# Patient Record
Sex: FEMALE | Race: WHITE | NOT HISPANIC OR LATINO | Employment: OTHER | URBAN - METROPOLITAN AREA
[De-identification: names, ages, dates, MRNs, and addresses within clinical notes are randomized per-mention and may not be internally consistent; named-entity substitution may affect disease eponyms.]

---

## 2017-03-23 ENCOUNTER — APPOINTMENT (OUTPATIENT)
Dept: LAB | Age: 62
End: 2017-03-23

## 2017-03-23 ENCOUNTER — TRANSCRIBE ORDERS (OUTPATIENT)
Dept: ADMINISTRATIVE | Age: 62
End: 2017-03-23

## 2017-03-23 DIAGNOSIS — Z00.00 ROUTINE GENERAL MEDICAL EXAMINATION AT A HEALTH CARE FACILITY: ICD-10-CM

## 2017-03-23 DIAGNOSIS — Z00.00 ROUTINE GENERAL MEDICAL EXAMINATION AT A HEALTH CARE FACILITY: Primary | ICD-10-CM

## 2017-03-23 PROCEDURE — 36415 COLL VENOUS BLD VENIPUNCTURE: CPT

## 2017-03-23 PROCEDURE — 86787 VARICELLA-ZOSTER ANTIBODY: CPT

## 2017-03-23 PROCEDURE — 86762 RUBELLA ANTIBODY: CPT

## 2017-03-23 PROCEDURE — 86480 TB TEST CELL IMMUN MEASURE: CPT

## 2017-03-23 PROCEDURE — 86765 RUBEOLA ANTIBODY: CPT

## 2017-03-23 PROCEDURE — 86735 MUMPS ANTIBODY: CPT

## 2017-03-24 LAB — RUBV IGG SERPL IA-ACNC: >175 IU/ML

## 2017-03-25 LAB
ANNOTATION COMMENT IMP: NORMAL
GAMMA INTERFERON BACKGROUND BLD IA-ACNC: 0.06 IU/ML
M TB IFN-G BLD-IMP: NEGATIVE
M TB IFN-G CD4+ BCKGRND COR BLD-ACNC: 0.16 IU/ML
M TB IFN-G CD4+ T-CELLS BLD-ACNC: 0.22 IU/ML
MITOGEN IGNF BLD-ACNC: >10 IU/ML
QUANTIFERON-TB GOLD IN TUBE: NORMAL
SERVICE CMNT-IMP: NORMAL

## 2017-03-28 LAB
MEV IGG SER QL: NORMAL
MUV IGG SER QL: NORMAL
VZV IGG SER IA-ACNC: NORMAL

## 2017-04-06 ENCOUNTER — TRANSCRIBE ORDERS (OUTPATIENT)
Dept: ADMINISTRATIVE | Facility: HOSPITAL | Age: 62
End: 2017-04-06

## 2017-04-06 DIAGNOSIS — Z13.820 SCREENING FOR OSTEOPOROSIS: Primary | ICD-10-CM

## 2017-04-13 ENCOUNTER — HOSPITAL ENCOUNTER (OUTPATIENT)
Dept: RADIOLOGY | Age: 62
Discharge: HOME | End: 2017-04-13
Payer: COMMERCIAL

## 2017-04-13 DIAGNOSIS — Z13.820 SCREENING FOR OSTEOPOROSIS: ICD-10-CM

## 2017-04-13 PROCEDURE — 77080 DXA BONE DENSITY AXIAL: CPT

## 2017-05-30 ENCOUNTER — APPOINTMENT (OUTPATIENT)
Dept: LAB | Age: 62
End: 2017-05-30
Attending: PREVENTIVE MEDICINE
Payer: COMMERCIAL

## 2017-05-30 ENCOUNTER — TRANSCRIBE ORDERS (OUTPATIENT)
Dept: ADMINISTRATIVE | Age: 62
End: 2017-05-30

## 2017-05-30 DIAGNOSIS — Z02.1 PRE-EMPLOYMENT HEALTH SCREENING EXAMINATION: ICD-10-CM

## 2017-05-30 DIAGNOSIS — Z02.1 PRE-EMPLOYMENT HEALTH SCREENING EXAMINATION: Primary | ICD-10-CM

## 2017-05-30 LAB — RUBV IGG SERPL IA-ACNC: >175 IU/ML

## 2017-05-30 PROCEDURE — 86762 RUBELLA ANTIBODY: CPT

## 2017-05-30 PROCEDURE — 86735 MUMPS ANTIBODY: CPT

## 2017-05-30 PROCEDURE — 86480 TB TEST CELL IMMUN MEASURE: CPT

## 2017-05-30 PROCEDURE — 86787 VARICELLA-ZOSTER ANTIBODY: CPT

## 2017-05-30 PROCEDURE — 36415 COLL VENOUS BLD VENIPUNCTURE: CPT

## 2017-05-30 PROCEDURE — 86765 RUBEOLA ANTIBODY: CPT

## 2017-06-01 LAB
ANNOTATION COMMENT IMP: NORMAL
GAMMA INTERFERON BACKGROUND BLD IA-ACNC: 0.03 IU/ML
M TB IFN-G BLD-IMP: NEGATIVE
M TB IFN-G CD4+ BCKGRND COR BLD-ACNC: 0.01 IU/ML
M TB IFN-G CD4+ T-CELLS BLD-ACNC: 0.04 IU/ML
MEV IGG SER QL: NORMAL
MITOGEN IGNF BLD-ACNC: 6.35 IU/ML
MUV IGG SER QL: NORMAL
QUANTIFERON-TB GOLD IN TUBE: NORMAL
SERVICE CMNT-IMP: NORMAL
VZV IGG SER IA-ACNC: NORMAL

## 2017-06-13 ENCOUNTER — TRANSCRIBE ORDERS (OUTPATIENT)
Dept: ADMINISTRATIVE | Age: 62
End: 2017-06-13

## 2017-06-13 ENCOUNTER — APPOINTMENT (OUTPATIENT)
Dept: LAB | Age: 62
End: 2017-06-13
Payer: COMMERCIAL

## 2017-06-13 DIAGNOSIS — E03.9 UNSPECIFIED HYPOTHYROIDISM: ICD-10-CM

## 2017-06-13 DIAGNOSIS — E03.9 UNSPECIFIED HYPOTHYROIDISM: Primary | ICD-10-CM

## 2017-06-13 LAB
ALBUMIN SERPL BCP-MCNC: 3.9 G/DL (ref 3.5–5)
ALP SERPL-CCNC: 153 U/L (ref 46–116)
ALT SERPL W P-5'-P-CCNC: 21 U/L (ref 12–78)
ANION GAP SERPL CALCULATED.3IONS-SCNC: 5 MMOL/L (ref 4–13)
AST SERPL W P-5'-P-CCNC: 17 U/L (ref 5–45)
BACTERIA UR QL AUTO: ABNORMAL /HPF
BASOPHILS # BLD AUTO: 0.01 THOUSANDS/ΜL (ref 0–0.1)
BASOPHILS NFR BLD AUTO: 0 % (ref 0–1)
BILIRUB SERPL-MCNC: 0.9 MG/DL (ref 0.2–1)
BILIRUB UR QL STRIP: NEGATIVE
BUN SERPL-MCNC: 24 MG/DL (ref 5–25)
CALCIUM SERPL-MCNC: 9.5 MG/DL (ref 8.3–10.1)
CHLORIDE SERPL-SCNC: 105 MMOL/L (ref 100–108)
CHOLEST SERPL-MCNC: 211 MG/DL (ref 50–200)
CLARITY UR: ABNORMAL
CO2 SERPL-SCNC: 29 MMOL/L (ref 21–32)
COLOR UR: YELLOW
CREAT SERPL-MCNC: 0.79 MG/DL (ref 0.6–1.3)
EOSINOPHIL # BLD AUTO: 0.12 THOUSAND/ΜL (ref 0–0.61)
EOSINOPHIL NFR BLD AUTO: 2 % (ref 0–6)
ERYTHROCYTE [DISTWIDTH] IN BLOOD BY AUTOMATED COUNT: 13.4 % (ref 11.6–15.1)
GFR SERPL CREATININE-BSD FRML MDRD: >60 ML/MIN/1.73SQ M
GLUCOSE P FAST SERPL-MCNC: 91 MG/DL (ref 65–99)
GLUCOSE UR STRIP-MCNC: NEGATIVE MG/DL
HCT VFR BLD AUTO: 41.4 % (ref 34.8–46.1)
HCV AB SER QL: NORMAL
HDLC SERPL-MCNC: 62 MG/DL (ref 40–60)
HGB BLD-MCNC: 13.8 G/DL (ref 11.5–15.4)
HGB UR QL STRIP.AUTO: NEGATIVE
HYALINE CASTS #/AREA URNS LPF: ABNORMAL /LPF
KETONES UR STRIP-MCNC: NEGATIVE MG/DL
LDLC SERPL CALC-MCNC: 109 MG/DL (ref 0–100)
LDLC SERPL DIRECT ASSAY-MCNC: 118 MG/DL (ref 0–100)
LEUKOCYTE ESTERASE UR QL STRIP: ABNORMAL
LYMPHOCYTES # BLD AUTO: 1.67 THOUSANDS/ΜL (ref 0.6–4.47)
LYMPHOCYTES NFR BLD AUTO: 32 % (ref 14–44)
MCH RBC QN AUTO: 29.6 PG (ref 26.8–34.3)
MCHC RBC AUTO-ENTMCNC: 33.3 G/DL (ref 31.4–37.4)
MCV RBC AUTO: 89 FL (ref 82–98)
MONOCYTES # BLD AUTO: 0.48 THOUSAND/ΜL (ref 0.17–1.22)
MONOCYTES NFR BLD AUTO: 9 % (ref 4–12)
NEUTROPHILS # BLD AUTO: 2.98 THOUSANDS/ΜL (ref 1.85–7.62)
NEUTS SEG NFR BLD AUTO: 57 % (ref 43–75)
NITRITE UR QL STRIP: NEGATIVE
NON-SQ EPI CELLS URNS QL MICRO: ABNORMAL /HPF
NRBC BLD AUTO-RTO: 0 /100 WBCS
PH UR STRIP.AUTO: 6 [PH] (ref 4.5–8)
PLATELET # BLD AUTO: 284 THOUSANDS/UL (ref 149–390)
PMV BLD AUTO: 10.2 FL (ref 8.9–12.7)
POTASSIUM SERPL-SCNC: 4.6 MMOL/L (ref 3.5–5.3)
PROT SERPL-MCNC: 7.5 G/DL (ref 6.4–8.2)
PROT UR STRIP-MCNC: NEGATIVE MG/DL
RBC # BLD AUTO: 4.67 MILLION/UL (ref 3.81–5.12)
RBC #/AREA URNS AUTO: ABNORMAL /HPF
SODIUM SERPL-SCNC: 139 MMOL/L (ref 136–145)
SP GR UR STRIP.AUTO: 1.02 (ref 1–1.03)
TRIGL SERPL-MCNC: 201 MG/DL
TSH SERPL DL<=0.05 MIU/L-ACNC: 3.18 UIU/ML (ref 0.36–3.74)
UROBILINOGEN UR QL STRIP.AUTO: 0.2 E.U./DL
WBC # BLD AUTO: 5.27 THOUSAND/UL (ref 4.31–10.16)
WBC #/AREA URNS AUTO: ABNORMAL /HPF

## 2017-06-13 PROCEDURE — 81001 URINALYSIS AUTO W/SCOPE: CPT | Performed by: INTERNAL MEDICINE

## 2017-06-13 PROCEDURE — 86803 HEPATITIS C AB TEST: CPT

## 2017-06-13 PROCEDURE — 85025 COMPLETE CBC W/AUTO DIFF WBC: CPT

## 2017-06-13 PROCEDURE — 36415 COLL VENOUS BLD VENIPUNCTURE: CPT

## 2017-06-13 PROCEDURE — 80053 COMPREHEN METABOLIC PANEL: CPT

## 2017-06-13 PROCEDURE — 84443 ASSAY THYROID STIM HORMONE: CPT

## 2017-06-13 PROCEDURE — 80061 LIPID PANEL: CPT

## 2017-06-13 PROCEDURE — 83721 ASSAY OF BLOOD LIPOPROTEIN: CPT

## 2017-11-14 ENCOUNTER — GENERIC CONVERSION - ENCOUNTER (OUTPATIENT)
Dept: OTHER | Facility: OTHER | Age: 62
End: 2017-11-14

## 2017-11-14 ENCOUNTER — HOSPITAL ENCOUNTER (OUTPATIENT)
Dept: RADIOLOGY | Facility: HOSPITAL | Age: 62
Discharge: HOME | End: 2017-11-14
Attending: ORTHOPAEDIC SURGERY
Payer: COMMERCIAL

## 2017-11-14 ENCOUNTER — TRANSCRIBE ORDERS (OUTPATIENT)
Dept: ADMINISTRATIVE | Facility: HOSPITAL | Age: 62
End: 2017-11-14

## 2017-11-14 DIAGNOSIS — M25.561 PAIN IN RIGHT KNEE: ICD-10-CM

## 2017-11-14 DIAGNOSIS — T84.023A: Primary | ICD-10-CM

## 2017-11-14 DIAGNOSIS — M19.079 PRIMARY OSTEOARTHRITIS, UNSPECIFIED ANKLE AND FOOT: ICD-10-CM

## 2017-11-14 DIAGNOSIS — M21.42 ACQUIRED LEFT FLAT FOOT: ICD-10-CM

## 2017-11-14 DIAGNOSIS — T84.023A DISPLACEMENT OF INTERNAL LEFT KNEE PROSTHESIS, INITIAL ENCOUNTER (HCC): Primary | ICD-10-CM

## 2017-11-14 PROCEDURE — 73560 X-RAY EXAM OF KNEE 1 OR 2: CPT

## 2017-11-15 ENCOUNTER — HOSPITAL ENCOUNTER (OUTPATIENT)
Dept: MAMMOGRAPHY | Facility: MEDICAL CENTER | Age: 62
Discharge: HOME | End: 2017-11-15
Payer: COMMERCIAL

## 2017-11-15 DIAGNOSIS — Z12.31 ENCOUNTER FOR SCREENING MAMMOGRAM FOR MALIGNANT NEOPLASM OF BREAST: ICD-10-CM

## 2017-11-15 PROCEDURE — 77063 BREAST TOMOSYNTHESIS BI: CPT

## 2017-11-15 PROCEDURE — G0202 SCR MAMMO BI INCL CAD: HCPCS

## 2017-11-20 ENCOUNTER — APPOINTMENT (OUTPATIENT)
Dept: LAB | Facility: MEDICAL CENTER | Age: 62
End: 2017-11-20
Payer: COMMERCIAL

## 2017-11-20 DIAGNOSIS — M25.561 PAIN IN RIGHT KNEE: ICD-10-CM

## 2017-11-20 LAB
CRP SERPL QL: <3 MG/L
ERYTHROCYTE [SEDIMENTATION RATE] IN BLOOD: 14 MM/HOUR (ref 0–20)

## 2017-11-20 PROCEDURE — 36415 COLL VENOUS BLD VENIPUNCTURE: CPT

## 2017-11-20 PROCEDURE — 86140 C-REACTIVE PROTEIN: CPT

## 2017-11-20 PROCEDURE — 85652 RBC SED RATE AUTOMATED: CPT

## 2017-11-28 ENCOUNTER — TRANSCRIBE ORDERS (OUTPATIENT)
Dept: RADIOLOGY | Facility: HOSPITAL | Age: 62
End: 2017-11-28

## 2017-11-28 ENCOUNTER — HOSPITAL ENCOUNTER (OUTPATIENT)
Dept: RADIOLOGY | Facility: HOSPITAL | Age: 62
Discharge: HOME | End: 2017-11-28
Payer: COMMERCIAL

## 2017-11-28 DIAGNOSIS — M79.672 LEFT FOOT PAIN: ICD-10-CM

## 2017-11-28 PROCEDURE — 77002 NEEDLE LOCALIZATION BY XRAY: CPT

## 2017-11-28 PROCEDURE — 20605 DRAIN/INJ JOINT/BURSA W/O US: CPT

## 2017-11-28 RX ORDER — LIDOCAINE HYDROCHLORIDE 10 MG/ML
20 INJECTION, SOLUTION INFILTRATION; PERINEURAL
Status: COMPLETED | OUTPATIENT
Start: 2017-11-28 | End: 2017-11-28

## 2017-11-28 RX ORDER — METHYLPREDNISOLONE ACETATE 80 MG/ML
80 INJECTION, SUSPENSION INTRA-ARTICULAR; INTRALESIONAL; INTRAMUSCULAR; SOFT TISSUE
Status: COMPLETED | OUTPATIENT
Start: 2017-11-28 | End: 2017-11-28

## 2017-11-28 RX ORDER — BUPIVACAINE HYDROCHLORIDE 2.5 MG/ML
30 INJECTION, SOLUTION EPIDURAL; INFILTRATION; INTRACAUDAL
Status: COMPLETED | OUTPATIENT
Start: 2017-11-28 | End: 2017-11-28

## 2017-11-28 RX ADMIN — BUPIVACAINE HYDROCHLORIDE 2 ML: 2.5 INJECTION, SOLUTION EPIDURAL; INFILTRATION; INTRACAUDAL at 09:25

## 2017-11-28 RX ADMIN — IOHEXOL 3 ML: 300 INJECTION, SOLUTION INTRAVENOUS at 09:25

## 2017-11-28 RX ADMIN — LIDOCAINE HYDROCHLORIDE 2 ML: 10 INJECTION, SOLUTION INFILTRATION; PERINEURAL at 09:25

## 2017-11-28 RX ADMIN — METHYLPREDNISOLONE ACETATE 80 MG: 80 INJECTION, SUSPENSION INTRA-ARTICULAR; INTRALESIONAL; INTRAMUSCULAR; SOFT TISSUE at 09:25

## 2017-12-04 ENCOUNTER — HOSPITAL ENCOUNTER (OUTPATIENT)
Dept: NUCLEAR MEDICINE | Facility: HOSPITAL | Age: 62
Discharge: HOME | End: 2017-12-04
Attending: ORTHOPAEDIC SURGERY
Payer: COMMERCIAL

## 2017-12-04 ENCOUNTER — GENERIC CONVERSION - ENCOUNTER (OUTPATIENT)
Dept: OTHER | Facility: OTHER | Age: 62
End: 2017-12-04

## 2017-12-04 DIAGNOSIS — T84.023A DISPLACEMENT OF INTERNAL LEFT KNEE PROSTHESIS, INITIAL ENCOUNTER (HCC): ICD-10-CM

## 2017-12-04 PROCEDURE — A9503 TC99M MEDRONATE: HCPCS

## 2017-12-04 PROCEDURE — 78315 BONE IMAGING 3 PHASE: CPT

## 2017-12-19 ENCOUNTER — ALLSCRIPTS OFFICE VISIT (OUTPATIENT)
Dept: OTHER | Facility: OTHER | Age: 62
End: 2017-12-19

## 2017-12-19 DIAGNOSIS — T84.023A: ICD-10-CM

## 2017-12-20 NOTE — PROGRESS NOTES
Assessment  1  Acute pain of right knee (529 46) (M25 561)   2  Mechanical complication of internal orthopedic device, implant or graft, initial encounter (435 40) (A53 373H)  Medical complication of internal orthopedic device in the right knee this patient she has pain and dysfunction  I think revision knee replacement surgeries indicated  After review the risks and benefits, consent was established the above-mentioned surgery  No guarantees are given  I would welcome the opportunity see this patient back in the office is a postoperative patient   Plan  Instability of internal left knee prosthesis, initial encounter    · (1) CBC/PLT/DIFF; Status:Active - Retrospective Authorization; Requestedfor:65Wgc6908;    · (1) COMPREHENSIVE METABOLIC PANEL; Status:Active - Retrospective Authorization; Requested for:29Gpu9749;    · (1) HEMOGLOBIN A1C; Status:Active - Retrospective Authorization; Requestedfor:05Tpy1582;    · (1) PT WITH INR; Status:Active - Retrospective Authorization; Requested for:96Ktd7633;   · (1) TYPE & SCREEN; Status:Active - Retrospective Authorization; Requestedfor:04Pyn3318;   currently receiving a biologic? : No  the patient pregnant or have they been in the last 90 days? : No  the patient been transfused in the last 3 months? : No  of Surgery : 22Jan2018  or PreOp : PreOp   · (1) URINALYSIS (will reflex a microscopy if leukocytes, occult blood, protein or nitritesare not within normal limits); Status:Active - Retrospective Authorization; Requestedfor:41Wpo9055;    · ECG 12-LEAD; Status:Hold For - Scheduling,Retrospective Authorization;  Requestedfor:71Zkm1093;    · *1 - [facility]  *  Status: Active - Retrospective Authorization Requested for: 47CWD1940  Care Summary provided  : Yes  Mechanical complication of internal orthopedic device, implant or graft, initial encounter    · Follow Up After Surgery Evaluation and Treatment  Follow-up  Status: Hold For -Scheduling  Requested for: 85ELV6168   · Schedule Surgery Treatment  Procedure  Status: Hold For - Scheduling  Requested for:77Gco4062    History of Present Illness  HPI: 15-year-old female now a few years following right total knee replacement, continue describe pain in the right knee, as well as giving way  She describes no febrile episodes, she has undergone bone scanning at my request      Review of Systems   Constitutional: No fever, no chills, feels well, no tiredness, no recent weight gain or loss  Eyes: No complaints of eyesight problems, no red eyes  ENT: no loss of hearing, no nosebleeds, no sore throat  Cardiovascular: No complaints of chest pain, no palpitations, no leg claudication or lower extremity edema  Respiratory: no compliants of shortness of breath, no wheezing, no cough  Gastrointestinal: no complaints of abdominal pain, no constipation, no nausea or diarrhea, no vomiting, no bloody stools  Genitourinary: no complaints of dysuria, no incontinence  Musculoskeletal: as noted in HPI  Integumentary: no complaints of skin rash or lesion, no itching or dry skin, no skin wounds  Neurological: no complaints of headache, no confusion, no numbness or tingling, no dizziness  Endocrine: No complaints of muscle weakness, no feelings of weakness, no frequent urination, no excessive thirst   Psychiatric: No suicidal thoughts, no anxiety, no feelings of depression  Active Problems  1  Acute pain of right knee (719 46) (M25 561)   2  Arthritis, midfoot (716 97) (M19 079)   3  De Quervain's tenosynovitis (727 04) (M65 4)   4  Instability of internal left knee prosthesis, initial encounter (047 42) (T84 023A)   5  Left Peroneal Tendonitis (726 79)   6  Pain in left foot (729 5) (M79 672)   7  Pain of right thumb (729 5) (M79 644)   8  Posterior tibial tendon dysfunction (PTTD) of left lower extremity (734) (M21 42)   9   Reported Hx Of Knee Replacement    Past Medical History   · History of diverticulitis of colon (V12 79) (Z87 19)   · History of Primary osteoarthritis of right knee (715 16) (M17 11)    The active problems and past medical history were reviewed and updated today  Surgical History   · Reported Hx Of Knee Replacement    The surgical history was reviewed and updated today  Family History  Mother    · Family history of Arthritis (V17 7)   · Family history of Osteoporosis (V17 81)  Family History    · Family history of Prostate Cancer (V16 42)    Social History   · Being A Social Drinker   · Current Diet High In Sugar Includes High Sugar Beverages   · Daily Tea Consumption (___ Cups/Day)    Current Meds   1  Ativan 0 5 MG Oral Tablet; Therapy: (Rosanna Kahn) to Recorded   2  Ibuprofen 600 MG Oral Tablet; Therapy: 63GLF4516 to (Last Rx:54Rva8897)  Requested for: 10RDD9104 Ordered   3  Keflex TABS; Therapy: (Rosanna Kahn) to Recorded   4  Levothyroxine Sodium 50 MCG Oral Tablet; Therapy: 01LJI0501 to (Evaluate:73Ger3156) Recorded   5  LORazepam 1 MG Oral Tablet; Therapy: 34PAZ4412 to (Evaluate:24Mtq4308) Recorded   6  Premarin TABS; Therapy: (Rosanna Kahn) to Recorded   7  Synthroid TABS; Therapy: (Rosanna Kahn) to Recorded   8  Voltaren 1 % Transdermal Gel; APPLY SPARINGLY TO AFFECTED AREA(S) ONCE DAILY; Therapy: 02DET4459 to (Last Rx:06Jun2014)  Requested for: 06Jun2014 Ordered    Allergies  1  Codeine Sulfate TABS   2  Demerol SOLN   3  Percocet TABS    Vitals  Signs   Heart Rate: 78  Systolic: 179  Diastolic: 84  Height: 5 ft 2 in  Weight: 157 lb 4 oz  BMI Calculated: 28 76  BSA Calculated: 1 73    Physical Exam  Gait pattern has very little antalgia  Right thighs devoid of atrophy  Right knee with a healed anterior incision  Extension is good flexion good  There is no warmth  There is no effusion  Her 7Â° of valgus given full extension and mid flexion  Calf compartments are soft and supple    Toes are warm, sensate, mobile      Results/Data  I personally reviewed the films/images/results in the office today  My interpretation follows  Diagnostic Review Bone scan review of the right knee shows increased uptake in the medial femur, and medial proximal tibia of the right knee  Surgery Scheduling Form  Surgery Schedule Form [facility] Standard:  Location: [place]   Confirmation Number:   PROCEDURE DETAILS   Procedure Date:   Requested Time:  Surgeon: pramod   Co-Surgeon:   [facility] [address] Required:  Procedure: Revision right total knee replacement  Bed: Med/Surg Bed Required  Laterality/Level: Right  Case Length: 120  Anticipated frozen section:  Anesthesia: spinal/adductor    Procedure Codes: 91066  Pre-op diagnosis: Mechanical complication right total knee replacement   Diagnosis Code(s):   Equipment:  Equipment Needs: depuy revision   Implants:    Is the patient able to walk up a flight of stairs, walk up a hill or do heavy housework WITHOUT having chest pain or shortness of breath? YES    REGISTRATION & FINANCIAL CLEARANCE   FA Initials:   Insurance:   Policy Number: Group Number:     PRE-ADMISSION TESTING/CLINICAL INFORMATION   PAT Location:       CONSULTS NEEDED:   Anesthesia Consult:   Medical Consult:   Cardiac Consult:    ALLERGIES AND ALERTS    Latex Allergy: NO  Penicillin Allergy: NO  Malignant Hyperthermia: NO  Diabetic Patient: NO     ERAS Patient:   COMMENTS   Scheduling Information Provided By:     CASE MANAGEMENT:  Discharge Needs: Complex Medication Follow-Up-- and-- Lovenox Management      Future Appointments    Date/Time Provider Specialty Site   01/22/2018 09:30 AM MARIPOSA Ann  [address] [place] [address]   01/30/2018 01:30 PM MARIPOSA Ann  Orthopedic Surgery [facility] [place]   02/06/2018 11:00 AM MARIPOSA Ann   Orthopedic Surgery [address]     Signatures   Electronically signed by : MARIPOSA Rascon ; Dec 19 2017  4:56PM EST                       (Author)

## 2018-01-04 ENCOUNTER — APPOINTMENT (OUTPATIENT)
Dept: PHYSICAL THERAPY | Facility: MEDICAL CENTER | Age: 63
End: 2018-01-04
Payer: COMMERCIAL

## 2018-01-04 ENCOUNTER — GENERIC CONVERSION - ENCOUNTER (OUTPATIENT)
Dept: OBGYN CLINIC | Facility: HOSPITAL | Age: 63
End: 2018-01-04

## 2018-01-04 DIAGNOSIS — T84.023A: ICD-10-CM

## 2018-01-04 PROCEDURE — 97161 PT EVAL LOW COMPLEX 20 MIN: CPT

## 2018-01-04 PROCEDURE — G8991 OTHER PT/OT GOAL STATUS: HCPCS

## 2018-01-04 PROCEDURE — G8990 OTHER PT/OT CURRENT STATUS: HCPCS

## 2018-01-06 ENCOUNTER — OFFICE VISIT (OUTPATIENT)
Dept: URGENT CARE | Facility: MEDICAL CENTER | Age: 63
End: 2018-01-06
Payer: COMMERCIAL

## 2018-01-06 PROCEDURE — G0382 LEV 3 HOSP TYPE B ED VISIT: HCPCS

## 2018-01-06 PROCEDURE — S9083 URGENT CARE CENTER GLOBAL: HCPCS

## 2018-01-08 NOTE — PROGRESS NOTES
Assessment   1  Cough (786 2) (R05)   2  Acute bronchitis (466 0) (J20 9)    Plan   Acute bronchitis    · Azithromycin 250 MG Oral Tablet; TAKE 2 TABLETS ON DAY 1 THEN TAKE 1    TABLET A DAY FOR 4 DAYS   · PredniSONE 50 MG Oral Tablet; TAKE 1 TABLET DAILY AS DIRECTED   · Ventolin  (90 Base) MCG/ACT Inhalation Aerosol Solution; INHALE 1    PUFF EVERY 4 HOURS AS NEEDED  Unlinked    · Keflex TABS    Discussion/Summary   Discussion Summary: You symptoms are likely consistent with acute bronchitis  Use given medications as per label instructions  Keep a close eye on the symptoms especially for worsening cough or worsening fever or both and if any, follow up with primary care doctor for evaluation in 5-7 days or sooner  Medication Side Effects Reviewed: Possible side effects of new medications were reviewed with the patient/guardian today  Understands and agrees with treatment plan: The treatment plan was reviewed with the patient/guardian  The patient/guardian understands and agrees with the treatment plan      Chief Complaint   1  Cold Symptoms  Chief Complaint Free Text Note Form: Pt with complaints of dry, non productive cough, post nasal drip for 3 days  Denies fever, chills or bodyaches  History of Present Illness   HPI: Coughing all the time which is nonproductive also present at night and unable to sleep at night secondary to cough  No fever chills or night sweats  Hospital Based Practices Required Assessment:      Pain Assessment      the patient states they do not have pain  (on a scale of 0 to 10, the patient rates the pain at 0 )      Abuse And Domestic Violence Screen       Yes, the patient is safe at home  -- The patient states no one is hurting them  Depression And Suicide Screen  No, the patient has not had thoughts of hurting themself  No, the patient has not felt depressed in the past 7 days  Prefered Language is  english  Primary Language is  english      Cold Symptoms: Caryn Zapata presents with complaints of cold symptoms  Associated symptoms include nasal congestion-- and-- dry cough, but-- no runny nose,-- no scratchy throat,-- no sore throat,-- no hoarseness,-- no productive cough,-- no facial pressure,-- no facial pain,-- no headache,-- no plugged ear(s),-- no ear pain,-- no swollen lymph nodes,-- no wheezing,-- no shortness of breath,-- no fatigue,-- no weakness,-- no nausea,-- no vomiting,-- no diarrhea,-- no fever-- and-- no chills  Review of Systems   Focused-Female:      Constitutional: No fever, no chills, feels well, no tiredness, no recent weight gain or loss  Active Problems   1  Acute pain of right knee (719 46) (M25 561)   2  Arthritis, midfoot (716 97) (M19 079)   3  De Quervain's tenosynovitis (727 04) (M65 4)   4  Instability of internal left knee prosthesis, initial encounter (996 42) (T84 023A)   5  Left Peroneal Tendonitis (726 79)   6  Mechanical complication of internal orthopedic device, implant or graft, initial encounter     (996 40) (T84 498A)   7  Pain in left foot (729 5) (M79 672)   8  Pain of right thumb (729 5) (M79 644)   9  Posterior tibial tendon dysfunction (PTTD) of left lower extremity (734) (M21 42)   10  Reported Hx Of Knee Replacement    Past Medical History   1  History of diverticulitis of colon (V12 79) (Z87 19)   2  History of Primary osteoarthritis of right knee (715 16) (M17 11)    Family History   Mother    1  Family history of Arthritis (V17 7)   2  Family history of Osteoporosis (V17 81)  Family History    3  Family history of Prostate Cancer (V16 42)    Social History    · Being A Social Drinker   · Current Diet High In Sugar Includes High Sugar Beverages   · Daily Tea Consumption (___ Cups/Day)    Surgical History   1  Reported Hx Of Knee Replacement    Current Meds    1  Folic Acid 1 MG Oral Tablet; Therapy: ((47) 6672 0525) to Recorded   2  Ibuprofen 600 MG Oral Tablet;      Therapy: 48OYT5331 to (Last Rx:12Bqc4853)  Requested for: 73Hqz2471 Ordered   3  Iron 325 (65 Fe) MG Oral Tablet; Therapy: (JAQFXQBZ:75YSX2332) to Recorded   4  Keflex TABS; Therapy: (Marquis Nolan) to Recorded   5  Levothyroxine Sodium 50 MCG Oral Tablet; Therapy: 08EVR3299 to (Evaluate:08Hkg5508) Recorded   6  Melatonin 1 MG Oral Tablet; Therapy: (Recorded:06Jan2018) to Recorded    Allergies   1  Codeine Sulfate TABS   2  Demerol SOLN   3  Percocet TABS    Vitals   Signs   Recorded: 11OXF6313 08:08AM   Temperature: 98 F  Heart Rate: 90  Respiration: 20  Systolic: 803  Diastolic: 62  Height: 5 ft 2 in  Weight: 157 lb 12 8 oz  BMI Calculated: 28 86  BSA Calculated: 1 73  O2 Saturation: 99  Pain Scale: 0    Physical Exam        Constitutional      General appearance: No acute distress, well appearing and well nourished  Ears, Nose, Mouth, and Throat      External inspection of ears and nose: Normal        Otoscopic examination: Tympanic membranes translucent with normal light reflex  Canals patent without erythema  Nasal mucosa, septum, and turbinates: Abnormal   There was a mucoid discharge from both nares  Oropharynx: Normal with no erythema, edema, exudate or lesions  Pulmonary      Respiratory effort: No increased work of breathing or signs of respiratory distress  Auscultation of lungs: Abnormal  -- (Patient has repeated bronchospasm with deep breathing and end expiratory wheeze noticed scattered in all over the lungs bilaterally  No other adventitious findings noticed on examination)      Cardiovascular      Auscultation of heart: Normal rate and rhythm, normal S1 and S2, without murmurs  Future Appointments      Date/Time Provider Specialty Site   01/22/2018 09:30 AM MARIPOSA Mendez  [address] [place] [address] OR   01/30/2018 01:30 PM MARIPOSA Mendez   Orthopedic Surgery [facility] [place]   02/06/2018 11:00 AM Demetri MARIPOSA Cohen   Orthopedic [address] [place] [address]     Signatures    Electronically signed by : MRAIPOSA Albert ; Jan 7 2018 12:01PM EST                       (Author)

## 2018-01-15 ENCOUNTER — APPOINTMENT (OUTPATIENT)
Dept: PREADMISSION TESTING | Facility: HOSPITAL | Age: 63
End: 2018-01-15
Payer: COMMERCIAL

## 2018-01-15 ENCOUNTER — GENERIC CONVERSION - ENCOUNTER (OUTPATIENT)
Dept: OTHER | Facility: OTHER | Age: 63
End: 2018-01-15

## 2018-01-15 DIAGNOSIS — T84.023A: ICD-10-CM

## 2018-01-15 LAB
ABO GROUP BLD: NORMAL
ALBUMIN SERPL BCP-MCNC: 3.6 G/DL (ref 3.5–5)
ALP SERPL-CCNC: 125 U/L (ref 46–116)
ALT SERPL W P-5'-P-CCNC: 28 U/L (ref 12–78)
ANION GAP SERPL CALCULATED.3IONS-SCNC: 7 MMOL/L (ref 4–13)
AST SERPL W P-5'-P-CCNC: 15 U/L (ref 5–45)
BACTERIA UR QL AUTO: ABNORMAL /HPF
BASOPHILS # BLD MANUAL: 0 THOUSAND/UL (ref 0–0.1)
BASOPHILS NFR MAR MANUAL: 0 % (ref 0–1)
BILIRUB SERPL-MCNC: 0.68 MG/DL (ref 0.2–1)
BILIRUB UR QL STRIP: NEGATIVE
BLD GP AB SCN SERPL QL: NEGATIVE
BUN SERPL-MCNC: 20 MG/DL (ref 5–25)
CALCIUM SERPL-MCNC: 9.6 MG/DL (ref 8.3–10.1)
CAOX CRY URNS QL MICRO: ABNORMAL /HPF
CHLORIDE SERPL-SCNC: 103 MMOL/L (ref 100–108)
CLARITY UR: ABNORMAL
CO2 SERPL-SCNC: 31 MMOL/L (ref 21–32)
COLOR UR: YELLOW
CREAT SERPL-MCNC: 0.7 MG/DL (ref 0.6–1.3)
EOSINOPHIL # BLD MANUAL: 0.31 THOUSAND/UL (ref 0–0.4)
EOSINOPHIL NFR BLD MANUAL: 4 % (ref 0–6)
ERYTHROCYTE [DISTWIDTH] IN BLOOD BY AUTOMATED COUNT: 14.3 % (ref 11.6–15.1)
EST. AVERAGE GLUCOSE BLD GHB EST-MCNC: 128 MG/DL
GFR SERPL CREATININE-BSD FRML MDRD: 93 ML/MIN/1.73SQ M
GLUCOSE P FAST SERPL-MCNC: 97 MG/DL (ref 65–99)
GLUCOSE UR STRIP-MCNC: NEGATIVE MG/DL
HBA1C MFR BLD: 6.1 % (ref 4.2–6.3)
HCT VFR BLD AUTO: 42.5 % (ref 34.8–46.1)
HGB BLD-MCNC: 13.9 G/DL (ref 11.5–15.4)
HGB UR QL STRIP.AUTO: NEGATIVE
INR PPP: 0.93 (ref 0.86–1.16)
KETONES UR STRIP-MCNC: NEGATIVE MG/DL
LEUKOCYTE ESTERASE UR QL STRIP: ABNORMAL
LYMPHOCYTES # BLD AUTO: 1.86 THOUSAND/UL (ref 0.6–4.47)
LYMPHOCYTES # BLD AUTO: 24 % (ref 14–44)
MCH RBC QN AUTO: 29.3 PG (ref 26.8–34.3)
MCHC RBC AUTO-ENTMCNC: 32.7 G/DL (ref 31.4–37.4)
MCV RBC AUTO: 90 FL (ref 82–98)
MONOCYTES # BLD AUTO: 0.62 THOUSAND/UL (ref 0–1.22)
MONOCYTES NFR BLD: 8 % (ref 4–12)
MYELOCYTES NFR BLD MANUAL: 3 % (ref 0–1)
NEUTROPHILS # BLD MANUAL: 4.66 THOUSAND/UL (ref 1.85–7.62)
NEUTS SEG NFR BLD AUTO: 60 % (ref 43–75)
NITRITE UR QL STRIP: NEGATIVE
NON-SQ EPI CELLS URNS QL MICRO: ABNORMAL /HPF
NRBC BLD AUTO-RTO: 0 /100 WBCS
PH UR STRIP.AUTO: 5.5 [PH] (ref 4.5–8)
PLATELET # BLD AUTO: 308 THOUSANDS/UL (ref 149–390)
PLATELET BLD QL SMEAR: ADEQUATE
PMV BLD AUTO: 9.8 FL (ref 8.9–12.7)
POTASSIUM SERPL-SCNC: 4.5 MMOL/L (ref 3.5–5.3)
PROT SERPL-MCNC: 7.3 G/DL (ref 6.4–8.2)
PROT UR STRIP-MCNC: NEGATIVE MG/DL
PROTHROMBIN TIME: 12.5 SECONDS (ref 12.1–14.4)
RBC # BLD AUTO: 4.75 MILLION/UL (ref 3.81–5.12)
RBC #/AREA URNS AUTO: ABNORMAL /HPF
RBC MORPH BLD: NORMAL
RH BLD: POSITIVE
SODIUM SERPL-SCNC: 141 MMOL/L (ref 136–145)
SP GR UR STRIP.AUTO: 1.02 (ref 1–1.03)
SPECIMEN EXPIRATION DATE: NORMAL
UROBILINOGEN UR QL STRIP.AUTO: 0.2 E.U./DL
VARIANT LYMPHS # BLD AUTO: 1 %
WBC # BLD AUTO: 7.77 THOUSAND/UL (ref 4.31–10.16)
WBC #/AREA URNS AUTO: ABNORMAL /HPF

## 2018-01-15 PROCEDURE — 80053 COMPREHEN METABOLIC PANEL: CPT

## 2018-01-15 PROCEDURE — 85610 PROTHROMBIN TIME: CPT

## 2018-01-15 PROCEDURE — 85027 COMPLETE CBC AUTOMATED: CPT

## 2018-01-15 PROCEDURE — 36415 COLL VENOUS BLD VENIPUNCTURE: CPT

## 2018-01-15 PROCEDURE — 85007 BL SMEAR W/DIFF WBC COUNT: CPT

## 2018-01-15 PROCEDURE — 81001 URINALYSIS AUTO W/SCOPE: CPT

## 2018-01-15 PROCEDURE — 86901 BLOOD TYPING SEROLOGIC RH(D): CPT

## 2018-01-15 PROCEDURE — 86850 RBC ANTIBODY SCREEN: CPT

## 2018-01-15 PROCEDURE — 86900 BLOOD TYPING SEROLOGIC ABO: CPT

## 2018-01-15 PROCEDURE — 83036 HEMOGLOBIN GLYCOSYLATED A1C: CPT

## 2018-01-15 RX ORDER — ASCORBIC ACID 500 MG
500 TABLET ORAL 2 TIMES DAILY
COMMUNITY
End: 2018-03-08 | Stop reason: ALTCHOICE

## 2018-01-15 RX ORDER — FERROUS SULFATE 325(65) MG
325 TABLET ORAL 2 TIMES DAILY WITH MEALS
COMMUNITY
End: 2018-03-08 | Stop reason: ALTCHOICE

## 2018-01-15 RX ORDER — LEVOTHYROXINE SODIUM 0.07 MG/1
75 TABLET ORAL DAILY
COMMUNITY
End: 2018-06-28 | Stop reason: DRUGHIGH

## 2018-01-15 RX ORDER — ALBUTEROL SULFATE 90 UG/1
2 AEROSOL, METERED RESPIRATORY (INHALATION) EVERY 6 HOURS PRN
COMMUNITY
End: 2018-01-30 | Stop reason: ALTCHOICE

## 2018-01-15 RX ORDER — FOLIC ACID 1 MG/1
TABLET ORAL DAILY
COMMUNITY
End: 2018-03-08 | Stop reason: ALTCHOICE

## 2018-01-15 NOTE — PRE-PROCEDURE INSTRUCTIONS
Pre-Surgery Instructions:   Medication Instructions    albuterol (PROVENTIL HFA,VENTOLIN HFA) 90 mcg/act inhaler Instructed patient per Anesthesia Guidelines   ascorbic acid (VITAMIN C) 500 mg tablet Patient was instructed by Physician and understands   Calcium-Vitamin D-Vitamin K (VIACTIV PO) Instructed patient per Anesthesia Guidelines   DiphenhydrAMINE HCl (BENADRYL PO) Instructed patient per Anesthesia Guidelines   ferrous sulfate 325 (65 Fe) mg tablet Patient was instructed by Physician and understands   folic acid (FOLVITE) 1 mg tablet Patient was instructed by Physician and understands   levothyroxine 75 mcg tablet Instructed patient per Anesthesia Guidelines   MELATONIN PO Instructed patient per Anesthesia Guidelines   Multiple Vitamins-Minerals (IMMUNE SUPPORT PO) Instructed patient per Anesthesia Guidelines   Probiotic Product (PROBIOTIC DAILY PO) Instructed patient per Anesthesia Guidelines  Patient had PAT appt  Medication list reviewed & instructed  As of 1 15 18 pt to stop probiotic, MV, calcium  Instructed on tylenol only  Pt will continue folic acid, vit c, iron until 1 21 18  On am DOS pt to take levothyroxine  Showering instructions & cloths given by office  Pt states she has soap  Spirometer given to pt  Instructions given to pt on all, understands  No further questions  Callback number given

## 2018-01-21 ENCOUNTER — ANESTHESIA EVENT (OUTPATIENT)
Dept: PERIOP | Facility: HOSPITAL | Age: 63
DRG: 467 | End: 2018-01-21
Payer: COMMERCIAL

## 2018-01-22 ENCOUNTER — HOSPITAL ENCOUNTER (INPATIENT)
Facility: HOSPITAL | Age: 63
LOS: 2 days | Discharge: HOME | DRG: 467 | End: 2018-01-24
Attending: ORTHOPAEDIC SURGERY | Admitting: ORTHOPAEDIC SURGERY
Payer: COMMERCIAL

## 2018-01-22 ENCOUNTER — TRANSCRIBE ORDERS (OUTPATIENT)
Dept: ADMISSIONS | Facility: HOSPITAL | Age: 63
End: 2018-01-22

## 2018-01-22 ENCOUNTER — ANESTHESIA (OUTPATIENT)
Dept: PERIOP | Facility: HOSPITAL | Age: 63
DRG: 467 | End: 2018-01-22
Payer: COMMERCIAL

## 2018-01-22 VITALS
HEART RATE: 78 BPM | DIASTOLIC BLOOD PRESSURE: 84 MMHG | SYSTOLIC BLOOD PRESSURE: 133 MMHG | HEIGHT: 62 IN | BODY MASS INDEX: 28.94 KG/M2 | WEIGHT: 157.25 LBS

## 2018-01-22 VITALS
BODY MASS INDEX: 29.15 KG/M2 | DIASTOLIC BLOOD PRESSURE: 78 MMHG | HEIGHT: 62 IN | HEART RATE: 77 BPM | WEIGHT: 158.38 LBS | SYSTOLIC BLOOD PRESSURE: 125 MMHG

## 2018-01-22 DIAGNOSIS — Z96.651 HISTORY OF TOTAL RIGHT KNEE REPLACEMENT: Primary | ICD-10-CM

## 2018-01-22 PROBLEM — Z96.659 HISTORY OF TOTAL KNEE ARTHROPLASTY: Status: ACTIVE | Noted: 2018-01-22

## 2018-01-22 PROCEDURE — C1776 JOINT DEVICE (IMPLANTABLE): HCPCS | Performed by: ORTHOPAEDIC SURGERY

## 2018-01-22 PROCEDURE — 0SRC0J9 REPLACEMENT OF RIGHT KNEE JOINT WITH SYNTHETIC SUBSTITUTE, CEMENTED, OPEN APPROACH: ICD-10-PCS | Performed by: ORTHOPAEDIC SURGERY

## 2018-01-22 PROCEDURE — 0SPC0JZ REMOVAL OF SYNTHETIC SUBSTITUTE FROM RIGHT KNEE JOINT, OPEN APPROACH: ICD-10-PCS | Performed by: ORTHOPAEDIC SURGERY

## 2018-01-22 PROCEDURE — 97163 PT EVAL HIGH COMPLEX 45 MIN: CPT

## 2018-01-22 PROCEDURE — G8979 MOBILITY GOAL STATUS: HCPCS

## 2018-01-22 PROCEDURE — G8978 MOBILITY CURRENT STATUS: HCPCS

## 2018-01-22 PROCEDURE — C1713 ANCHOR/SCREW BN/BN,TIS/BN: HCPCS | Performed by: ORTHOPAEDIC SURGERY

## 2018-01-22 DEVICE — UNIVERSAL STEM FLUTED 115MM X 10MM: Type: IMPLANTABLE DEVICE | Site: KNEE | Status: FUNCTIONAL

## 2018-01-22 DEVICE — TIBIAL TRAY ROTATING PLATFORM M.B.T. REVISION SIZE 2.5 CEMENTED: Type: IMPLANTABLE DEVICE | Site: KNEE | Status: FUNCTIONAL

## 2018-01-22 DEVICE — P.F.C. SIGMA FEMORAL ADAPTER BOLT +2/-2
Type: IMPLANTABLE DEVICE | Site: KNEE | Status: FUNCTIONAL
Brand: P.F.C. SIGMA

## 2018-01-22 DEVICE — P.F.C. SIGMA FEMORAL ADAPTER 5 DEGREE
Type: IMPLANTABLE DEVICE | Site: KNEE | Status: FUNCTIONAL
Brand: P.F.C. SIGMA

## 2018-01-22 DEVICE — SMARTSET HV HIGH VISCOSITY BONE CEMENT 40G
Type: IMPLANTABLE DEVICE | Site: KNEE | Status: FUNCTIONAL
Brand: SMARTSET

## 2018-01-22 DEVICE — P.F.C. SIGMA DISTAL AUGMENT 2.5 8MM RIGHT
Type: IMPLANTABLE DEVICE | Site: KNEE | Status: FUNCTIONAL
Brand: P.F.C. SIGMA

## 2018-01-22 DEVICE — SIGMA TIBIAL INSERT ROTATING PLATFORM TC3 SIZE 2.5 17.5MM GVF
Type: IMPLANTABLE DEVICE | Site: KNEE | Status: FUNCTIONAL
Brand: SIGMA

## 2018-01-22 DEVICE — P.F.C. SIGMA POSTERIOR AUGMENT COMBO CEMENTED 2.5 4MM
Type: IMPLANTABLE DEVICE | Site: KNEE | Status: FUNCTIONAL
Brand: P.F.C. SIGMA

## 2018-01-22 DEVICE — M.B.T. REVISION METAPHYSEAL SLEEVE POROUS 29MM: Type: IMPLANTABLE DEVICE | Site: KNEE | Status: FUNCTIONAL

## 2018-01-22 DEVICE — SIGMA FEMORAL TC3 CEMENTED 2.5 RIGHT
Type: IMPLANTABLE DEVICE | Site: KNEE | Status: FUNCTIONAL
Brand: SIGMA

## 2018-01-22 RX ORDER — ROPIVACAINE HYDROCHLORIDE 2 MG/ML
INJECTION, SOLUTION EPIDURAL; INFILTRATION; PERINEURAL AS NEEDED
Status: DISCONTINUED | OUTPATIENT
Start: 2018-01-22 | End: 2018-01-22 | Stop reason: SURG

## 2018-01-22 RX ORDER — ACETAMINOPHEN 325 MG/1
975 TABLET ORAL ONCE
Status: COMPLETED | OUTPATIENT
Start: 2018-01-22 | End: 2018-01-22

## 2018-01-22 RX ORDER — MIDAZOLAM HYDROCHLORIDE 1 MG/ML
INJECTION INTRAMUSCULAR; INTRAVENOUS
Status: COMPLETED
Start: 2018-01-22 | End: 2018-01-22

## 2018-01-22 RX ORDER — LANOLIN ALCOHOL/MO/W.PET/CERES
3 CREAM (GRAM) TOPICAL
Status: DISCONTINUED | OUTPATIENT
Start: 2018-01-22 | End: 2018-01-24 | Stop reason: HOSPADM

## 2018-01-22 RX ORDER — DIPHENHYDRAMINE HCL 25 MG
25 TABLET ORAL EVERY 6 HOURS PRN
Status: DISCONTINUED | OUTPATIENT
Start: 2018-01-22 | End: 2018-01-24 | Stop reason: HOSPADM

## 2018-01-22 RX ORDER — OXYCODONE HYDROCHLORIDE 5 MG/1
TABLET ORAL
Qty: 30 TABLET | Refills: 0 | Status: SHIPPED | OUTPATIENT
Start: 2018-01-22 | End: 2018-03-08 | Stop reason: ALTCHOICE

## 2018-01-22 RX ORDER — BUPIVACAINE HYDROCHLORIDE 7.5 MG/ML
INJECTION, SOLUTION INTRASPINAL AS NEEDED
Status: DISCONTINUED | OUTPATIENT
Start: 2018-01-22 | End: 2018-01-22 | Stop reason: SURG

## 2018-01-22 RX ORDER — DOCUSATE SODIUM 100 MG/1
100 CAPSULE, LIQUID FILLED ORAL 2 TIMES DAILY
Status: DISCONTINUED | OUTPATIENT
Start: 2018-01-22 | End: 2018-01-24 | Stop reason: HOSPADM

## 2018-01-22 RX ORDER — SENNOSIDES 8.6 MG
1 TABLET ORAL DAILY
Status: DISCONTINUED | OUTPATIENT
Start: 2018-01-23 | End: 2018-01-24 | Stop reason: HOSPADM

## 2018-01-22 RX ORDER — MIDAZOLAM HYDROCHLORIDE 1 MG/ML
INJECTION INTRAMUSCULAR; INTRAVENOUS AS NEEDED
Status: DISCONTINUED | OUTPATIENT
Start: 2018-01-22 | End: 2018-01-22 | Stop reason: SURG

## 2018-01-22 RX ORDER — GABAPENTIN 100 MG/1
100 CAPSULE ORAL EVERY 8 HOURS SCHEDULED
Status: DISCONTINUED | OUTPATIENT
Start: 2018-01-22 | End: 2018-01-24 | Stop reason: HOSPADM

## 2018-01-22 RX ORDER — MAGNESIUM HYDROXIDE 1200 MG/15ML
LIQUID ORAL AS NEEDED
Status: DISCONTINUED | OUTPATIENT
Start: 2018-01-22 | End: 2018-01-22 | Stop reason: HOSPADM

## 2018-01-22 RX ORDER — FENTANYL CITRATE/PF 50 MCG/ML
25 SYRINGE (ML) INJECTION
Status: DISCONTINUED | OUTPATIENT
Start: 2018-01-22 | End: 2018-01-22 | Stop reason: HOSPADM

## 2018-01-22 RX ORDER — OXYCODONE HYDROCHLORIDE 10 MG/1
10 TABLET ORAL EVERY 4 HOURS PRN
Status: DISCONTINUED | OUTPATIENT
Start: 2018-01-22 | End: 2018-01-24 | Stop reason: HOSPADM

## 2018-01-22 RX ORDER — ALBUMIN, HUMAN INJ 5% 5 %
SOLUTION INTRAVENOUS CONTINUOUS PRN
Status: DISCONTINUED | OUTPATIENT
Start: 2018-01-22 | End: 2018-01-22 | Stop reason: SURG

## 2018-01-22 RX ORDER — OXYCODONE HYDROCHLORIDE 5 MG/1
5 TABLET ORAL EVERY 4 HOURS PRN
Status: DISCONTINUED | OUTPATIENT
Start: 2018-01-22 | End: 2018-01-24 | Stop reason: HOSPADM

## 2018-01-22 RX ORDER — TRAMADOL HYDROCHLORIDE 50 MG/1
50 TABLET ORAL EVERY 6 HOURS PRN
Qty: 30 TABLET | Refills: 0 | Status: SHIPPED | OUTPATIENT
Start: 2018-01-22 | End: 2018-02-01

## 2018-01-22 RX ORDER — ONDANSETRON 2 MG/ML
INJECTION INTRAMUSCULAR; INTRAVENOUS AS NEEDED
Status: DISCONTINUED | OUTPATIENT
Start: 2018-01-22 | End: 2018-01-22 | Stop reason: SURG

## 2018-01-22 RX ORDER — SODIUM CHLORIDE, SODIUM LACTATE, POTASSIUM CHLORIDE, CALCIUM CHLORIDE 600; 310; 30; 20 MG/100ML; MG/100ML; MG/100ML; MG/100ML
50 INJECTION, SOLUTION INTRAVENOUS CONTINUOUS
Status: DISCONTINUED | OUTPATIENT
Start: 2018-01-22 | End: 2018-01-22

## 2018-01-22 RX ORDER — SODIUM CHLORIDE, SODIUM LACTATE, POTASSIUM CHLORIDE, CALCIUM CHLORIDE 600; 310; 30; 20 MG/100ML; MG/100ML; MG/100ML; MG/100ML
INJECTION, SOLUTION INTRAVENOUS CONTINUOUS PRN
Status: DISCONTINUED | OUTPATIENT
Start: 2018-01-22 | End: 2018-01-22 | Stop reason: SURG

## 2018-01-22 RX ORDER — SODIUM CHLORIDE, SODIUM LACTATE, POTASSIUM CHLORIDE, CALCIUM CHLORIDE 600; 310; 30; 20 MG/100ML; MG/100ML; MG/100ML; MG/100ML
125 INJECTION, SOLUTION INTRAVENOUS CONTINUOUS
Status: DISCONTINUED | OUTPATIENT
Start: 2018-01-22 | End: 2018-01-24 | Stop reason: HOSPADM

## 2018-01-22 RX ORDER — ALBUTEROL SULFATE 90 UG/1
2 AEROSOL, METERED RESPIRATORY (INHALATION) EVERY 6 HOURS PRN
Status: DISCONTINUED | OUTPATIENT
Start: 2018-01-22 | End: 2018-01-24 | Stop reason: HOSPADM

## 2018-01-22 RX ORDER — PROPOFOL 10 MG/ML
INJECTION, EMULSION INTRAVENOUS CONTINUOUS PRN
Status: DISCONTINUED | OUTPATIENT
Start: 2018-01-22 | End: 2018-01-22 | Stop reason: SURG

## 2018-01-22 RX ORDER — CALCIUM CARBONATE 200(500)MG
1000 TABLET,CHEWABLE ORAL DAILY PRN
Status: DISCONTINUED | OUTPATIENT
Start: 2018-01-22 | End: 2018-01-24 | Stop reason: HOSPADM

## 2018-01-22 RX ORDER — CEFAZOLIN SODIUM 1 G/3ML
INJECTION, POWDER, FOR SOLUTION INTRAMUSCULAR; INTRAVENOUS AS NEEDED
Status: DISCONTINUED | OUTPATIENT
Start: 2018-01-22 | End: 2018-01-22 | Stop reason: SURG

## 2018-01-22 RX ORDER — PANTOPRAZOLE SODIUM 40 MG/1
40 TABLET, DELAYED RELEASE ORAL
Status: DISCONTINUED | OUTPATIENT
Start: 2018-01-23 | End: 2018-01-24 | Stop reason: HOSPADM

## 2018-01-22 RX ORDER — LEVOTHYROXINE SODIUM 0.07 MG/1
75 TABLET ORAL
Status: DISCONTINUED | OUTPATIENT
Start: 2018-01-23 | End: 2018-01-24 | Stop reason: HOSPADM

## 2018-01-22 RX ORDER — ONDANSETRON 2 MG/ML
4 INJECTION INTRAMUSCULAR; INTRAVENOUS ONCE AS NEEDED
Status: DISCONTINUED | OUTPATIENT
Start: 2018-01-22 | End: 2018-01-22 | Stop reason: HOSPADM

## 2018-01-22 RX ORDER — GABAPENTIN 300 MG/1
300 CAPSULE ORAL ONCE
Status: COMPLETED | OUTPATIENT
Start: 2018-01-22 | End: 2018-01-22

## 2018-01-22 RX ORDER — ACETAMINOPHEN 325 MG/1
650 TABLET ORAL EVERY 6 HOURS PRN
Status: DISCONTINUED | OUTPATIENT
Start: 2018-01-22 | End: 2018-01-24 | Stop reason: HOSPADM

## 2018-01-22 RX ADMIN — ACETAMINOPHEN 975 MG: 325 TABLET, FILM COATED ORAL at 09:23

## 2018-01-22 RX ADMIN — GABAPENTIN 100 MG: 100 CAPSULE ORAL at 16:07

## 2018-01-22 RX ADMIN — SODIUM CHLORIDE, SODIUM LACTATE, POTASSIUM CHLORIDE, AND CALCIUM CHLORIDE 50 ML/HR: .6; .31; .03; .02 INJECTION, SOLUTION INTRAVENOUS at 10:22

## 2018-01-22 RX ADMIN — MELATONIN TAB 3 MG 3 MG: 3 TAB at 22:01

## 2018-01-22 RX ADMIN — BUPIVACAINE HYDROCHLORIDE IN DEXTROSE 1.4 ML: 7.5 INJECTION, SOLUTION SUBARACHNOID at 12:10

## 2018-01-22 RX ADMIN — OXYCODONE HYDROCHLORIDE 10 MG: 10 TABLET ORAL at 17:24

## 2018-01-22 RX ADMIN — MIDAZOLAM HYDROCHLORIDE 2 MG: 1 INJECTION, SOLUTION INTRAMUSCULAR; INTRAVENOUS at 10:42

## 2018-01-22 RX ADMIN — GABAPENTIN 100 MG: 100 CAPSULE ORAL at 22:01

## 2018-01-22 RX ADMIN — CEFAZOLIN 2000 MG: 1 INJECTION, POWDER, FOR SOLUTION INTRAVENOUS at 12:12

## 2018-01-22 RX ADMIN — SODIUM CHLORIDE, SODIUM LACTATE, POTASSIUM CHLORIDE, AND CALCIUM CHLORIDE: .6; .31; .03; .02 INJECTION, SOLUTION INTRAVENOUS at 11:45

## 2018-01-22 RX ADMIN — DEXAMETHASONE SODIUM PHOSPHATE 10 MG: 10 INJECTION INTRAMUSCULAR; INTRAVENOUS at 13:22

## 2018-01-22 RX ADMIN — ONDANSETRON 4 MG: 2 INJECTION INTRAMUSCULAR; INTRAVENOUS at 13:22

## 2018-01-22 RX ADMIN — OXYCODONE HYDROCHLORIDE 10 MG: 10 TABLET ORAL at 22:01

## 2018-01-22 RX ADMIN — CEFAZOLIN SODIUM 2000 MG: 2 SOLUTION INTRAVENOUS at 20:55

## 2018-01-22 RX ADMIN — ALBUMIN HUMAN: 0.05 INJECTION, SOLUTION INTRAVENOUS at 12:49

## 2018-01-22 RX ADMIN — GABAPENTIN 300 MG: 300 CAPSULE ORAL at 09:23

## 2018-01-22 RX ADMIN — ROPIVACAINE HYDROCHLORIDE 20 ML: 2 INJECTION, SOLUTION EPIDURAL; INFILTRATION at 10:45

## 2018-01-22 RX ADMIN — PROPOFOL 80 MCG/KG/MIN: 10 INJECTION, EMULSION INTRAVENOUS at 12:13

## 2018-01-22 NOTE — PHYSICAL THERAPY NOTE
Physical Therapy Evaluation    Patient's Name: Kodak Velazquez    Admitting Diagnosis  Other mechanical complication of other internal orthopedic devices, implants and grafts, initial encounter [facility]) [T82 477F]    Problem List  Patient Active Problem List   Diagnosis    History of total knee arthroplasty       Past Medical History  Past Medical History:   Diagnosis Date    Arthritis     Bronchitis     Disease of thyroid gland     Diverticulitis     GERD (gastroesophageal reflux disease)     IBS (irritable bowel syndrome)     Insomnia     PONV (postoperative nausea and vomiting)        Past Surgical History  Past Surgical History:   Procedure Laterality Date    CHOLECYSTECTOMY      DILATION AND CURETTAGE OF UTERUS      HYSTERECTOMY      JOINT REPLACEMENT      PARTIAL HYSTERECTOMY      TONSILLECTOMY      VARICOSE VEIN SURGERY        01/22/18 1720   Note Type   Note type Eval only   Pain Assessment   Pain Assessment 0-10   Pain Score 3   Pain Type Acute pain   Pain Location Knee   Pain Orientation Right   Patient's Stated Pain Goal No pain   Hospital Pain Intervention(s) Ambulation/increased activity   Response to Interventions unchanged   Home Living   Type of Home House   Additional Comments Resides w/ s/o in 2 story home, 3 JACE and 14+3 steps to 2nd floor  Normally indep, does own [employer]    Drives, works part time for [employer]   Prior Function   Level of [place] Independent with ADLs and functional mobility   Falls in the last 6 months 0   Restrictions/Precautions   Weight Bearing Precautions Per Order Yes   RLE Weight Bearing Per Order WBAT   General   Family/Caregiver Present No   Cognition   Overall Cognitive Status WFL   Arousal/Participation Alert   Orientation Level Oriented X4   Memory Within functional limits   Following Commands Follows all commands and directions without difficulty   RLE Assessment   RLE Assessment (knee ROM limited ,strength 3-/5)   LLE Assessment   LLE Assessment (strength 4+/5- assessed w/ mobility)   Bed Mobility   Supine to Sit 3  Moderate assistance   Additional items Assist x 1   Transfers   Sit to Stand 4  Minimal assistance   Additional items Assist x 1   Stand to Sit 4  Minimal assistance   Additional items Assist x 1   Ambulation/Elevation   Gait pattern (slow, antalgic)   Gait Assistance 4  Minimal assist   Additional items Assist x 1   Assistive Device Rolling walker   Distance 3-4'x1 from bed to chair, limited distance due to c/o R LE numbness from nerve block   Balance   Static Sitting Good   Dynamic Sitting Fair   Static Standing Fair -   Dynamic Standing Fair -   Ambulatory Fair -   Endurance Deficit   Endurance Deficit Yes   Endurance Deficit Description fatigue, pain   Activity Tolerance   Activity Tolerance Patient limited by fatigue;Patient limited by pain;Treatment limited secondary to medical complications (Comment)   Nurse Made Aware yes   Assessment   Prognosis Good   Problem List Decreased strength;Decreased endurance;Decreased range of motion; Impaired balance;Decreased mobility;Pain;Orthopedic restrictions   Assessment Pt seen for high complexity physical therapy evaluation  Pt is a 59 y/o female w/ history/comorbidiities of hypothyroidism, arthritis, recent bronchitis, prior R TKR, now admitted w/ loosening or hardware and had revision performed todaty  Due to post op pain, need for supplimental O2, fall risk, note unstable clincial picture  PT consulted to assess mobility, d/c needs  Pt presents w/ decreased functional mob, standing balance, endurance, R knee RIOM and R LE strength, barriers at home  Will benefit from skilled PT to correct for the above problems    recommend home w/ outpatient PT when stable   Goals   Patient Goals none stated   [facility] Expiration Date 02/05/18   Short Term Goal #1 1-2 wks: bed mob and transfers w/ indep, standing balance to good w/ device, ambulate 200-300 ft w/ RW and mod I, increase R knee ROM to 0-90* and R LE strength by 1/2-1 grade, indep HEP, ambulate 14 stairs w / rail and S   Treatment Day 0   Plan   Treatment/Interventions Functional transfer training;LE strengthening/ROM; Therapeutic exercise; Endurance training;Elevations; Patient/family training;Equipment eval/education; Bed mobility;Gait training   PT Frequency 7x/wk; Twice a day   Recommendation   Recommendation (recommend home w/ outpatient therapies)   Equipment Recommended Walker   PT - OK to Discharge Yes  (when stable to home w/ outpatient PT)   Modified Lyme Scale   Modified Carson Scale 4   Barthel Index   Feeding 10   Bathing 0   Grooming Score 5   Dressing Score 5   Bladder Score 0   Bowels Score 10   Toilet Use Score 5   Transfers (Bed/Chair) Score 5   Mobility (Level Surface) Score 0   Stairs Score 0   Barthel Index Score 40           Vishal Ano PT, DPT, CSRS

## 2018-01-22 NOTE — PLAN OF CARE
Problem: PHYSICAL THERAPY ADULT  Goal: Performs mobility at highest level of function for planned discharge setting  See evaluation for individualized goals  Treatment/Interventions: Functional transfer training, LE strengthening/ROM, Therapeutic exercise, Endurance training, Elevations, Patient/family training, Equipment eval/education, Bed mobility, Gait training  Equipment Recommended: Onofre Hurley       See flowsheet documentation for full assessment, interventions and recommendations  Prognosis: Good  Problem List: Decreased strength, Decreased endurance, Decreased range of motion, Impaired balance, Decreased mobility, Pain, Orthopedic restrictions  Assessment: Pt seen for high complexity physical therapy evaluation  Pt is a 59 y/o female w/ history/comorbidiities of hypothyroidism, arthritis, recent bronchitis, prior R TKR, now admitted w/ loosening or hardware and had revision performed todaty  Due to post op pain, need for supplimental O2, fall risk, note unstable clincial picture  PT consulted to assess mobility, d/c needs  Pt presents w/ decreased functional mob, standing balance, endurance, R knee RIOM and R LE strength, barriers at home  Will benefit from skilled PT to correct for the above problems  recommend home w/ outpatient PT when stable        Recommendation:  (recommend home w/ outpatient therapies)     PT - OK to Discharge: (S) Yes (when stable to home w/ outpatient PT)    See flowsheet documentation for full assessment

## 2018-01-22 NOTE — ANESTHESIA PREPROCEDURE EVALUATION
Review of Systems/Medical History  Patient summary reviewed  Chart reviewed  History of anesthetic complications PONV    Cardiovascular  Negative cardio ROS    Pulmonary  Recent URI resolved,        GI/Hepatic    GERD ,   Comment: IBS     Negative  ROS        Endo/Other  History of thyroid disease , hypothyroidism,      GYN  Negative gynecology ROS          Hematology  Negative hematology ROS      Musculoskeletal    Comment: S/p left knee replacement Arthritis     Neurology  Negative neurology ROS      Psychology   Negative psychology ROS              Physical Exam    Airway    Mallampati score: II  TM Distance: >3 FB  Neck ROM: full     Dental   No notable dental hx     Cardiovascular  Comment: Negative ROS, Rhythm: regular, Rate: normal, Cardiovascular exam normal    Pulmonary  Pulmonary exam normal Breath sounds clear to auscultation,     Other Findings        Anesthesia Plan  ASA Score- 2     Anesthesia Type- spinal, regional and IV sedation with anesthesia with ASA Monitors  Additional Monitors:   Airway Plan:     Comment: Right adductor canal block  Plan Factors-    Induction- intravenous  Postoperative Plan- Plan for postoperative opioid use  Informed Consent- Anesthetic plan and risks discussed with patient and spouse  I personally reviewed this patient with the CRNA  Discussed and agreed on the Anesthesia Plan with the CRNA  Slade Mata         Recent labs personally reviewed:  Lab Results   Component Value Date    WBC 7 77 01/15/2018    HGB 13 9 01/15/2018     01/15/2018     Lab Results   Component Value Date     01/15/2018    K 4 5 01/15/2018    BUN 20 01/15/2018    CREATININE 0 70 01/15/2018    GLUCOSE 99 11/09/2016     No results found for: PTT   Lab Results   Component Value Date    INR 0 93 01/15/2018       Blood type AB    Lab Results   Component Value Date    HGBA1C 6 1 01/15/2018       I, Bryson Hong MD, have personally seen and evaluated the patient prior to anesthetic care   I have reviewed the pre-anesthetic record, and other medical records if appropriate to the anesthetic care  If a CRNA is involved in the case, I have reviewed the CRNA assessment, if present, and agree  Risks/benefits and alternatives discussed with patient including possible PONV, sore throat, and possibility of rare anesthetic and surgical emergencies

## 2018-01-22 NOTE — OP NOTE
OPERATIVE REPORT  PATIENT NAME: Guanaco King    :  1955  MRN: [id number]  Pt Location: BE OR ROOM 15    SURGERY DATE: 2018    Surgeon(s) and Role:     * Emily Gold MD - Primary     * Benji Borrego PA-C - Grace Chaney MD - Assisting    Preop Diagnosis:  Other mechanical complication of other internal orthopedic devices, implants and grafts, initial encounter [facility]) [T84 498A]    Post-Op Diagnosis Codes:     * Other mechanical complication of other internal orthopedic devices, implants and grafts, initial encounter ([facility] ) [T84 498A]    Procedure(s) (LRB):  ARTHROPLASTY KNEE TOTAL REVISION (Right)  Right total knee revision including femoral and tibial components   Specimen(s):  * No specimens in log *    Estimated Blood Loss:   Minimal    Drains:       Anesthesia Type:   Spinal    Operative Indications: Other mechanical complication of other internal orthopedic devices, implants and grafts, initial encounter [facility]) [Q65 [id number]]      Operative Findings:  depuy   Femur-2 5 with stem and augments   Poly-17 5mm TC3   Tibia-2 5 with sleeve    Complications:   None    Procedure and Technique: Following induction of adequate level of spinal anesthesia, Horton catheters and sterilely introduced into this patient's bladder  The right thigh was then fitted with a thigh-high tourniquet  Antibiotics were administered  The right lower extremity then underwent sterile prep and drape  The right lower extremity was exsanguinated to gravity, and the tourniquet was inflated to 300 mm of mercury  Her pre-existing midline incision was opened up gain access the extensor mechanism  A medial arthrotomy was created to open up the knee joint  Very little fluid was identified, no granulation tissue was identified  There were no other indicators of infection  The knee was flexed  The poly was removed  The femoral tibial components removed with very little bone loss    The proximal tibia was then prepared for revision implant  A freshen up cut was made, and the tibia was sized  The tibia was sized and prepared to accept a small symmetrical sleeve  The femoral component was then prepared  A freshen up cut was performed once the Reamer was used to function as alignment rashad  The freshen up cuts were made both anteriorly, posteriorly, a trial component was placed  With the trial 2 5 femur, trial 2 5 tibia, and 17 5 mm poly, the knee was taken through range of motion found to be capable full extension, good flexion, and no mid flexion valgus instability  The trial components removed, the knee was prepared for insertion of the final implants  The metaphyseal sleeve mated to the tibia was introduced to the tibia  The femoral component was cemented in place  The 17 5 mm trial was introduced  Excess cement was removed, the knee was brought into extension  The cement was allowed to cure  The trial poly was taken out, the knee was packed off  The tourniquet deflated, hemostasis was secured  The insert T C3 was introduced, and the knee was taken through a final range of motion, found to be capable of full extension, good flexion, no mid flexion valgus instability  Satisfied with the extent of surgery, the wounds then flushed with saline closed  A Betadine soak was initiated  A drain was placed deep brought out via separate lateral stab incision  The arthrotomy was closed number Vicryl suture  The subcu tissue closed 2 Vicryl suture  The skin was closed staples  The drain was reconstituted, sterile dressings applied    She was then transferred to recovery room in stable condition with plans to include physical therapy weight-bearing to tolerance, she will require DVT prophylaxis with Lovenox   I was present for the entire procedure    Patient Disposition:  PACU     SIGNATURE: Jean Huffman MD  DATE: January 22, 2018  TIME: 1:49 PM

## 2018-01-22 NOTE — PERIOPERATIVE NURSING NOTE
Pt  Able to feel slight sensation to b/l toes  Pt  Able to wiggle b/l feet/ankles  Pt  Able to slightly bend b/l knees  Will continue to monitor

## 2018-01-22 NOTE — ANESTHESIA POSTPROCEDURE EVALUATION
Post-Op Assessment Note      CV Status:  Stable    Mental Status:  Alert and awake    Hydration Status:  Euvolemic    PONV Controlled:  Controlled    Airway Patency:  Patent    Post Op Vitals Reviewed: Yes          Staff: Anesthesiologist, CRNA           /54 (01/22/18 1358)    Temp 99 6 °F (37 6 °C) (01/22/18 1358)    Pulse (!) 117 (01/22/18 1358)   Resp 18 (01/22/18 1358)    SpO2   100

## 2018-01-22 NOTE — CONSULTS
Consultation - Pradip Begum 58 y o  female MRN: [id number]    Unit/Bed#: CW3 344-01 Encounter: [id number]        History of Present Illness     HPI: Pradip Begum is a 58y o  year old female with a history of hypothyroidism, recent bronchitis (early this month), right TKR who underwent revision of right total knee today with Dr Yandel Jung secondary to instability and pain  Currently, patient has no CP, SOB, dizziness, nausea  ROS:  As in HPI otherwise negative 12 point ROS  Historical Information   Past Medical History:   Diagnosis Date    Arthritis     Bronchitis     Disease of thyroid gland     Diverticulitis     GERD (gastroesophageal reflux disease)     IBS (irritable bowel syndrome)     Insomnia     PONV (postoperative nausea and vomiting)      Past Surgical History:   Procedure Laterality Date    CHOLECYSTECTOMY      DILATION AND CURETTAGE OF UTERUS      HYSTERECTOMY      JOINT REPLACEMENT      PARTIAL HYSTERECTOMY      TONSILLECTOMY      VARICOSE VEIN SURGERY       Social History   History   Alcohol Use    Yes     Comment: social      History   Drug Use No     History   Smoking Status    Never Smoker   Smokeless Tobacco    Never Used     History reviewed  No pertinent family history      Meds/Allergies   current meds:  Current Facility-Administered Medications   Medication Dose Route Frequency    acetaminophen (TYLENOL) tablet 650 mg  650 mg Oral Q6H PRN    albuterol (PROVENTIL HFA,VENTOLIN HFA) inhaler 2 puff  2 puff Inhalation Q6H PRN    calcium carbonate (TUMS) chewable tablet 1,000 mg  1,000 mg Oral Daily PRN    ceFAZolin (ANCEF) IVPB (premix) 2,000 mg  2,000 mg Intravenous Once    ceFAZolin (ANCEF) IVPB (premix) 2,000 mg  2,000 mg Intravenous Q8H    diphenhydrAMINE (BENADRYL) tablet 25 mg  25 mg Oral Q6H PRN    docusate sodium (COLACE) capsule 100 mg  100 mg Oral BID    [START ON 1/23/2018] enoxaparin (LOVENOX) subcutaneous injection 40 mg  40 mg Subcutaneous Daily    gabapentin (NEURONTIN) capsule 100 mg  100 mg Oral Q8H [facility]    HYDROmorphone (DILAUDID) injection 0 5 mg  0 5 mg Intravenous Q1H PRN    lactated ringers infusion  125 mL/hr Intravenous Continuous    [START ON 1/23/2018] levothyroxine tablet 75 mcg  75 mcg Oral Early Morning    melatonin tablet 3 mg  3 mg Oral HS    oxyCODONE (ROXICODONE) immediate release tablet 10 mg  10 mg Oral Q4H PRN    oxyCODONE (ROXICODONE) IR tablet 5 mg  5 mg Oral Q4H PRN    [START ON 1/23/2018] pantoprazole (PROTONIX) EC tablet 40 mg  40 mg Oral Daily Before Breakfast    [START ON 1/23/2018] senna (SENOKOT) tablet 8 6 mg  1 tablet Oral Daily       PTA meds:   Prescriptions Prior to Admission   Medication    albuterol (PROVENTIL HFA,VENTOLIN HFA) 90 mcg/act inhaler    ascorbic acid (VITAMIN C) 500 mg tablet    Calcium-Vitamin D-Vitamin K (VIACTIV PO)    DiphenhydrAMINE HCl (BENADRYL PO)    ferrous sulfate 325 (65 Fe) mg tablet    folic acid (FOLVITE) 1 mg tablet    levothyroxine 75 mcg tablet    MELATONIN PO    Multiple Vitamins-Minerals (IMMUNE SUPPORT PO)    Probiotic Product (PROBIOTIC DAILY PO)     Allergies   Allergen Reactions    Codeine GI Intolerance    Morphine Itching       Objective   Vitals: Blood pressure 118/72, pulse 92, temperature 97 6 °F (36 4 °C), resp  rate 18, height 5' 1" (1 549 m), weight 70 3 kg (155 lb), SpO2 100 %  Physical Exam     GEN: NAD  HEENT: NC/AT  RESP: BBS w/o crackles/wheeze/rhonci; resp unlabored  CV: +S1 S2, regular rate, no rubs/murmurs  ABD: soft, NT, ND, normal BS   : + winkler = clear yellow urine  EXT: no edema; right knee dressed/drain in place  Skin: no rashes  Neuro: AAO      Lab Results:                   Glucose (mg/dL)   Date Value   11/09/2016 99   05/02/2016 90   10/20/2015 89   05/18/2015 75   05/28/2014 143 (H)   01/31/2014 97       Labs reviewed    Imaging: reviewed  EKG, Pathology, and Other Studies: I have personally reviewed pertinent reports      VTE Prophylaxis: Enoxaparin (Lovenox)    Code Status: Level 1 - Full Code     Assessment/Plan     1  Right total knee revision (Brogle):  Pain management per primary service; continue bowel meds to prevent constipation    2  Hypothyroidism:  Continue current Levothyroxine 75 mcg qd    3  DVT prophylaxis:  Lovenox    4  GERD prophy:  Continue PPI; she takes something at home for sx prn only      Counseling / Coordination of Care  Total floor / unit time spent today 60 minutes  Greater than 50% of total time was spent with the patient and / or family counseling and / or coordination of care        SCOTTIE Dailey

## 2018-01-22 NOTE — ANESTHESIA PROCEDURE NOTES
Peripheral Block    Patient location during procedure: holding area  Start time: 1/22/2018 10:45 AM  Reason for block: at surgeon's request and post-op pain management  Staffing  Anesthesiologist: Selene Zapata  Performed: anesthesiologist   Preanesthetic Checklist  Completed: patient identified, site marked, surgical consent, pre-op evaluation, timeout performed, IV checked, risks and benefits discussed and monitors and equipment checked  Peripheral Block  Patient position: supine  Prep: ChloraPrep  Patient monitoring: continuous pulse ox and frequent blood pressure checks  Block type: adductor canal block  Laterality: right  Injection technique: single-shot  Procedures: ultrasound guided  ultrasound permanent image saved    Local infiltration: ropivacaine  Infiltration strength: 0 2 %  Dose: 20 mL  Needle  Needle type: Stimuplex   Needle localization: ultrasound guidance  Test dose: negative  Assessment  Injection assessment: incremental injection, local visualized surrounding nerve on ultrasound, negative aspiration for CSF, negative aspiration for heme and no paresthesia on injection  Paresthesia pain: none  Heart rate change: no  Slow fractionated injection: yes  Post-procedure:  site cleaned  patient tolerated the procedure well with no immediate complications

## 2018-01-22 NOTE — DISCHARGE INSTRUCTIONS
Discharge Instructions - Orthopedics  Willian Spangler 58 y o  female MRN: [id number]  Unit/Bed#: HOLDING (IP) 1    Weight Bearing Status:                                           Weight Bearing as tolerated to the right lower extremity  DVT prophylaxis:  Complete course of Lovenox as directed    Pain:  Continue analgesics as directed    Showering Instructions:   Do not shower until 3 days    Dressing Instructions:   Keep dressing clean, dry and intact until follow up appointment  Driving Instructions:  No driving until cleared by Orthopaedic Surgery  PT/OT:  Continue PT/OT on outpatient basis as directed    Appt Instructions: If you do not have your appointment, please call the clinic at [phone]  Otherwise followup as scheduled below:      Contact the office sooner if you experience any increased numbness/tingling in the extremities        Miscellaneous:  None

## 2018-01-23 VITALS
HEART RATE: 90 BPM | SYSTOLIC BLOOD PRESSURE: 114 MMHG | DIASTOLIC BLOOD PRESSURE: 62 MMHG | TEMPERATURE: 98 F | HEIGHT: 62 IN | OXYGEN SATURATION: 99 % | BODY MASS INDEX: 29.04 KG/M2 | RESPIRATION RATE: 20 BRPM | WEIGHT: 157.8 LBS

## 2018-01-23 LAB
ANION GAP SERPL CALCULATED.3IONS-SCNC: 8 MMOL/L (ref 4–13)
BUN SERPL-MCNC: 17 MG/DL (ref 5–25)
CALCIUM SERPL-MCNC: 9 MG/DL (ref 8.3–10.1)
CHLORIDE SERPL-SCNC: 103 MMOL/L (ref 100–108)
CO2 SERPL-SCNC: 28 MMOL/L (ref 21–32)
CREAT SERPL-MCNC: 0.64 MG/DL (ref 0.6–1.3)
ERYTHROCYTE [DISTWIDTH] IN BLOOD BY AUTOMATED COUNT: 14.4 % (ref 11.6–15.1)
GFR SERPL CREATININE-BSD FRML MDRD: 96 ML/MIN/1.73SQ M
GLUCOSE SERPL-MCNC: 132 MG/DL (ref 65–140)
HCT VFR BLD AUTO: 32.6 % (ref 34.8–46.1)
HGB BLD-MCNC: 11 G/DL (ref 11.5–15.4)
MCH RBC QN AUTO: 29.7 PG (ref 26.8–34.3)
MCHC RBC AUTO-ENTMCNC: 33.7 G/DL (ref 31.4–37.4)
MCV RBC AUTO: 88 FL (ref 82–98)
PLATELET # BLD AUTO: 223 THOUSANDS/UL (ref 149–390)
PMV BLD AUTO: 9.8 FL (ref 8.9–12.7)
POTASSIUM SERPL-SCNC: 4 MMOL/L (ref 3.5–5.3)
RBC # BLD AUTO: 3.7 MILLION/UL (ref 3.81–5.12)
SODIUM SERPL-SCNC: 139 MMOL/L (ref 136–145)
WBC # BLD AUTO: 13.67 THOUSAND/UL (ref 4.31–10.16)

## 2018-01-23 PROCEDURE — 97166 OT EVAL MOD COMPLEX 45 MIN: CPT

## 2018-01-23 PROCEDURE — 97110 THERAPEUTIC EXERCISES: CPT

## 2018-01-23 PROCEDURE — 80048 BASIC METABOLIC PNL TOTAL CA: CPT | Performed by: ORTHOPAEDIC SURGERY

## 2018-01-23 PROCEDURE — 85027 COMPLETE CBC AUTOMATED: CPT | Performed by: ORTHOPAEDIC SURGERY

## 2018-01-23 PROCEDURE — G8988 SELF CARE GOAL STATUS: HCPCS

## 2018-01-23 PROCEDURE — G8987 SELF CARE CURRENT STATUS: HCPCS

## 2018-01-23 PROCEDURE — G8989 SELF CARE D/C STATUS: HCPCS

## 2018-01-23 PROCEDURE — 97116 GAIT TRAINING THERAPY: CPT

## 2018-01-23 RX ORDER — GABAPENTIN 100 MG/1
100 CAPSULE ORAL EVERY 8 HOURS SCHEDULED
Qty: 90 CAPSULE | Refills: 0
Start: 2018-01-23 | End: 2018-03-08 | Stop reason: ALTCHOICE

## 2018-01-23 RX ORDER — ZOLPIDEM TARTRATE 5 MG/1
5 TABLET ORAL
Status: DISCONTINUED | OUTPATIENT
Start: 2018-01-23 | End: 2018-01-24 | Stop reason: HOSPADM

## 2018-01-23 RX ORDER — DOCUSATE SODIUM 100 MG/1
100 CAPSULE, LIQUID FILLED ORAL 2 TIMES DAILY
Qty: 20 CAPSULE | Refills: 0 | Status: SHIPPED | OUTPATIENT
Start: 2018-01-23 | End: 2018-03-08 | Stop reason: ALTCHOICE

## 2018-01-23 RX ORDER — ONDANSETRON 2 MG/ML
4 INJECTION INTRAMUSCULAR; INTRAVENOUS EVERY 8 HOURS PRN
Status: DISCONTINUED | OUTPATIENT
Start: 2018-01-23 | End: 2018-01-24 | Stop reason: HOSPADM

## 2018-01-23 RX ORDER — TRAMADOL HYDROCHLORIDE 50 MG/1
50 TABLET ORAL EVERY 6 HOURS PRN
Status: DISCONTINUED | OUTPATIENT
Start: 2018-01-23 | End: 2018-01-24 | Stop reason: HOSPADM

## 2018-01-23 RX ORDER — ACETAMINOPHEN 325 MG/1
TABLET ORAL
Qty: 30 TABLET | Refills: 0 | Status: SHIPPED | OUTPATIENT
Start: 2018-01-23 | End: 2018-05-22 | Stop reason: ALTCHOICE

## 2018-01-23 RX ADMIN — MELATONIN TAB 3 MG 3 MG: 3 TAB at 21:30

## 2018-01-23 RX ADMIN — HYDROMORPHONE HYDROCHLORIDE 0.5 MG: 1 INJECTION, SOLUTION INTRAMUSCULAR; INTRAVENOUS; SUBCUTANEOUS at 01:02

## 2018-01-23 RX ADMIN — SODIUM CHLORIDE, SODIUM LACTATE, POTASSIUM CHLORIDE, AND CALCIUM CHLORIDE 125 ML/HR: .6; .31; .03; .02 INJECTION, SOLUTION INTRAVENOUS at 00:52

## 2018-01-23 RX ADMIN — DOCUSATE SODIUM 100 MG: 100 CAPSULE, LIQUID FILLED ORAL at 08:22

## 2018-01-23 RX ADMIN — HYDROMORPHONE HYDROCHLORIDE 1 MG: 1 INJECTION, SOLUTION INTRAMUSCULAR; INTRAVENOUS; SUBCUTANEOUS at 10:05

## 2018-01-23 RX ADMIN — GABAPENTIN 100 MG: 100 CAPSULE ORAL at 05:32

## 2018-01-23 RX ADMIN — DIPHENHYDRAMINE HYDROCHLORIDE 25 MG: 25 TABLET ORAL at 01:02

## 2018-01-23 RX ADMIN — OXYCODONE HYDROCHLORIDE 10 MG: 10 TABLET ORAL at 08:22

## 2018-01-23 RX ADMIN — ZOLPIDEM TARTRATE 5 MG: 5 TABLET ORAL at 21:30

## 2018-01-23 RX ADMIN — OXYCODONE HYDROCHLORIDE 10 MG: 10 TABLET ORAL at 13:46

## 2018-01-23 RX ADMIN — OXYCODONE HYDROCHLORIDE 10 MG: 10 TABLET ORAL at 03:46

## 2018-01-23 RX ADMIN — ENOXAPARIN SODIUM 40 MG: 40 INJECTION SUBCUTANEOUS at 08:22

## 2018-01-23 RX ADMIN — ONDANSETRON 4 MG: 2 INJECTION INTRAMUSCULAR; INTRAVENOUS at 13:58

## 2018-01-23 RX ADMIN — GABAPENTIN 100 MG: 100 CAPSULE ORAL at 13:46

## 2018-01-23 RX ADMIN — LEVOTHYROXINE SODIUM 75 MCG: 75 TABLET ORAL at 05:32

## 2018-01-23 RX ADMIN — PANTOPRAZOLE SODIUM 40 MG: 40 TABLET, DELAYED RELEASE ORAL at 08:22

## 2018-01-23 RX ADMIN — Medication 1000 MG: at 21:33

## 2018-01-23 RX ADMIN — GABAPENTIN 100 MG: 100 CAPSULE ORAL at 21:30

## 2018-01-23 RX ADMIN — CEFAZOLIN SODIUM 2000 MG: 2 SOLUTION INTRAVENOUS at 05:32

## 2018-01-23 RX ADMIN — OXYCODONE HYDROCHLORIDE 10 MG: 10 TABLET ORAL at 17:46

## 2018-01-23 RX ADMIN — HYDROMORPHONE HYDROCHLORIDE 0.5 MG: 1 INJECTION, SOLUTION INTRAMUSCULAR; INTRAVENOUS; SUBCUTANEOUS at 05:32

## 2018-01-23 RX ADMIN — SENNOSIDES 8.6 MG: 8.6 TABLET, FILM COATED ORAL at 08:22

## 2018-01-23 NOTE — PHYSICAL THERAPY NOTE
PT PROGRESS NOTE       01/23/18 4771   Pain Assessment   Pain Assessment 0-10   Pain Score 6   Pain Type Acute pain   Pain Location Knee   Pain Orientation Right   Hospital Pain Intervention(s) Ambulation/increased activity;Repositioned   Response to Interventions unchanged   Restrictions/Precautions   Weight Bearing Precautions Per Order Yes   RLE Weight Bearing Per Order WBAT   Other Precautions Chair Alarm;WBS;Multiple lines; Fall Risk;Pain   General   Chart Reviewed Yes   Response to Previous Treatment Patient with no complaints from previous session  Family/Caregiver Present No   Cognition   Overall Cognitive Status WFL   Arousal/Participation Alert   Attention Within functional limits   Orientation Level Oriented X4   Following Commands Follows all commands and directions without difficulty   Subjective   Subjective pt agreeable to work w/ PT for todays session   Bed Mobility   Supine to Sit 4  Minimal assistance   Additional items Assist x 1;HOB elevated; Increased time required;Verbal cues;LE management   Transfers   Sit to Stand 4  Minimal assistance   Additional items Assist x 1; Increased time required;Verbal cues   Stand to Sit 4  Minimal assistance   Additional items Assist x 1; Increased time required;Verbal cues   Stand pivot 4  Minimal assistance   Additional items Assist x 1; Increased time required;Verbal cues   Ambulation/Elevation   Gait pattern Improper Weight shift;Decreased R stance; Short stride; Step to;Excessively slow   Gait Assistance 4  Minimal assist   Additional items Assist x 1;Verbal cues   Assistive Device Rolling walker   Distance 3 ft x 1, 25 ft x 1   Balance   Static Sitting Good   Dynamic Sitting Fair +   Static Standing Fair   Dynamic Standing Fair -   Ambulatory Poor +   Endurance Deficit   Endurance Deficit Yes   Endurance Deficit Description fatigue, pain   Activity Tolerance   Activity Tolerance Patient tolerated treatment well   Nurse Columba Burns RN   Exercises   [employer] Sets Sitting;10 reps;AROM; Right   Heelslides Sitting;10 reps;AROM; Right   Glute Sets Sitting;10 reps;AROM; Bilateral   Hip Abduction Sitting;10 reps;AAROM; Bilateral   Ankle Pumps Sitting;10 reps;AROM; Bilateral   Assessment   Prognosis Good   Problem List Decreased strength;Decreased range of motion;Decreased endurance; Impaired balance;Decreased mobility; Decreased coordination;Decreased skin integrity;Orthopedic restrictions;Pain   Assessment Pt seen for therapy session today and progressed well  Pt was min A for supine-sit and sit-stand transfers  Pt was min A to amb 3 ft from bed-chair w/ RW ; no portable IV pole in room so pt could only move to the chair at this time  Once pt in chair, pt was instructed/performed exercises to increase RLE mobility and strength  After nsg disconnected pt IV , pt was min A to amb 25 ft using RW ; WBAT RLE, decreased R stance, improper weight shift, short stride, step to, slow  Pt in chair working w/ OT upon PT departure  Skilled therapy needed to improve strength, balance, endurance, stair training and maximize func indepednence in order to return home  PT rec is home w/ family support and outpatient PT upon Pt DC    Goals   Patient Goals get better   STG Expiration Date 02/05/18   Treatment Day 1   Plan   Treatment/Interventions Functional transfer training;LE strengthening/ROM; Elevations; Therapeutic exercise; Endurance training;Patient/family training;Equipment eval/education; Bed mobility;Gait training;Continued evaluation;Spoke to nursing   Progress Progressing toward goals   PT Frequency Twice a day   Recommendation   Recommendation Home with family support; Outpatient PT   Equipment Recommended Walker  (pt requests BSC)   PT - OK to Discharge No

## 2018-01-23 NOTE — MISCELLANEOUS
Message  To whom it may concern,    Soledad Man will be having right knee surgery on 1/22/2018  Her expected time out of work will be 2 months  We will keep you apprised of her return to work status at subsequent post-operative visits      Thank you  Darlene Mills PA-C under the supervision of Mikaela Brown MD        Signatures   Electronically signed by : Darlene Mills, [facility]; Fredi 15 2018 11:32AM EST                       (Author)

## 2018-01-23 NOTE — MISCELLANEOUS
Message  To whom it may concern,    Manuel Rivera will be having knee surgery on 1/22/2018  She will be unable to return to the gym until Mid March at the earliest   Please allow her to defer her gym membership until she is orthopedically cleared to return        Thank you,   Theresa Elder PA-C under the supervision of Morgan Jensen MD        Signatures   Electronically signed by : Theresa Elder, [facility]; Fredi 15 2018 11:34AM EST                       (Author)

## 2018-01-23 NOTE — PROGRESS NOTES
Internal Medicine Progress Note  Patient: Noni Sinclair  Age/sex: 58 y o  female  Medical Record #: [id number]      ASSESSMENT/PLAN:  Noni Sinclair is seen and examined and mangement for following issues:    1  Right total knee revision (Brogle):  Pain management per primary service; continue bowel meds to prevent constipation  Hgb at 11, no indication for transfusion      2  Hypothyroidism:  Continue current Levothyroxine 75 mcg qd     3  DVT prophylaxis:  Lovenox     4  GERD prophy:  Continue PPI; she takes something at home for sx prn only    5  Leukocytosis:  Mild  No other signs of infx  Subjective: Patient seen and examined  Patient reports she was nauseated this morning but when she ate the nausea resolved  Pain is adequately controlled  Horton has been removed  Patient denies any other complaints      ROS:   GI: denies abdominal pain, change bowel habits or reflux symptoms  Neuro: No new neurologic changes  Respiratory: No Cough, SOB  Cardiovascular: No CP, palpitations     Scheduled Meds:    cefazolin 2,000 mg Intravenous Once   docusate sodium 100 mg Oral BID   enoxaparin 40 mg Subcutaneous Daily   gabapentin 100 mg Oral Q8H [facility]   levothyroxine 75 mcg Oral Early Morning   melatonin 3 mg Oral HS   pantoprazole 40 mg Oral Daily Before Breakfast   senna 1 tablet Oral Daily       Labs:       Results from last 7 days  Lab Units 01/23/18  0457   WBC Thousand/uL 13 67*   HEMOGLOBIN g/dL 11 0*   HEMATOCRIT % 32 6*   PLATELETS Thousands/uL 223       Results from last 7 days  Lab Units 01/23/18  0457   SODIUM mmol/L 139   POTASSIUM mmol/L 4 0   CHLORIDE mmol/L 103   CO2 mmol/L 28   BUN mg/dL 17   CREATININE mg/dL 0 64   GLUCOSE RANDOM mg/dL 132   CALCIUM mg/dL 9 0                Glucose (mg/dL)   Date Value   01/23/2018 132   11/09/2016 99   05/02/2016 90   10/20/2015 89   05/18/2015 75   05/28/2014 143 (H)   01/31/2014 97       Labs reviewed    Physical Examination:  Vitals:   Vitals: 01/22/18 1927 01/22/18 2300 01/23/18 0300 01/23/18 0746   BP: 112/69 114/51 116/57 107/67   BP Location: Right arm Left arm Left arm Left arm   Pulse: 103 95 91 78   Resp: 18 16 16 18   Temp: 98 6 °F (37 °C) 98 6 °F (37 °C) 98 6 °F (37 °C) 98 3 °F (36 8 °C)   TempSrc: Oral Oral Oral Oral   SpO2: 96% 93% 96% 96%   Weight:       Height:           GEN: NAD  HEENT: NC/AT, EOMI  RESP: CTAB, no R/R/W  CV: +S1 S2, regular rate, no rubs  ABD: soft, NT, ND, normal BS   :  No Horton  EXT:  Right lower extremity wrapped  Drain in place  No edema  Skin:  No rashes  Neuro:  Alert and oriented x3  [ X ] Total time spent: 30 Mins and greater than 50% of this time was spent counseling/coordinating care        Pedrito Patel PA-C  Internal Medicine

## 2018-01-23 NOTE — CASE MANAGEMENT
Initial Clinical Review    Age/Sex: 58 y o  female admitted on 1/22 for elective surgery - OR    Surgery Date: 1/22    Procedure: S/P ARTHROPLASTY KNEE TOTAL REVISION (Right Knee) - Explanted components: femoral, tibial, and tibial insert    Anesthesia: Spinal, Regional, Iv Sedation with anesthesia    Admission Orders: Date/Time/Statement: Inpatient 1/22/18 @ 1153 Med Surg     Orders Placed This Encounter   Procedures    Inpatient Admission     Standing Status:   Standing     Number of Occurrences:   1     Order Specific Question:   Admitting Physician     Answer:   Lorna Saleh [[id number]]     Order Specific Question:   Level of Care     Answer:   Med Surg [16]     Order Specific Question:   Estimated length of stay     Answer:   More than 2 Midnights     Order Specific Question:   Certification     Answer:   I certify that inpatient services are medically necessary for this patient for a duration of greater than two midnights  See H&P and MD Progress Notes for additional information about the patient's course of treatment  Vital Signs: /67 (BP Location: Left arm)   Pulse 78   Temp 98 3 °F (36 8 °C) (Oral)   Resp 18   Ht 5' 1" (1 549 m)   Wt 70 3 kg (155 lb)   SpO2 96%   BMI 29 29 kg/m²     Diet:        Diet Orders            Start     Ordered    01/22/18 1153  Diet Regular; Regular House  Diet effective now     Question Answer Comment   Diet Type Regular    Regular Regular House    RD to adjust diet per protocol?  Yes        01/22/18 1154          Mobility: WBAT Right Lower  PT/OT eval and treat    DVT Prophylaxis: Sequential compression device    Scheduled Meds:  cefazolin 2,000 mg Intravenous Once   docusate sodium 100 mg Oral BID   enoxaparin 40 mg Subcutaneous Daily   gabapentin 100 mg Oral Q8H [facility]   levothyroxine 75 mcg Oral Early Morning   melatonin 3 mg Oral HS   pantoprazole 40 mg Oral Daily Before Breakfast   senna 1 tablet Oral Daily     Continuous Infusions:  lactated ringers 125 mL/hr Last Rate: 125 mL/hr (01/23/18 0052)     PRN Meds:   Dilaudid Iv x2  Oxycodone po x4

## 2018-01-23 NOTE — PROGRESS NOTES
Preliminary Nursing Report                Patient Information    Initial Encounter Entry Date:   2017 10:20 AM EST (Automated Transmission Automated Transmission)       Last Modified:   {Veda Contreras}              Legal Name: [id number] Number:        YOB: 1955        Age (years): 58        Gender: F        Body Mass Index (BMI): 29 kg/m2        Height: 62 in  Weight: 157 lbs (71 kgs)           Address:   [facility]              Phone: [phone]   (consent to leave messages)        Email:        Ethnicity: Decline to State        Mandaen:        Marital Status:        Preferred Language: English        Race: Other Race                    Patient Insurance Information        Primary Insurance Information Carrier Name: {Primary  CarrierName}           Carrier Address:   {Primary  CarrierAddress}              Carrier Phone: {Primary  CarrierPhone}          Group Number: {Primary  GroupNumber}          Policy Number: {Primary  PolicyNumber}          Insured Name: {Primary  InsuredName}          Insured : {Primary  InsuredDOB}          Relationship to Insured: {Primary  RelationshiptoInsured}           Secondary Insurance Information Carrier Name: {Secondary  CarrierName}           Carrier Address:   {Secondary  CarrierAddress}              Carrier Phone: {Secondary  CarrierPhone}          Group Number: {Secondary  GroupNumber}          Policy Number: {Secondary  PolicyNumber}          Insured Name: {Secondary  InsuredName}          Insured : {Secondary  InsuredDOB}          Relationship to Insured: {Secondary  RelationshiptoInsured}                       Health Profile   Booking #:   Mirna Garcia #: [id number]               DOS: 2018    Surgery : TOTAL KNEE REPLACEMENT    Add'l Procedures/Notes:     Surgery Risk: Intermediate          Precautions          Allergies    Codeine Sulfate TABS       Clinical Comments: Reaction Type: , Reaction: , Severity:        Demerol SOLN       Clinical Comments: Reaction Type: , Reaction: , Severity:        Percocet TABS       Clinical Comments: Reaction Type: , Reaction: , Severity:              Medications    Ativan 0 5 MG Oral Tablet       Ibuprofen 600 MG Oral Tablet       Keflex TABS       Levothyroxine Sodium 50 MCG Oral Tablet       LORazepam 1 MG Oral Tablet       Premarin TABS       Synthroid TABS       Voltaren 1 % Transdermal Gel               Conditions    Acute pain of right knee       Arthritis, midfoot       De Quervain's tenosynovitis       Instability of internal left knee prosthesis, initial encounter       Left Peroneal Tendonitis       Mechanical complication of internal orthopedic device, implant or graft, initial encounter       Pain in left foot       Pain of right thumb       Posterior tibial tendon dysfunction (PTTD) of left lower extremity               Family History    None             Surgical History    None             Social History    Being A Social Drinker       Current Diet High In Sugar Includes High Sugar Beverages       Daily Tea Consumption (___ Cups/Day)                               Patient Instructions       Medical Procedure  ERP NPO Instructions TJ  Please do not eat anything for at least 8 hours prior to your surgery  You may continue drinking clear liquids up until 2 hours before surgery  Please drink provided carbohydrate beverage and finish 2 hours before surgery  If you are supposed to take any of your medications, do so with a sip of water  If you have any questions, please contact your institution for further instructions  LORazepam 1 MG Oral Tablet  Medication Instruction (Benzodiazepine) 15  Please continue the following medications, if needed, up to and including the day of surgery (with a sip of water)           Ibuprofen 600 MG Oral Tablet, , Voltaren 1 % Transdermal Gel  Medication Instruction (NSAID - Pain Medication) 61  Please stop ibuprofen, naproxen and other non-steroidal anti-inflammatory drugs (NSAIDS) for 24 hrs before surgery  Levothyroxine Sodium 50 MCG Oral Tablet  Medication Instruction (Thyroxine - Synthroid)   Please continue to take this medication on your normal schedule  If this is an oral medication and you take in the morning, you may do so with a sip of water  Medical Procedure  ERP-Education TJ  Preoperative counseling, education, and expectations provided               Testing Considerations       ? Complete Blood Count (CBC) t, client, client  If test was completed and normal within last six months, repeat test is not necessary  Triggered by: Age or Facility Rec         ? Electrocardiogram (ECG) t  Patient does not need new test if normal ECG is present within the last six months and no change in clinical condition  Triggered by: Age or Facility Rec         ? Hemoglobin A1c (HbA1c) client  If test was completed and normal within the last three months, repeat test is not necessary  Triggered by: Age or Facility Rec         ? Type and Screen client  Type and Screen - Blood: If there is anticipated or possible large blood loss with this procedure, then a Type and Screen for Blood should be ordered  Triggered by: Age or Facility Rec               Consultations       No recommendations for this classification  Miscellaneous Questions         Question: Are you able to walk up a flight of stairs, walk up a hill or do heavy housework WITHOUT having chest pain or shortness of breath? Answer: YES                   Allergies/Conditions/Medications Not Found        The following were not recognized by our system when generating the recommendations  Please consider if this would impact any preoperative protocols  ? Being A Social Drinker       ? Current Diet High In Sugar Includes High Sugar Beverages       ? Daily Tea Consumption (___ Cups/Day)       ?  Left Peroneal Tendonitis ? Ativan 0 5 MG Oral Tablet       ? Keflex TABS       ? Premarin TABS       ? Synthroid TABS                  Appointment Information         Date:    01/22/2018        Location:    [place]        Address:           Directions:                      Footnotes revision 14      ?? Denotes a free-text entry  Legal Disclaimer: Any and all recommendations and services provided herein are designed to assist in the preoperative care of the patient  Nothing contained herein is designed to replace, eliminate or alleviate the responsibility of the attending physician to supervise and determine the patient?s preoperative care and course of treatment  Failure to provide complete, accurate information may negatively impact the system?s ability to recommend the proper preoperative protocol  THE ATTENDING PHYSICIAN IS RESPONSIBLE TO REVIEW THE SUGGESTED PREOPERATIVE PROTOCOLS/COURSE OF TREATMENT AND PRESCRIBE THE FINAL COURSE OF PREOPERATIVE TREATMENT IN CONSULTATION WITH THE PATIENT  THE ePREOP SYSTEM AND ITS MATERIALS ARE PROVIDED ? AS IS? WITHOUT WARRANTY OF ANY KIND, EXPRESS OR IMPLIED, INCLUDING, BUT NOT LIMITED TO, WARRANTIES OF PERFORMANCE OR MERCHANTABILITY OR FITNESS FOR A PARTICULAR PURPOSE  PATIENT AND PHYSICIANS HEREBY AGREE THAT THEIR USE OF THE MATERIALS AND RESOURCES ACT AS A CONSENT TO RELEASE AND WAIVE ePREOP FROM ANY AND ALL CLAIMS OF WARRANTY, TORT OR CONTRACT LAW OF ANY KIND  Electronically signed by:Mauricio MOFFETT    Dec 19 2017  5:13PM EST

## 2018-01-23 NOTE — PLAN OF CARE
Problem: PHYSICAL THERAPY ADULT  Goal: Performs mobility at highest level of function for planned discharge setting  See evaluation for individualized goals  Treatment/Interventions: Functional transfer training, LE strengthening/ROM, Therapeutic exercise, Endurance training, Elevations, Patient/family training, Equipment eval/education, Bed mobility, Gait training  Equipment Recommended: Mireya Dias       See flowsheet documentation for full assessment, interventions and recommendations  Outcome: Adequate for Discharge  Prognosis: Good  Problem List: Decreased strength, Decreased range of motion, Decreased endurance, Impaired balance, Decreased mobility, Decreased coordination, Decreased skin integrity, Orthopedic restrictions, Pain  Assessment: The pt  has improving mobility as she is ambulating [facility], and she completed stair trial  She had no difficulty with mobility, and she has demonstrated the necessary skills in order to safely return home  Barriers to Discharge: None     Recommendation: Home with family support, Outpatient PT     PT - OK to Discharge: Yes    See flowsheet documentation for full assessment

## 2018-01-23 NOTE — PLAN OF CARE
Problem: PHYSICAL THERAPY ADULT  Goal: Performs mobility at highest level of function for planned discharge setting  See evaluation for individualized goals  Treatment/Interventions: Functional transfer training, LE strengthening/ROM, Therapeutic exercise, Endurance training, Elevations, Patient/family training, Equipment eval/education, Bed mobility, Gait training  Equipment Recommended: Joseph Marvin       See flowsheet documentation for full assessment, interventions and recommendations  Prognosis: Good  Problem List: Decreased strength, Decreased range of motion, Decreased endurance, Impaired balance, Decreased mobility, Decreased coordination, Decreased skin integrity, Orthopedic restrictions, Pain  Assessment: Pt seen for therapy session today and progressed well  Pt was min A for supine-sit and sit-stand transfers  Pt was min A to amb 3 ft from bed-chair w/ RW ; no portable IV pole in room so pt could only move to the chair at this time  Once pt in chair, pt was instructed/performed exercises to increase RLE mobility and strength  After nsg disconnected pt IV , pt was min A to amb 25 ft using RW ; WBAT RLE, decreased R stance, improper weight shift, short stride, step to, slow  Skilled therapy needed to improve strength, balance, endurance, stair training and maximize func indepednence in order to return home  PT rec is home w/ family support and outpatient PT upon Pt DC         Recommendation: Home with family support, Outpatient PT     PT - OK to Discharge: No    See flowsheet documentation for full assessment

## 2018-01-23 NOTE — OCCUPATIONAL THERAPY NOTE
[address]  Evaluation     Patient Name: Guanaco IQBAL Date: 1/23/2018  Problem List  Patient Active Problem List   Diagnosis    History of total knee arthroplasty     Past Medical History  Past Medical History:   Diagnosis Date    Arthritis     Bronchitis     Disease of thyroid gland     Diverticulitis     GERD (gastroesophageal reflux disease)     IBS (irritable bowel syndrome)     Insomnia     PONV (postoperative nausea and vomiting)      Past Surgical History  Past Surgical History:   Procedure Laterality Date    CHOLECYSTECTOMY      DILATION AND CURETTAGE OF UTERUS      HYSTERECTOMY      JOINT REPLACEMENT      PARTIAL HYSTERECTOMY      VA REVISE KNEE JOINT REPLACE,ALL PARTS Right 1/22/2018    Procedure: ARTHROPLASTY KNEE TOTAL REVISION;  Surgeon: Emily Gold MD;  [facility];  Service: Orthopedics    TONSILLECTOMY      VARICOSE VEIN SURGERY           01/23/18 0931   Note Type   Note type Eval only   Restrictions/Precautions   Weight Bearing Precautions Per Order Yes   RUE Weight Bearing Per Order WBAT   LUE Weight Bearing Per Order WBAT   RLE Weight Bearing Per Order WBAT   LLE Weight Bearing Per Order TTWB   Pain Assessment   Pain Assessment 0-10   Pain Score 5   Pain Type Acute pain   Pain Location Knee   Pain Orientation Right   Hospital Pain Intervention(s) Ambulation/increased activity;Repositioned; Emotional support   Home Living   Type of [address] Two level;Stairs to enter with rails;Bed/bath upstairs   Prior Function   Level of [place] Independent with ADLs and functional mobility   Lives With Spouse   Receives Help From Family   ADL Assistance Independent   IADLs Independent   Falls in the last 6 months 0   Vocational Part time employment   Lifestyle   Autonomy I ADLS AND MOBILITY - I IADLS - SHARES HOMEMAKING WITH SPOUSE   Reciprocal Relationships SUPPORTIVE FAMILY   Service to Others WORKS PART TIME FOR [facility] Gratification ACTIVE PTA   Subjective   Subjective OFFERS NO C/O    ADL   Eating Assistance 7  Independent   Grooming Assistance 7  Independent   UB Bathing Assistance 5  Supervision/Setup   LB Bathing Assistance 5  Supervision/Setup   UB Dressing Assistance 5  Supervision/Setup   LB Dressing Assistance 5  Supervision/Setup   Toileting Assistance  5  Supervision/Setup   Bed Mobility   Supine to Sit 4  Minimal assistance   Transfers   Sit to Stand 4  Minimal assistance   Stand to Sit 4  Minimal assistance   Stand pivot 4  Minimal assistance   Toilet transfer 4  Minimal assistance   Additional items Standard toilet   Additional Comments WOULD LIKE [facility]   Functional Mobility   Functional Mobility 4  Minimal assistance   Additional items Rolling walker   Balance   Static Sitting Good   Dynamic Sitting Fair +   Static Standing Fair   Dynamic Standing Fair   Ambulatory Fair -   Activity Tolerance   Activity Tolerance Patient tolerated treatment well   RUE Assessment   RUE Assessment WFL   LUE Assessment   LUE Assessment WFL   Cognition   Overall Cognitive Status WFL   Assessment   Limitation Decreased self-care trans;Decreased high-level ADLs; Decreased ADL status   Prognosis Good   Assessment Pt is a 58 y o  female who was admitted to [facility] on 1/22/2018 with History of total knee arthroplasty   Pt's problem list also includes PMH of previous surgery and arthritis, bronchitis, thyroid disease, diverticulitis, GERD, IBS, insomnia  At baseline pt was completing adls and mobility independently w/o ad  Pt lives with spouse in [address] [place] home  Currently pt requires sba to min assist for overall ADLS and sba to min assist for functional mobility/transfers  Pt currently presents with impairments in the following categories -steps to enter environment, difficulty performing IADLS  and health management  activity tolerance, endurance and standing balance/tolerance   These impairments, as well as pt's pain, orthopedic restricitions  and WBS   limit pt's ability to safely engage in all baseline areas of occupation, includingfunctional mobility/transfers, community mobility, house maintenance, meal prep, social participation  and leisure activities however has supportive spouse who is available to assist (is taking time off from work and able to work from home after prn) From OT standpoint, recommend home with family support upon D/C   No further acute OT needs indicated at this time - d/c from caseload   Goals   Patient Goals go home   Plan   OT Frequency Eval only   Matt  mmendation   OT Discharge Recommendation Home with family support   Equipment Recommended Bedside commode   Barthel Index   Feeding 10   Bathing 0   Grooming Score 5   Dressing Score 5   Bladder Score 10   Bowels Score 10   Toilet Use Score 5   Transfers (Bed/Chair) Score 10   Mobility (Level Surface) Score 0   Stairs Score 0   Barthel Index Score [address] [place] [address],  [address], [place]

## 2018-01-23 NOTE — PHYSICAL THERAPY NOTE
Physical Therapy Progress Note     01/23/18 1545   Pain Assessment   Pain Assessment 0-10   Pain Score 6   Pain Type Surgical pain   Pain Location Knee   Pain Orientation Right   Hospital Pain Intervention(s) Repositioned; Ambulation/increased activity; Emotional support   Restrictions/Precautions   Other Precautions Pain; Fall Risk   Subjective   Subjective The pt  states that she is ready to work with therapy  Bed Mobility   Supine to Sit 5  Supervision   Additional items LE management   Transfers   Sit to Stand 5  Supervision   Stand to Sit 5  Supervision   Ambulation/Elevation   Gait pattern Excessively slow; Inconsistent len   Gait Assistance 5  Supervision   Additional items Verbal cues   Assistive Device Rolling walker   Distance 180   Stair Management Assistance 5  Supervision   Additional items Verbal cues   Stair Management Technique Two rails; One rail L;Step to pattern; Foreward;Nonreciprocal;With walker  (First 7 steps 2 rails, last 7 rail and RW )   Number of Stairs 14   Balance   Static Sitting Good   Static Standing Fair +   Ambulatory Fair   Activity Tolerance   Activity Tolerance Patient tolerated treatment well   Nurse Made Aware Yes  Exercises   TKR Supine;Right;AAROM;AROM;10 reps   Assessment   Prognosis Good   Problem List Decreased strength;Decreased range of motion;Decreased endurance; Impaired balance;Decreased mobility; Decreased coordination;Decreased skin integrity;Orthopedic restrictions;Pain   Assessment The pt  has improving mobility as she is ambulating [facility], and she completed stair trial  She had no difficulty with mobility, and she has demonstrated the necessary skills in order to safely return home  Barriers to Discharge None   Goals   Patient Goals To go home tomorrow  STG Expiration Date 02/05/18   Treatment Day 2   Plan   Treatment/Interventions Functional transfer training;LE strengthening/ROM; Therapeutic exercise; Endurance training;Patient/family training;Bed mobility;Gait training;Elevations   Progress Progressing toward goals   PT Frequency Twice a day   Recommendation   Recommendation Home with family support; Outpatient PT   Equipment Recommended Other (Comment)  (Bedside commode  )   PT - OK to Discharge Yes     Bailey Ruiz PTA

## 2018-01-23 NOTE — DISCHARGE SUMMARY
ORTHOPEDICS DISCHARGE SUMMARY  Cristiana Mcconnell 58 y o  female MRN: [id number]  Unit/Bed#: CW3 344-01    Attending Physician: Ev Dykes    Admitting diagnosis: Other mechanical complication of other internal orthopedic devices, implants and grafts, initial encounter [facility]) [[id number]]    Discharge diagnosis: Other mechanical complication of other internal orthopedic devices, implants and grafts, initial encounter [facility]) [[id number]]    Date of admission: 1/22/2018    Date of discharge: 01/23/18         Procedure: Right revision total knee arthroplasty    HPI:  This is a 58y o  year old female that presented to the office with signs and symptoms of right knee instability  They tried and failed conservative treatment measures and wished to proceed with surgical intervention  The risks, benefits, and complications of the procedure were discussed with the patient and informed consent was obtained  Hospital Course: The patient was admitted to the hospital on 1/22/2018 and underwent an uncomplicated right total knee arthroplasty  They were transferred to the floor after a brief stay in the post-anesthesia care unit  Their pain was well managed with IV and oral pain medications  They began therapy on post operative day #1  Lovenox was also started for DVT prophylaxis post operative day #1  Hemovac drain was removed on POD1  Post operative course was uneventful  On discharge date pt was cleared by PT and the medicine team and determined to be safe for discharge  Daily discussion was had with the patient, nursing staff, orthopaedic team, and family members if present  All questions were answered to the patients satisifaction          0  Lab Value Date/Time   HGB 11 0 (L) 01/23/2018 0457   HGB 13 9 01/15/2018 1049   HGB 13 8 06/13/2017 0818   HGB 14 0 11/09/2016 0940   HGB 13 7 05/02/2016 0844   HGB 14 1 10/20/2015 0958   HGB 13 6 05/18/2015 1026   HGB 13 2 05/28/2014 1152   HGB 13 5 01/31/2014 1128       Greater than 2 gram drop which qualifies for diagnosis of acute blood loss anemia  Vital signs remained stable and pt was resuscitated with IVF as needed     Discharge Instructions: The patient was discharged weight bearing as tolerated to the right lower extremity  Lovenox will be continued for 28 days  Continue PT/OT  Take pain medications as instructed  Discharge Medications: For the complete list of discharge medications, please refer to the patient's medication reconciliation

## 2018-01-23 NOTE — PROGRESS NOTES
Orthopedics   Kodak Velazquez 58 y o  female MRN: [id number]  Unit/Bed#: CW3 344-01      Subjective:  58 y  o female post operative day 1 right revision total knee arthroplasty  Pt doing well  Pain controlled      Labs:    0  Lab Value Date/Time   HCT 32 6 (L) 01/23/2018 0457   HCT 42 5 01/15/2018 1049   HCT 41 4 06/13/2017 0818   HCT 41 8 10/20/2015 0958   HCT 42 2 05/18/2015 1026   HCT 39 8 05/28/2014 1152   HGB 11 0 (L) 01/23/2018 0457   HGB 13 9 01/15/2018 1049   HGB 13 8 06/13/2017 0818   HGB 14 1 10/20/2015 0958   HGB 13 6 05/18/2015 1026   HGB 13 2 05/28/2014 1152   INR 0 93 01/15/2018 1049   WBC 13 67 (H) 01/23/2018 0457   WBC 7 77 01/15/2018 1049   WBC 5 27 06/13/2017 0818   WBC 5 89 10/20/2015 0958   WBC 12 06 (H) 05/18/2015 1026   WBC 6 39 05/28/2014 1152   ESR 14 11/20/2017 1114   CRP <3 0 11/20/2017 1114       Meds:    Current Facility-Administered Medications:     acetaminophen (TYLENOL) tablet 650 mg, 650 mg, Oral, Q6H PRN, Matilda Soares MD    albuterol (PROVENTIL HFA,VENTOLIN HFA) inhaler 2 puff, 2 puff, Inhalation, Q6H PRN, Matilda Soares MD    calcium carbonate (TUMS) chewable tablet 1,000 mg, 1,000 mg, Oral, Daily PRN, Matilda Soares MD    ceFAZolin (ANCEF) IVPB (premix) 2,000 mg, 2,000 mg, Intravenous, Once, Gustavo Guerrero MD    diphenhydrAMINE (BENADRYL) tablet 25 mg, 25 mg, Oral, Q6H PRN, Matilda Soares MD, 25 mg at 01/23/18 0102    docusate sodium (COLACE) capsule 100 mg, 100 mg, Oral, BID, Matilda Soares MD    enoxaparin (LOVENOX) subcutaneous injection 40 mg, 40 mg, Subcutaneous, Daily, Matilda Soares MD    gabapentin (NEURONTIN) capsule 100 mg, 100 mg, Oral, Q8H [address], Matilda Soares MD, 100 mg at 01/23/18 0532    HYDROmorphone (DILAUDID) injection 0 5 mg, 0 5 mg, Intravenous, Q1H PRN, Matilda Soares MD, 0 5 mg at 01/23/18 0532    lactated ringers infusion, 125 mL/hr, Intravenous, Continuous, Matilda Soares MD, Last Rate: 125 mL/hr at 01/23/18 0052, 125 mL/hr at 01/23/18 0052   levothyroxine tablet 75 mcg, 75 mcg, Oral, Early Morning, Ryan Pascual MD, 75 mcg at 01/23/18 0532    melatonin tablet 3 mg, 3 mg, Oral, HS, Ryan Pascual MD, 3 mg at 01/22/18 2201    oxyCODONE (ROXICODONE) immediate release tablet 10 mg, 10 mg, Oral, Q4H PRN, Ryan Pascual MD, 10 mg at 01/23/18 0346    oxyCODONE (ROXICODONE) IR tablet 5 mg, 5 mg, Oral, Q4H PRN, Ryan Pascual MD    pantoprazole (PROTONIX) EC tablet 40 mg, 40 mg, Oral, Daily Before Breakfast, Ryan Pascual MD    senna (SENOKOT) tablet 8 6 mg, 1 tablet, Oral, Daily, Ryan Pascual MD    Blood Culture:   No results found for: BLOODCX    Wound Culture:   No results found for: WOUNDCULT    Ins and Outs:  I/O last 24 hours: In: 6010 [P O :221; I V :1125; IV Piggyback:250]  Out: 1700 [Urine:1150; Drains:450; Blood:100]          Physical:  Vitals:    01/23/18 0300   BP: 116/57   Pulse: 91   Resp: 16   Temp: 98 6 °F (37 °C)   SpO2: 96%     right lower extremity:  · Dressings C/D/I  · Toes warm and well perfused  · HVx1    _*_*_*_*_*_*_*_*_*_*_*_*_*_*_*_*_*_*_*_*_*_*_*_*_*_*_*_*_*_*_*_*_*_*_*_*_*_*_*_*_*    Assessment: 58 y  o female post operative day 1 right total knee arthroplasty   Doing well    Plan:  · Weight Bearing as tolerated  · Up and out of bed  · DVT prophylaxis  · Analgesics  · PT/OT  · Patient noted to have acute blood loss anemia due to a drop in Hbg of > 2 0g from preop levels, will monitor vital signs and resuscitate with IV fluids as needed    Ryan Pascual MD

## 2018-01-23 NOTE — SOCIAL WORK
Pt new admit to the floor  CM met with patient and explained cm role  Pt alert and oriented  Pt reports she lives in a 2 story townhouse w/significant other Glenda Simmons w/1 david  Pt reports being independent PTA, reports good support at home, she drives and has transport home w/signigicant other for d/c  Pt reports DME: rw, denies VNA and rehab  Pts pharmacy is [facility] in [facility]  POA is daughter Isabella Guillermo  Pt denies hx/admission for drug/etoh and psych/mental health  Pt reports getting Lovenox filled  PT recommending: commode, outpt Pt  Pt is starting outpt PT on Thursday 1/25/2018 @ 10am with  rehab in [facility]  Referral to [address] for commode, pt agreeable  CM reviewed d/c planning process including the following: identifying help at home, patient preference for d/c planning needs, Discharge Lounge, [facility] Meds to Bed program, availability of treatment team to discuss questions or concerns patient and/or family may have regarding understanding medications and recognizing signs and symptoms once discharged  CM also encouraged patient to follow up with all recommended appointments after discharge  Patient advised of importance for patient and family to participate in managing patients medical well being

## 2018-01-24 VITALS
DIASTOLIC BLOOD PRESSURE: 62 MMHG | BODY MASS INDEX: 29.27 KG/M2 | RESPIRATION RATE: 16 BRPM | HEART RATE: 100 BPM | SYSTOLIC BLOOD PRESSURE: 120 MMHG | WEIGHT: 155 LBS | HEIGHT: 61 IN | TEMPERATURE: 100.4 F | OXYGEN SATURATION: 94 %

## 2018-01-24 LAB
ANION GAP SERPL CALCULATED.3IONS-SCNC: 3 MMOL/L (ref 4–13)
BUN SERPL-MCNC: 21 MG/DL (ref 5–25)
CALCIUM SERPL-MCNC: 8.7 MG/DL (ref 8.3–10.1)
CHLORIDE SERPL-SCNC: 101 MMOL/L (ref 100–108)
CO2 SERPL-SCNC: 31 MMOL/L (ref 21–32)
CREAT SERPL-MCNC: 0.72 MG/DL (ref 0.6–1.3)
GFR SERPL CREATININE-BSD FRML MDRD: 90 ML/MIN/1.73SQ M
GLUCOSE SERPL-MCNC: 114 MG/DL (ref 65–140)
POTASSIUM SERPL-SCNC: 3.9 MMOL/L (ref 3.5–5.3)
SODIUM SERPL-SCNC: 135 MMOL/L (ref 136–145)

## 2018-01-24 PROCEDURE — 97110 THERAPEUTIC EXERCISES: CPT

## 2018-01-24 PROCEDURE — 80048 BASIC METABOLIC PNL TOTAL CA: CPT | Performed by: ORTHOPAEDIC SURGERY

## 2018-01-24 PROCEDURE — 97116 GAIT TRAINING THERAPY: CPT

## 2018-01-24 RX ORDER — OXYCODONE HYDROCHLORIDE 10 MG/1
TABLET ORAL
Status: COMPLETED
Start: 2018-01-24 | End: 2018-01-24

## 2018-01-24 RX ADMIN — HYDROMORPHONE HYDROCHLORIDE 1 MG: 1 INJECTION, SOLUTION INTRAMUSCULAR; INTRAVENOUS; SUBCUTANEOUS at 03:36

## 2018-01-24 RX ADMIN — LEVOTHYROXINE SODIUM 75 MCG: 75 TABLET ORAL at 05:58

## 2018-01-24 RX ADMIN — GABAPENTIN 100 MG: 100 CAPSULE ORAL at 05:58

## 2018-01-24 RX ADMIN — OXYCODONE HYDROCHLORIDE 10 MG: 10 TABLET ORAL at 00:10

## 2018-01-24 RX ADMIN — TRAMADOL HYDROCHLORIDE 50 MG: 50 TABLET, FILM COATED ORAL at 09:14

## 2018-01-24 RX ADMIN — PANTOPRAZOLE SODIUM 40 MG: 40 TABLET, DELAYED RELEASE ORAL at 06:02

## 2018-01-24 RX ADMIN — ENOXAPARIN SODIUM 40 MG: 40 INJECTION SUBCUTANEOUS at 09:17

## 2018-01-24 NOTE — PROGRESS NOTES
Internal Medicine Progress Note  Patient: Terrence Palma  Age/sex: 58 y o  female  Medical Record #: [id number]      ASSESSMENT/PLAN:  Terrence Palma is seen and examined and mangement for following issues:    1  Right total knee revision ([place]):  Pain management per primary service; continue bowel meds to prevent constipation  Hgb stable  Encouraged continued use of IS      2  Hypothyroidism:  Continue current Levothyroxine 75 mcg qd     3  DVT prophylaxis:  Lovenox     4  GERD prophy:  Continue PPI; she takes something at home for sx prn only    5  Leukocytosis:  Mild  Seems reactive as patient without infectious symptoms      Medically clear for D/C      Subjective: Patient seen and examined  Patient reports feeling well this AM  Pain controlled  No new or overnight issues   Has been cleared by PT for discharge    ROS:   GI: denies abdominal pain, change bowel habits or reflux symptoms  Neuro: No new neurologic changes  Respiratory: No Cough, SOB  Cardiovascular: No CP, palpitations     Scheduled Meds:    cefazolin 2,000 mg Intravenous Once   docusate sodium 100 mg Oral BID   enoxaparin 40 mg Subcutaneous Daily   gabapentin 100 mg Oral Q8H [address]   levothyroxine 75 mcg Oral Early Morning   melatonin 3 mg Oral HS   pantoprazole 40 mg Oral Daily Before Breakfast   senna 1 tablet Oral Daily       Labs:       Results from last 7 days  Lab Units 01/23/18  0457   WBC Thousand/uL 13 67*   HEMOGLOBIN g/dL 11 0*   HEMATOCRIT % 32 6*   PLATELETS Thousands/uL 223       Results from last 7 days  Lab Units 01/24/18  0551 01/23/18  0457   SODIUM mmol/L 135* 139   POTASSIUM mmol/L 3 9 4 0   CHLORIDE mmol/L 101 103   CO2 mmol/L 31 28   BUN mg/dL 21 17   CREATININE mg/dL 0 72 0 64   GLUCOSE RANDOM mg/dL 114 132   CALCIUM mg/dL 8 7 9 0                  Glucose (mg/dL)   Date Value   01/24/2018 114   01/23/2018 132   11/09/2016 99   05/02/2016 90   10/20/2015 89   05/18/2015 75   05/28/2014 143 (H)   01/31/2014 97 Labs reviewed    Physical Examination:  Vitals:   Vitals:    01/23/18 1900 01/23/18 2300 01/24/18 0300 01/24/18 0800   BP: 127/55 123/65 127/61 120/62   BP Location: Right arm Right arm Left arm Left arm   Pulse: 88 103 97 100   Resp: 18 18 18 16   Temp: 98 1 °F (36 7 °C) 99 2 °F (37 3 °C) 99 7 °F (37 6 °C) 100 1 °F (37 8 °C)   TempSrc: Oral Oral Oral Oral   SpO2: 92% 97% 98% 94%   Weight:       Height:           GEN: NAD  HEENT: NC/AT, EOMI  RESP: CTAB, no R/R/W  CV: +S1 S2, regular rate, no rubs  ABD: soft, NT, ND, normal BS   :  No Horton  EXT:  Right lower extremity wrapped  No edema  Skin:  No rashes  Neuro:  Alert and oriented x3  [ X ] Total time spent: 30 Mins and greater than 50% of this time was spent counseling/coordinating care        Aye Marrero PA-C  Internal Medicine

## 2018-01-24 NOTE — PROGRESS NOTES
Orthopedics   Pradip Begum 58 y o  female MRN: [id number]  Unit/Bed#: CW3 344-01      Subjective:  58 y  o female post operative day 2 right revision total knee arthroplasty  Pt doing well  Pain controlled      Labs:    0  Lab Value Date/Time   HCT 32 6 (L) 01/23/2018 0457   HCT 42 5 01/15/2018 1049   HCT 41 4 06/13/2017 0818   HCT 41 8 10/20/2015 0958   HCT 42 2 05/18/2015 1026   HCT 39 8 05/28/2014 1152   HGB 11 0 (L) 01/23/2018 0457   HGB 13 9 01/15/2018 1049   HGB 13 8 06/13/2017 0818   HGB 14 1 10/20/2015 0958   HGB 13 6 05/18/2015 1026   HGB 13 2 05/28/2014 1152   INR 0 93 01/15/2018 1049   WBC 13 67 (H) 01/23/2018 0457   WBC 7 77 01/15/2018 1049   WBC 5 27 06/13/2017 0818   WBC 5 89 10/20/2015 0958   WBC 12 06 (H) 05/18/2015 1026   WBC 6 39 05/28/2014 1152   ESR 14 11/20/2017 1114   CRP <3 0 11/20/2017 1114       Meds:    Current Facility-Administered Medications:     acetaminophen (TYLENOL) tablet 650 mg, 650 mg, Oral, Q6H PRN, Karmen Moleler MD    albuterol (PROVENTIL HFA,VENTOLIN HFA) inhaler 2 puff, 2 puff, Inhalation, Q6H PRN, Karmen Moeller MD    calcium carbonate (TUMS) chewable tablet 1,000 mg, 1,000 mg, Oral, Daily PRN, Karmen Moeller MD, 1,000 mg at 01/23/18 2133    ceFAZolin (ANCEF) IVPB (premix) 2,000 mg, 2,000 mg, Intravenous, Once, Luis Wayne MD    diphenhydrAMINE (BENADRYL) tablet 25 mg, 25 mg, Oral, Q6H PRN, Karmen Moeller MD, 25 mg at 01/23/18 0102    docusate sodium (COLACE) capsule 100 mg, 100 mg, Oral, BID, Karmen Moeller MD, 100 mg at 01/23/18 2759    enoxaparin (LOVENOX) subcutaneous injection 40 mg, 40 mg, Subcutaneous, Daily, Karmen Moeller MD, 40 mg at 01/23/18 8443    gabapentin (NEURONTIN) capsule 100 mg, 100 mg, Oral, Q8H [address], Karmen Moeller MD, 100 mg at 01/24/18 0558    HYDROmorphone (DILAUDID) injection 1 mg, 1 mg, Intravenous, Q3H PRN, Sudie Felty, DO, 1 mg at 01/24/18 0336    lactated ringers infusion, 125 mL/hr, Intravenous, Continuous, Karmen Moeller MD, Stopped at 01/23/18 0959    levothyroxine tablet 75 mcg, 75 mcg, Oral, Early Morning, Ketty Cain MD, 75 mcg at 01/24/18 0558    melatonin tablet 3 mg, 3 mg, Oral, HS, Ketty Cain MD, 3 mg at 01/23/18 2130    ondansetron (ZOFRAN) injection 4 mg, 4 mg, Intravenous, Q8H PRN, Woo Sousa MD, 4 mg at 01/23/18 1358    oxyCODONE (ROXICODONE) immediate release tablet 10 mg, 10 mg, Oral, Q4H PRN, Ketty Cain MD, 10 mg at 01/24/18 0010    oxyCODONE (ROXICODONE) IR tablet 5 mg, 5 mg, Oral, Q4H PRN, Ketty Cain MD    pantoprazole (PROTONIX) EC tablet 40 mg, 40 mg, Oral, Daily Before Breakfast, Ketty Cain MD, 40 mg at 01/24/18 0602    senna (SENOKOT) tablet 8 6 mg, 1 tablet, Oral, Daily, Ketty Cain MD, 8 6 mg at 01/23/18 3012    traMADol (ULTRAM) tablet 50 mg, 50 mg, Oral, Q6H PRN, Belen Gottlieb DO    zolpidem (AMBIEN) tablet 5 mg, 5 mg, Oral, HS PRN, Woo Sousa MD, 5 mg at 01/23/18 2130    Blood Culture:   No results found for: BLOODCX    Wound Culture:   No results found for: WOUNDCULT    Ins and Outs:  I/O last 24 hours: In: 641 [P O :641]  Out: 2125 [Urine:1700; Drains:425]          Physical:  Vitals:    01/24/18 0300   BP: 127/61   Pulse: 97   Resp: 18   Temp: 99 7 °F (37 6 °C)   SpO2: 98%     right lower extremity:  · Incision well approximated no erythema no drainage  · Toes warm and well perfused  · + EHL/FHl  · SILT DP/SP/T/S/S    _*_*_*_*_*_*_*_*_*_*_*_*_*_*_*_*_*_*_*_*_*_*_*_*_*_*_*_*_*_*_*_*_*_*_*_*_*_*_*_*_*    Assessment: 58 y  o female post operative day 2 right total knee arthroplasty   Doing well    Plan:  · Weight Bearing as tolerated  · Up and out of bed  · DVT prophylaxis: lovenox  · Analgesics  · PT/OT  · Patient noted to have acute blood loss anemia due to a drop in Hbg of > 2 0g from preop levels, will monitor vital signs and resuscitate with IV fluids as needed    Ketty Cain MD

## 2018-01-24 NOTE — PHYSICAL THERAPY NOTE
Physical Therapy Progress Note     01/24/18 0840   Pain Assessment   Pain Assessment 0-10   Pain Score 3   Pain Type Surgical pain   Pain Location Knee   Pain Orientation Right   Hospital Pain Intervention(s) Repositioned; Ambulation/increased activity   Response to Interventions Satisfied  Restrictions/Precautions   Other Precautions Fall Risk   Subjective   Subjective The pt  states that she is ready to go home  Transfers   Sit to Stand 6  Modified independent   Stand to Sit 6  Modified independent   Ambulation/Elevation   Gait pattern Excessively slow;Decreased foot clearance   Gait Assistance 6  Modified independent   Assistive Device Rolling walker   Distance 180 feet  Stair Management Assistance 6  Modified independent   Additional items Verbal cues   Stair Management Technique One rail L;Foreward; With walker   Number of Stairs 14   Balance   Static Sitting Good   Static Standing Fair +   Ambulatory Fair   Activity Tolerance   Activity Tolerance Patient tolerated treatment well   Nurse Made Aware Yes  Exercises   TKR Sitting;Right;AAROM;AROM;15 reps   Assessment   Prognosis Good   Problem List Decreased strength;Decreased range of motion;Decreased endurance; Impaired balance;Decreased mobility; Decreased coordination;Decreased skin integrity;Orthopedic restrictions;Pain   Assessment The pt  has improving mobility and strength today which is demonstrated by improved balance  She has progressed to indepenence with the rolling walker  She had improved competency with stairs utilizing the rail and the walker  She has demonstrated the necessary mobility and knowledge to safely return home  Barriers to Discharge None   Goals   Patient Goals To go home  STG Expiration Date 02/05/18   Treatment Day 3   Plan   Treatment/Interventions Functional transfer training;LE strengthening/ROM; Therapeutic exercise; Endurance training;Elevations; Patient/family training;Bed mobility;Gait training   Progress Improving as expected   PT Frequency Twice a day   Recommendation   Recommendation Outpatient PT; Home with family support   Equipment Recommended (Bedside commode  )   PT - OK to Discharge Yes     Juanito Zarate, ARTEMIO

## 2018-01-24 NOTE — PLAN OF CARE
Problem: PHYSICAL THERAPY ADULT  Goal: Performs mobility at highest level of function for planned discharge setting  See evaluation for individualized goals  Treatment/Interventions: Functional transfer training, LE strengthening/ROM, Therapeutic exercise, Endurance training, Elevations, Patient/family training, Equipment eval/education, Bed mobility, Gait training  Equipment Recommended: Enma Singh       See flowsheet documentation for full assessment, interventions and recommendations  Outcome: Adequate for Discharge  Prognosis: Good  Problem List: Decreased strength, Decreased range of motion, Decreased endurance, Impaired balance, Decreased mobility, Decreased coordination, Decreased skin integrity, Orthopedic restrictions, Pain  Assessment: The pt  has improving mobility and strength today which is demonstrated by improved balance  She has progressed to indepenence with the rolling walker  She had improved competency with stairs utilizing the rail and the walker  She has demonstrated the necessary mobility and knowledge to safely return home  Barriers to Discharge: None     Recommendation: Outpatient PT, Home with family support     PT - OK to Discharge: Yes    See flowsheet documentation for full assessment

## 2018-01-24 NOTE — DISCHARGE SUMMARY
ORTHOPEDICS DISCHARGE SUMMARY  Ally Rucker 58 y o  female MRN: [id number]  Unit/Bed#: CW3 344-01    Attending Physician: Eyad Darling    Admitting diagnosis: Other mechanical complication of other internal orthopedic devices, implants and grafts, initial encounter [facility]) [P70 417L]    Discharge diagnosis: Other mechanical complication of other internal orthopedic devices, implants and grafts, initial encounter [facility]) [T84 498A]    Date of admission: 1/22/2018    Date of discharge: 01/24/18         Procedure: Right revision total knee arthroplasty    HPI:  This is a 58y o  year old female that presented to the office with signs and symptoms of right knee instability  They tried and failed conservative treatment measures and wished to proceed with surgical intervention  The risks, benefits, and complications of the procedure were discussed with the patient and informed consent was obtained  Hospital Course: The patient was admitted to the hospital on 1/22/2018 and underwent an uncomplicated right total knee arthroplasty  They were transferred to the floor after a brief stay in the post-anesthesia care unit  Their pain was well managed with IV and oral pain medications  They began therapy on post operative day #1  Lovenox was also started for DVT prophylaxis post operative day #1  Hemovac drain was removed on POD1  Post operative course was uneventful  On discharge date pt was cleared by PT and the medicine team and determined to be safe for discharge  Daily discussion was had with the patient, nursing staff, orthopaedic team, and family members if present  All questions were answered to the patients satisifaction          0  Lab Value Date/Time   HGB 11 0 (L) 01/23/2018 0457   HGB 13 9 01/15/2018 1049   HGB 13 8 06/13/2017 0818   HGB 14 0 11/09/2016 0940   HGB 13 7 05/02/2016 0844   HGB 14 1 10/20/2015 0958   HGB 13 6 05/18/2015 1026   HGB 13 2 05/28/2014 1152   HGB 13 5 01/31/2014 1128       Greater than 2 gram drop which qualifies for diagnosis of acute blood loss anemia  Vital signs remained stable and pt was resuscitated with IVF as needed     Discharge Instructions: The patient was discharged weight bearing as tolerated to the right lower extremity  Lovenox will be continued for 28 days  Continue PT/OT  Take pain medications as instructed  Discharge Medications: For the complete list of discharge medications, please refer to the patient's medication reconciliation

## 2018-01-24 NOTE — PROGRESS NOTES
Patient contacted me again stating she cannot find her commode attachment  Found in dirty utility room  Placed at  on cw 3 with patient's name  Charge nurse and manager aware

## 2018-01-24 NOTE — PROGRESS NOTES
Patient returned phone call  Agreed to send all scripts to pharmacy  Sent to [facility]

## 2018-01-24 NOTE — PROGRESS NOTES
Patient left scripts in room  Patient attempted to be contacted  Left a message with significant other via demographics info

## 2018-01-25 ENCOUNTER — OFFICE VISIT (OUTPATIENT)
Dept: PHYSICAL THERAPY | Facility: MEDICAL CENTER | Age: 63
End: 2018-01-25
Payer: COMMERCIAL

## 2018-01-25 DIAGNOSIS — M25.561 ACUTE PAIN OF RIGHT KNEE: Primary | ICD-10-CM

## 2018-01-25 DIAGNOSIS — Z47.89 ORTHOPEDIC AFTERCARE: ICD-10-CM

## 2018-01-25 PROCEDURE — 97110 THERAPEUTIC EXERCISES: CPT | Performed by: PHYSICAL THERAPIST

## 2018-01-25 PROCEDURE — 97112 NEUROMUSCULAR REEDUCATION: CPT | Performed by: PHYSICAL THERAPIST

## 2018-01-25 NOTE — PROGRESS NOTES
Daily Note     Today's date: 2018  Patient name: Ally Rucker  : 1955  MRN: [id number]  Referring provider: Pearl Friend MD  Dx:   Encounter Diagnoses   Name Primary?  Acute pain of right knee Yes    Orthopedic aftercare                   Subjective: Patient reports feeling sore but good for day 3 post op  Objective: See treatment diary below  Precautions: DOS     Daily Treatment Diary     Manual              Patellar mobilizations PRN                                                                     Exercise Diary              Bike ROM 10'              PROM to R knee 10'            Hamstring stretch with strap 30 sec X 5            Heel slides with strap 10 sec X 10            Quad set 5 sec X 30            SAQ 30            Heel raises  NV            Standing hip abduction/exten NV            Gait training  NV                                                                                                                                                               Modalities              CP post TE 10'                                              Assessment: Tolerated treatment well  Patient exhibited good technqiue with therapeutic exercises   Patient doing well post operatively, incision clean, dry, intact staples in place    Plan: Continue per plan of care

## 2018-01-29 ENCOUNTER — OFFICE VISIT (OUTPATIENT)
Dept: PHYSICAL THERAPY | Facility: MEDICAL CENTER | Age: 63
End: 2018-01-29
Payer: COMMERCIAL

## 2018-01-29 DIAGNOSIS — T84.023A DISPLACEMENT OF INTERNAL LEFT KNEE PROSTHESIS, INITIAL ENCOUNTER (HCC): Primary | ICD-10-CM

## 2018-01-29 PROCEDURE — 97112 NEUROMUSCULAR REEDUCATION: CPT | Performed by: PHYSICAL THERAPY ASSISTANT

## 2018-01-29 PROCEDURE — 97140 MANUAL THERAPY 1/> REGIONS: CPT | Performed by: PHYSICAL THERAPY ASSISTANT

## 2018-01-29 PROCEDURE — 97110 THERAPEUTIC EXERCISES: CPT | Performed by: PHYSICAL THERAPY ASSISTANT

## 2018-01-29 NOTE — PROGRESS NOTES
Daily Note   Today's date: 2018  Patient name: Amalia Oakley  : 1955  MRN: [id number]  Referring provider: Lolita Lama MD  Dx:   Encounter Diagnosis   Name Primary?  Displacement of internal left knee prosthesis, initial encounter ([facility] ) Yes                  Subjective: Pt reports she tried to decreased the amount of pain meds she was taking yesterday and ended up with a significant increase in pain  Pt states she is back to taking pain meds on a regular schedule and pain is more controlled  Objective: See treatment diary below  Precautions: DOS     Daily Treatment Diary     Manual             Patellar mobilizations PRN                                                                     Exercise Diary             Bike ROM 10'   10'           PROM to R knee 10' 10'           Hamstring stretch with strap 30 sec X 5 30"x5           Heel slides with strap 10 sec X 10 10"x10           Quad set 5 sec X 30 5"x30           SAQ 30 30x           Heel raises  NV 30x           Standing hip abduction/exten NV 30x ea           Gait training  NV 5' [facility]                                                                                                                                                              Modalities              CP post TE 10' 10'                                               Assessment: Tolerated treatment well  Remains most limited in R knee flexion  Pt reports good compliance with HEP  Patient demonstrated fatigue post treatment and could benefit from continued PT  Plan: Continue per plan of care  and Progress treatment as tolerated

## 2018-01-30 ENCOUNTER — OFFICE VISIT (OUTPATIENT)
Dept: OBGYN CLINIC | Facility: HOSPITAL | Age: 63
End: 2018-01-30

## 2018-01-30 ENCOUNTER — HOSPITAL ENCOUNTER (OUTPATIENT)
Dept: RADIOLOGY | Facility: HOSPITAL | Age: 63
Discharge: HOME | End: 2018-01-30
Attending: ORTHOPAEDIC SURGERY
Payer: COMMERCIAL

## 2018-01-30 VITALS
SYSTOLIC BLOOD PRESSURE: 113 MMHG | BODY MASS INDEX: 29.83 KG/M2 | WEIGHT: 158 LBS | DIASTOLIC BLOOD PRESSURE: 79 MMHG | HEART RATE: 101 BPM | HEIGHT: 61 IN

## 2018-01-30 DIAGNOSIS — Z47.1 AFTERCARE FOLLOWING RIGHT KNEE JOINT REPLACEMENT SURGERY: ICD-10-CM

## 2018-01-30 DIAGNOSIS — Z96.651 AFTERCARE FOLLOWING RIGHT KNEE JOINT REPLACEMENT SURGERY: ICD-10-CM

## 2018-01-30 DIAGNOSIS — Z96.651 AFTERCARE FOLLOWING RIGHT KNEE JOINT REPLACEMENT SURGERY: Primary | ICD-10-CM

## 2018-01-30 DIAGNOSIS — Z47.1 AFTERCARE FOLLOWING RIGHT KNEE JOINT REPLACEMENT SURGERY: Primary | ICD-10-CM

## 2018-01-30 DIAGNOSIS — Z48.89 AFTERCARE FOLLOWING SURGERY: ICD-10-CM

## 2018-01-30 PROCEDURE — 73560 X-RAY EXAM OF KNEE 1 OR 2: CPT

## 2018-01-30 PROCEDURE — 99024 POSTOP FOLLOW-UP VISIT: CPT | Performed by: ORTHOPAEDIC SURGERY

## 2018-01-30 RX ORDER — PREDNISONE 50 MG/1
TABLET ORAL
COMMUNITY
Start: 2018-01-06 | End: 2018-01-30 | Stop reason: ALTCHOICE

## 2018-01-30 RX ORDER — AZITHROMYCIN 250 MG/1
TABLET, FILM COATED ORAL DAILY
COMMUNITY
Start: 2018-01-06 | End: 2018-01-30 | Stop reason: ALTCHOICE

## 2018-01-30 NOTE — PROGRESS NOTES
58 y o female presents for 1 week postoperative visit status post right revision TKA (op date:  01/24/2018)  Patient doing wel 1 week postop revision right total knee arthroplasty      Review of Systems  Review of systems negative unless otherwise specified in HPI    Past Medical History  Past Medical History:   Diagnosis Date    Arthritis     Bronchitis     Disease of thyroid gland     Diverticulitis     GERD (gastroesophageal reflux disease)     IBS (irritable bowel syndrome)     Insomnia     PONV (postoperative nausea and vomiting)        Past Surgical History  Past Surgical History:   Procedure Laterality Date    CHOLECYSTECTOMY      DILATION AND CURETTAGE OF UTERUS      HYSTERECTOMY      JOINT REPLACEMENT      PARTIAL HYSTERECTOMY      MS REVISE KNEE JOINT REPLACE,ALL PARTS Right 1/22/2018    Procedure: ARTHROPLASTY KNEE TOTAL REVISION;  Surgeon: Bello Cota MD;  Location:  MAIN OR;  Service: [address] [place] [address]         Current Medications  Current Outpatient Prescriptions on File Prior to Visit   Medication Sig Dispense Refill    acetaminophen (TYLENOL) 325 mg tablet Please take 650mg every 6 hours as needed for pain 30 tablet 0    ascorbic acid (VITAMIN C) 500 mg tablet Take 500 mg by mouth 2 (two) times a day      Calcium-Vitamin D-Vitamin K (VIACTIV PO) Take by mouth      DiphenhydrAMINE HCl (BENADRYL PO) Take by mouth as needed      docusate sodium (COLACE) 100 mg capsule Take 1 capsule by mouth 2 (two) times a day 20 capsule 0    enoxaparin (LOVENOX) 40 mg/0 4 mL Inject 0 4 mL under the skin daily in the early morning for 30 days 11 2 mL 0    ferrous sulfate 325 (65 Fe) mg tablet Take 325 mg by mouth 2 (two) times a day with meals      gabapentin (NEURONTIN) 100 mg capsule Take 1 capsule by mouth every 8 (eight) hours 90 capsule 0    levothyroxine 75 mcg tablet Take 75 mcg by mouth daily      MELATONIN PO Take by mouth      oxyCODONE (ROXICODONE) 5 mg immediate release tablet Take 1-2 tab Q4H PRN pain 30 tablet 0    Probiotic Product (PROBIOTIC DAILY PO) Take by mouth      traMADol (ULTRAM) 50 mg tablet Take 1 tablet by mouth every 6 (six) hours as needed for moderate pain for up to 10 days 30 tablet 0    folic acid (FOLVITE) 1 mg tablet Take by mouth daily      [DISCONTINUED] albuterol (PROVENTIL HFA,VENTOLIN HFA) 90 mcg/act inhaler Inhale 2 puffs every 6 (six) hours as needed for wheezing      [DISCONTINUED] Multiple Vitamins-Minerals (IMMUNE SUPPORT PO) Take by mouth       No current facility-administered medications on file prior to visit  Recent Labs [facility])  @LABALLVALUEIP(HCT:3,HGB:3,PT,INR,WBC:3,ESR,CRP,GLUCOSE,HGBA1C)@      Physical exam  · General: Awake, Alert, Oriented  · Eyes: Pupils equal, round and reactive to light  · Heart: regular rate and rhythm  · Lungs: No audible wheezing  · Abdomen: soft  right Knee exam  · Skin/incision/alignment/Palpation- incision well-healing no erythema no drainage moderate effusion appreciated  · Extension 30 degrees flexion 90 degrees  · Stable to varus valgus stress  · Negative Lachman's  · Negative Madelyn's  ·     Imaging  X-ray right knee shows well-positioned revision knee hardware without concern for loosening or infection    Procedure  None    Assessment:   58 y  o female "1 week Status post right TKA    Doing well  Plan  · Weight Bearing:   Weightbear as tolerate  · Physical therapy:   She for stretching an strengthening  · Analgesics:  Continue as needed   · DVT ppx:  Complete course of Lovenox  · Follow-up "1 week

## 2018-01-31 ENCOUNTER — OFFICE VISIT (OUTPATIENT)
Dept: PHYSICAL THERAPY | Facility: MEDICAL CENTER | Age: 63
End: 2018-01-31
Payer: COMMERCIAL

## 2018-01-31 DIAGNOSIS — Z96.651 HISTORY OF TOTAL RIGHT KNEE REPLACEMENT: Primary | ICD-10-CM

## 2018-01-31 PROCEDURE — 97112 NEUROMUSCULAR REEDUCATION: CPT

## 2018-01-31 PROCEDURE — 97140 MANUAL THERAPY 1/> REGIONS: CPT

## 2018-01-31 PROCEDURE — 97110 THERAPEUTIC EXERCISES: CPT

## 2018-01-31 NOTE — PROGRESS NOTES
Daily Note   Today's date: 2018  Patient name: Owen Soliman  : 1955  MRN: [id number]  Referring provider: Yoni Carrillo MD  Dx:   Encounter Diagnosis   Name Primary?  History of total right knee replacement Yes                  Subjective: Pt reports that she is only taking tylenol now  Pt states that she feels she is doing well and on schedule to achieve her 2 week goals of 90 degress of bend, 0 degrees of extension and ambulation with a SPC  Objective: See treatment diary below  Precautions: DOS     Daily Treatment Diary     Manual            Patellar mobilizations PRN                                                                     Exercise Diary            Bike ROM 10'   10' 10'          PROM to R knee 10' 10' 10'          Hamstring stretch with strap 30 sec X 5 30"x5 5x30"          Heel slides with strap 10 sec X 10 10"x10 20x5'          Quad set 5 sec X 30 5"x30 30x5"          SAQ 30 30x 30x          Heel raises  NV 30x           Standing hip abduction/exten NV 30x ea 30x ea          Gait training  NV 5' SPC 5'  SPC          SLR   10x                                                                                                                                                Modalities              CP post TE 10' 10' 10'                                              Assessment: Tolerated treatment well  Remains most limited in R knee flexion  Pt reports good compliance with HEP  Patient demonstrated fatigue post treatment and could benefit from continued PT  Pt demonstrates good sequencing and steadiness with ambulation with SPC  AROM 0-78 degrees, PROM 0-90  Plan: Continue per plan of care  and Progress treatment as tolerated

## 2018-02-02 ENCOUNTER — OFFICE VISIT (OUTPATIENT)
Dept: PHYSICAL THERAPY | Facility: MEDICAL CENTER | Age: 63
End: 2018-02-02
Payer: COMMERCIAL

## 2018-02-02 DIAGNOSIS — Z96.651 HISTORY OF TOTAL RIGHT KNEE REPLACEMENT: Primary | ICD-10-CM

## 2018-02-02 DIAGNOSIS — M25.561 ACUTE PAIN OF RIGHT KNEE: ICD-10-CM

## 2018-02-02 DIAGNOSIS — Z47.89 ORTHOPEDIC AFTERCARE: ICD-10-CM

## 2018-02-02 DIAGNOSIS — T84.023A DISPLACEMENT OF INTERNAL LEFT KNEE PROSTHESIS, INITIAL ENCOUNTER (HCC): ICD-10-CM

## 2018-02-02 PROCEDURE — 97112 NEUROMUSCULAR REEDUCATION: CPT

## 2018-02-02 PROCEDURE — 97110 THERAPEUTIC EXERCISES: CPT

## 2018-02-02 PROCEDURE — 97140 MANUAL THERAPY 1/> REGIONS: CPT

## 2018-02-02 NOTE — PROGRESS NOTES
Daily Note   Today's date: 2018  Patient name: Eve Gordon  : 1955  MRN: [id number]  Referring provider: Amarjit Moeller MD  Dx:   Encounter Diagnoses   Name Primary?  History of total right knee replacement Yes    Displacement of internal left knee prosthesis, initial encounter ([facility] [address] )     Acute pain of right knee     Orthopedic aftercare                   Subjective: Pt reports that her knee is feeling stiff and she is unsure why because she has been doing her exercises regularly  Objective: See treatment diary below  Precautions: DOS     Daily Treatment Diary     Manual           Patellar mobilizations PRN                                                                     Exercise Diary           Bike ROM 10'   10' 10' 10'         PROM to R knee 10' 10' 10' 10'         Hamstring stretch with strap 30 sec X 5 30"x5 5x30" 5x30"         Heel slides with strap 10 sec X 10 10"x10 20x5' 20x5"         Quad set 5 sec X 30 5"x30 30x5" 30x5"         SAQ 30 30x 30x 30x         Heel raises  NV 30x  30x         Standing hip abduction/exten NV 30x ea 30x ea 30x         Gait training  NV 5' SPC 5'  SPC 5'  SPC         SLR   10x 15x                                                                                                                                               Modalities              CP post TE 10' 10' 10' 10'                                             Pt tolerated treatment well  Patient demonstrated fatigue post treatment and could benefit from continued PT  Full R knee extension noted today  Adjusted the pt's SPC to the correct height  Pt demonstrated good sequencing and steadiness with ambulation with SPC  Plan: Continue per plan of care  and Progress treatment as tolerated

## 2018-02-05 ENCOUNTER — OFFICE VISIT (OUTPATIENT)
Dept: PHYSICAL THERAPY | Facility: MEDICAL CENTER | Age: 63
End: 2018-02-05
Payer: COMMERCIAL

## 2018-02-05 DIAGNOSIS — Z47.89 ORTHOPEDIC AFTERCARE: ICD-10-CM

## 2018-02-05 DIAGNOSIS — Z96.651 HISTORY OF TOTAL RIGHT KNEE REPLACEMENT: Primary | ICD-10-CM

## 2018-02-05 DIAGNOSIS — M25.561 ACUTE PAIN OF RIGHT KNEE: ICD-10-CM

## 2018-02-05 DIAGNOSIS — T84.023A DISPLACEMENT OF INTERNAL LEFT KNEE PROSTHESIS, INITIAL ENCOUNTER (HCC): ICD-10-CM

## 2018-02-05 PROCEDURE — 97112 NEUROMUSCULAR REEDUCATION: CPT

## 2018-02-05 PROCEDURE — 97110 THERAPEUTIC EXERCISES: CPT

## 2018-02-05 PROCEDURE — 97140 MANUAL THERAPY 1/> REGIONS: CPT

## 2018-02-05 NOTE — PROGRESS NOTES
Daily Note   Today's date: 2018  Patient name: Emily Johnson  : 1955  MRN: [id number]  Referring provider: Camille Walters MD  Dx:   Encounter Diagnoses   Name Primary?  History of total right knee replacement Yes    Displacement of internal left knee prosthesis, initial encounter ([facility] [address] )     Acute pain of right knee     Orthopedic aftercare                   Subjective: Pt reports that her knee continues to feel stiff especially in the am   Pt reports that she has been ambulating with her SPC without difficulty  Objective: See treatment diary below  Precautions: DOS     Daily Treatment Diary     Manual          Patellar mobilizations PRN                                                                     Exercise Diary          Bike ROM 10'   10' 10' 10' 10'        PROM to R knee 10' 10' 10' 10' 10'        Hamstring stretch with strap 30 sec X 5 30"x5 5x30" 5x30" 5x30"        Heel slides with strap 10 sec X 10 10"x10 20x5' 20x5" 20x5"        Quad set 5 sec X 30 5"x30 30x5" 30x5" 30x5"        SAQ 30 30x 30x 30x 30x        Heel raises  NV 30x  30x 30x        Standing hip abduction/exten NV 30x ea 30x ea 30x 30xea        Gait training  NV 5' SPC 5'  SPC 5'  SPC 5'  SPC        SLR   10x 15x 2x10        Seated AA knee flexion     20x5"                     Step ups     nv                                                                                                       Modalities              CP post TE 10' 10' 10' 10' 10'                                            Assessment:  Pt tolerated treatment well  Patient demonstrated fatigue post treatment and could benefit from continued PT to improve R knee ROM, strength and gait mechanics  Pt has transitioned to ambulating with her SPC  Pt requires some cueing to bend her knee during toe off phase of her gait pattern         Plan:  Continue with plan of care and progress as the pt is able to attain set goals

## 2018-02-06 ENCOUNTER — OFFICE VISIT (OUTPATIENT)
Dept: OBGYN CLINIC | Facility: HOSPITAL | Age: 63
End: 2018-02-06

## 2018-02-06 VITALS
BODY MASS INDEX: 28.92 KG/M2 | HEART RATE: 86 BPM | WEIGHT: 153.2 LBS | HEIGHT: 61 IN | DIASTOLIC BLOOD PRESSURE: 83 MMHG | SYSTOLIC BLOOD PRESSURE: 123 MMHG

## 2018-02-06 DIAGNOSIS — Z47.89 ORTHOPEDIC AFTERCARE: Primary | ICD-10-CM

## 2018-02-06 PROCEDURE — 99024 POSTOP FOLLOW-UP VISIT: CPT | Performed by: ORTHOPAEDIC SURGERY

## 2018-02-06 NOTE — PROGRESS NOTES
58 y o female presents for 2 week postoperative visit status post right revision TKA (op date:  01/24/2018)  She reports improvement in her knee pain and function but does complain of difficulty fully bending her knee  She has undergone physical therapy as directed  She denies any fevers, chills, or constant symptoms      Review of Systems  Review of systems negative unless otherwise specified in HPI    Past Medical History  Past Medical History:   Diagnosis Date    Arthritis     Bronchitis     Disease of thyroid gland     Diverticulitis     GERD (gastroesophageal reflux disease)     IBS (irritable bowel syndrome)     Insomnia     PONV (postoperative nausea and vomiting)        Past Surgical History  Past Surgical History:   Procedure Laterality Date    CHOLECYSTECTOMY      DILATION AND CURETTAGE OF UTERUS      HYSTERECTOMY      JOINT REPLACEMENT      PARTIAL HYSTERECTOMY      DC REVISE KNEE JOINT REPLACE,ALL PARTS Right 1/22/2018    Procedure: ARTHROPLASTY KNEE TOTAL REVISION;  Surgeon: Didier Adan MD;  Location: [facility];  Service: Orthopedics    TONSILLECTOMY     [address]         Current Medications  Current Outpatient Prescriptions on File Prior to Visit   Medication Sig Dispense Refill    acetaminophen (TYLENOL) 325 mg tablet Please take 650mg every 6 hours as needed for pain 30 tablet 0    ascorbic acid (VITAMIN C) 500 mg tablet Take 500 mg by mouth 2 (two) times a day      DiphenhydrAMINE HCl (BENADRYL PO) Take by mouth as needed      enoxaparin (LOVENOX) 40 mg/0 4 mL Inject 0 4 mL under the skin daily in the early morning for 30 days 11 2 mL 0    levothyroxine 75 mcg tablet Take 75 mcg by mouth daily      MELATONIN PO Take by mouth      Probiotic Product (PROBIOTIC DAILY PO) Take by mouth      Calcium-Vitamin D-Vitamin K (VIACTIV PO) Take by mouth      docusate sodium (COLACE) 100 mg capsule Take 1 capsule by mouth 2 (two) times a day 20 capsule 0    ferrous sulfate 325 (65 Fe) mg tablet Take 325 mg by mouth 2 (two) times a day with meals      folic acid (FOLVITE) 1 mg tablet Take by mouth daily      gabapentin (NEURONTIN) 100 mg capsule Take 1 capsule by mouth every 8 (eight) hours 90 capsule 0    oxyCODONE (ROXICODONE) 5 mg immediate release tablet Take 1-2 tab Q4H PRN pain 30 tablet 0     No current facility-administered medications on file prior to visit  Recent Labs [facility])  @LABALLVALUEIP(HCT,HGB,WBC,PT,INR,ESR,CRP,GLUCOSE,HGBA1C)@      Physical exam  · General: Awake, Alert, Oriented  · Eyes: Pupils equal, round and reactive to light  · Heart: regular rate and rhythm  · Lungs: No audible wheezing  · Abdomen: soft  Right lower extremity  · Healing anterior incision, staples c/d/i without erythema or induration  · Extension 5° flexion 85°  · Knee stable in sagittal coronal planes  · Motor and sensation intact distally      Imaging  X-rays of right knee shows stable admitted for revision knee prosthesis without evidence of loosening or fracture    Procedure  Staples removed at today's visit    Assessment/Plan:   58 y  o female resents for 2 week postoperative visit status post right revision TKA     · Staples were removed at today's visit  She may be weight-bearing as tolerated to right lower extremity  She is encouraged to continue physical therapy and work on range of motion  Will see her back in follow-up in 4 weeks

## 2018-02-07 ENCOUNTER — APPOINTMENT (OUTPATIENT)
Dept: PHYSICAL THERAPY | Facility: MEDICAL CENTER | Age: 63
End: 2018-02-07
Payer: COMMERCIAL

## 2018-02-09 ENCOUNTER — OFFICE VISIT (OUTPATIENT)
Dept: PHYSICAL THERAPY | Facility: MEDICAL CENTER | Age: 63
End: 2018-02-09
Payer: COMMERCIAL

## 2018-02-09 DIAGNOSIS — T84.023A DISPLACEMENT OF INTERNAL LEFT KNEE PROSTHESIS, INITIAL ENCOUNTER (HCC): ICD-10-CM

## 2018-02-09 DIAGNOSIS — M25.561 ACUTE PAIN OF RIGHT KNEE: ICD-10-CM

## 2018-02-09 DIAGNOSIS — Z96.651 HISTORY OF TOTAL RIGHT KNEE REPLACEMENT: Primary | ICD-10-CM

## 2018-02-09 DIAGNOSIS — Z47.89 ORTHOPEDIC AFTERCARE: ICD-10-CM

## 2018-02-09 PROCEDURE — 97110 THERAPEUTIC EXERCISES: CPT

## 2018-02-09 PROCEDURE — 97140 MANUAL THERAPY 1/> REGIONS: CPT

## 2018-02-09 PROCEDURE — 97112 NEUROMUSCULAR REEDUCATION: CPT

## 2018-02-09 NOTE — PROGRESS NOTES
Daily Note   Today's date: 2018  Patient name: Nichol Hankins  : 1955  MRN: [id number]  Referring provider: Sujit Mccormack MD  Dx:   Encounter Diagnoses   Name Primary?  Displacement of internal left knee prosthesis, initial encounter ([facility] [address] )     History of total right knee replacement Yes    Acute pain of right knee     Orthopedic aftercare                   Subjective: Pt reports that she saw the doctor and had her staples removed  Pt reports that the doctor felt she was on track for 2 weeks post op  Pt reports continued R knee stiffness  Objective: See treatment diary below  Precautions: DOS     Daily Treatment Diary     Manual   2       Patellar mobilizations PRN                                                                     Exercise Diary         Bike ROM 10'   10' 10' 10' 10' 10'       PROM to R knee 10' 10' 10' 10' 10' 10'       Hamstring stretch with strap 30 sec X 5 30"x5 5x30" 5x30" 5x30" 5x30"       Heel slides with strap 10 sec X 10 10"x10 20x5' 20x5" 20x5" 20x5"       Quad set 5 sec X 30 5"x30 30x5" 30x5" 30x5" 30x5"       SAQ 30 30x 30x 30x 30x 3x12       Heel raises  NV 30x  30x 30x 30x       Standing hip abduction/exten NV 30x ea 30x ea 30x 30xea 30xea       Gait training  NV 5' SPC 5'  SPC 5'  SPC 5'  [facility] NP       SLR   10x 15x 2x10 3x12       Seated AA knee flexion     20x5" 20x5"                    Step ups      nv                                                                                                      Modalities              CP post TE 10' 10' 10' 10' 10' To go                                           Assessment:  Pt tolerated treatment well  Patient demonstrated fatigue post treatment and could benefit from continued PT to improve R knee ROM, strength and gait mechanics  Pt needed some cueing to bend her knee during toe off phase of her gait pattern    Incision is dry and healing with steri strips intact  Plan:  Continue with plan of care and progress as the pt is able to attain set goals

## 2018-02-12 ENCOUNTER — OFFICE VISIT (OUTPATIENT)
Dept: PHYSICAL THERAPY | Facility: MEDICAL CENTER | Age: 63
End: 2018-02-12
Payer: COMMERCIAL

## 2018-02-12 DIAGNOSIS — M25.561 ACUTE PAIN OF RIGHT KNEE: ICD-10-CM

## 2018-02-12 DIAGNOSIS — Z96.651 HISTORY OF TOTAL RIGHT KNEE REPLACEMENT: ICD-10-CM

## 2018-02-12 DIAGNOSIS — T84.023A DISPLACEMENT OF INTERNAL LEFT KNEE PROSTHESIS, INITIAL ENCOUNTER (HCC): Primary | ICD-10-CM

## 2018-02-12 DIAGNOSIS — Z47.89 ORTHOPEDIC AFTERCARE: ICD-10-CM

## 2018-02-12 PROCEDURE — 97110 THERAPEUTIC EXERCISES: CPT

## 2018-02-12 PROCEDURE — 97112 NEUROMUSCULAR REEDUCATION: CPT

## 2018-02-12 PROCEDURE — 97140 MANUAL THERAPY 1/> REGIONS: CPT

## 2018-02-12 NOTE — PROGRESS NOTES
Daily Note   Today's date: 2018  Patient name: Angélica Banks  : 1955  MRN: [id number]  Referring provider: Terri Woods MD  Dx:   Encounter Diagnoses   Name Primary?  Displacement of internal left knee prosthesis, initial encounter ([facility] [address] ) Yes    History of total right knee replacement     Acute pain of right knee     Orthopedic aftercare                   Subjective: Pt reports that she is doing a little better each day  Pt reports that she started walking short distances in her home without an AD  Pt states that she continues to feel a lot of stiffness in her knee and she exercises regularly at home  Objective: See treatment diary below  Precautions: DOS     Daily Treatment Diary     Manual        Patellar mobilizations PRN                                                                     Exercise Diary         Bike ROM 10'   10' 10' 10' 10' 10' 10'      PROM to R knee 10' 10' 10' 10' 10' 10' 10'      Hamstring stretch with strap 30 sec X 5 30"x5 5x30" 5x30" 5x30" 5x30" 5x30"      Heel slides with strap 10 sec X 10 10"x10 20x5' 20x5" 20x5" 20x5" 20x5"      Quad set 5 sec X 30 5"x30 30x5" 30x5" 30x5" 30x5" 30x5"      SAQ 30 30x 30x 30x 30x 3x12 1# 2x10      Heel raises  NV 30x  30x 30x 30x 30x      Standing hip abduction/exten NV 30x ea 30x ea 30x 30xea 30xea 30xea      Gait training  NV 5' SPC 5'  SPC 5'  SPC 5'  SPC NP       SLR   10x 15x 2x10 3x12 1# 2x10      Seated AA knee flexion     20x5" 20x5" 20x5"      Standing ham curls       2x10      Step ups       nv                                                                                                     Modalities              CP post TE 10' 10' 10' 10' 10' To go 8'                                          Assessment:  Pt tolerated treatment well     Patient demonstrated fatigue post treatment and could benefit from continued PT to improve R knee ROM, strength and gait mechanics  Pt was able to achieve 90 degrees of knee flexion in sitting today  Plan:  Continue with plan of care and progress as the pt is able to attain set goals

## 2018-02-14 ENCOUNTER — OFFICE VISIT (OUTPATIENT)
Dept: PHYSICAL THERAPY | Facility: MEDICAL CENTER | Age: 63
End: 2018-02-14
Payer: COMMERCIAL

## 2018-02-14 DIAGNOSIS — T84.023A DISPLACEMENT OF INTERNAL LEFT KNEE PROSTHESIS, INITIAL ENCOUNTER (HCC): Primary | ICD-10-CM

## 2018-02-14 DIAGNOSIS — M25.561 ACUTE PAIN OF RIGHT KNEE: ICD-10-CM

## 2018-02-14 DIAGNOSIS — Z96.651 HISTORY OF TOTAL RIGHT KNEE REPLACEMENT: ICD-10-CM

## 2018-02-14 PROCEDURE — 97140 MANUAL THERAPY 1/> REGIONS: CPT | Performed by: PHYSICAL THERAPIST

## 2018-02-14 PROCEDURE — 97110 THERAPEUTIC EXERCISES: CPT | Performed by: PHYSICAL THERAPIST

## 2018-02-14 NOTE — PROGRESS NOTES
Daily Note   Today's date: 2018  Patient name: Gustabo Wise  : 1955  MRN: [id number]  Referring provider: Paula Hardwick MD  Dx:   Encounter Diagnoses   Name Primary?  [facility] Yes    History of total right knee replacement     Acute pain of right knee                   Subjective: Patient reports that she is doing well, continues to work on HEP     Objective: See treatment diary below  Precautions: DOS     Daily Treatment Diary     Manual       Patellar mobilizations PRN             Knee distraction extension/flexion mob's        AF                                                Exercise Diary         Bike ROM 10'   10' 10' 10' 10' 10' 10' 10'     PROM to R knee 10' 10' 10' 10' 10' 10' 10' 10'     Hamstring stretch with strap 30 sec X 5 30"x5 5x30" 5x30" 5x30" 5x30" 5x30" 5X30"     Heel slides with strap 10 sec X 10 10"x10 20x5' 20x5" 20x5" 20x5" 20x5" 20X5"     Quad set 5 sec X 30 5"x30 30x5" 30x5" 30x5" 30x5" 30x5" 30X5"     SAQ 30 30x 30x 30x 30x 3x12 1# 2x10 1#  3 X10     Heel raises  NV 30x  30x 30x 30x 30x 30     Standing hip abduction/exten NV 30x ea 30x ea 30x 30xea 30xea 30xea NP     Gait training  NV 5' SPC 5'  SPC 5'  SPC 5'  SPC NP  NP     SLR   10x 15x 2x10 3x12 1# 2x10 1#   3X10     Seated AA knee flexion     20x5" 20x5" 20x5" [id number]     Standing ham curls       2x10 2 X10     Step ups       nv                                                                                                     Modalities              CP post TE 10' 10' 10' 10' 10' To go 8'                                          Assessment:  Pt tolerated treatment well  Patient demonstrated fatigue post treatment and could benefit from continued PT to improve R knee ROM, strength and gait mechanics    Will initiate contact to obtain ROM device for home given slow progress to avoid scarring of tissue     Plan:  Continue with plan of care and progress as the pt is able to attain set goals

## 2018-02-16 ENCOUNTER — OFFICE VISIT (OUTPATIENT)
Dept: PHYSICAL THERAPY | Facility: MEDICAL CENTER | Age: 63
End: 2018-02-16
Payer: COMMERCIAL

## 2018-02-16 DIAGNOSIS — T84.023A DISPLACEMENT OF INTERNAL LEFT KNEE PROSTHESIS, INITIAL ENCOUNTER (HCC): Primary | ICD-10-CM

## 2018-02-16 DIAGNOSIS — M25.561 ACUTE PAIN OF RIGHT KNEE: ICD-10-CM

## 2018-02-16 DIAGNOSIS — Z96.651 HISTORY OF TOTAL RIGHT KNEE REPLACEMENT: ICD-10-CM

## 2018-02-16 DIAGNOSIS — Z47.89 ORTHOPEDIC AFTERCARE: ICD-10-CM

## 2018-02-16 PROCEDURE — 97140 MANUAL THERAPY 1/> REGIONS: CPT

## 2018-02-16 PROCEDURE — 97110 THERAPEUTIC EXERCISES: CPT

## 2018-02-16 NOTE — PROGRESS NOTES
Daily Note   Today's date: 2018  Patient name: Cristiana Mcconnell  : 1955  MRN: [id number]  Referring provider: Sam Li MD  Dx:   Encounter Diagnoses   Name Primary?  Displacement of internal left knee prosthesis, initial encounter ([facility] [address] ) Yes    History of total right knee replacement     Acute pain of right knee     Orthopedic aftercare                   Subjective: Patient reports that she still feels really stiff and she feels like her bone feels bruised especially on the inside of her knee  Objective: See treatment diary below  Precautions: DOS     Daily Treatment Diary     Manual      Patellar mobilizations PRN             Knee distraction extension/flexion mob's        AF                                                Exercise Diary         Bike ROM 10'   10' 10' 10' 10' 10' 10' 10' 10'    PROM to R knee 10' 10' 10' 10' 10' 10' 10' 10' 15'    Hamstring stretch with strap 30 sec X 5 30"x5 5x30" 5x30" 5x30" 5x30" 5x30" 5X30" 5x30"    Heel slides with strap 10 sec X 10 10"x10 20x5' 20x5" 20x5" 20x5" 20x5" 20X5" 20x5"    Quad set 5 sec X 30 5"x30 30x5" 30x5" 30x5" 30x5" 30x5" 30X5" 30x5"    SAQ 30 30x 30x 30x 30x 3x12 1# 2x10 1#  3 X10 1# 3x10    Heel raises  NV 30x  30x 30x 30x 30x 30 30x    Standing hip abduction/exten NV 30x ea 30x ea 30x 30xea 30xea 30xea NP 30x    Gait training  NV 5' SPC 5'  SPC 5'  SPC 5'  SPC NP  NP     SLR   10x 15x 2x10 3x12 1# 2x10 1#   3X10 1# 3x10    Seated AA knee flexion     20x5" 20x5" 20x5" [id number] 20x    Standing ham curls       2x10 2 X10 3x10    Step ups       nv                                                                                                     Modalities              CP post TE 10' 10' 10' 10' 10' To go 8'      Moist heat pre PROM         10'                           Assessment:  Pt tolerated treatment well     Patient demonstrated fatigue post treatment and could benefit from continued PT to improve R knee ROM, strength and gait mechanics  Pt tolerated manual PROM and knee flexion stretching better after moist heat  Pt will try moist heat at home prior to stretching to assist with improving R knee flexion ROM  Plan:  Continue with plan of care and progress as the pt is able to attain set goals

## 2018-02-19 ENCOUNTER — OFFICE VISIT (OUTPATIENT)
Dept: PHYSICAL THERAPY | Facility: MEDICAL CENTER | Age: 63
End: 2018-02-19
Payer: COMMERCIAL

## 2018-02-19 DIAGNOSIS — T84.023A DISPLACEMENT OF INTERNAL LEFT KNEE PROSTHESIS, INITIAL ENCOUNTER (HCC): Primary | ICD-10-CM

## 2018-02-19 DIAGNOSIS — Z47.89 ORTHOPEDIC AFTERCARE: ICD-10-CM

## 2018-02-19 DIAGNOSIS — Z96.651 HISTORY OF TOTAL RIGHT KNEE REPLACEMENT: ICD-10-CM

## 2018-02-19 DIAGNOSIS — M25.561 ACUTE PAIN OF RIGHT KNEE: ICD-10-CM

## 2018-02-19 PROCEDURE — 97140 MANUAL THERAPY 1/> REGIONS: CPT

## 2018-02-19 PROCEDURE — 97116 GAIT TRAINING THERAPY: CPT

## 2018-02-19 PROCEDURE — 97110 THERAPEUTIC EXERCISES: CPT

## 2018-02-19 NOTE — PROGRESS NOTES
Daily Note   Today's date: 2018  Patient name: Eve Gordon  : 1955  MRN: [id number]  Referring provider: Amarjit Moeller MD  Dx:   Encounter Diagnoses   Name Primary?  Displacement of internal left knee prosthesis, initial encounter ([facility] [address] ) Yes    History of total right knee replacement     Orthopedic aftercare     Acute pain of right knee                   Subjective: Patient reports that she tried using heat at home and it makes it feel better but after stretching her knee tightens up again        Objective: See treatment diary below  Precautions: DOS     Daily Treatment Diary     Manual     Patellar mobilizations PRN             Knee distraction extension/flexion mob's        AF                  Proximal scar massage          6'                    Exercise Diary         Bike ROM 10'   10' 10' 10' 10' 10' 10' 10' 10' 10'   PROM to R knee 10' 10' 10' 10' 10' 10' 10' 10' 15' 10'   Hamstring stretch with strap 30 sec X 5 30"x5 5x30" 5x30" 5x30" 5x30" 5x30" 5X30" 5x30" 5x30"   Heel slides with strap 10 sec X 10 10"x10 20x5' 20x5" 20x5" 20x5" 20x5" 20X5" 20x5" 20x5"   Quad set 5 sec X 30 5"x30 30x5" 30x5" 30x5" 30x5" 30x5" 30X5" 30x5" 30x5"   SAQ 30 30x 30x 30x 30x 3x12 1# 2x10 1#  3 X10 1# 3x10 2# 3x10   Heel raises  NV 30x  30x 30x 30x 30x 30 30x    Standing hip abduction/exten NV 30x ea 30x ea 30x 30xea 30xea 30xea NP 30x np   Gait training  NV 5' SPC 5'  SPC 5'  SPC 5'  [facility] NP  NP  Alter G  70% 1 5-1 8 x7'   SLR   10x 15x 2x10 3x12 1# 2x10 1#   3X10 1# 3x10 1# 3x12   Seated AA knee flexion     20x5" 20x5" 20x5" [id number] 20x 20x   Standing ham curls       2x10 2 X10 3x10 np   Step ups       nv                                                                                                     Modalities              CP post TE 10' 10' 10' 10' 10' To go 8'      Moist heat pre PROM         10' Assessment:  Pt tolerated treatment well  Patient demonstrated fatigue post treatment and could benefit from continued PT to improve R knee ROM, strength and gait mechanics  + proximal incision scarring noted greater than distal incision scarring  Added scar massage to address soft tissue restriction  Plan:  Continue with plan of care and progress as the pt is able to attain set goals

## 2018-02-21 ENCOUNTER — APPOINTMENT (OUTPATIENT)
Dept: PHYSICAL THERAPY | Facility: MEDICAL CENTER | Age: 63
End: 2018-02-21
Payer: COMMERCIAL

## 2018-02-23 ENCOUNTER — OFFICE VISIT (OUTPATIENT)
Dept: PHYSICAL THERAPY | Facility: MEDICAL CENTER | Age: 63
End: 2018-02-23
Payer: COMMERCIAL

## 2018-02-23 DIAGNOSIS — Z47.89 ORTHOPEDIC AFTERCARE: ICD-10-CM

## 2018-02-23 DIAGNOSIS — Z96.651 HISTORY OF TOTAL RIGHT KNEE REPLACEMENT: ICD-10-CM

## 2018-02-23 DIAGNOSIS — T84.023A DISPLACEMENT OF INTERNAL LEFT KNEE PROSTHESIS, INITIAL ENCOUNTER (HCC): Primary | ICD-10-CM

## 2018-02-23 DIAGNOSIS — M25.561 ACUTE PAIN OF RIGHT KNEE: ICD-10-CM

## 2018-02-23 PROCEDURE — 97110 THERAPEUTIC EXERCISES: CPT

## 2018-02-23 PROCEDURE — 97140 MANUAL THERAPY 1/> REGIONS: CPT

## 2018-02-23 NOTE — PROGRESS NOTES
Daily Note   Today's date: 2018  Patient name: Ally Rucker  : 1955  MRN: [id number]  Referring provider: Pearl Friend MD  Dx:   Encounter Diagnoses   Name Primary?  [facility]    History of total right knee replacement     Orthopedic aftercare     Acute pain of right knee                   Subjective: Patient reports that she feels like she has made some progress over the past day or so  Pt notes that she is doing wall slides and she is able to bend farther down her wall  Objective: See treatment diary below  Precautions: DOS     Daily Treatment Diary     Manual              Patellar mobilizations PRN 5'            Knee distraction extension/flexion mob's                          Proximal scar massage 6'                             Exercise Diary              Bike ROM 10'              PROM to R knee 10'            Hamstring stretch with strap 5x30"            Heel slides with strap 20x5"            Quad set  30x5"            SAQ 2# 3x12            Heel raises  DC            Standing hip abduction/exten NV            Gait training              SLR 1# 3x12            Seated AA knee flexion 20x5"            Standing ham curls 3x10            Step ups 6" 2x10            squats 2x10                                                                                              Modalities              CP post TE 10' 10' 10' 10' 10' To go 8'      Moist heat pre PROM         10'                           Assessment:  Pt tolerated treatment well  Patient demonstrated fatigue post treatment and could benefit from continued PT to improve R knee ROM, strength and gait mechanics  + proximal incision scarring noted greater than distal incision scarring  Added scar massage to address soft tissue restriction  Plan:  Continue with plan of care and progress as the pt is able to attain set goals

## 2018-02-26 ENCOUNTER — OFFICE VISIT (OUTPATIENT)
Dept: PHYSICAL THERAPY | Facility: MEDICAL CENTER | Age: 63
End: 2018-02-26
Payer: COMMERCIAL

## 2018-02-26 DIAGNOSIS — Z47.89 ORTHOPEDIC AFTERCARE: ICD-10-CM

## 2018-02-26 DIAGNOSIS — M25.561 ACUTE PAIN OF RIGHT KNEE: ICD-10-CM

## 2018-02-26 DIAGNOSIS — T84.023A DISPLACEMENT OF INTERNAL LEFT KNEE PROSTHESIS, INITIAL ENCOUNTER (HCC): Primary | ICD-10-CM

## 2018-02-26 DIAGNOSIS — Z96.651 HISTORY OF TOTAL RIGHT KNEE REPLACEMENT: ICD-10-CM

## 2018-02-26 PROCEDURE — 97116 GAIT TRAINING THERAPY: CPT

## 2018-02-26 PROCEDURE — 97110 THERAPEUTIC EXERCISES: CPT

## 2018-02-26 PROCEDURE — 97140 MANUAL THERAPY 1/> REGIONS: CPT

## 2018-02-26 NOTE — PROGRESS NOTES
Daily Note   Today's date: 2018  Patient name: Sasha Serrano  : 1955  MRN: [id number]  Referring provider: Jacinto Rose MD  Dx:   Encounter Diagnoses   Name Primary?  [facility]    History of total right knee replacement     Orthopedic aftercare     Acute pain of right knee                   Subjective: Patient reports that she feels she is making some progress  Pt reports that she has been using the heat at home and doing slides on her door which she feels is really helping with her knee bending  Objective: See treatment diary below  Precautions: DOS     Daily Treatment Diary     Manual             Patellar mobilizations PRN 5' 5'           Knee distraction extension/flexion mob's                          Proximal scar massage 6' 6'                            Exercise Diary              Bike ROM 10'   10'           PROM to R knee 10' 10'           Hamstring stretch with strap 5x30" 5x30"           Heel slides with strap 20x5" 20x5"           Quad set  30x5" 30x5"           SAQ 2# 3x12 2# 3x10           Heel raises  DC            Standing hip abduction/exten NV            Gait training              SLR 1# 3x12 1#  3x12           Seated AA knee flexion 20x5" 20x5"           Standing ham curls 3x10 3x10           Step ups 6" 2x10 6" 3x10           squats 2x10 3x10                        Alter G  Gait training  10'                                                                   Modalities              CP post TE             Moist heat pre PROM 10'                                   Assessment:  Pt tolerated treatment well  Patient demonstrated fatigue post treatment and could benefit from continued PT to improve R knee ROM, strength and gait mechanics  Pt's gait mechanics improve with cueing  Plan:  Continue with plan of care and progress as the pt is able to attain set goals

## 2018-02-28 ENCOUNTER — OFFICE VISIT (OUTPATIENT)
Dept: PHYSICAL THERAPY | Facility: MEDICAL CENTER | Age: 63
End: 2018-02-28
Payer: COMMERCIAL

## 2018-02-28 DIAGNOSIS — T84.023A DISPLACEMENT OF INTERNAL LEFT KNEE PROSTHESIS, INITIAL ENCOUNTER (HCC): Primary | ICD-10-CM

## 2018-02-28 DIAGNOSIS — Z96.651 HISTORY OF TOTAL RIGHT KNEE REPLACEMENT: ICD-10-CM

## 2018-02-28 DIAGNOSIS — M25.561 ACUTE PAIN OF RIGHT KNEE: ICD-10-CM

## 2018-02-28 DIAGNOSIS — Z47.89 ORTHOPEDIC AFTERCARE: ICD-10-CM

## 2018-02-28 PROCEDURE — 97140 MANUAL THERAPY 1/> REGIONS: CPT

## 2018-02-28 PROCEDURE — 97110 THERAPEUTIC EXERCISES: CPT

## 2018-02-28 NOTE — PROGRESS NOTES
Daily Note   Today's date: 2018  Patient name: Bharat Garcias  : 1955  MRN: [id number]  Referring provider: Gia Agee MD  Dx:   Encounter Diagnoses   Name Primary?  Displacement of internal left knee prosthesis, initial encounter ([facility] [address] ) Yes    History of total right knee replacement     Orthopedic aftercare     Acute pain of right knee                   Subjective: Patient reports that she is hopeful that she will continue to notice improved knee flexion  Pt states that she continues to work hard on her motion at home and she is noticing improvement day to day  Objective: See treatment diary below  Precautions: DOS     Daily Treatment Diary     Manual            Patellar mobilizations PRN 5' 5' 5'          Knee distraction extension/flexion mob's                          Proximal scar massage 6' 6' 6'                           Exercise Diary              Bike ROM 10'   10' 10'          PROM to R knee 10' 10' 10'          Hamstring stretch with strap 5x30" 5x30" 5x30"          Heel slides with strap 20x5" 20x5" 20x5"          Quad set  30x5" 30x5" 30x5"          SAQ 2# 3x12 2# 3x10 3# 3x10          Heel raises  DC            Standing hip abduction/exten NV HEP HEP          Gait training              SLR 1# 3x12 1#  3x12 2# 3x10          Seated AA knee flexion 20x5" 20x5" 20x5"          Standing ham curls 3x10 3x10 np-pt time retr          Step ups 6" 2x10 6" 3x10 6"  3x10          squats 2x10 3x10 3x10                       Alter G  Gait training  10' np-pt time restr                                                                  Modalities              CP post TE             Moist heat pre PROM 10'  10'                                 Assessment:  Pt tolerated treatment well  Patient demonstrated fatigue post treatment and could benefit from continued PT to improve R knee ROM, strength and gait mechanics      Active knee flexion after stretching 103 degrees  Plan:  Continue with plan of care and progress as the pt is able to attain set goals

## 2018-03-01 NOTE — PROGRESS NOTES
PT Re-evaluation    Today's date: 3/1/2018  Patient name: Ana Lilia Lindsey  : 1955  MRN: [id number]  Referring provider: Tatum Gifford MD  Dx:   Encounter Diagnosis     ICD-10-CM    1  Displacement of internal left knee prosthesis, initial encounter ([facility] ) T84 023A    2  History of total right knee replacement Z96 651    3  Orthopedic aftercare Z47 89    4  Acute pain of right knee M25 561        Start Time: 1000  Stop Time: 1118  Total time in clinic (min): 78 minutes    Assessment  Impairments: abnormal muscle firing, abnormal muscle tone, abnormal or restricted ROM, impaired physical strength and pain with function    Assessment details: Ana Lilia Lindsey is a 61 y o  female presents with Displacement of internal left knee prosthesis, initial encounter ([facility] )  (primary encounter diagnosis)  History of total right knee replacement  Orthopedic aftercare  Acute pain of right knee  Ana Lilia Lindsey presents with the above listed impairments resulting in daily functional deficits and negative impact to quality of life  Skilled PT services appropriate to facilitate return to prior level of function with transition to home exercise program when appropriate  Ana Lilia Lindsey has been compliant with attending PT and home exercise program since initial eval   Nella Friend has shown improvements in objective impairments identified at initial evaluation which have resulted in decreased functional deficits  Nella Friend continues to respond favorably to treatment and will benefit from continued skilled PT services to address remaining impairments and functional limitations including long distance walking,stairs, and return to fitness activity     Patient will be transitioned to home exercise program as appropriate        Understanding of Dx/Px/POC: good   Prognosis: good    Goals  Independent with HEP  Strength improved to 4/5 in knee and hip    ROM to maximal level    No pain with stairs  No pain with recreational walking of 30 minutes     Plan  Patient would benefit from: skilled PT  Referral necessary: No  Other planned modality interventions: Modalities PRN   Planned therapy interventions: home exercise program, manual therapy, neuromuscular re-education, patient education, functional ROM exercises, strengthening, stretching, joint mobilization, graded activity and graded exercise  Frequency: 2x week  Duration in weeks: 12  Treatment plan discussed with: patient        Subjective Evaluation    History of Present Illness  Date of surgery: 2018  Mechanism of injury: Samia Lozada has been compliant with physical therapy and currently is 5 weeks post operative from R TKA revision    Patient has been been showing progression in ROM however remains limited at this time and requires further PT intervention   Pain  Current pain ratin  At best pain ratin  At worst pain ratin  Quality: dull ache    Patient Goals  Patient goals for therapy: decreased pain, return to work, independence with ADLs/IADLs, increased strength and increased motion          Objective     Passive Range of Motion   Left Knee   Normal passive range of motion    Right Knee   Flexion: 98 degrees   Extension: 4 degrees     Strength/Myotome Testing     Left Knee   Normal strength    Right Knee   Flexion: 3+  Prone flexion: 3+  Extension: 3+    General Comments     Knee Comments  Swelling well controlled    Ambulates without use of AD          Precautions: None    Daily Treatment Diary     Manual                                                                                   Exercise Diary                                                                                                                                                                                                                                                                                      Modalities

## 2018-03-02 ENCOUNTER — OFFICE VISIT (OUTPATIENT)
Dept: PHYSICAL THERAPY | Facility: MEDICAL CENTER | Age: 63
End: 2018-03-02
Payer: COMMERCIAL

## 2018-03-02 DIAGNOSIS — T84.023A DISPLACEMENT OF INTERNAL LEFT KNEE PROSTHESIS, INITIAL ENCOUNTER (HCC): Primary | ICD-10-CM

## 2018-03-02 DIAGNOSIS — Z96.651 HISTORY OF TOTAL RIGHT KNEE REPLACEMENT: ICD-10-CM

## 2018-03-02 DIAGNOSIS — M25.561 ACUTE PAIN OF RIGHT KNEE: ICD-10-CM

## 2018-03-02 DIAGNOSIS — Z47.89 ORTHOPEDIC AFTERCARE: ICD-10-CM

## 2018-03-02 PROCEDURE — 97140 MANUAL THERAPY 1/> REGIONS: CPT

## 2018-03-02 PROCEDURE — 97116 GAIT TRAINING THERAPY: CPT

## 2018-03-02 PROCEDURE — 97110 THERAPEUTIC EXERCISES: CPT

## 2018-03-02 NOTE — PROGRESS NOTES
Daily Note   Today's date: 3/2/2018  Patient name: Javier Woodward  : 1955  MRN: [id number]  Referring provider: Julianna Collins MD  Dx:   Encounter Diagnoses   Name Primary?  Displacement of internal left knee prosthesis, initial encounter ([facility] ) Yes    History of total right knee replacement     Orthopedic aftercare     Acute pain of right knee                   Subjective: Patient reports that her knee really bothered her yesterday about mid day but she took some Aleeve and it took the edge off  Objective: See treatment diary below  Precautions: DOS     Daily Treatment Diary     Manual  2/23 2/26 2/28 3/2         Patellar mobilizations PRN 5' 5' 5' 5'         Knee distraction extension/flexion mob's                          Proximal scar massage 6' 6' 6' 6'                          Exercise Diary              Bike ROM 10'   10' 10' 10'         PROM to R knee 10' 10' 10' 10'         Hamstring stretch with strap 5x30" 5x30" 5x30" 5x30"         Heel slides with strap 20x5" 20x5" 20x5" 20x5"         Quad set  30x5" 30x5" 30x5" DC         SAQ 2# 3x12 2# 3x10 3# 3x10 3# 3x10         Heel raises  DC            Standing hip abduction/exten NV HEP HEP          Gait training              SLR 1# 3x12 1#  3x12 2# 3x10 2# 3x10         Seated AA knee flexion 20x5" 20x5" 20x5" 20x5"         Standing ham curls 3x10 3x10 np-pt time retr          Step ups 6" 2x10 6" 3x10 6"  3x10 8" 3x10         squats 2x10 3x10 3x10 3x10                      Alter G  Gait training  10' np-pt time restr 10' 70%                                                                 Modalities              CP post TE             Moist heat pre PROM 10'  10' 10'                                Assessment:  Pt tolerated treatment well  Patient demonstrated fatigue post treatment and could benefit from continued PT   Pt is making gradual progress with ROM, strength and improved gait mechanics       Plan:  Continue with plan of care and progress as the pt is able to attain set goals

## 2018-03-05 ENCOUNTER — OFFICE VISIT (OUTPATIENT)
Dept: PHYSICAL THERAPY | Facility: MEDICAL CENTER | Age: 63
End: 2018-03-05
Payer: COMMERCIAL

## 2018-03-05 DIAGNOSIS — Z96.651 HISTORY OF TOTAL RIGHT KNEE REPLACEMENT: ICD-10-CM

## 2018-03-05 DIAGNOSIS — T84.023A DISPLACEMENT OF INTERNAL LEFT KNEE PROSTHESIS, INITIAL ENCOUNTER (HCC): Primary | ICD-10-CM

## 2018-03-05 DIAGNOSIS — Z47.89 ORTHOPEDIC AFTERCARE: ICD-10-CM

## 2018-03-05 DIAGNOSIS — M25.561 ACUTE PAIN OF RIGHT KNEE: ICD-10-CM

## 2018-03-05 PROCEDURE — 97110 THERAPEUTIC EXERCISES: CPT

## 2018-03-05 PROCEDURE — 97140 MANUAL THERAPY 1/> REGIONS: CPT

## 2018-03-05 NOTE — PROGRESS NOTES
Daily Note   Today's date: 3/5/2018  Patient name: Sasha Serrano  : 1955  MRN: [id number]  Referring provider: Jacinto Rose MD  Dx:   Encounter Diagnoses   Name Primary?  Displacement of internal left knee prosthesis, initial encounter ([facility] ) Yes    History of total right knee replacement     Orthopedic aftercare     Acute pain of right knee                   Subjective: Patient reports that her knee really bothered her yesterday about mid day but she took some Aleeve and it took the edge off  Objective: See treatment diary below  Precautions: DOS     Daily Treatment Diary     Manual  2/23 2/26 2/28 3/2 3/5        Patellar mobilizations PRN 5' 5' 5' 5' 5'        Knee distraction extension/flexion mob's                          Proximal scar massage 6' 6' 6' 6' 6'                         Exercise Diary              Bike ROM 10'   10' 10' 10' 10'        PROM to R knee 10' 10' 10' 10' 10'        Hamstring stretch with strap 5x30" 5x30" 5x30" 5x30" 5x30"        Heel slides with strap 20x5" 20x5" 20x5" 20x5" 20x5"        Quad set  30x5" 30x5" 30x5" DC         SAQ 2# 3x12 2# 3x10 3# 3x10 3# 3x10 3# 3x10        Heel raises  DC            Standing hip abduction/exten NV HEP HEP          Gait training              SLR 1# 3x12 1#  3x12 2# 3x10 2# 3x10 2# 3x10        Seated AA knee flexion 20x5" 20x5" 20x5" 20x5" 20x5"        Standing ham curls 3x10 3x10 np-pt time retr          Step ups 6" 2x10 6" 3x10 6"  3x10 8" 3x10 8" 3x10        squats 2x10 3x10 3x10 3x10 3x10                     Alter G  Gait training  10' np-pt time restr 10' 70% Pt deferred                                                                Modalities              CP post TE     8'        Moist heat pre PROM 10'  10' 10' 10                               Assessment:  Pt tolerated treatment well     Patient demonstrated fatigue post treatment and could benefit from continued PT    R knee active flexion 105 degrees after manual stretching today  Plan:  Continue with plan of care and progress as the pt is able to attain set goals

## 2018-03-08 ENCOUNTER — OFFICE VISIT (OUTPATIENT)
Dept: OBGYN CLINIC | Facility: HOSPITAL | Age: 63
End: 2018-03-08

## 2018-03-08 VITALS
HEIGHT: 61 IN | DIASTOLIC BLOOD PRESSURE: 81 MMHG | BODY MASS INDEX: 28.93 KG/M2 | SYSTOLIC BLOOD PRESSURE: 126 MMHG | HEART RATE: 85 BPM | WEIGHT: 153.22 LBS

## 2018-03-08 DIAGNOSIS — Z47.89 ORTHOPEDIC AFTERCARE: Primary | ICD-10-CM

## 2018-03-08 PROCEDURE — 99024 POSTOP FOLLOW-UP VISIT: CPT | Performed by: ORTHOPAEDIC SURGERY

## 2018-03-08 RX ORDER — CEPHALEXIN 500 MG/1
2000 CAPSULE ORAL
Status: DISCONTINUED | OUTPATIENT
Start: 2018-03-08 | End: 2021-04-28 | Stop reason: HOSPADM

## 2018-03-08 NOTE — PROGRESS NOTES
61 y o female presents to the office 6 weeks status post right knee arthroplasty total revision (op date: 01/22/2018)  She is having pain down her lateral aspect of her lower right leg  She has been progressing with physical therapy      Review of Systems  Review of systems negative unless otherwise specified in HPI    Past Medical History  Past Medical History:   Diagnosis Date    Arthritis     Bronchitis     Disease of thyroid gland     Diverticulitis     GERD (gastroesophageal reflux disease)     IBS (irritable bowel syndrome)     Insomnia     PONV (postoperative nausea and vomiting)        Past Surgical History  Past Surgical History:   Procedure Laterality Date    CHOLECYSTECTOMY      DILATION AND CURETTAGE OF UTERUS      HYSTERECTOMY      JOINT REPLACEMENT      PARTIAL HYSTERECTOMY      WV REVISE KNEE JOINT REPLACE,ALL PARTS Right 1/22/2018    Procedure: ARTHROPLASTY KNEE TOTAL REVISION;  Surgeon: Pippa Ball MD;  Location: [facility];  Service: [address] [place] [address]         Current Medications  Current Outpatient Prescriptions on File Prior to Visit   Medication Sig Dispense Refill    acetaminophen (TYLENOL) 325 mg tablet Please take 650mg every 6 hours as needed for pain 30 tablet 0    levothyroxine 75 mcg tablet Take 75 mcg by mouth daily      MELATONIN PO Take by mouth      Probiotic Product (PROBIOTIC DAILY PO) Take by mouth      Calcium-Vitamin D-Vitamin K (VIACTIV PO) Take by mouth      DiphenhydrAMINE HCl (BENADRYL PO) Take by mouth as needed      [DISCONTINUED] ascorbic acid (VITAMIN C) 500 mg tablet Take 500 mg by mouth 2 (two) times a day      [DISCONTINUED] docusate sodium (COLACE) 100 mg capsule Take 1 capsule by mouth 2 (two) times a day 20 capsule 0    [DISCONTINUED] enoxaparin (LOVENOX) 40 mg/0 4 mL Inject 0 4 mL under the skin daily in the early morning for 30 days 11 2 mL 0    [DISCONTINUED] ferrous sulfate 325 (65 Fe) mg tablet Take 325 mg by mouth 2 (two) times a day with meals      [DISCONTINUED] folic acid (FOLVITE) 1 mg tablet Take by mouth daily      [DISCONTINUED] gabapentin (NEURONTIN) 100 mg capsule Take 1 capsule by mouth every 8 (eight) hours 90 capsule 0    [DISCONTINUED] oxyCODONE (ROXICODONE) 5 mg immediate release tablet Take 1-2 tab Q4H PRN pain 30 tablet 0     No current facility-administered medications on file prior to visit  Recent Labs [facility])    0  Lab Value Date/Time   HCT 32 6 (L) 01/23/2018 0457   HCT 41 8 10/20/2015 0958   HGB 11 0 (L) 01/23/2018 0457   HGB 14 1 10/20/2015 0958   WBC 13 67 (H) 01/23/2018 0457   WBC 5 89 10/20/2015 0958   INR 0 93 01/15/2018 1049   ESR 14 11/20/2017 1114   CRP <3 0 11/20/2017 1114   GLUCOSE 114 01/24/2018 0551   GLUCOSE 89 10/20/2015 0958   HGBA1C 6 1 01/15/2018 1049         Physical exam  · General: Awake, Alert, Oriented  · Eyes: Pupils equal, round and reactive to light  · Heart: regular rate and rhythm  · Lungs: No audible wheezing  · Abdomen: soft  right Knee exam  · Patient ambulates without assistance  · Grossly motor and sensory stable  · Able to fully extend, flexes to 100 degrees  · Incision well healed      Imaging  None    Procedure  None    Assessment/Plan:   61 y  o female is 6 weeks status post right total knee revision  She will continue with physical therapy  We will see her back in 6 weeks

## 2018-03-08 NOTE — LETTER
March 8, 2018     Patient: Ana Lilia Lindsey   YOB: 1955   Date of Visit: 3/8/2018       To Whom it May Concern:    Iona Gates is under my professional care  She was seen in my office on 3/8/2018  She may return to work on 3/12/2018  If you have any questions or concerns, please don't hesitate to call       She is alsoable to return to the gym    Sincerely,          Martín Beverly MD        CC: No Recipients

## 2018-03-09 ENCOUNTER — OFFICE VISIT (OUTPATIENT)
Dept: PHYSICAL THERAPY | Facility: MEDICAL CENTER | Age: 63
End: 2018-03-09
Payer: COMMERCIAL

## 2018-03-09 DIAGNOSIS — M25.561 ACUTE PAIN OF RIGHT KNEE: ICD-10-CM

## 2018-03-09 DIAGNOSIS — T84.023A DISPLACEMENT OF INTERNAL LEFT KNEE PROSTHESIS, INITIAL ENCOUNTER (HCC): Primary | ICD-10-CM

## 2018-03-09 DIAGNOSIS — Z47.89 ORTHOPEDIC AFTERCARE: ICD-10-CM

## 2018-03-09 DIAGNOSIS — Z96.651 HISTORY OF TOTAL RIGHT KNEE REPLACEMENT: ICD-10-CM

## 2018-03-09 PROCEDURE — 97140 MANUAL THERAPY 1/> REGIONS: CPT

## 2018-03-09 PROCEDURE — 97110 THERAPEUTIC EXERCISES: CPT

## 2018-03-09 NOTE — PROGRESS NOTES
Daily Note   Today's date: 3/9/2018  Patient name: Javier Woodward  : 1955  MRN: [id number]  Referring provider: Julianna Collins MD  Dx:   Encounter Diagnoses   Name Primary?  [facility]placement of internal left [facility] prosthesis[facility] Yes    History of total right knee replacement     Orthopedic aftercare     Acute pain of right knee                   Subjective: Patient reports that she saw the doctor and he was pleased with her progress  Pt reports that her knee is feeling better in general but last night her leg was really achey and restless for some reason       Objective: See treatment diary below  Precautions: DOS     Daily Treatment Diary     Manual  2/23 2/26 2/28 3/2 3/5 3/9       Patellar mobilizations PRN 5' 5' 5' 5' 5' 5'       Knee distraction extension/flexion mob's                          Proximal scar massage 6' 6' 6' 6' 6' 6'                        Exercise Diary              Bike ROM 10'   10' 10' 10' 10' 10'       PROM to R knee 10' 10' 10' 10' 10' 10'       Hamstring stretch with strap 5x30" 5x30" 5x30" 5x30" 5x30" 5x30"       Heel slides with strap 20x5" 20x5" 20x5" 20x5" 20x5" 20x5"       Quad set  30x5" 30x5" 30x5" DC         SAQ 2# 3x12 2# 3x10 3# 3x10 3# 3x10 3# 3x10 3# 3x12       Heel raises  DC            Standing hip abduction/exten NV HEP HEP          Gait training              SLR 1# 3x12 1#  3x12 2# 3x10 2# 3x10 2# 3x10 2# 3x12       Seated AA knee flexion 20x5" 20x5" 20x5" 20x5" 20x5" 20x5"       Standing ham curls 3x10 3x10 np-pt time retr          Step ups 6" 2x10 6" 3x10 6"  3x10 8" 3x10 8" 3x10 8" 3x10       squats 2x10 3x10 3x10 3x10 3x10 3x10                    Alter G  Gait training  10' np-pt time restr 10' 70% Pt deferred        Prone quad str      5x30"                                                  Modalities              CP post TE     8' 8'       Moist heat pre PROM 10'  10' 10' 10 10                              Assessment: Pt tolerated treatment well  Patient demonstrated fatigue post treatment and could benefit from continued PT    R knee active flexion 107 degrees after manual stretching today with effort  Plan:  Continue with plan of care and progress as the pt is able to attain set goals

## 2018-03-12 ENCOUNTER — OFFICE VISIT (OUTPATIENT)
Dept: PHYSICAL THERAPY | Facility: MEDICAL CENTER | Age: 63
End: 2018-03-12
Payer: COMMERCIAL

## 2018-03-12 DIAGNOSIS — Z96.651 HISTORY OF TOTAL RIGHT KNEE REPLACEMENT: ICD-10-CM

## 2018-03-12 DIAGNOSIS — Z47.89 ORTHOPEDIC AFTERCARE: ICD-10-CM

## 2018-03-12 DIAGNOSIS — M25.561 ACUTE PAIN OF RIGHT KNEE: ICD-10-CM

## 2018-03-12 DIAGNOSIS — T84.023A DISPLACEMENT OF INTERNAL LEFT KNEE PROSTHESIS, INITIAL ENCOUNTER (HCC): Primary | ICD-10-CM

## 2018-03-12 PROCEDURE — 97110 THERAPEUTIC EXERCISES: CPT

## 2018-03-12 PROCEDURE — 97140 MANUAL THERAPY 1/> REGIONS: CPT

## 2018-03-12 NOTE — PROGRESS NOTES
Daily Note   Today's date: 3/12/2018  Patient name: Inga Dawson  : 1955  MRN: [id number]  Referring provider: Macho Almanzar MD  Dx:   Encounter Diagnoses   Name Primary?  [facility]placement [facility] internal left knee prosthesis[facility] Yes    History of total right knee replacement     Orthopedic aftercare     Acute pain of right knee                   Subjective: Patient reports that she had some R knee soreness over the weekend and some difficulty with getting comfortable to sleep  Pt reports that she did return to the gym this weekend and she thinks some of the soreness was a result to doing more       Objective: See treatment diary below  Precautions: DOS     Daily Treatment Diary     Manual  2/23 2/26 2/28 3/2 3/5 3/9 3/12      Patellar mobilizations PRN 5' 5' 5' 5' 5' 5' 5'      Knee distraction extension/flexion mob's                          Proximal scar massage 6' 6' 6' 6' 6' 6'                        Exercise Diary              Bike ROM 10'   10' 10' 10' 10' 10' 10'      PROM to R knee 10' 10' 10' 10' 10' 10' 10'      Hamstring stretch with strap 5x30" 5x30" 5x30" 5x30" 5x30" 5x30" 5x30"      Heel slides with strap 20x5" 20x5" 20x5" 20x5" 20x5" 20x5" 20x5"      Quad set  30x5" 30x5" 30x5" DC         SAQ 2# 3x12 2# 3x10 3# 3x10 3# 3x10 3# 3x10 3# 3x12 3#  3x12      Heel raises  DC            Standing hip abduction/exten NV HEP HEP          Gait training              SLR 1# 3x12 1#  3x12 2# 3x10 2# 3x10 2# 3x10 2# 3x12 2#  3x12      Seated AA knee flexion 20x5" 20x5" 20x5" 20x5" 20x5" 20x5" 20x5"      Standing ham curls 3x10 3x10 np-pt time retr          Step ups 6" 2x10 6" 3x10 6"  3x10 8" 3x10 8" 3x10 8" 3x10 8"  3x10      squats 2x10 3x10 3x10 3x10 3x10 3x10 3x10                   Alter G  Gait training  10' np-pt time restr 10' 70% Pt deferred        Prone quad str      5x30" 5x30"                                                 Modalities              CP post TE     8' 8' 8'      Moist heat pre PROM 10'  10' 10' 10 10 10'                             Assessment:  Pt tolerated treatment well  Patient demonstrated fatigue post treatment and could benefit from continued PT    R knee active flexion 105 degrees after manual stretching today with effort, 107 degrees passively  Plan:  Continue with plan of care and progress as the pt is able to attain set goals

## 2018-03-16 ENCOUNTER — OFFICE VISIT (OUTPATIENT)
Dept: PHYSICAL THERAPY | Facility: MEDICAL CENTER | Age: 63
End: 2018-03-16
Payer: COMMERCIAL

## 2018-03-16 DIAGNOSIS — Z47.89 ORTHOPEDIC AFTERCARE: ICD-10-CM

## 2018-03-16 DIAGNOSIS — M25.561 ACUTE PAIN OF RIGHT KNEE: ICD-10-CM

## 2018-03-16 DIAGNOSIS — Z96.651 HISTORY OF TOTAL RIGHT KNEE REPLACEMENT: ICD-10-CM

## 2018-03-16 DIAGNOSIS — T84.023A DISPLACEMENT OF INTERNAL LEFT KNEE PROSTHESIS, INITIAL ENCOUNTER (HCC): Primary | ICD-10-CM

## 2018-03-16 PROCEDURE — 97110 THERAPEUTIC EXERCISES: CPT

## 2018-03-16 PROCEDURE — 97140 MANUAL THERAPY 1/> REGIONS: CPT

## 2018-03-16 NOTE — PROGRESS NOTES
Daily Note   Today's date: 3/16/2018  Patient name: Viviana Walsh  : 1955  MRN: [id number]  Referring provider: Sd Wong MD  Dx:   Encounter Diagnoses   Name Primary?  Displacement of internal left knee prosthesis, initial encounter ([facility] ) Yes    History of total right knee replacement     Orthopedic aftercare     Acute pain of right knee                   Subjective: Patient reports that her knee is feeling pretty good  Pt states that she has been going to the gym on her non therapy days and she feels this is helping       Objective: See treatment diary below  Precautions: DOS     Daily Treatment Diary     Manual  2/23 2/26 2/28 3/2 3/5 3/9 3/12 3/16     Patellar mobilizations PRN 5' 5' 5' 5' 5' 5' 5' 5'     Knee distraction extension/flexion mob's                          Proximal scar massage 6' 6' 6' 6' 6' 6'  dc                      Exercise Diary              Bike ROM 10'   10' 10' 10' 10' 10' 10' 10'     PROM to R knee 10' 10' 10' 10' 10' 10' 10' 10'     Hamstring stretch with strap 5x30" 5x30" 5x30" 5x30" 5x30" 5x30" 5x30" 5x30"     Heel slides with strap 20x5" 20x5" 20x5" 20x5" 20x5" 20x5" 20x5" 20x5"     Quad set  30x5" 30x5" 30x5" DC         SAQ 2# 3x12 2# 3x10 3# 3x10 3# 3x10 3# 3x10 3# 3x12 3#  3x12 4# 3x10     Heel raises  DC            Standing hip abduction/exten NV HEP HEP          Gait training              SLR 1# 3x12 1#  3x12 2# 3x10 2# 3x10 2# 3x10 2# 3x12 2#  3x12 3# 3x10     Seated AA knee flexion 20x5" 20x5" 20x5" 20x5" 20x5" 20x5" 20x5" 20x5"     Standing ham curls 3x10 3x10 np-pt time retr DC         Step ups 6" 2x10 6" 3x10 6"  3x10 8" 3x10 8" 3x10 8" 3x10 8"  3x10 8" 3x10     squats 2x10 3x10 3x10 3x10 3x10 3x10 3x10 3x10                  Alter G  Gait training  10' np-pt time restr 10' 70% Pt deferred        Prone quad str      5x30" 5x30" 5x30"     Step down        4" 2x10     Leg press        nv                      Modalities              CP post TE     8' 8' 8'      Moist heat pre PROM 10'  10' 10' 10 10 10' 10'                            Assessment:  Pt tolerated treatment well  Patient demonstrated fatigue post treatment and could benefit from continued PT    R knee active flexion 110 degrees after manual stretching today  Added step downs to address R quad eccentric strengthening  Plan:  Continue with plan of care and progress as the pt is able to attain set goals

## 2018-03-19 ENCOUNTER — APPOINTMENT (OUTPATIENT)
Dept: PHYSICAL THERAPY | Facility: MEDICAL CENTER | Age: 63
End: 2018-03-19
Payer: COMMERCIAL

## 2018-03-21 ENCOUNTER — APPOINTMENT (OUTPATIENT)
Dept: PHYSICAL THERAPY | Facility: MEDICAL CENTER | Age: 63
End: 2018-03-21
Payer: COMMERCIAL

## 2018-03-23 ENCOUNTER — OFFICE VISIT (OUTPATIENT)
Dept: PHYSICAL THERAPY | Facility: MEDICAL CENTER | Age: 63
End: 2018-03-23
Payer: COMMERCIAL

## 2018-03-23 DIAGNOSIS — T84.023A DISPLACEMENT OF INTERNAL LEFT KNEE PROSTHESIS, INITIAL ENCOUNTER (HCC): Primary | ICD-10-CM

## 2018-03-23 DIAGNOSIS — M25.561 ACUTE PAIN OF RIGHT KNEE: ICD-10-CM

## 2018-03-23 DIAGNOSIS — Z47.89 ORTHOPEDIC AFTERCARE: ICD-10-CM

## 2018-03-23 DIAGNOSIS — Z96.651 HISTORY OF TOTAL RIGHT KNEE REPLACEMENT: ICD-10-CM

## 2018-03-23 PROCEDURE — 97110 THERAPEUTIC EXERCISES: CPT | Performed by: PHYSICAL THERAPIST

## 2018-03-23 PROCEDURE — 97140 MANUAL THERAPY 1/> REGIONS: CPT | Performed by: PHYSICAL THERAPIST

## 2018-03-24 NOTE — PROGRESS NOTES
Daily Note     Today's date: 3/23/2018  Patient name: Amalia Oakley  : 1955  MRN: [id number]  Referring provider: Lolita Lama MD  Dx:   Encounter Diagnosis     ICD-10-CM    1  Displacement of internal left knee prosthesis, initial encounter ([facility] ) T84 023A    2  History of total right knee replacement Z96 651    3  Orthopedic aftercare Z47 89    4   Acute pain of right knee M25 561                   Subjective: Patient is doing well and has been working on knee flexion at home       Objective: See treatment diary below  Precautions: DOS     Daily Treatment Diary     Manual  2/23 2/26 2/28 3/2 3/5 3/9 3/12 3/16 3/23    Patellar mobilizations PRN 5' 5' 5' 5' 5' 5' 5' 5' AF    Knee distraction extension/flexion mob's         AF    Prone knee flexion with distraction         AF    Proximal scar massage 6' 6' 6' 6' 6' 6'  dc                      Exercise Diary              Bike ROM 10'   10' 10' 10' 10' 10' 10' 10' 10'    PROM to R knee 10' 10' 10' 10' 10' 10' 10' 10'     Hamstring stretch with strap 5x30" 5x30" 5x30" 5x30" 5x30" 5x30" 5x30" 5x30" 5X30"    Heel slides with strap 20x5" 20x5" 20x5" 20x5" 20x5" 20x5" 20x5" 20x5" 20X5"    Quad set  30x5" 30x5" 30x5" DC         SAQ 2# 3x12 2# 3x10 3# 3x10 3# 3x10 3# 3x10 3# 3x12 3#  3x12 4# 3x10 4#  3X10    Heel raises  DC            Standing hip abduction/exten NV HEP HEP          Gait training              SLR 1# 3x12 1#  3x12 2# 3x10 2# 3x10 2# 3x10 2# 3x12 2#  3x12 3# 3x10 3#  3X10    Seated AA knee flexion 20x5" 20x5" 20x5" 20x5" 20x5" 20x5" 20x5" 20x5"     Standing ham curls 3x10 3x10 np-pt time retr DC         Step ups 6" 2x10 6" 3x10 6"  3x10 8" 3x10 8" 3x10 8" 3x10 8"  3x10 8" 3x10 8"  3X10    squats 2x10 3x10 3x10 3x10 3x10 3x10 3x10 3x10 3x10                 Alter G  Gait training  10' np-pt time restr 10' 70% Pt deferred        Prone quad str      5x30" 5x30" 5x30" 5X30"    Step down        4" 2x10     Leg press        nv 60#  3X10 Modalities              CP post TE     8' 8' 8'      Moist heat pre PROM 10'  10' 10' 10 10 10' 10'                      Assessment: Tolerated treatment well  Patient exhibited good technique with therapeutic exercises and would benefit from continued PT  Achieved 118 deg flexion post mobs       Plan: Continue per plan of care

## 2018-03-26 ENCOUNTER — OFFICE VISIT (OUTPATIENT)
Dept: PHYSICAL THERAPY | Facility: MEDICAL CENTER | Age: 63
End: 2018-03-26
Payer: COMMERCIAL

## 2018-03-26 DIAGNOSIS — M25.561 ACUTE PAIN OF RIGHT KNEE: ICD-10-CM

## 2018-03-26 DIAGNOSIS — T84.023A DISPLACEMENT OF INTERNAL LEFT KNEE PROSTHESIS, INITIAL ENCOUNTER (HCC): Primary | ICD-10-CM

## 2018-03-26 DIAGNOSIS — Z47.89 ORTHOPEDIC AFTERCARE: ICD-10-CM

## 2018-03-26 DIAGNOSIS — Z96.651 HISTORY OF TOTAL RIGHT KNEE REPLACEMENT: ICD-10-CM

## 2018-03-26 PROCEDURE — 97110 THERAPEUTIC EXERCISES: CPT

## 2018-03-26 PROCEDURE — 97140 MANUAL THERAPY 1/> REGIONS: CPT

## 2018-03-26 NOTE — PROGRESS NOTES
Daily Note     Today's date: 3/26/2018  Patient name: Jayla Chandra  : 1955  MRN: [id number]  Referring provider: Darius Seo MD  Dx:   Encounter Diagnosis     ICD-10-CM    1  Displacement of internal left knee prosthesis, initial encounter ([facility] ) T84 023A    2  History of total right knee replacement Z96 651    3  Orthopedic aftercare Z47 89    4  Acute pain of right knee M25 561                   Subjective: Patient continues to report that her knee is doing better however she would like descending stairs to get easier          Objective: See treatment diary below  Precautions: DOS     Daily Treatment Diary     Manual  2/23 2/26 2/28 3/2 3/5 3/9 3/12 3/16 3/23 3/26   Patellar mobilizations PRN 5' 5' 5' 5' 5' 5' 5' 5' AF 5'   Knee distraction extension/flexion mob's         AF    Prone knee flexion with distraction         AF    Proximal scar massage 6' 6' 6' 6' 6' 6'  dc                      Exercise Diary              Bike ROM 10'   10' 10' 10' 10' 10' 10' 10' 10' 10   PROM to R knee 10' 10' 10' 10' 10' 10' 10' 10'  10'   Hamstring stretch with strap 5x30" 5x30" 5x30" 5x30" 5x30" 5x30" 5x30" 5x30" 5X30" 5x30"   Heel slides with strap 20x5" 20x5" 20x5" 20x5" 20x5" 20x5" 20x5" 20x5" 20X5" 20x5"   Quad set  30x5" 30x5" 30x5" DC         SAQ 2# 3x12 2# 3x10 3# 3x10 3# 3x10 3# 3x10 3# 3x12 3#  3x12 4# 3x10 4#  3X10 4# 3x10   Heel raises  DC            Standing hip abduction/exten NV HEP HEP          Gait training              SLR 1# 3x12 1#  3x12 2# 3x10 2# 3x10 2# 3x10 2# 3x12 2#  3x12 3# 3x10 3#  3X10 3# 3x10   Seated AA knee flexion 20x5" 20x5" 20x5" 20x5" 20x5" 20x5" 20x5" 20x5"     Standing ham curls 3x10 3x10 np-pt time retr DC         Step ups 6" 2x10 6" 3x10 6"  3x10 8" 3x10 8" 3x10 8" 3x10 8"  3x10 8" 3x10 8"  3X10 np   squats 2x10 3x10 3x10 3x10 3x10 3x10 3x10 3x10 3x10 3x10                Alter G  Gait training  10' np-pt time restr 10' 70% Pt deferred        Prone quad str      5x30" 5x30" 5x30" 5X30" 5x30"   Step down        4" 2x10 4" 6" 3x10   Leg press        nv 60#  3X10 60# 3x12                    Modalities              CP post TE     8' 8' 8'   np   Moist heat pre PROM 10'  10' 10' 10 10 10' 10'  10                    Assessment: Tolerated treatment well  Patient exhibited good technique with therapeutic exercises and would benefit from continued PT  Achieved 120 degrees of knee flexion post manual stretching  Pt is making gradual progress with R quad eccentric strengthening  Plan: Continue per plan of care

## 2018-03-30 ENCOUNTER — OFFICE VISIT (OUTPATIENT)
Dept: PHYSICAL THERAPY | Facility: MEDICAL CENTER | Age: 63
End: 2018-03-30
Payer: COMMERCIAL

## 2018-03-30 DIAGNOSIS — T84.023A DISPLACEMENT OF INTERNAL LEFT KNEE PROSTHESIS, INITIAL ENCOUNTER (HCC): Primary | ICD-10-CM

## 2018-03-30 DIAGNOSIS — M25.561 ACUTE PAIN OF RIGHT KNEE: ICD-10-CM

## 2018-03-30 DIAGNOSIS — Z47.89 ORTHOPEDIC AFTERCARE: ICD-10-CM

## 2018-03-30 DIAGNOSIS — Z96.651 HISTORY OF TOTAL RIGHT KNEE REPLACEMENT: ICD-10-CM

## 2018-03-30 PROCEDURE — 97110 THERAPEUTIC EXERCISES: CPT

## 2018-03-30 PROCEDURE — 97140 MANUAL THERAPY 1/> REGIONS: CPT

## 2018-03-30 NOTE — PROGRESS NOTES
Daily Note     Today's date: 3/30/2018  Patient name: Denver Sánchez  : 1955  MRN: [id number]  Referring provider: Farhana Saleh MD  Dx:   Encounter Diagnosis     ICD-10-CM    1  Displacement of internal left knee prosthesis, initial encounter ([facility] [address] ) T84 023A    2  History of total right knee replacement Z96 651    3  Orthopedic aftercare Z47 89    4  Acute pain of right knee M25 561                   Subjective: Patient reports that she is pleased with her progress over the past weeks  Pt notes increased R knee function with stairs  Objective: See treatment diary below  Precautions: DOS     Daily Treatment Diary     Manual  3/30            Patellar mobilizations PRN 5'            Knee distraction extension/flexion mob's             Prone knee flexion with distraction             Proximal scar massage DC                             Exercise Diary  3/30            Bike ROM 10'              PROM to R knee 10'            Hamstring stretch with strap HEP            Heel slides with strap 20x5"            Quad set  DC            SAQ 4# 3x12            Heel raises  DC            Standing hip abduction/exten NV HEP HEP          Gait training              SLR 3# 3x12            Seated AA knee flexion 20x5"            Standing ham curls DC            Step ups HEP            squats 3x10                         Alter G  Gait training np            Prone quad str man            Step down 6" 4x10            Leg press 60# np                             Modalities              CP post TE np            Moist heat pre PROM 8'                             Assessment: Tolerated treatment well  Patient exhibited good technique with therapeutic exercises and would benefit from continued PT  Pt continues to improve with R knee ROM and strengthening  Plan: Continue per plan of care

## 2018-04-04 ENCOUNTER — APPOINTMENT (OUTPATIENT)
Dept: PHYSICAL THERAPY | Facility: MEDICAL CENTER | Age: 63
End: 2018-04-04
Payer: COMMERCIAL

## 2018-04-06 ENCOUNTER — OFFICE VISIT (OUTPATIENT)
Dept: PHYSICAL THERAPY | Facility: MEDICAL CENTER | Age: 63
End: 2018-04-06
Payer: COMMERCIAL

## 2018-04-06 DIAGNOSIS — Z96.651 HISTORY OF TOTAL RIGHT KNEE REPLACEMENT: ICD-10-CM

## 2018-04-06 DIAGNOSIS — T84.023A DISPLACEMENT OF INTERNAL LEFT KNEE PROSTHESIS, INITIAL ENCOUNTER (HCC): Primary | ICD-10-CM

## 2018-04-06 DIAGNOSIS — Z47.89 ORTHOPEDIC AFTERCARE: ICD-10-CM

## 2018-04-06 DIAGNOSIS — M25.561 ACUTE PAIN OF RIGHT KNEE: ICD-10-CM

## 2018-04-06 PROCEDURE — 97140 MANUAL THERAPY 1/> REGIONS: CPT

## 2018-04-06 NOTE — PROGRESS NOTES
Daily Note     Today's date: 2018  Patient name: Evans Billings  : 1955  MRN: [id number]  Referring provider: Mariel Rosas MD  Dx:   Encounter Diagnosis     ICD-10-CM    1  Displacement of internal left knee prosthesis, initial encounter ([facility] [address] ) T84 023A    2  History of total right knee replacement Z96 651    3  Orthopedic aftercare Z47 89    4  Acute pain of right knee M25 561                   Subjective:  Patient reports that she has been working hard with her stretching at home and she feels it is going well  Objective: See treatment diary below  Precautions: DOS     Daily Treatment Diary     Manual  3/30 4/6           Patellar mobilizations PRN 5'            Knee distraction extension/flexion mob's  5'           Prone knee flexion with distraction  5'           Proximal scar massage DC                             Exercise Diary  3/30            Bike ROM 10'   10' warm up           PROM to R knee 10' 8'           Hamstring stretch with strap HEP HEP           Heel slides with strap 20x5" HEP           Quad set  DC            SAQ 4# 3x12 HEP           Heel raises  DC            Standing hip abduction/exten NV HEP HEP          Gait training              SLR 3# 3x12 HEP           Seated AA knee flexion 20x5" HEP           Standing ham curls DC            Step ups HEP            squats 3x10 HEP                        Alter G  Gait training np            Prone quad str man HEP           Step down 6" 4x10 HEP           Leg press 60# np HEP                            Modalities              CP post TE np            Moist heat pre PROM 8' np                            Assessment: Tolerated stretching well  Active knee flexion 120 degrees  Plan: Continue per plan of care

## 2018-04-11 ENCOUNTER — OFFICE VISIT (OUTPATIENT)
Dept: PHYSICAL THERAPY | Facility: MEDICAL CENTER | Age: 63
End: 2018-04-11
Payer: COMMERCIAL

## 2018-04-11 DIAGNOSIS — Z47.89 ORTHOPEDIC AFTERCARE: ICD-10-CM

## 2018-04-11 DIAGNOSIS — M25.561 ACUTE PAIN OF RIGHT KNEE: ICD-10-CM

## 2018-04-11 DIAGNOSIS — Z96.651 HISTORY OF TOTAL RIGHT KNEE REPLACEMENT: Primary | ICD-10-CM

## 2018-04-11 PROCEDURE — 97140 MANUAL THERAPY 1/> REGIONS: CPT

## 2018-04-11 NOTE — PROGRESS NOTES
Daily Note     Today's date: 2018  Patient name: Brion Callas  : 1955  MRN: [id number]  Referring provider: Elida Archibald MD  Dx:   Encounter Diagnosis     ICD-10-CM    1  History of total right knee replacement Z96 651    2  Orthopedic aftercare Z47 89    3  Acute pain of right knee M25 561                   Subjective:  Patient reports that her knee is doing well  Objective: See treatment diary below  Precautions: DOS     Daily Treatment Diary     Manual  3/30 4/6 4/11          Patellar mobilizations PRN 5'            Knee distraction extension/flexion mob's  5' 5'          Prone knee flexion with distraction  5' 5'          Proximal scar massage DC                             Exercise Diary  3/30            Bike ROM 10'   10' warm up           PROM to R knee 10' 8' 10'          Hamstring stretch with strap HEP HEP           Heel slides with strap 20x5" HEP           Quad set  DC            SAQ 4# 3x12 HEP           Heel raises  DC            Standing hip abduction/exten NV HEP HEP          Gait training              SLR 3# 3x12 HEP           Seated AA knee flexion 20x5" HEP           Standing ham curls DC            Step ups HEP            squats 3x10 HEP                        Alter G  Gait training np            Prone quad str man HEP           Step down 6" 4x10 HEP           Leg press 60# np HEP                            Modalities              CP post TE np            Moist heat pre PROM 8' np                            Assessment: Tolerated stretching well  Active knee flexion 123 degrees with effort post manual stretching  Plan: Continue per plan of care

## 2018-04-13 ENCOUNTER — OFFICE VISIT (OUTPATIENT)
Dept: PHYSICAL THERAPY | Facility: MEDICAL CENTER | Age: 63
End: 2018-04-13
Payer: COMMERCIAL

## 2018-04-13 DIAGNOSIS — M25.561 ACUTE PAIN OF RIGHT KNEE: ICD-10-CM

## 2018-04-13 DIAGNOSIS — Z47.89 ORTHOPEDIC AFTERCARE: ICD-10-CM

## 2018-04-13 DIAGNOSIS — Z96.651 HISTORY OF TOTAL RIGHT KNEE REPLACEMENT: Primary | ICD-10-CM

## 2018-04-13 PROCEDURE — 97140 MANUAL THERAPY 1/> REGIONS: CPT | Performed by: PHYSICAL THERAPIST

## 2018-04-13 NOTE — PROGRESS NOTES
Daily Note     Today's date: 2018  Patient name: Maria Elena Ontiveros  : 1955  MRN: [id number]  Referring provider: Darius Stewart MD  Dx:   Encounter Diagnosis     ICD-10-CM    1  History of total right knee replacement Z96 651    2  Orthopedic aftercare Z47 89    3  Acute pain of right knee M25 561                   Subjective: Brittnee Cole reports that she is doing well and has noted improved motion over last few weeks  She is pleased with progress recently       Objective: See treatment diary below    Precautions: DOS     Daily Treatment Diary     Manual  3/30 4/6 4/11 4/13         Patellar mobilizations PRN 5'   AF         Knee distraction extension/flexion mob's  5' 5' AF         Prone knee flexion with distraction  5' 5' AF         Proximal scar massage DC                             Exercise Diary  3/30            Bike ROM 10'   10' warm up           PROM to R knee 10' 8' 10'          Hamstring stretch with strap HEP HEP           Heel slides with strap 20x5" HEP           Quad set  DC            SAQ 4# 3x12 HEP           Heel raises  DC            Standing hip abduction/exten NV HEP HEP          Gait training              SLR 3# 3x12 HEP           Seated AA knee flexion 20x5" HEP           Standing ham curls DC            Step ups HEP            squats 3x10 HEP                        Alter G  Gait training np            Prone quad str man HEP           Step down 6" 4x10 HEP           Leg press 60# np HEP                            Modalities              CP post TE np            Moist heat pre PROM 8' np             Assessment: Tolerated treatment well  Patient would benefit from continued PT to continue maximizing ROM  Likely transition to HEP in next 1-2 weeks  Pain in lateral knee reduced with active release of lateral quadricep today and issued for home       Plan: Continue per plan of care

## 2018-04-16 ENCOUNTER — APPOINTMENT (OUTPATIENT)
Dept: PHYSICAL THERAPY | Facility: MEDICAL CENTER | Age: 63
End: 2018-04-16
Payer: COMMERCIAL

## 2018-04-18 ENCOUNTER — OFFICE VISIT (OUTPATIENT)
Dept: PHYSICAL THERAPY | Facility: MEDICAL CENTER | Age: 63
End: 2018-04-18
Payer: COMMERCIAL

## 2018-04-18 DIAGNOSIS — Z96.651 HISTORY OF TOTAL RIGHT KNEE REPLACEMENT: Primary | ICD-10-CM

## 2018-04-18 DIAGNOSIS — Z47.89 ORTHOPEDIC AFTERCARE: ICD-10-CM

## 2018-04-18 DIAGNOSIS — T84.023A DISPLACEMENT OF INTERNAL LEFT KNEE PROSTHESIS, INITIAL ENCOUNTER (HCC): ICD-10-CM

## 2018-04-18 DIAGNOSIS — M25.561 ACUTE PAIN OF RIGHT KNEE: ICD-10-CM

## 2018-04-18 PROCEDURE — 97140 MANUAL THERAPY 1/> REGIONS: CPT

## 2018-04-18 NOTE — PROGRESS NOTES
Daily Note     Today's date: 2018  Patient name: Hafsa Kay  : 1955  MRN: [id number]  Referring provider: Alex Peralta MD  Dx:   Encounter Diagnosis     ICD-10-CM    1  History of total right knee replacement Z96 651    2  Orthopedic aftercare Z47 89    3  Acute pain of right knee M25 561    4  Displacement of internal left knee prosthesis, initial encounter [facility]) T84 023A                   Subjective: [employer] Ip reports that she continues to do well with her motion  Pt reports that she is having some achiness today and she feels like she was punched in her upper thigh, perhaps from the weather  Objective: See treatment diary below    Precautions: DOS     Daily Treatment Diary     Manual  3/30 4/6 4/11 4/13 4/18        Patellar mobilizations PRN 5'   AF 5        Knee distraction extension/flexion mob's  5' 5' AF 5        Prone knee flexion with distraction  5' 5' AF 5        Proximal scar massage DC                             Exercise Diary  3/30    4/18        Bike ROM 10'   10' warm up           PROM to R knee 10' 8' 10'  5        Hamstring stretch with strap HEP HEP           Heel slides with strap 20x5" HEP           Quad set  DC            SAQ 4# 3x12 HEP           Heel raises  DC            Standing hip abduction/exten NV HEP HEP          Gait training              SLR 3# 3x12 HEP           Seated AA knee flexion 20x5" HEP           Standing ham curls DC            Step ups HEP            squats 3x10 HEP                        Alter G  Gait training np            Prone quad str man HEP           Step down 6" 4x10 HEP           Leg press 60# np HEP                            Modalities              CP post TE np            Moist heat pre PROM 8' np             Assessment: Tolerated treatment well  Patient would benefit from continued PT to continue maximizing ROM  Likely transition to HEP in next 1-2 weeks  R knee AROM post manuals 125 degrees with effort    Pt has a follow up with Dr Tigre Jenkins tomorrow  Plan: Continue per plan of care

## 2018-04-19 ENCOUNTER — HOSPITAL ENCOUNTER (OUTPATIENT)
Dept: RADIOLOGY | Facility: HOSPITAL | Age: 63
Discharge: HOME | End: 2018-04-19
Attending: ORTHOPAEDIC SURGERY
Payer: COMMERCIAL

## 2018-04-19 ENCOUNTER — APPOINTMENT (OUTPATIENT)
Dept: PHYSICAL THERAPY | Facility: MEDICAL CENTER | Age: 63
End: 2018-04-19
Payer: COMMERCIAL

## 2018-04-19 ENCOUNTER — OFFICE VISIT (OUTPATIENT)
Dept: OBGYN CLINIC | Facility: HOSPITAL | Age: 63
End: 2018-04-19

## 2018-04-19 ENCOUNTER — TRANSCRIBE ORDERS (OUTPATIENT)
Dept: ADMINISTRATIVE | Facility: HOSPITAL | Age: 63
End: 2018-04-19

## 2018-04-19 VITALS — SYSTOLIC BLOOD PRESSURE: 151 MMHG | HEART RATE: 63 BPM | DIASTOLIC BLOOD PRESSURE: 88 MMHG | HEIGHT: 61 IN

## 2018-04-19 DIAGNOSIS — Z47.1 AFTERCARE FOLLOWING RIGHT KNEE JOINT REPLACEMENT SURGERY: ICD-10-CM

## 2018-04-19 DIAGNOSIS — Z96.651 AFTERCARE FOLLOWING RIGHT KNEE JOINT REPLACEMENT SURGERY: Primary | ICD-10-CM

## 2018-04-19 DIAGNOSIS — Z96.651 AFTERCARE FOLLOWING RIGHT KNEE JOINT REPLACEMENT SURGERY: ICD-10-CM

## 2018-04-19 DIAGNOSIS — Z96.619 AFTERCARE FOLLOWING SHOULDER JOINT REPLACEMENT SURGERY, UNSPECIFIED LATERALITY: Primary | ICD-10-CM

## 2018-04-19 DIAGNOSIS — Z47.1 AFTERCARE FOLLOWING JOINT REPLACEMENT SURGERY, UNSPECIFIED JOINT: ICD-10-CM

## 2018-04-19 DIAGNOSIS — Z47.1 AFTERCARE FOLLOWING RIGHT KNEE JOINT REPLACEMENT SURGERY: Primary | ICD-10-CM

## 2018-04-19 DIAGNOSIS — Z47.1 AFTERCARE FOLLOWING SHOULDER JOINT REPLACEMENT SURGERY, UNSPECIFIED LATERALITY: Primary | ICD-10-CM

## 2018-04-19 DIAGNOSIS — M19.071 ARTHRITIS OF FOOT, RIGHT: ICD-10-CM

## 2018-04-19 PROCEDURE — 99024 POSTOP FOLLOW-UP VISIT: CPT | Performed by: ORTHOPAEDIC SURGERY

## 2018-04-19 PROCEDURE — 73560 X-RAY EXAM OF KNEE 1 OR 2: CPT

## 2018-04-19 RX ORDER — CEPHALEXIN 500 MG/1
500 CAPSULE ORAL EVERY 6 HOURS SCHEDULED
Qty: 12 CAPSULE | Refills: 0 | Status: SHIPPED | OUTPATIENT
Start: 2018-04-19 | End: 2018-04-22

## 2018-04-19 NOTE — PROGRESS NOTES
61 y o female presents for 3 months postoperative visit status post right revision TKA (op date:  01/22/2018)  Patient doing wel  Notes mild difficulty with ascending stairs due to weakness  No other complaints at this time   Patient does note radiating posterior pain down the right leg concerning for sciatica    Review of Systems  Review of systems negative unless otherwise specified in HPI    Past Medical History  Past Medical History:   Diagnosis Date    Arthritis     Bronchitis     Disease of thyroid gland     Diverticulitis     GERD (gastroesophageal reflux disease)     IBS (irritable bowel syndrome)     Insomnia     PONV (postoperative nausea and vomiting)        Past Surgical History  Past Surgical History:   Procedure Laterality Date    CHOLECYSTECTOMY      DILATION AND CURETTAGE OF UTERUS      HYSTERECTOMY      JOINT REPLACEMENT      PARTIAL HYSTERECTOMY      DE REVISE KNEE JOINT REPLACE,ALL PARTS Right 1/22/2018    Procedure: ARTHROPLASTY KNEE TOTAL REVISION;  Surgeon: Rahul Lane MD;  Location:  MAIN OR;  Service: Orthopedics   [address] [place] [address]          Current Medications  Current Outpatient Prescriptions on File Prior to Visit   Medication Sig Dispense Refill    acetaminophen (TYLENOL) 325 mg tablet Please take 650mg every 6 hours as needed for pain 30 tablet 0    Calcium-Vitamin D-Vitamin K (VIACTIV PO) Take by mouth      DiphenhydrAMINE HCl (BENADRYL PO) Take by mouth as needed      levothyroxine 75 mcg tablet Take 75 mcg by mouth daily      MELATONIN PO Take by mouth      Probiotic Product (PROBIOTIC DAILY PO) Take by mouth       Current Facility-Administered Medications on File Prior to Visit   Medication Dose Route Frequency Provider Last Rate Last Dose    cephalexin (KEFLEX) capsule 2,000 mg  2,000 mg Oral 60 Min Pre-Op Rahul Lane MD           Recent Labs [facility])    0  Lab Value Date/Time   HCT 32 6 (L) 01/23/2018 0457   HCT 41 8 10/20/2015 0958   HGB 11 0 (L) 01/23/2018 0457   HGB 14 1 10/20/2015 0958   WBC 13 67 (H) 01/23/2018 0457   WBC 5 89 10/20/2015 0958   INR 0 93 01/15/2018 1049   ESR 14 11/20/2017 1114   CRP <3 0 11/20/2017 1114   GLUCOSE 114 01/24/2018 0551   GLUCOSE 89 10/20/2015 0958   HGBA1C 6 1 01/15/2018 1049         Physical exam  · General: Awake, Alert, Oriented  · Eyes: Pupils equal, round and reactive to light  · Heart: regular rate and rhythm  · Lungs: No audible wheezing  · Abdomen: soft  right Knee exam  · Incision well-healing no erythema no drainage  · Extension 0° flexion 120°  · Stable to varus valgus stress  · Negative lock negative posterior drawer  ·   · Negative Madelyn's  · Sensation intact grossly throughout entirety of extremity  · 4/5 strength knee flexion extension    Imaging  X-ray right knee shows well-positioned hardware without concern for loosening    Procedure  None    Assessment:   61 y  o female 3 months status post surgical fixation of right ankle fracture fixation   Doing well with concern for sciatica    Plan  · Weight Bearing:  As tolerated  · Physical therapy:  Continue strengthening  · Analgesics:  As needed for pain   · DVT ppx:  None  · Given patient's concerns for continued sciatica referral to spine pain will be undertaken  · Will send to radiology for L foot injection  · Will provide dental prophylaixis  · Follow-up 3 months

## 2018-04-25 ENCOUNTER — OFFICE VISIT (OUTPATIENT)
Dept: PHYSICAL THERAPY | Facility: MEDICAL CENTER | Age: 63
End: 2018-04-25
Payer: COMMERCIAL

## 2018-04-25 DIAGNOSIS — M25.561 ACUTE PAIN OF RIGHT KNEE: ICD-10-CM

## 2018-04-25 DIAGNOSIS — T84.023A DISPLACEMENT OF INTERNAL LEFT KNEE PROSTHESIS, INITIAL ENCOUNTER (HCC): ICD-10-CM

## 2018-04-25 DIAGNOSIS — Z47.89 ORTHOPEDIC AFTERCARE: ICD-10-CM

## 2018-04-25 DIAGNOSIS — Z96.651 HISTORY OF TOTAL RIGHT KNEE REPLACEMENT: Primary | ICD-10-CM

## 2018-04-25 PROCEDURE — 97140 MANUAL THERAPY 1/> REGIONS: CPT

## 2018-04-25 NOTE — PROGRESS NOTES
Daily Note     Today's date: 2018  Patient name: Xiang Lin  : 1955  MRN: [id number]  Referring provider: Amena Ureña MD  Dx:   Encounter Diagnosis     ICD-10-CM    1  History of total right knee replacement Z96 651    2  Orthopedic aftercare Z47 89    3  Acute pain of right knee M25 561    4  Displacement of internal left knee prosthesis, initial encounter [facility]) T84 023A                   Subjective: Munir Elena reports that she saw Dr Jaci Shukla last week and he was pleased with her progress and she is ahead of what he expected at this point  Pt reports that she continues to have upper thigh pain which the doctor feels is related to her back  Munir Elena states that she has been referred to pain management to address her back related symptoms  Pt notes that her knee is feeling good and she had no issues with traveling last week          Objective: See treatment diary below    Precautions: DOS     Daily Treatment Diary     Manual  3/30 4/6 4/11 4/13 4/18 4/25       Patellar mobilizations PRN 5'   AF 5 5'       Knee distraction extension/flexion mob's  5' 5' AF 5 5'       Prone knee flexion with distraction  5' 5' AF 5 5'       Proximal scar massage DC                             Exercise Diary  3/30    4/18 4/25       Bike ROM 10'   10' warm up           PROM to R knee 10' 8' 10'  5 5'       Hamstring stretch with strap HEP HEP           Heel slides with strap 20x5" HEP           Quad set  DC            SAQ 4# 3x12 HEP           Heel raises  DC            Standing hip abduction/exten NV HEP HEP          Gait training              SLR 3# 3x12 HEP           Seated AA knee flexion 20x5" HEP           Standing ham curls DC            Step ups HEP            squats 3x10 HEP                        Alter G  Gait training np            Prone quad str man HEP           Step down 6" 4x10 HEP           Leg press 60# np HEP                            Modalities              CP post TE np     10'       Moist heat pre PROM 8' np             Assessment: Tolerated treatment well  Patient would benefit from continued PT to continue maximizing ROM  R knee AROM post manuals 0- 125 degrees with effort  Plan: Continue per plan of care

## 2018-05-04 ENCOUNTER — OFFICE VISIT (OUTPATIENT)
Dept: PHYSICAL THERAPY | Facility: MEDICAL CENTER | Age: 63
End: 2018-05-04
Payer: COMMERCIAL

## 2018-05-04 DIAGNOSIS — Z47.89 ORTHOPEDIC AFTERCARE: ICD-10-CM

## 2018-05-04 DIAGNOSIS — M25.561 ACUTE PAIN OF RIGHT KNEE: ICD-10-CM

## 2018-05-04 DIAGNOSIS — Z96.651 HISTORY OF TOTAL RIGHT KNEE REPLACEMENT: Primary | ICD-10-CM

## 2018-05-04 PROCEDURE — 97140 MANUAL THERAPY 1/> REGIONS: CPT | Performed by: PHYSICAL THERAPIST

## 2018-05-04 NOTE — PROGRESS NOTES
Daily Note     Today's date: 2018  Patient name: Nay Manley  : 1955  MRN: [id number]  Referring provider: Jakub Gale MD  Dx:   Encounter Diagnosis     ICD-10-CM    1  History of total right knee replacement Z96 651    2  Orthopedic aftercare Z47 89    3  Acute pain of right knee M25 561                   Subjective: Erendira Orosco reports that her knee is doing well  She had her MD follow up and he is very pleased       Objective: See treatment diary below    Precautions: DOS     Daily Treatment Diary     Manual  3/30 4/6 4/11 4/13 4/18 4/25 5/4      Patellar mobilizations PRN 5'   AF 5 5' AF      Knee distraction extension/flexion mob's  5' 5' AF 5 5' AF      Prone knee flexion with distraction  5' 5' AF 5 5' AF      Proximal scar massage DC                             Exercise Diary  3/30    4/18 4/25       Bike ROM 10'   10' warm up           PROM to R knee 10' 8' 10'  5 5'       Hamstring stretch with strap HEP HEP           Heel slides with strap 20x5" HEP           Quad set  DC            SAQ 4# 3x12 HEP           Heel raises  DC            Standing hip abduction/exten NV HEP HEP          Gait training              SLR 3# 3x12 HEP           Seated AA knee flexion 20x5" HEP           Standing ham curls DC            Step ups HEP            squats 3x10 HEP                        Alter G  Gait training np            Prone quad str man HEP           Step down 6" 4x10 HEP           Leg press 60# np HEP                            Modalities              CP post TE np     10'       Moist heat pre PROM 8' np               Assessment: Tolerated treatment well  Patient discharge care for knee  Will begin with low back treatment next week direct access      Plan: Continue per plan of care

## 2018-05-09 ENCOUNTER — APPOINTMENT (OUTPATIENT)
Dept: PHYSICAL THERAPY | Facility: MEDICAL CENTER | Age: 63
End: 2018-05-09
Payer: COMMERCIAL

## 2018-05-22 ENCOUNTER — OFFICE VISIT (OUTPATIENT)
Dept: PAIN MEDICINE | Facility: MEDICAL CENTER | Age: 63
End: 2018-05-22
Payer: COMMERCIAL

## 2018-05-22 VITALS
HEART RATE: 72 BPM | RESPIRATION RATE: 12 BRPM | DIASTOLIC BLOOD PRESSURE: 72 MMHG | BODY MASS INDEX: 29.27 KG/M2 | HEIGHT: 61 IN | SYSTOLIC BLOOD PRESSURE: 106 MMHG | WEIGHT: 155 LBS

## 2018-05-22 DIAGNOSIS — Z96.651 AFTERCARE FOLLOWING RIGHT KNEE JOINT REPLACEMENT SURGERY: ICD-10-CM

## 2018-05-22 DIAGNOSIS — M54.16 LUMBAR RADICULOPATHY: Primary | ICD-10-CM

## 2018-05-22 DIAGNOSIS — Z47.1 AFTERCARE FOLLOWING RIGHT KNEE JOINT REPLACEMENT SURGERY: ICD-10-CM

## 2018-05-22 PROCEDURE — 99244 OFF/OP CNSLTJ NEW/EST MOD 40: CPT | Performed by: PHYSICAL MEDICINE & REHABILITATION

## 2018-05-22 RX ORDER — MELOXICAM 15 MG/1
15 TABLET ORAL DAILY
Qty: 30 TABLET | Refills: 0 | Status: SHIPPED | OUTPATIENT
Start: 2018-05-22 | End: 2019-02-04

## 2018-05-22 RX ORDER — MELOXICAM 15 MG/1
15 TABLET ORAL DAILY
Qty: 30 TABLET | Refills: 0 | Status: SHIPPED | OUTPATIENT
Start: 2018-05-22 | End: 2018-05-22 | Stop reason: SDUPTHER

## 2018-05-22 NOTE — LETTER
May 22, 2018     Kareem [place], [address] [address]    Patient: Deedee Benito   YOB: 1955   Date of Visit: 5/22/2018       Dear Dr Simon Erps: Thank you for referring Juan F Pryor to me for evaluation  Below are my notes for this consultation  If you have questions, please do not hesitate to call me  I look forward to following your patient along with you  Sincerely,        Blima Meckel, DO        CC: Abel Hailstone, MD Patrice Clapper, MD Blima Meckel, DO  5/22/2018 11:11 AM  Sign at close encounter  Assessment:  1  Lumbar radiculopathy - Right    2  Aftercare following right knee joint replacement surgery        Plan:  1  Initiate meloxicam 15 mg 1 tablet daily in place of the leave    2  At this time the patient has participated appropriately in physical therapy and tried oral analgesics  She is continuing to experience back and radiating leg pain consistent with lumbar radiculopathy  We will obtain MRI of the lumbar spine and determine further treatment which may include epidural steroid injection or referral to 1 of our spine surgeons, Dr Robson Claudio  3   Depending on the results of the MRI we will also consider Re initiating physical therapy  My impressions and treatment recommendations were discussed in detail with the patient who verbalized understanding and had no further questions  Discharge instructions were provided  I personally saw and examined the patient and I agree with the above discussed plan of care  Orders Placed This Encounter   Procedures    MRI lumbar spine wo contrast     Standing Status:   Future     Standing Expiration Date:   5/22/2022     Scheduling Instructions: There is no preparation for this test  Please leave your jewelry and valuables at home, wedding rings are the exception  Please bring your physician order, insurance cards, a form of photo ID and a list of your medications with you   Arrive 15 minutes prior to your appointment time in order to       register  Please bring any prior CT or MRI studies of this area that were not performed at a [facility] facility  To schedule this appointment, please contact Central Scheduling at [phone]  Order Specific Question:   What is the patient's sedation requirement? Answer:   No Sedation     New Medications Ordered This Visit   Medications    meloxicam (MOBIC) 15 mg tablet     Sig: Take 1 tablet (15 mg total) by mouth daily for 30 days     Dispense:  30 tablet     Refill:  0       History of Present Illness:    Eliseo Kent is a 61 y o  female Sent from Dr Jose Dover for further evaluation and management of her back and radiating right leg pain consistent with lumbar radiculopathy  She did recently undergo total knee arthroplasty revision which has been progressing nicely with her physical therapy  She states that she has had back problems in the past and this has been aggravated recently  She is describing moderate to severe intensity pain rated as a 6/10 which is intermittent without any typical pattern  She characterizes the pain as dull, aching, shooting  Aggravating factors include standing, walking  Alleviating factors include prior, lying down, sitting, and relaxation  She has not had any advanced imaging of the lumbar spine recently  She has noted moderate relief with physical therapy, exercise, 10s unit, heat or ice application, acupuncture, and chiropractic manipulation  She has been using Aleve and notices some improvement but states that if she uses this too long she does developed Gastric irritation    I have personally reviewed and/or updated the patient's past medical history, past surgical history, family history, social history, current medications, allergies, and vital signs today  Review of Systems:    Review of Systems   Constitutional: Negative for fever and unexpected weight change  HENT: Negative for trouble swallowing  Eyes: Negative for visual disturbance  Respiratory: Negative for shortness of breath and wheezing  Cardiovascular: Negative for chest pain and palpitations  Gastrointestinal: Positive for diarrhea  Negative for constipation, nausea and vomiting  Endocrine: Positive for heat intolerance  Negative for cold intolerance and polydipsia  Genitourinary: Negative for difficulty urinating and frequency  Musculoskeletal: Positive for back pain, joint swelling, myalgias and neck pain  Negative for arthralgias and gait problem  Skin: Negative for rash  Neurological: Positive for weakness  Negative for dizziness, seizures, syncope and headaches  Hematological: Does not bruise/bleed easily  Psychiatric/Behavioral: Negative for dysphoric mood  All other systems reviewed and are negative        Patient Active Problem List   Diagnosis    History of total knee arthroplasty    Orthopedic aftercare    Aftercare following right knee joint replacement surgery    Arthritis of foot, right       Past Medical History:   Diagnosis Date    Arthritis     Bronchitis     Disease of thyroid gland     Diverticulitis     GERD (gastroesophageal reflux disease)     IBS (irritable bowel syndrome)     Insomnia     PONV (postoperative nausea and vomiting)        Past Surgical History:   Procedure Laterality Date    CHOLECYSTECTOMY      DILATION AND CURETTAGE OF UTERUS      HYSTERECTOMY      JOINT REPLACEMENT      PARTIAL HYSTERECTOMY      [place] REVISE KNEE JOINT REPLACE,ALL PARTS Right 1/22/2018    Procedure: ARTHROPLASTY KNEE TOTAL REVISION;  Surgeon: Alex Decker MD;  [facility];  Service: Orthopedics    TONSILLECTOMY      VARICOSE VEIN SURGERY         Family History   Problem Relation Age of Onset    No Known Problems Mother     No Known Problems Sister     Heart disease Family     Hypertension Family     Pancreatic cancer Family        Social History Occupational History    Not on file  Social History Main Topics    Smoking status: Never Smoker    Smokeless tobacco: Never Used    Alcohol use Yes      Comment: social     Drug use: No    Sexual activity: Not on file       Current Outpatient Prescriptions on File Prior to Visit   Medication Sig    Calcium-Vitamin D-Vitamin K (VIACTIV PO) Take by mouth    DiphenhydrAMINE HCl (BENADRYL PO) Take by mouth as needed    levothyroxine 75 mcg tablet Take 75 mcg by mouth daily    Probiotic Product (PROBIOTIC DAILY PO) Take by mouth    [DISCONTINUED] acetaminophen (TYLENOL) 325 mg tablet Please take 650mg every 6 hours as needed for pain    [DISCONTINUED] MELATONIN PO Take by mouth     Current Facility-Administered Medications on File Prior to Visit   Medication    cephalexin (KEFLEX) capsule 2,000 mg       Allergies   Allergen Reactions    Codeine GI Intolerance    Morphine Itching       Physical Exam:    /72   Pulse 72   Resp 12   Ht 5' 1" (1 549 m)   Wt 70 3 kg (155 lb)   BMI 29 29 kg/m²       LUMBAR  General: Well-developed, well-nourished individual in no acute distress  Mental: Appropriate mood and affect  Grossly oriented with coherent speech and thought processing   Efrain's score: Efrain's score is 0/5  Neuro:  Cranial nerves: Cranial nerve function is grossly intact bilaterally   Strength: Bilateral lower extremity strength is normal and symmetric   No atrophy or tone abnormalities noted   Reflexes: Bilateral lower extremity muscle stretch reflexes are physiologic and symmetric but absent at the right knee  No ankle clonus is noted   Sensation: No loss of sensation is noted   SLR/Foraminal Compression Maneuvers: Straight leg raising in the sitting position is equivocal for radicular pain on the right  Gait:  Gait/gross motor: Gait is slightly altered secondary to recent total knee arthroplasty revision   Station is normal  Toe walking, heel walking  are normal     Musculoskeletal:  Palpation: Inspection and palpation of the spine and extremities are unremarkable   Spine: Normal pain-free range of motion   No gross axial skeletal deformities   Skin: Skin inspection grossly negative for erythema, breakdown, or concerning lesions in affected area   Lymph: No lymphadenopathy is appreciated in the involved extremity   Vessels: No lower extremity edema   Lungs: Breathing is comfortable and regular  No dyspnea noted during examination   Eyes: Visual field grossly intact to confrontation  No redness appreciated  ENT: No craniofacial deformities or asymmetry  No neck masses appreciated           Imaging

## 2018-05-22 NOTE — PROGRESS NOTES
Assessment:  1  Lumbar radiculopathy - Right    2  Aftercare following right knee joint replacement surgery        Plan:  1  Initiate meloxicam 15 mg 1 tablet daily in place of the leave    2  At this time the patient has participated appropriately in physical therapy and tried oral analgesics  She is continuing to experience back and radiating leg pain consistent with lumbar radiculopathy  We will obtain MRI of the lumbar spine and determine further treatment which may include epidural steroid injection or referral to 1 of our spine surgeons, Dr Emmy Vela  3   Depending on the results of the MRI we will also consider Re initiating physical therapy  My impressions and treatment recommendations were discussed in detail with the patient who verbalized understanding and had no further questions  Discharge instructions were provided  I personally saw and examined the patient and I agree with the above discussed plan of care  Orders Placed This Encounter   Procedures    MRI lumbar spine wo contrast     Standing Status:   Future     Standing Expiration Date:   5/22/2022     Scheduling Instructions: There is no preparation for this test  Please leave your jewelry and valuables at home, wedding rings are the exception  Please bring your physician order, insurance cards, a form of photo ID and a list of your medications with you  Arrive 15 minutes prior to your appointment time in order to       register  Please bring any prior CT or MRI studies of this area that were not performed at a [facility]  To schedule this appointment, please contact Central Scheduling at [phone]  Order Specific Question:   What is the patient's sedation requirement?      Answer:   No Sedation     New Medications Ordered This Visit   Medications    meloxicam (MOBIC) 15 mg tablet     Sig: Take 1 tablet (15 mg total) by mouth daily for 30 days     Dispense:  30 tablet     Refill:  0       History of Present Illness:    Johnathan Kothari is a 61 y o  female Sent from Dr Rico Nino for further evaluation and management of her back and radiating right leg pain consistent with lumbar radiculopathy  She did recently undergo total knee arthroplasty revision which has been progressing nicely with her physical therapy  She states that she has had back problems in the past and this has been aggravated recently  She is describing moderate to severe intensity pain rated as a 6/10 which is intermittent without any typical pattern  She characterizes the pain as dull, aching, shooting  Aggravating factors include standing, walking  Alleviating factors include prior, lying down, sitting, and relaxation  She has not had any advanced imaging of the lumbar spine recently  She has noted moderate relief with physical therapy, exercise, 10s unit, heat or ice application, acupuncture, and chiropractic manipulation  She has been using Aleve and notices some improvement but states that if she uses this too long she does developed Gastric irritation    I have personally reviewed and/or updated the patient's past medical history, past surgical history, family history, social history, current medications, allergies, and vital signs today  Review of Systems:    Review of Systems   Constitutional: Negative for fever and unexpected weight change  HENT: Negative for trouble swallowing  Eyes: Negative for visual disturbance  Respiratory: Negative for shortness of breath and wheezing  Cardiovascular: Negative for chest pain and palpitations  Gastrointestinal: Positive for diarrhea  Negative for constipation, nausea and vomiting  Endocrine: Positive for heat intolerance  Negative for cold intolerance and polydipsia  Genitourinary: Negative for difficulty urinating and frequency  Musculoskeletal: Positive for back pain, joint swelling, myalgias and neck pain   Negative for arthralgias and gait problem  Skin: Negative for rash  Neurological: Positive for weakness  Negative for dizziness, seizures, syncope and headaches  Hematological: Does not bruise/bleed easily  Psychiatric/Behavioral: Negative for dysphoric mood  All other systems reviewed and are negative  Patient Active Problem List   Diagnosis    History of total knee arthroplasty    Orthopedic aftercare    Aftercare following right knee joint replacement surgery    Arthritis of foot, right       Past Medical History:   Diagnosis Date    Arthritis     Bronchitis     Disease of thyroid gland     Diverticulitis     GERD (gastroesophageal reflux disease)     IBS (irritable bowel syndrome)     Insomnia     PONV (postoperative nausea and vomiting)        Past Surgical History:   Procedure Laterality Date    CHOLECYSTECTOMY      DILATION AND CURETTAGE OF UTERUS      HYSTERECTOMY      JOINT REPLACEMENT      PARTIAL HYSTERECTOMY      [place] REVISE KNEE JOINT REPLACE,ALL PARTS Right 1/22/2018    Procedure: ARTHROPLASTY KNEE TOTAL REVISION;  Surgeon: Karina Campos MD;  [facility];  Service: Orthopedics    TONSILLECTOMY      VARICOSE VEIN SURGERY         Family History   Problem Relation Age of Onset    No Known Problems Mother     No Known Problems Sister     Heart disease Family     Hypertension Family     Pancreatic cancer Family        Social History     Occupational History    Not on file       Social History Main Topics    Smoking status: Never Smoker    Smokeless tobacco: Never Used    Alcohol use Yes      Comment: social     Drug use: No    Sexual activity: Not on file       Current Outpatient Prescriptions on File Prior to Visit   Medication Sig    Calcium-Vitamin D-Vitamin K (VIACTIV PO) Take by mouth    DiphenhydrAMINE HCl (BENADRYL PO) Take by mouth as needed    levothyroxine 75 mcg tablet Take 75 mcg by mouth daily    Probiotic Product (PROBIOTIC DAILY PO) Take by mouth    [DISCONTINUED] acetaminophen (TYLENOL) 325 mg tablet Please take 650mg every 6 hours as needed for pain    [DISCONTINUED] MELATONIN PO Take by mouth     Current Facility-Administered Medications on File Prior to Visit   Medication    cephalexin (KEFLEX) capsule 2,000 mg       Allergies   Allergen Reactions    Codeine GI Intolerance    Morphine Itching       Physical Exam:    /72   Pulse 72   Resp 12   Ht 5' 1" (1 549 m)   Wt 70 3 kg (155 lb)   BMI 29 29 kg/m²      LUMBAR  General: Well-developed, well-nourished individual in no acute distress  Mental: Appropriate mood and affect  Grossly oriented with coherent speech and thought processing   Efrain's score: Efrain's score is 0/5  Neuro:  Cranial nerves: Cranial nerve function is grossly intact bilaterally   Strength: Bilateral lower extremity strength is normal and symmetric   No atrophy or tone abnormalities noted   Reflexes: Bilateral lower extremity muscle stretch reflexes are physiologic and symmetric but absent at the right knee  No ankle clonus is noted   Sensation: No loss of sensation is noted   SLR/Foraminal Compression Maneuvers: Straight leg raising in the sitting position is equivocal for radicular pain on the right  Gait:  Gait/gross motor: Gait is slightly altered secondary to recent total knee arthroplasty revision  Station is normal  Toe walking, heel walking  are normal     Musculoskeletal:  Palpation: Inspection and palpation of the spine and extremities are unremarkable   Spine: Normal pain-free range of motion   No gross axial skeletal deformities   Skin: Skin inspection grossly negative for erythema, breakdown, or concerning lesions in affected area   Lymph: No lymphadenopathy is appreciated in the involved extremity   Vessels: No lower extremity edema   Lungs: Breathing is comfortable and regular  No dyspnea noted during examination   Eyes: Visual field grossly intact to confrontation   No redness appreciated  ENT: No craniofacial deformities or asymmetry  No neck masses appreciated           Imaging

## 2018-05-29 ENCOUNTER — TRANSCRIBE ORDERS (OUTPATIENT)
Dept: ADMINISTRATIVE | Facility: HOSPITAL | Age: 63
End: 2018-05-29

## 2018-05-29 ENCOUNTER — APPOINTMENT (OUTPATIENT)
Dept: LAB | Facility: MEDICAL CENTER | Age: 63
End: 2018-05-29
Payer: COMMERCIAL

## 2018-05-29 DIAGNOSIS — E03.9 MYXEDEMA HEART DISEASE: Primary | ICD-10-CM

## 2018-05-29 DIAGNOSIS — E03.9 MYXEDEMA HEART DISEASE: ICD-10-CM

## 2018-05-29 DIAGNOSIS — I51.9 MYXEDEMA HEART DISEASE: ICD-10-CM

## 2018-05-29 DIAGNOSIS — I51.9 MYXEDEMA HEART DISEASE: Primary | ICD-10-CM

## 2018-05-29 LAB
ALBUMIN SERPL BCP-MCNC: 3.6 G/DL (ref 3.5–5)
ALP SERPL-CCNC: 119 U/L (ref 46–116)
ALT SERPL W P-5'-P-CCNC: 22 U/L (ref 12–78)
ANION GAP SERPL CALCULATED.3IONS-SCNC: 9 MMOL/L (ref 4–13)
AST SERPL W P-5'-P-CCNC: 16 U/L (ref 5–45)
BASOPHILS # BLD AUTO: 0.02 THOUSANDS/ΜL (ref 0–0.1)
BASOPHILS NFR BLD AUTO: 0 % (ref 0–1)
BILIRUB SERPL-MCNC: 0.69 MG/DL (ref 0.2–1)
BUN SERPL-MCNC: 23 MG/DL (ref 5–25)
CALCIUM SERPL-MCNC: 9 MG/DL (ref 8.3–10.1)
CHLORIDE SERPL-SCNC: 105 MMOL/L (ref 100–108)
CHOLEST SERPL-MCNC: 171 MG/DL (ref 50–200)
CO2 SERPL-SCNC: 25 MMOL/L (ref 21–32)
CREAT SERPL-MCNC: 0.76 MG/DL (ref 0.6–1.3)
EOSINOPHIL # BLD AUTO: 0.1 THOUSAND/ΜL (ref 0–0.61)
EOSINOPHIL NFR BLD AUTO: 2 % (ref 0–6)
ERYTHROCYTE [DISTWIDTH] IN BLOOD BY AUTOMATED COUNT: 14.1 % (ref 11.6–15.1)
GFR SERPL CREATININE-BSD FRML MDRD: 84 ML/MIN/1.73SQ M
GLUCOSE P FAST SERPL-MCNC: 91 MG/DL (ref 65–99)
HCT VFR BLD AUTO: 40.1 % (ref 34.8–46.1)
HDLC SERPL-MCNC: 50 MG/DL (ref 40–60)
HGB BLD-MCNC: 13.1 G/DL (ref 11.5–15.4)
LDLC SERPL CALC-MCNC: 92 MG/DL (ref 0–100)
LDLC SERPL DIRECT ASSAY-MCNC: 104 MG/DL (ref 0–100)
LYMPHOCYTES # BLD AUTO: 1.72 THOUSANDS/ΜL (ref 0.6–4.47)
LYMPHOCYTES NFR BLD AUTO: 29 % (ref 14–44)
MCH RBC QN AUTO: 28.5 PG (ref 26.8–34.3)
MCHC RBC AUTO-ENTMCNC: 32.7 G/DL (ref 31.4–37.4)
MCV RBC AUTO: 87 FL (ref 82–98)
MONOCYTES # BLD AUTO: 0.61 THOUSAND/ΜL (ref 0.17–1.22)
MONOCYTES NFR BLD AUTO: 10 % (ref 4–12)
NEUTROPHILS # BLD AUTO: 3.4 THOUSANDS/ΜL (ref 1.85–7.62)
NEUTS SEG NFR BLD AUTO: 58 % (ref 43–75)
NONHDLC SERPL-MCNC: 121 MG/DL
NRBC BLD AUTO-RTO: 0 /100 WBCS
PLATELET # BLD AUTO: 271 THOUSANDS/UL (ref 149–390)
PMV BLD AUTO: 11 FL (ref 8.9–12.7)
POTASSIUM SERPL-SCNC: 4.2 MMOL/L (ref 3.5–5.3)
PROT SERPL-MCNC: 7 G/DL (ref 6.4–8.2)
RBC # BLD AUTO: 4.59 MILLION/UL (ref 3.81–5.12)
SODIUM SERPL-SCNC: 139 MMOL/L (ref 136–145)
TRIGL SERPL-MCNC: 145 MG/DL
TSH SERPL DL<=0.05 MIU/L-ACNC: 5.59 UIU/ML (ref 0.36–3.74)
WBC # BLD AUTO: 5.87 THOUSAND/UL (ref 4.31–10.16)

## 2018-05-29 PROCEDURE — 85025 COMPLETE CBC W/AUTO DIFF WBC: CPT

## 2018-05-29 PROCEDURE — 80061 LIPID PANEL: CPT

## 2018-05-29 PROCEDURE — 80053 COMPREHEN METABOLIC PANEL: CPT

## 2018-05-29 PROCEDURE — 36415 COLL VENOUS BLD VENIPUNCTURE: CPT

## 2018-05-29 PROCEDURE — 84443 ASSAY THYROID STIM HORMONE: CPT

## 2018-05-29 PROCEDURE — 83721 ASSAY OF BLOOD LIPOPROTEIN: CPT

## 2018-06-05 ENCOUNTER — TELEPHONE (OUTPATIENT)
Dept: PAIN MEDICINE | Facility: MEDICAL CENTER | Age: 63
End: 2018-06-05

## 2018-06-05 ENCOUNTER — HOSPITAL ENCOUNTER (OUTPATIENT)
Dept: MRI IMAGING | Facility: HOSPITAL | Age: 63
Discharge: HOME | End: 2018-06-05
Attending: PHYSICAL MEDICINE & REHABILITATION
Payer: COMMERCIAL

## 2018-06-05 DIAGNOSIS — M54.16 LUMBAR RADICULOPATHY: ICD-10-CM

## 2018-06-05 PROCEDURE — 72148 MRI LUMBAR SPINE W/O DYE: CPT

## 2018-06-05 NOTE — TELEPHONE ENCOUNTER
----- Message from Blima Meckel, DO sent at 6/5/2018  3:38 PM EDT -----  Please schedule follow up with me to review MRI findings and determine treatment options

## 2018-06-05 NOTE — TELEPHONE ENCOUNTER
LMOM on home/cell # for pt to C/B, C/B # provided and office hrs  --pt needs scheduled as per previous task

## 2018-06-07 ENCOUNTER — TELEPHONE (OUTPATIENT)
Dept: OBGYN CLINIC | Facility: HOSPITAL | Age: 63
End: 2018-06-07

## 2018-06-07 ENCOUNTER — HOSPITAL ENCOUNTER (OUTPATIENT)
Dept: RADIOLOGY | Facility: HOSPITAL | Age: 63
Discharge: HOME | End: 2018-06-07
Admitting: RADIOLOGY
Payer: COMMERCIAL

## 2018-06-07 DIAGNOSIS — Z47.1 AFTERCARE FOLLOWING JOINT REPLACEMENT SURGERY, UNSPECIFIED JOINT: ICD-10-CM

## 2018-06-07 DIAGNOSIS — M19.071 ARTHRITIS OF ANKLE OR FOOT, DEGENERATIVE, RIGHT: Primary | ICD-10-CM

## 2018-06-07 PROCEDURE — 20605 DRAIN/INJ JOINT/BURSA W/O US: CPT

## 2018-06-07 PROCEDURE — 77002 NEEDLE LOCALIZATION BY XRAY: CPT

## 2018-06-07 RX ORDER — METHYLPREDNISOLONE ACETATE 80 MG/ML
80 INJECTION, SUSPENSION INTRA-ARTICULAR; INTRALESIONAL; INTRAMUSCULAR; SOFT TISSUE
Status: COMPLETED | OUTPATIENT
Start: 2018-06-07 | End: 2018-06-07

## 2018-06-07 RX ORDER — LIDOCAINE HYDROCHLORIDE 10 MG/ML
20 INJECTION, SOLUTION INFILTRATION; PERINEURAL
Status: COMPLETED | OUTPATIENT
Start: 2018-06-07 | End: 2018-06-07

## 2018-06-07 RX ORDER — BUPIVACAINE HYDROCHLORIDE 2.5 MG/ML
30 INJECTION, SOLUTION EPIDURAL; INFILTRATION; INTRACAUDAL
Status: COMPLETED | OUTPATIENT
Start: 2018-06-07 | End: 2018-06-07

## 2018-06-07 RX ADMIN — METHYLPREDNISOLONE ACETATE 80 MG: 80 INJECTION, SUSPENSION INTRA-ARTICULAR; INTRALESIONAL; INTRAMUSCULAR; SOFT TISSUE at 13:36

## 2018-06-07 RX ADMIN — BUPIVACAINE HYDROCHLORIDE 2 ML: 2.5 INJECTION, SOLUTION EPIDURAL; INFILTRATION; INTRACAUDAL at 13:35

## 2018-06-07 RX ADMIN — IOHEXOL 3 ML: 300 INJECTION, SOLUTION INTRAVENOUS at 13:34

## 2018-06-07 RX ADMIN — LIDOCAINE HYDROCHLORIDE 2 ML: 10 INJECTION, SOLUTION INFILTRATION; PERINEURAL at 13:36

## 2018-06-07 NOTE — TELEPHONE ENCOUNTER
Jaime Rousseau Goods    Radiology called and [facility] [place]    Was finished but they are requesting a new order in [facility] describing injection point

## 2018-06-28 ENCOUNTER — OFFICE VISIT (OUTPATIENT)
Dept: PAIN MEDICINE | Facility: MEDICAL CENTER | Age: 63
End: 2018-06-28
Payer: COMMERCIAL

## 2018-06-28 VITALS
WEIGHT: 153.4 LBS | RESPIRATION RATE: 12 BRPM | HEART RATE: 68 BPM | BODY MASS INDEX: 28.96 KG/M2 | SYSTOLIC BLOOD PRESSURE: 116 MMHG | DIASTOLIC BLOOD PRESSURE: 64 MMHG | HEIGHT: 61 IN

## 2018-06-28 DIAGNOSIS — M47.816 LUMBAR SPONDYLOSIS: ICD-10-CM

## 2018-06-28 DIAGNOSIS — G89.29 CHRONIC BILATERAL LOW BACK PAIN WITHOUT SCIATICA: Primary | ICD-10-CM

## 2018-06-28 DIAGNOSIS — M54.50 CHRONIC BILATERAL LOW BACK PAIN WITHOUT SCIATICA: Primary | ICD-10-CM

## 2018-06-28 PROCEDURE — 99213 OFFICE O/P EST LOW 20 MIN: CPT | Performed by: PHYSICAL MEDICINE & REHABILITATION

## 2018-06-28 RX ORDER — LEVOTHYROXINE SODIUM 88 UG/1
88 TABLET ORAL DAILY
Refills: 1 | COMMUNITY
Start: 2018-05-30 | End: 2019-02-04

## 2018-06-28 NOTE — PROGRESS NOTES
Assessment:  1  Chronic bilateral low back pain without sciatica    2  Lumbar spondylosis        Plan:  1  At this time the patient is doing fairly well in managing her symptoms with oral NSAIDs  I would like her to initiate physical therapy and follow up with me on an as-needed basis  My impressions and treatment recommendations were discussed in detail with the patient who verbalized understanding and had no further questions  Discharge instructions were provided  I personally saw and examined the patient and I agree with the above discussed plan of care  Orders Placed This Encounter   Procedures    Ambulatory referral to Physical Therapy     Standing Status:   Future     Standing Expiration Date:   12/28/2018     Referral Priority:   Routine     Referral Type:   Physical Therapy     Referral Reason:   Specialty Services Required     Requested Specialty:   Physical Therapy     Number of Visits Requested:   1     Expiration Date:   6/28/2019     New Medications Ordered This Visit   Medications    levothyroxine 88 mcg tablet     Sig: Take 88 mcg by mouth daily     Refill:  1       History of Present Illness:    Lucas Montalvo is a 61 y o  female Returns in follow-up to review her MRI and discuss her current symptomatology  She does not have any significant compressive disease in her spine although there are some degenerative changes  She is not experiencing any current radiating pain  She does describe some back and lateral hip pain as well as some aching in her right thigh  She states that this is not limiting any of her activity although she does noted intermittently throughout the day as a dull aching sensation  This is scored as a 4 to 5/10  I have personally reviewed and/or updated the patient's past medical history, past surgical history, family history, social history, current medications, allergies, and vital signs today       Review of Systems:    Review of Systems   Respiratory: Negative for shortness of breath  Cardiovascular: Negative for chest pain  Gastrointestinal: Negative for constipation, diarrhea, nausea and vomiting  Musculoskeletal: Positive for back pain, joint swelling and myalgias  Negative for arthralgias and gait problem  Skin: Negative for rash  Neurological: Negative for dizziness, seizures and weakness  All other systems reviewed and are negative  Patient Active Problem List   Diagnosis    History of total knee arthroplasty    Orthopedic aftercare    Aftercare following right knee joint replacement surgery    Arthritis of foot, right       Past Medical History:   Diagnosis Date    Arthritis     Bronchitis     Disease of thyroid gland     Diverticulitis     GERD (gastroesophageal reflux disease)     IBS (irritable bowel syndrome)     Insomnia     PONV (postoperative nausea and vomiting)        Past Surgical History:   Procedure Laterality Date    CHOLECYSTECTOMY      DILATION AND CURETTAGE OF UTERUS      HYSTERECTOMY      JOINT REPLACEMENT      PARTIAL HYSTERECTOMY      [place] REVISE KNEE JOINT REPLACE,ALL PARTS Right 1/22/2018    Procedure: ARTHROPLASTY KNEE TOTAL REVISION;  Surgeon: Charleen Mendoza MD;  Location:  MAIN OR;  Service: Orthopedics    TONSILLECTOMY      VARICOSE VEIN SURGERY         Family History   Problem Relation Age of Onset    No Known Problems Mother     No Known Problems Sister     Heart disease Family     Hypertension Family     Pancreatic cancer Family        Social History     Occupational History    Not on file       Social History Main Topics    Smoking status: Never Smoker    Smokeless tobacco: Never Used    Alcohol use Yes      Comment: social     Drug use: No    Sexual activity: Not on file       Current Outpatient Prescriptions on File Prior to Visit   Medication Sig    Calcium-Vitamin D-Vitamin K (VIACTIV PO) Take by mouth    DiphenhydrAMINE HCl (BENADRYL PO) Take by mouth as needed    Probiotic Product (PROBIOTIC DAILY PO) Take by mouth    meloxicam (MOBIC) 15 mg tablet Take 1 tablet (15 mg total) by mouth daily for 30 days    [DISCONTINUED] levothyroxine 75 mcg tablet Take 75 mcg by mouth daily     Current Facility-Administered Medications on File Prior to Visit   Medication    cephalexin (KEFLEX) capsule 2,000 mg       Allergies   Allergen Reactions    Codeine GI Intolerance    Morphine Itching       Physical Exam:    /64   Pulse 68   Resp 12   Ht 5' 1" (1 549 m)   Wt 69 6 kg (153 lb 6 4 oz)   BMI 28 98 kg/m²     Constitutional: normal, well developed, well nourished, alert, in no distress and non-toxic and no overt pain behavior  Eyes: anicteric  HEENT: grossly intact  Neck: supple, symmetric, trachea midline and no masses   Pulmonary:even and unlabored  Cardiovascular:No edema or pitting edema present  Skin:Normal without rashes or lesions and well hydrated  Psychiatric:Mood and affect appropriate  Neurologic:Cranial Nerves II-XII grossly intact  Musculoskeletal:normal    Imaging    Show images for MRI lumbar spine wo contrast   Order Report      Order Details   Study Result     MRI LUMBAR SPINE WITHOUT CONTRAST     INDICATION: [id number]: Radiculopathy, lumbar region right sciatica     COMPARISON:  None      TECHNIQUE:  Sagittal T1, sagittal T2, sagittal inversion recovery, axial T1 and axial T2, coronal T2        IMAGE QUALITY:  Diagnostic     FINDINGS:     ALIGNMENT:  Left convex L3-L4 apex scoliosis, loss of disc height with partial fusion C2-C3 and C4-C5  Rightward translation of L2, leftward translation of L4  Degenerative grade 1 L5 anterolisthesis      MARROW SIGNAL:  Chronic reactive marrow changes at the opposing endplates at F8-J5 and X7-G9      DISTAL CORD AND CONUS:  Normal size and signal within the distal cord and conus  The conus ends at the L1 level      PARASPINAL SOFT TISSUES:  Paraspinal soft tissues are unremarkable    Upper pole right renal mass consistent with cysts     SACRUM:  Normal signal within the sacrum  No evidence of insufficiency or stress fracture      LOWER THORACIC DISC SPACES:  Normal disc height and signal   No disc herniation, canal stenosis or foraminal narrowing  Bulging discs T12-L1, chronic reactive endplate changes  Minor facet arthrosis     LUMBAR DISC SPACES:     L1-L2:  Circumferential bulge, small marginal osteophytes, mild right greater than left facet arthrosis     L2-L3:  Vertebral bodies appear fused with collapse of disc height  Marginal osteophytes, bilateral facet arthrosis      L3-L4:  Reduction disc height, circumferential bulge with marginal osteophytes  Moderate facet arthrosis  Disc and osteophytes extend asymmetrically to the right in contact with but not displacing the exiting L3 root      L4-L5:  Fusion of the opposing endplates asymmetric marginal osteophytes to the right  No critical stenosis  Mild facet arthrosis      L5-S1:  Moderate facet arthrosis, grade 1 degenerative anterolisthesis  Marginal osteophytes in contact with the right L5 roots      IMPRESSION:     Multilevel degenerative changes with autofusion L2-3 and L4-5    Despite mild malalignment, marginal osteophytes and bulging discs, no definite root compression         Workstation performed: [id number]

## 2018-07-09 ENCOUNTER — EVALUATION (OUTPATIENT)
Dept: PHYSICAL THERAPY | Facility: MEDICAL CENTER | Age: 63
End: 2018-07-09
Payer: COMMERCIAL

## 2018-07-09 ENCOUNTER — APPOINTMENT (OUTPATIENT)
Dept: LAB | Facility: MEDICAL CENTER | Age: 63
End: 2018-07-09
Payer: COMMERCIAL

## 2018-07-09 ENCOUNTER — TRANSCRIBE ORDERS (OUTPATIENT)
Dept: ADMINISTRATIVE | Facility: HOSPITAL | Age: 63
End: 2018-07-09

## 2018-07-09 DIAGNOSIS — E03.9 MYXEDEMA HEART DISEASE: ICD-10-CM

## 2018-07-09 DIAGNOSIS — I51.9 MYXEDEMA HEART DISEASE: Primary | ICD-10-CM

## 2018-07-09 DIAGNOSIS — E03.9 MYXEDEMA HEART DISEASE: Primary | ICD-10-CM

## 2018-07-09 DIAGNOSIS — I51.9 MYXEDEMA HEART DISEASE: ICD-10-CM

## 2018-07-09 DIAGNOSIS — M54.50 CHRONIC BILATERAL LOW BACK PAIN WITHOUT SCIATICA: Primary | ICD-10-CM

## 2018-07-09 DIAGNOSIS — M47.816 LUMBAR SPONDYLOSIS: ICD-10-CM

## 2018-07-09 DIAGNOSIS — G89.29 CHRONIC BILATERAL LOW BACK PAIN WITHOUT SCIATICA: Primary | ICD-10-CM

## 2018-07-09 LAB — TSH SERPL DL<=0.05 MIU/L-ACNC: 4.42 UIU/ML (ref 0.36–3.74)

## 2018-07-09 PROCEDURE — 36415 COLL VENOUS BLD VENIPUNCTURE: CPT

## 2018-07-09 PROCEDURE — G8979 MOBILITY GOAL STATUS: HCPCS | Performed by: PHYSICAL THERAPIST

## 2018-07-09 PROCEDURE — 84443 ASSAY THYROID STIM HORMONE: CPT

## 2018-07-09 PROCEDURE — G8978 MOBILITY CURRENT STATUS: HCPCS | Performed by: PHYSICAL THERAPIST

## 2018-07-09 PROCEDURE — 97161 PT EVAL LOW COMPLEX 20 MIN: CPT | Performed by: PHYSICAL THERAPIST

## 2018-07-09 NOTE — PROGRESS NOTES
PT Evaluation     Today's date: 2018  Patient name: Nay Manley  : 1955  MRN: [id number]  Referring provider: Agusto Best DO  Dx:   Encounter Diagnosis     ICD-10-CM    1  Chronic bilateral low back pain without sciatica M54 5 Ambulatory referral to Physical Therapy    G89 29    2  Lumbar spondylosis M47 816 Ambulatory referral to Physical Therapy                  Assessment  Impairments: abnormal muscle firing, abnormal muscle tone, abnormal or restricted ROM, impaired physical strength and pain with function    Assessment details: Nay Manley is a 61 y o  female presents with Chronic bilateral low back pain without sciatica  (primary encounter diagnosis)  Lumbar spondylosis  Nay Manley presents with the above listed impairments resulting in daily functional deficits and negative impact to quality of life  Skilled PT services appropriate to facilitate return to prior level of function with transition to home exercise program when appropriate  Understanding of Dx/Px/POC: good   Prognosis: good    Goals  Patient will be independent with HEP   Patient will report no pain with bending to pick something off floor  No pain with rolling in bed  No pain with extended walking of 15 min or more     Plan  Patient would benefit from: skilled PT  Referral necessary: No  Planned therapy interventions: home exercise program, manual therapy, neuromuscular re-education, patient education, functional ROM exercises, strengthening, stretching, joint mobilization, graded activity and graded exercise  Frequency: 2x week  Duration in weeks: 12  Treatment plan discussed with: patient        Subjective Evaluation    History of Present Illness  Mechanism of injury: Nay Manley is 61 y o  female presenting to therapy with low back pain  She reports back pain for many years and has had flare ups numerous times  She has managed her symptoms with home TENS unit    She began care with Dr Zuleyka Patel and had MRI indicating autofusion of L2/3 and L4/L5 with grade 1L5 on S1 anterolisthesis  They decided to hold on injections due to her being able to manage pain with Tylenol  Pain located bilateral along lower back, with occasional pain down right lower extremity to mid shin  AGGS:  Turning in bed  Standing for long periods, sitting for long periods  Driving   EASES: Ice, tylenol  Pain  No pain reported  Current pain ratin  At best pain ratin  At worst pain ratin  Quality: sharp      Diagnostic Tests  No diagnostic tests performed  MRI studies: abnormal (Multilevel degenerative changes with autofusion L2-3 and L4-5  Despite mild malalignment, marginal osteophytes and bulging discs, no definite root compression )  Treatments  No previous or current treatments  Patient Goals  Patient goals for therapy: decreased pain  Patient goal: learn exercises to manage it, play golf without pain, turn in bed without pain  Objective     Postural Observations  Standing posture: fair    Additional Postural Observation Details  Flattened lordosis       Tenderness     Left Hip   Tenderness in the PSIS  Right Hip   Tenderness in the PSIS  Neurological Testing     Sensation     Lumbar   Left   Intact: light touch    Right   Intact: light touch    Reflexes   Left   Patellar (L4): normal (2+)  Achilles (S1): normal (2+)    Right   Patellar (L4): normal (2+)  Achilles (S1): normal (2+)    Active Range of Motion   Cervical/Thoracic Spine     Thoracic   Flexion: WFL and with pain  Extension: 15 degrees with pain    Lumbar   Left lateral flexion: with pain  Right lateral flexion: with pain    Muscle Activation   Patient able to activate left multifidus, right transverse abdominals and right multifidus  Patient unable to activate left transverse abdominals  Tests     Lumbar     Left   Negative crossed SLR and passive SLR  Right   Negative crossed SLR and passive SLR       Left Pelvic Girdle/Sacrum   Positive: sacrum compression and sacral spring  Negative: gapping  Right Pelvic Girdle/Sacrum   Positive: sacrum compression and sacral spring  Negative: gapping  Left Hip   Negative Gaenslen's  Right Hip   Negative Gaenslen's       Additional Tests Details  Hypermobility L3/L4               Precautions: None    Daily Treatment Diary     Manual  7/9                                                                                 Exercise Diary              DKC 20 sec  X5            Supine pelvic floor iso with hip abduction             LTR             Mod plank on counter                                                                                                                                                                                                                                 Modalities

## 2018-07-18 ENCOUNTER — OFFICE VISIT (OUTPATIENT)
Dept: PHYSICAL THERAPY | Facility: MEDICAL CENTER | Age: 63
End: 2018-07-18
Payer: COMMERCIAL

## 2018-07-18 DIAGNOSIS — M47.816 LUMBAR SPONDYLOSIS: ICD-10-CM

## 2018-07-18 DIAGNOSIS — M54.50 CHRONIC BILATERAL LOW BACK PAIN WITHOUT SCIATICA: Primary | ICD-10-CM

## 2018-07-18 DIAGNOSIS — G89.29 CHRONIC BILATERAL LOW BACK PAIN WITHOUT SCIATICA: Primary | ICD-10-CM

## 2018-07-18 PROCEDURE — 97110 THERAPEUTIC EXERCISES: CPT | Performed by: PHYSICAL THERAPIST

## 2018-07-18 PROCEDURE — 97140 MANUAL THERAPY 1/> REGIONS: CPT | Performed by: PHYSICAL THERAPIST

## 2018-07-18 RX ORDER — LEVOTHYROXINE SODIUM 0.1 MG/1
100 TABLET ORAL DAILY
Refills: 2 | COMMUNITY
Start: 2018-07-11 | End: 2019-02-04

## 2018-07-18 NOTE — PROGRESS NOTES
Daily Note     Today's date: 2018  Patient name: Calvin Jean  : 1955  MRN: [id number]  Referring provider: Jolanta Miller DO  Dx:   Encounter Diagnosis     ICD-10-CM    1  Chronic bilateral low back pain without sciatica M54 5     G89 29    2  Lumbar spondylosis M47 816                   Subjective: Gisele Tejeda reports that she is feeling good today and is no longer having the more intense pain she was having previously  Objective: See treatment diary below  Precautions: None    Daily Treatment Diary     Manual             Seated thoracic rotation mob  L rot  AF           Hip flexor stretch   AF                                                      Exercise Diary              DKC 20 sec  X5 20sec  X5           Supine pelvic floor iso with hip abduction  30           LTR  30           Mod plank on counter  10 sec  X10           TB presses  20           SL lumbar rotation stretch  10 sec  X5 each                                                                                                                                                                                                     Modalities                                                       Assessment: Tolerated treatment well  Patient with decreased L rotation today improved post mobilizations  Continued to progress stabilization as well without issue  Plan: Continue per plan of care

## 2018-07-19 ENCOUNTER — HOSPITAL ENCOUNTER (OUTPATIENT)
Dept: RADIOLOGY | Facility: HOSPITAL | Age: 63
Discharge: HOME | End: 2018-07-19
Attending: ORTHOPAEDIC SURGERY
Payer: COMMERCIAL

## 2018-07-19 ENCOUNTER — OFFICE VISIT (OUTPATIENT)
Dept: OBGYN CLINIC | Facility: HOSPITAL | Age: 63
End: 2018-07-19
Payer: COMMERCIAL

## 2018-07-19 VITALS
DIASTOLIC BLOOD PRESSURE: 84 MMHG | BODY MASS INDEX: 28.89 KG/M2 | WEIGHT: 153 LBS | SYSTOLIC BLOOD PRESSURE: 119 MMHG | HEART RATE: 85 BPM | HEIGHT: 61 IN

## 2018-07-19 DIAGNOSIS — Z96.651 AFTERCARE FOLLOWING RIGHT KNEE JOINT REPLACEMENT SURGERY: ICD-10-CM

## 2018-07-19 DIAGNOSIS — Z47.1 AFTERCARE FOLLOWING RIGHT KNEE JOINT REPLACEMENT SURGERY: Primary | ICD-10-CM

## 2018-07-19 DIAGNOSIS — M25.561 ACUTE PAIN OF RIGHT KNEE: ICD-10-CM

## 2018-07-19 DIAGNOSIS — Z96.651 AFTERCARE FOLLOWING RIGHT KNEE JOINT REPLACEMENT SURGERY: Primary | ICD-10-CM

## 2018-07-19 DIAGNOSIS — Z47.1 AFTERCARE FOLLOWING RIGHT KNEE JOINT REPLACEMENT SURGERY: ICD-10-CM

## 2018-07-19 PROCEDURE — 99213 OFFICE O/P EST LOW 20 MIN: CPT | Performed by: ORTHOPAEDIC SURGERY

## 2018-07-19 PROCEDURE — 73560 X-RAY EXAM OF KNEE 1 OR 2: CPT

## 2018-07-19 NOTE — PROGRESS NOTES
61 y o female presents 6 months following revision right total knee replacement, describing very little pain in the right knee  She has occasional difficulties descending stairs    Compared to her preoperative condition, she notices ongoing continued significant relief of her preoperative knee pain    Review of Systems  Review of systems negative unless otherwise specified in HPI    Past Medical History  Past Medical History:   Diagnosis Date    Arthritis     Bronchitis     Disease of thyroid gland     Diverticulitis     GERD (gastroesophageal reflux disease)     IBS (irritable bowel syndrome)     Insomnia     PONV (postoperative nausea and vomiting)        Past Surgical History  Past Surgical History:   Procedure Laterality Date    CHOLECYSTECTOMY      DILATION AND CURETTAGE OF UTERUS      HYSTERECTOMY      JOINT REPLACEMENT      PARTIAL HYSTERECTOMY      VA REVISE KNEE JOINT REPLACE,ALL PARTS Right 1/22/2018    Procedure: ARTHROPLASTY KNEE TOTAL REVISION;  Surgeon: Danny Rothman MD;  [facility];  Service: Orthopedics    TONSILLECTOMY      [address]         Current Medications  Current Outpatient Prescriptions on File Prior to Visit   Medication Sig Dispense Refill    Calcium-Vitamin D-Vitamin K (VIACTIV PO) Take by mouth      DiphenhydrAMINE HCl (BENADRYL PO) Take by mouth as needed      levothyroxine 88 mcg tablet Take 88 mcg by mouth daily  1    meloxicam (MOBIC) 15 mg tablet Take 1 tablet (15 mg total) by mouth daily for 30 days 30 tablet 0    Probiotic Product (PROBIOTIC DAILY PO) Take by mouth       Current Facility-Administered Medications on File Prior to Visit   Medication Dose Route Frequency Provider Last Rate Last Dose    cephalexin (KEFLEX) capsule 2,000 mg  2,000 mg Oral 60 Min Pre-Op Danny Rothman MD           Recent Labs [facility])    0  Lab Value Date/Time   HCT 40 1 05/29/2018 0729   HCT 41 8 10/20/2015 0958   HGB 13 1 05/29/2018 0729   HGB 14 1 10/20/2015 0958   WBC 5 87 05/29/2018 0729   WBC 5 89 10/20/2015 0958   INR 0 93 01/15/2018 1049   ESR 14 11/20/2017 1114   CRP <3 0 11/20/2017 1114   GLUCOSE 114 01/24/2018 0551   GLUCOSE 89 10/20/2015 0958   HGBA1C 6 1 01/15/2018 1049         Physical exam  · General: Awake, Alert, Oriented  · Eyes: Pupils equal, round and reactive to light  · Heart: regular rate and rhythm  · Lungs: No audible wheezing  · Abdomen: soft  Gait pattern is without assist device, without antalgia  Right hip was well  Right thighs very little atrophy  Right knee has a healed anterior incision  Extension is good flexion is good  There is no mid flexion valgus instability  There is very little patellofemoral chatter  Calf compartments are soft and supple  Toes are warm, sensate, mobile  Imaging  I have personally reviewed x-rays of the right knee that were taken today, and my interpretation as follows: Two views the right knee show revision total knee replacement in satisfactory position    Procedure  None indicated or performed today    Assessment/Plan:   61 y  o female who is 6 months following right total knee revision, who looks well from a clinical and radiographic perspective  She will continue to work on return of quadriceps strength    I would welcome the opportunity see back in the office in 6 months time for repeat physical exam   She understands for the next 1 5 years, antibiotic prophylaxis indicated prior to invasive procedures

## 2018-07-25 ENCOUNTER — OFFICE VISIT (OUTPATIENT)
Dept: PHYSICAL THERAPY | Facility: MEDICAL CENTER | Age: 63
End: 2018-07-25
Payer: COMMERCIAL

## 2018-07-25 DIAGNOSIS — M54.50 CHRONIC BILATERAL LOW BACK PAIN WITHOUT SCIATICA: Primary | ICD-10-CM

## 2018-07-25 DIAGNOSIS — G89.29 CHRONIC BILATERAL LOW BACK PAIN WITHOUT SCIATICA: Primary | ICD-10-CM

## 2018-07-25 DIAGNOSIS — M47.816 LUMBAR SPONDYLOSIS: ICD-10-CM

## 2018-07-25 PROCEDURE — 97140 MANUAL THERAPY 1/> REGIONS: CPT

## 2018-07-25 PROCEDURE — 97110 THERAPEUTIC EXERCISES: CPT

## 2018-07-25 NOTE — PROGRESS NOTES
Daily Note     Today's date: 2018  Patient name: Deedee Benito  : 1955  MRN: [id number]  Referring provider: Barbie Velasquez DO  Dx:   Encounter Diagnosis     ICD-10-CM    1  Chronic bilateral low back pain without sciatica M54 5     G89 29    2  Lumbar spondylosis M47 816                   Subjective: Hussein Mendoza reports that she is feeling good today and she feels the exercises have been helpful  Objective: See treatment diary below  Precautions: None    Daily Treatment Diary     Manual            Seated thoracic rotation mob  L rot  AF AF          Hip flexor stretch   AF AF                                                     Exercise Diary              DKC 20 sec  X5 20sec  X5 20"x5          Supine pelvic floor iso with hip abduction  30 30 BTB          LTR  30 30          Mod plank on counter  10 sec  X10 10x 10"          TB presses  20 30 purple          SL lumbar rotation stretch  10 sec  X5 each 5x10 sec          bike   10'                                                                                                                                                                                       Modalities                                                       Assessment: Tolerated treatment well  Patient demonstrates good understanding of the exercises performed today  Pt continues to report improvement in her low back symptoms with improved mobility noted as well  Pt would benefit from continued PT  Plan: Continue per plan of care

## 2018-08-03 ENCOUNTER — OFFICE VISIT (OUTPATIENT)
Dept: PHYSICAL THERAPY | Facility: MEDICAL CENTER | Age: 63
End: 2018-08-03
Payer: COMMERCIAL

## 2018-08-03 DIAGNOSIS — M54.50 CHRONIC BILATERAL LOW BACK PAIN WITHOUT SCIATICA: Primary | ICD-10-CM

## 2018-08-03 DIAGNOSIS — G89.29 CHRONIC BILATERAL LOW BACK PAIN WITHOUT SCIATICA: Primary | ICD-10-CM

## 2018-08-03 DIAGNOSIS — M47.816 LUMBAR SPONDYLOSIS: ICD-10-CM

## 2018-08-03 PROCEDURE — 97140 MANUAL THERAPY 1/> REGIONS: CPT

## 2018-08-03 PROCEDURE — 97110 THERAPEUTIC EXERCISES: CPT

## 2018-08-03 NOTE — PROGRESS NOTES
Daily Note     Today's date: 8/3/2018  Patient name: Orquidea Yeboah  : 1955  MRN: [id number]  Referring provider: Gabino Ovalle DO  Dx:   Encounter Diagnosis     ICD-10-CM    1  Chronic bilateral low back pain without sciatica M54 5     G89 29    2  Lumbar spondylosis M47 816                   Subjective: Ronald Frausto reports that her back is feeling better overall  She notes having some isolated L sided back pain today  Objective: See treatment diary below  Precautions: None    Daily Treatment Diary     Manual  7/9 7/18 7/25 8/3         Seated thoracic rotation mob  L rot  AF AF AF         Hip flexor stretch   AF AF CB                                                    Exercise Diary              DKC 20 sec  X5 20sec  X5 20"x5 20"x5         Supine pelvic floor iso with hip abduction  30 30 BTB 30x BTB         LTR  30 30 30         Mod plank on counter  10 sec  X10 10x 10" 10x10"         TB presses  20 30 purple 30x purple         SL lumbar rotation stretch  10 sec  X5 each 5x10 sec 5x10"         bike   10' 10'                                                                                                                                                                                      Modalities                                                       Assessment: Tolerated treatment well  Patient demonstrates good understanding of the exercises performed and reports a good compliance with her HEP  Pt continues to report improvement in her low back symptoms  Pt would benefit from continued PT  Plan: Continue per plan of care

## 2018-08-23 ENCOUNTER — APPOINTMENT (OUTPATIENT)
Dept: PHYSICAL THERAPY | Facility: MEDICAL CENTER | Age: 63
End: 2018-08-23
Payer: COMMERCIAL

## 2018-08-24 ENCOUNTER — APPOINTMENT (OUTPATIENT)
Dept: LAB | Facility: MEDICAL CENTER | Age: 63
End: 2018-08-24
Payer: COMMERCIAL

## 2018-08-24 ENCOUNTER — TRANSCRIBE ORDERS (OUTPATIENT)
Dept: ADMINISTRATIVE | Facility: HOSPITAL | Age: 63
End: 2018-08-24

## 2018-08-24 DIAGNOSIS — E03.9 MYXEDEMA HEART DISEASE: Primary | ICD-10-CM

## 2018-08-24 DIAGNOSIS — I51.9 MYXEDEMA HEART DISEASE: Primary | ICD-10-CM

## 2018-08-24 DIAGNOSIS — I51.9 MYXEDEMA HEART DISEASE: ICD-10-CM

## 2018-08-24 DIAGNOSIS — E03.9 MYXEDEMA HEART DISEASE: ICD-10-CM

## 2018-08-24 LAB — TSH SERPL DL<=0.05 MIU/L-ACNC: 0.96 UIU/ML (ref 0.36–3.74)

## 2018-08-24 PROCEDURE — 84443 ASSAY THYROID STIM HORMONE: CPT

## 2018-08-24 PROCEDURE — 36415 COLL VENOUS BLD VENIPUNCTURE: CPT

## 2018-10-29 ENCOUNTER — TRANSCRIBE ORDERS (OUTPATIENT)
Dept: ADMINISTRATIVE | Facility: HOSPITAL | Age: 63
End: 2018-10-29

## 2018-10-29 DIAGNOSIS — Z12.39 SCREENING BREAST EXAMINATION: Primary | ICD-10-CM

## 2018-11-30 ENCOUNTER — TELEPHONE (OUTPATIENT)
Dept: OBGYN CLINIC | Facility: MEDICAL CENTER | Age: 63
End: 2018-11-30

## 2018-11-30 DIAGNOSIS — M25.571 ARTHRALGIA OF FOOT, RIGHT: Primary | ICD-10-CM

## 2018-11-30 DIAGNOSIS — M25.572 ARTHRALGIA OF LEFT FOOT: ICD-10-CM

## 2018-11-30 NOTE — TELEPHONE ENCOUNTER
Can you please contact the pt to schedule    She states she only needs for her left foot  Jennifer Greene put in an order for both  Thanks

## 2018-11-30 NOTE — TELEPHONE ENCOUNTER
Spoke with patient  Patient will call central scheduling on Monday to schedule , gave her my direct line if she needs any help with scheduling

## 2018-12-05 ENCOUNTER — TRANSCRIBE ORDERS (OUTPATIENT)
Dept: ADMINISTRATIVE | Facility: HOSPITAL | Age: 63
End: 2018-12-05

## 2018-12-05 ENCOUNTER — APPOINTMENT (OUTPATIENT)
Dept: LAB | Facility: MEDICAL CENTER | Age: 63
End: 2018-12-05
Payer: COMMERCIAL

## 2018-12-05 DIAGNOSIS — E03.9 MYXEDEMA HEART DISEASE: ICD-10-CM

## 2018-12-05 DIAGNOSIS — E03.9 MYXEDEMA HEART DISEASE: Primary | ICD-10-CM

## 2018-12-05 DIAGNOSIS — I51.9 MYXEDEMA HEART DISEASE: Primary | ICD-10-CM

## 2018-12-05 DIAGNOSIS — Z13.9 SCREENING FOR CONDITION: ICD-10-CM

## 2018-12-05 DIAGNOSIS — I51.9 MYXEDEMA HEART DISEASE: ICD-10-CM

## 2018-12-05 DIAGNOSIS — M19.90 SENILE ARTHRITIS: ICD-10-CM

## 2018-12-05 LAB
25(OH)D3 SERPL-MCNC: 19.7 NG/ML (ref 30–100)
ALBUMIN SERPL BCP-MCNC: 3.6 G/DL (ref 3.5–5)
ALP SERPL-CCNC: 120 U/L (ref 46–116)
ALT SERPL W P-5'-P-CCNC: 27 U/L (ref 12–78)
ANION GAP SERPL CALCULATED.3IONS-SCNC: 6 MMOL/L (ref 4–13)
AST SERPL W P-5'-P-CCNC: 18 U/L (ref 5–45)
BASOPHILS # BLD AUTO: 0.02 THOUSANDS/ΜL (ref 0–0.1)
BASOPHILS NFR BLD AUTO: 0 % (ref 0–1)
BILIRUB SERPL-MCNC: 0.89 MG/DL (ref 0.2–1)
BUN SERPL-MCNC: 23 MG/DL (ref 5–25)
CALCIUM SERPL-MCNC: 9 MG/DL (ref 8.3–10.1)
CHLORIDE SERPL-SCNC: 105 MMOL/L (ref 100–108)
CHOLEST SERPL-MCNC: 182 MG/DL (ref 50–200)
CO2 SERPL-SCNC: 29 MMOL/L (ref 21–32)
CREAT SERPL-MCNC: 0.72 MG/DL (ref 0.6–1.3)
EOSINOPHIL # BLD AUTO: 0.15 THOUSAND/ΜL (ref 0–0.61)
EOSINOPHIL NFR BLD AUTO: 3 % (ref 0–6)
ERYTHROCYTE [DISTWIDTH] IN BLOOD BY AUTOMATED COUNT: 12.8 % (ref 11.6–15.1)
GFR SERPL CREATININE-BSD FRML MDRD: 89 ML/MIN/1.73SQ M
GLUCOSE P FAST SERPL-MCNC: 95 MG/DL (ref 65–99)
HCT VFR BLD AUTO: 42.4 % (ref 34.8–46.1)
HDLC SERPL-MCNC: 49 MG/DL (ref 40–60)
HGB BLD-MCNC: 13.7 G/DL (ref 11.5–15.4)
IMM GRANULOCYTES # BLD AUTO: 0.03 THOUSAND/UL (ref 0–0.2)
IMM GRANULOCYTES NFR BLD AUTO: 1 % (ref 0–2)
LDLC SERPL CALC-MCNC: 93 MG/DL (ref 0–100)
LDLC SERPL DIRECT ASSAY-MCNC: 114 MG/DL (ref 0–100)
LYMPHOCYTES # BLD AUTO: 1.97 THOUSANDS/ΜL (ref 0.6–4.47)
LYMPHOCYTES NFR BLD AUTO: 36 % (ref 14–44)
MCH RBC QN AUTO: 29.4 PG (ref 26.8–34.3)
MCHC RBC AUTO-ENTMCNC: 32.3 G/DL (ref 31.4–37.4)
MCV RBC AUTO: 91 FL (ref 82–98)
MONOCYTES # BLD AUTO: 0.46 THOUSAND/ΜL (ref 0.17–1.22)
MONOCYTES NFR BLD AUTO: 9 % (ref 4–12)
NEUTROPHILS # BLD AUTO: 2.79 THOUSANDS/ΜL (ref 1.85–7.62)
NEUTS SEG NFR BLD AUTO: 51 % (ref 43–75)
NONHDLC SERPL-MCNC: 133 MG/DL
NRBC BLD AUTO-RTO: 0 /100 WBCS
PLATELET # BLD AUTO: 276 THOUSANDS/UL (ref 149–390)
PMV BLD AUTO: 10.7 FL (ref 8.9–12.7)
POTASSIUM SERPL-SCNC: 4.1 MMOL/L (ref 3.5–5.3)
PROT SERPL-MCNC: 7.2 G/DL (ref 6.4–8.2)
RBC # BLD AUTO: 4.66 MILLION/UL (ref 3.81–5.12)
SODIUM SERPL-SCNC: 140 MMOL/L (ref 136–145)
T4 FREE SERPL-MCNC: 0.93 NG/DL (ref 0.76–1.46)
TRIGL SERPL-MCNC: 201 MG/DL
TSH SERPL DL<=0.05 MIU/L-ACNC: 4.77 UIU/ML (ref 0.36–3.74)
WBC # BLD AUTO: 5.42 THOUSAND/UL (ref 4.31–10.16)

## 2018-12-05 PROCEDURE — 80053 COMPREHEN METABOLIC PANEL: CPT

## 2018-12-05 PROCEDURE — 83721 ASSAY OF BLOOD LIPOPROTEIN: CPT

## 2018-12-05 PROCEDURE — 85025 COMPLETE CBC W/AUTO DIFF WBC: CPT

## 2018-12-05 PROCEDURE — 80061 LIPID PANEL: CPT

## 2018-12-05 PROCEDURE — 36415 COLL VENOUS BLD VENIPUNCTURE: CPT

## 2018-12-05 PROCEDURE — 84443 ASSAY THYROID STIM HORMONE: CPT

## 2018-12-05 PROCEDURE — 82306 VITAMIN D 25 HYDROXY: CPT

## 2018-12-05 PROCEDURE — 84439 ASSAY OF FREE THYROXINE: CPT

## 2018-12-11 ENCOUNTER — HOSPITAL ENCOUNTER (OUTPATIENT)
Dept: MAMMOGRAPHY | Facility: MEDICAL CENTER | Age: 63
Discharge: HOME | End: 2018-12-11
Payer: COMMERCIAL

## 2018-12-11 VITALS — BODY MASS INDEX: 29.83 KG/M2 | WEIGHT: 158 LBS | HEIGHT: 61 IN

## 2018-12-11 DIAGNOSIS — Z12.39 SCREENING BREAST EXAMINATION: ICD-10-CM

## 2018-12-11 PROCEDURE — 77063 BREAST TOMOSYNTHESIS BI: CPT

## 2018-12-11 PROCEDURE — 77067 SCR MAMMO BI INCL CAD: CPT

## 2018-12-13 ENCOUNTER — HOSPITAL ENCOUNTER (OUTPATIENT)
Dept: RADIOLOGY | Facility: HOSPITAL | Age: 63
Discharge: HOME | End: 2018-12-13
Payer: COMMERCIAL

## 2018-12-13 DIAGNOSIS — M25.572 ARTHRALGIA OF LEFT FOOT: ICD-10-CM

## 2018-12-13 PROCEDURE — 77002 NEEDLE LOCALIZATION BY XRAY: CPT

## 2018-12-13 PROCEDURE — 20605 DRAIN/INJ JOINT/BURSA W/O US: CPT

## 2018-12-13 RX ORDER — METHYLPREDNISOLONE ACETATE 80 MG/ML
80 INJECTION, SUSPENSION INTRA-ARTICULAR; INTRALESIONAL; INTRAMUSCULAR; SOFT TISSUE
Status: COMPLETED | OUTPATIENT
Start: 2018-12-13 | End: 2018-12-13

## 2018-12-13 RX ORDER — LIDOCAINE HYDROCHLORIDE 10 MG/ML
10 INJECTION, SOLUTION INFILTRATION; PERINEURAL
Status: COMPLETED | OUTPATIENT
Start: 2018-12-13 | End: 2018-12-13

## 2018-12-13 RX ORDER — BUPIVACAINE HYDROCHLORIDE 2.5 MG/ML
30 INJECTION, SOLUTION EPIDURAL; INFILTRATION; INTRACAUDAL
Status: COMPLETED | OUTPATIENT
Start: 2018-12-13 | End: 2018-12-13

## 2018-12-13 RX ADMIN — LIDOCAINE HYDROCHLORIDE 6 ML: 10 INJECTION, SOLUTION INFILTRATION; PERINEURAL at 14:45

## 2018-12-13 RX ADMIN — METHYLPREDNISOLONE ACETATE 80 MG: 80 INJECTION, SUSPENSION INTRA-ARTICULAR; INTRALESIONAL; INTRAMUSCULAR; SOFT TISSUE at 14:45

## 2018-12-13 RX ADMIN — BUPIVACAINE HYDROCHLORIDE 3 ML: 2.5 INJECTION, SOLUTION EPIDURAL; INFILTRATION; INTRACAUDAL at 14:45

## 2018-12-13 RX ADMIN — IOHEXOL 3 ML: 300 INJECTION, SOLUTION INTRAVENOUS at 14:45

## 2019-01-21 RX ORDER — LEVOTHYROXINE SODIUM 112 UG/1
112 TABLET ORAL DAILY
Refills: 1 | COMMUNITY
Start: 2018-12-06 | End: 2021-10-19

## 2019-01-21 RX ORDER — MELOXICAM 15 MG/1
15 TABLET ORAL DAILY PRN
Refills: 2 | COMMUNITY
Start: 2018-11-27 | End: 2020-02-10

## 2019-01-24 ENCOUNTER — OFFICE VISIT (OUTPATIENT)
Dept: OBGYN CLINIC | Facility: HOSPITAL | Age: 64
End: 2019-01-24
Payer: COMMERCIAL

## 2019-01-24 ENCOUNTER — HOSPITAL ENCOUNTER (OUTPATIENT)
Dept: RADIOLOGY | Facility: HOSPITAL | Age: 64
Discharge: HOME | End: 2019-01-24
Attending: ORTHOPAEDIC SURGERY
Payer: COMMERCIAL

## 2019-01-24 VITALS
SYSTOLIC BLOOD PRESSURE: 128 MMHG | HEIGHT: 61 IN | DIASTOLIC BLOOD PRESSURE: 81 MMHG | HEART RATE: 86 BPM | BODY MASS INDEX: 29.83 KG/M2 | WEIGHT: 158 LBS

## 2019-01-24 DIAGNOSIS — Z47.1 AFTERCARE FOLLOWING RIGHT KNEE JOINT REPLACEMENT SURGERY: Primary | ICD-10-CM

## 2019-01-24 DIAGNOSIS — M70.50 PES ANSERINE BURSITIS: ICD-10-CM

## 2019-01-24 DIAGNOSIS — Z96.651 AFTERCARE FOLLOWING RIGHT KNEE JOINT REPLACEMENT SURGERY: Primary | ICD-10-CM

## 2019-01-24 DIAGNOSIS — Z96.651 AFTERCARE FOLLOWING RIGHT KNEE JOINT REPLACEMENT SURGERY: ICD-10-CM

## 2019-01-24 DIAGNOSIS — G89.29 CHRONIC PAIN OF RIGHT KNEE: ICD-10-CM

## 2019-01-24 DIAGNOSIS — M25.561 CHRONIC PAIN OF RIGHT KNEE: ICD-10-CM

## 2019-01-24 DIAGNOSIS — Z47.1 AFTERCARE FOLLOWING RIGHT KNEE JOINT REPLACEMENT SURGERY: ICD-10-CM

## 2019-01-24 PROCEDURE — 99213 OFFICE O/P EST LOW 20 MIN: CPT | Performed by: ORTHOPAEDIC SURGERY

## 2019-01-24 PROCEDURE — 73560 X-RAY EXAM OF KNEE 1 OR 2: CPT

## 2019-01-24 PROCEDURE — 20610 DRAIN/INJ JOINT/BURSA W/O US: CPT | Performed by: ORTHOPAEDIC SURGERY

## 2019-01-24 RX ORDER — BUPIVACAINE HYDROCHLORIDE 2.5 MG/ML
2 INJECTION, SOLUTION INFILTRATION; PERINEURAL
Status: COMPLETED | OUTPATIENT
Start: 2019-01-24 | End: 2019-01-24

## 2019-01-24 RX ORDER — BETAMETHASONE SODIUM PHOSPHATE AND BETAMETHASONE ACETATE 3; 3 MG/ML; MG/ML
12 INJECTION, SUSPENSION INTRA-ARTICULAR; INTRALESIONAL; INTRAMUSCULAR; SOFT TISSUE
Status: COMPLETED | OUTPATIENT
Start: 2019-01-24 | End: 2019-01-24

## 2019-01-24 RX ORDER — LIDOCAINE HYDROCHLORIDE 20 MG/ML
2 INJECTION, SOLUTION EPIDURAL; INFILTRATION; INTRACAUDAL; PERINEURAL
Status: COMPLETED | OUTPATIENT
Start: 2019-01-24 | End: 2019-01-24

## 2019-01-24 RX ADMIN — BUPIVACAINE HYDROCHLORIDE 2 ML: 2.5 INJECTION, SOLUTION INFILTRATION; PERINEURAL at 13:11

## 2019-01-24 RX ADMIN — LIDOCAINE HYDROCHLORIDE 2 ML: 20 INJECTION, SOLUTION EPIDURAL; INFILTRATION; INTRACAUDAL; PERINEURAL at 13:11

## 2019-01-24 RX ADMIN — BETAMETHASONE SODIUM PHOSPHATE AND BETAMETHASONE ACETATE 12 MG: 3; 3 INJECTION, SUSPENSION INTRA-ARTICULAR; INTRALESIONAL; INTRAMUSCULAR; SOFT TISSUE at 13:11

## 2019-01-24 NOTE — PROGRESS NOTES
Assessment:  1  Aftercare following right knee joint replacement surgery  XR knee 1 or 2 vw right   2  Chronic pain of right knee         Plan:  The patient is one year s/p right TKA, 1/22/18  Today she complains of anterior and medial knee pain  She is tender over the pes anserine  She has full ROM  She is provided with a right pes anserine injection  All questions were answered  The patient should follow up in 3 months  To do next visit:  No Follow-up on file  The above stated was discussed in layman's terms and the patient expressed understanding  All questions were answered to the patient's satisfaction  Scribe Attestation    I,:   Val Ji am acting as a scribe while in the presence of the attending physician :        I,:   Morgan Jensen MD personally performed the services described in this documentation    as scribed in my presence :              Subjective:   Angélica Banks is a 61 y o  female who presents one year status post Right TKA, 1/22/18  Today she complains of anterior and medial knee pain  She does use mobic sparingly  She does her HEP 4x/week          Review of systems negative unless otherwise specified in HPI    Past Medical History:   Diagnosis Date    Arthritis     Bronchitis     Disease of thyroid gland     Diverticulitis     GERD (gastroesophageal reflux disease)     IBS (irritable bowel syndrome)     Insomnia     PONV (postoperative nausea and vomiting)        Past Surgical History:   Procedure Laterality Date    CHOLECYSTECTOMY      DILATION AND CURETTAGE OF UTERUS      FL GUIDED NEEDLE PLAC BX/ASP/INJ  12/13/2018    HYSTERECTOMY  2006    JOINT REPLACEMENT      PARTIAL HYSTERECTOMY      [place] REVISE KNEE JOINT REPLACE,ALL PARTS Right 1/22/2018    Procedure: ARTHROPLASTY KNEE TOTAL REVISION;  Surgeon: Morgan Jensen MD;  [facility];  Service: Orthopedics    TONSILLECTOMY      VARICOSE VEIN SURGERY         Family History   Problem Relation Age of Onset    Pancreatic cancer Mother 72    No Known Problems Sister     Heart disease Family     Hypertension Family     Breast cancer Family 80    Breast cancer Paternal Grandmother        Social History     Occupational History    Not on file  Social History Main Topics    Smoking status: Never Smoker    Smokeless tobacco: Never Used    Alcohol use Yes      Comment: social     Drug use: No    Sexual activity: Not on file         Current Outpatient Prescriptions:     Calcium-Vitamin D-Vitamin K (VIACTIV PO), Take by mouth, Disp: , Rfl:     DiphenhydrAMINE HCl (BENADRYL PO), Take by mouth as needed, Disp: , Rfl:     levothyroxine 100 mcg tablet, Take 100 mcg by mouth daily, Disp: , Rfl: 2    levothyroxine 112 mcg tablet, Take 112 mcg by mouth daily, Disp: , Rfl: 1    levothyroxine 88 mcg tablet, Take 88 mcg by mouth daily, Disp: , Rfl: 1    meloxicam (MOBIC) 15 mg tablet, Take 1 tablet (15 mg total) by mouth daily for 30 days, Disp: 30 tablet, Rfl: 0    meloxicam (MOBIC) 15 mg tablet, Take 15 mg by mouth daily as needed, Disp: , Rfl: 2    Probiotic Product (PROBIOTIC DAILY PO), Take by mouth, Disp: , Rfl:     Current Facility-Administered Medications:     cephalexin (KEFLEX) capsule 2,000 mg, 2,000 mg, Oral, 60 Min Pre-Op, Edgardo Bentley MD    Allergies   Allergen Reactions    Codeine GI Intolerance    Morphine Itching            Vitals:    01/24/19 1254   BP: 128/81   Pulse: 86       Objective:  Physical exam  · General: Awake, Alert, Oriented  · Eyes: Pupils equal, round and reactive to light  · Heart: regular rate and rhythm  · Lungs: No audible wheezing  · Abdomen: soft                    Ortho Exam  Right knee:  Full ROM  Stable to Varus and valgus stress  Tender medial joint line and pes anserine    Diagnostics, reviewed and taken today if performed as documented:     The attending physician has personally reviewed the pertinent films in PACS and interpretation is as follows:  Prosthesis well aligned, no acute changes    Procedures, if performed today:    Large joint arthrocentesis  Date/Time: 1/24/2019 1:11 PM  Consent given by: patient  Site marked: site marked  Supporting Documentation  Indications: pain   Procedure Details  Location: knee - R knee (pes anserine)  Preparation: Patient was prepped and draped in the usual sterile fashion  Needle size: 22 G  Ultrasound guidance: no  Approach: anterolateral  Medications administered: 12 mg betamethasone acetate-betamethasone sodium phosphate 6 (3-3) mg/mL; 2 mL bupivacaine 0 25 %; 2 mL lidocaine (PF) 2 % (2ml 2% lidocaine)    Patient tolerance: patient tolerated the procedure well with no immediate complications  Dressing:  Sterile dressing applied             Portions of the record may have been created with voice recognition software   Occasional wrong word or "sound a like" substitutions may have occurred due to the inherent limitations of voice recognition software   Read the chart carefully and recognize, using context, where substitutions have occurred

## 2019-02-04 ENCOUNTER — HOSPITAL ENCOUNTER (OUTPATIENT)
Dept: RADIOLOGY | Facility: HOSPITAL | Age: 64
Discharge: HOME | End: 2019-02-04
Payer: COMMERCIAL

## 2019-02-04 ENCOUNTER — OFFICE VISIT (OUTPATIENT)
Dept: OBGYN CLINIC | Facility: HOSPITAL | Age: 64
End: 2019-02-04
Payer: COMMERCIAL

## 2019-02-04 VITALS
HEART RATE: 79 BPM | SYSTOLIC BLOOD PRESSURE: 143 MMHG | BODY MASS INDEX: 29.83 KG/M2 | HEIGHT: 61 IN | DIASTOLIC BLOOD PRESSURE: 94 MMHG | WEIGHT: 158 LBS

## 2019-02-04 DIAGNOSIS — M17.12 PRIMARY OSTEOARTHRITIS OF LEFT KNEE: ICD-10-CM

## 2019-02-04 DIAGNOSIS — M25.562 LEFT KNEE PAIN, UNSPECIFIED CHRONICITY: Primary | ICD-10-CM

## 2019-02-04 DIAGNOSIS — M25.562 LEFT KNEE PAIN, UNSPECIFIED CHRONICITY: ICD-10-CM

## 2019-02-04 PROCEDURE — 73562 X-RAY EXAM OF KNEE 3: CPT

## 2019-02-04 PROCEDURE — 20610 DRAIN/INJ JOINT/BURSA W/O US: CPT | Performed by: PHYSICIAN ASSISTANT

## 2019-02-04 PROCEDURE — 99214 OFFICE O/P EST MOD 30 MIN: CPT | Performed by: PHYSICIAN ASSISTANT

## 2019-02-04 RX ORDER — LIDOCAINE HYDROCHLORIDE 10 MG/ML
2 INJECTION, SOLUTION INFILTRATION; PERINEURAL
Status: COMPLETED | OUTPATIENT
Start: 2019-02-04 | End: 2019-02-04

## 2019-02-04 RX ORDER — BUPIVACAINE HYDROCHLORIDE 5 MG/ML
2 INJECTION, SOLUTION EPIDURAL; INTRACAUDAL
Status: COMPLETED | OUTPATIENT
Start: 2019-02-04 | End: 2019-02-04

## 2019-02-04 RX ORDER — TRIAMCINOLONE ACETONIDE 40 MG/ML
40 INJECTION, SUSPENSION INTRA-ARTICULAR; INTRAMUSCULAR
Status: COMPLETED | OUTPATIENT
Start: 2019-02-04 | End: 2019-02-04

## 2019-02-04 RX ADMIN — TRIAMCINOLONE ACETONIDE 40 MG: 40 INJECTION, SUSPENSION INTRA-ARTICULAR; INTRAMUSCULAR at 10:43

## 2019-02-04 RX ADMIN — BUPIVACAINE HYDROCHLORIDE 2 ML: 5 INJECTION, SOLUTION EPIDURAL; INTRACAUDAL at 10:43

## 2019-02-04 RX ADMIN — LIDOCAINE HYDROCHLORIDE 2 ML: 10 INJECTION, SOLUTION INFILTRATION; PERINEURAL at 10:43

## 2019-02-04 NOTE — PATIENT INSTRUCTIONS
Ice Pack Application   WHAT YOU NEED TO KNOW:   Ice can be used to decrease swelling and pain after an injury or surgery  Common injuries that may benefit from ice therapy are sprains, strains, and bruises  The use of ice is most effective in the first 1 to 3 days after an injury  DISCHARGE INSTRUCTIONS:   How to apply ice:   · Fill a bag with crushed ice about half full  Remove the air from the bag before you close it  You can also use a bag of frozen vegetables  · Wrap the ice pack in a cloth to protect your skin from frostbite or other injury  · Put the ice over the injured area for 20 to 30 minutes or as long as directed  · Check your skin after about 30 seconds for color changes or blistering  Remove the ice if you notice skin changes or you feel burning or numbness in the area  · Throw the ice pack away after use  · Apply ice to your injured area 4 times each day or as directed  Ask your healthcare provider how many days you should apply ice  Contact your healthcare provider if:   · You see blisters, whitening of your skin, or a bluish color to your skin after using ice  · You feel burning or numbness when using ice  · You have questions about the use of ice packs  © 2017 [facility] Information is for End User's use only and may not be sold, redistributed or otherwise used for commercial purposes  All illustrations and images included in CareNotes® are the copyrighted property of [employer]  or [employer]  The above information is an  only  It is not intended as medical advice for individual conditions or treatments  Talk to your doctor, nurse or pharmacist before following any medical regimen to see if it is safe and effective for you  Safe Use of NSAIDs   WHAT YOU NEED TO KNOW:   NSAIDs are medicines that are used to decrease pain, swelling, and fever  NSAIDs are available with or without a doctor's order   NSAIDs that you can buy without a doctor's order include aspirin, ibuprofen, and naproxen  DISCHARGE INSTRUCTIONS:   Return to the emergency department if:   · You have swelling around your mouth or trouble breathing  · You are breathing fast or you have a fast heartbeat  · You have nausea, vomiting, or abdominal pain  · You have blood in your vomit or bowel movements  · You have a seizure  Contact your healthcare provider if:   · You have a headache or become confused  · You develop hearing loss or ringing in your ears  · You develop itching, a rash, or hives  · You have swelling around your lower legs, feet, ankles, and hands  · You do not know how much NSAIDs to give to your child  · You have questions or concerns about your condition or care  How to give NSAIDs to your child safely:   · Read the directions on the label  Find out if the medicine is right for your child's age and how much to give to your child  The dose for your child's weight or age should be listed  Do not  give your child more than the recommended amount  · Use the measuring tool that came with the medicine  Do not  use another measuring tool, such as a kitchen spoon  Other measuring tools do not provide the right amount of medicine  How to take NSAIDs safely:   · Read the directions on the label to learn how much medicine you should take and often to take it  Do not take more than the recommended amount  · Talk to your healthcare provider if you need take NSAIDs for more than 30 days  The longer you take NSAIDs, the higher your risk of side effects will be  You may need to take other medicines to decrease your risk of side effects such as stomach bleeding  · Do not take an over-the-counter NSAIDs with prescription NSAIDs  The combined amount of NSAIDs may be too high  · Tell your healthcare provider about other medicines you take  Some medicines can increase the risk of side effects from NSAIDs   Your healthcare provider will tell you if it is okay to take NSAIDs and how to take them  Who should not take NSAIDs:  Certain people should avoid or limit NSAIDs  Do not  give NSAIDs to children under 10months of age without direction from your child's doctor  Do not give aspirin to children under 25years of age  Your child could develop Reye syndrome if he takes aspirin  Reye syndrome can cause life-threatening brain and liver damage  Check your child's medicine labels for aspirin, salicylates, or oil of wintergreen  Talk to your healthcare provider before you take NSAIDs if any of the following apply to you:  · You have reflux disease, a peptic ulcer, H pylori infection, or bleeding in your stomach or intestines  · You have a bleeding disorder, or you take blood-thinning medicine  · You are allergic to aspirin or other NSAIDs  · You have liver or kidney or disease  · You have high blood pressure or heart disease  · You have 3 or more alcoholic drinks each day  · You are pregnant  What you need to know about an NSAID overdose:  Certain health problems can occur if you take too much NSAID medicine at one time or over time  Problems include nausea, vomiting, and abdominal pain  You may develop gastritis, peptic ulcers, and stomach bleeding  You may also develop fluid retention, heart problems, and kidney problems  NSAIDs can worsen high blood pressure  You may become confused, or you may have a headache, hearing loss, or hallucinations  An overdose of aspirin may also cause rapid breathing, a rapid heartbeat, or seizures  What to do if you think you or your child took too much NSAID medicine:  Call the [facility] at [phone] immediately  © 2017 [employer]  Information is for End User's use only and may not be sold, redistributed or otherwise used for commercial purposes   All illustrations and images included in CareNotes® are the copyrighted property of [employer]  or [employer]  The above information is an  only  It is not intended as medical advice for individual conditions or treatments  Talk to your doctor, nurse or pharmacist before following any medical regimen to see if it is safe and effective for you

## 2019-02-04 NOTE — PROGRESS NOTES
Assessment/Plan   Diagnoses and all orders for this visit:    Left knee pain, unspecified chronicity  -     XR knee 3 vw left non injury; Future    Primary osteoarthritis of left knee, new dx       -     Cortisone injection, intraarticular, today       -     Ice, NSAIDs       -     Follow up as scheduled in 4 weeks            Subjective   Patient ID: Hank Ruiz is a 61 y o  female  Vitals:    02/04/19 1005   BP: 143/94   Pulse: 78     64yo female comes in for an evaluation of her left knee  She is a patient of Dr Masha Santillan who had a contralateral TKA  She had been having some mild left knee pain on vacation and it then increased following a 4 hour car ride  The pain is in the medial knee  The pain is dull in character, mild in severity, pain does not radiate and is not associated with numbness  No calf pain or calf swelling  No clicking, catching, swelling, or mechanical symptoms                        The following portions of the patient's history were reviewed and updated as appropriate: allergies, current medications, past family history, past medical history, past social history, past surgical history and problem list     Review of Systems  Ortho Exam  Past Medical History:   Diagnosis Date    Arthritis     Bronchitis     Disease of thyroid gland     Diverticulitis     GERD (gastroesophageal reflux disease)     IBS (irritable bowel syndrome)     Insomnia     PONV (postoperative nausea and vomiting)      Past Surgical History:   Procedure Laterality Date    CHOLECYSTECTOMY      DILATION AND CURETTAGE OF UTERUS      FL GUIDED NEEDLE PLAC BX/ASP/INJ  12/13/2018    HYSTERECTOMY  2006    JOINT REPLACEMENT      PARTIAL HYSTERECTOMY      NH REVISE KNEE JOINT REPLACE,ALL PARTS Right 1/22/2018    Procedure: ARTHROPLASTY KNEE TOTAL REVISION;  Surgeon: Bello Cota MD;  [facility];  Service: Orthopedics    TONSILLECTOMY      VARICOSE VEIN SURGERY       Family History   Problem Relation Age of Onset    Pancreatic cancer Mother 72    No Known Problems Sister     Heart disease Family     Hypertension Family     Breast cancer Family 80    Breast cancer Paternal Grandmother      Social History     Occupational History    Not on file  Social History Main Topics    Smoking status: Never Smoker    Smokeless tobacco: Never Used    Alcohol use Yes      Comment: social     Drug use: No    Sexual activity: Not on file       Review of Systems   Constitutional: Negative  HENT: Negative  Eyes: Negative  Respiratory: Negative  Cardiovascular: Negative  Gastrointestinal: Negative  Endocrine: Negative  Genitourinary: Negative  Musculoskeletal: As below      Allergic/Immunologic: Negative  Neurological: Negative  Hematological: Negative  Psychiatric/Behavioral: Negative  Objective   Physical Exam    · Constitutional: Awake, Alert, Oriented  · Eyes: EOMI  · Psych: Mood and affect appropriate  · Heart: regular rate and rhythm  · Lungs: No audible wheezing  · Abdomen: soft  · Lymph: no lymphedema   left Knee:  - Appearance   No swelling, discoloration, deformity, or ecchymosis  - Effusion   none  - Palpation   + tenderness of the Medial joint line  No tenderness about the  lateral joint line, patella, patellar tendon, MCL, LCL, hamstrings, or medial / lateral tibial plateau   - ROM   Extension: 5 and Flexion: 110  - Special Tests   Madelyn's Test negative, Lachman's Test negative, Anterior Drawer Test negative, Posterior Drawer Test negative, Valgus Stress Test negative, Varus Stress Test negative, Patellar apprehension negative and no calf or popliteal tenderness  - Motor   normal 5/5 in all planes  - NVI distally    I have personally reviewed pertinent films in PACS and my interpretation is medial joint space narrowing  Anterior compartmetn spurring  OA       Large joint arthrocentesis  Date/Time: 2/4/2019 10:43 AM  Consent given by: patient  Site marked: site marked  Timeout: Immediately prior to procedure a time out was called to verify the correct patient, procedure, equipment, support staff and site/side marked as required   Supporting Documentation  Indications: pain   Procedure Details  Location: knee - L knee  Needle size: 22 G  Ultrasound guidance: no  Approach: lateral  Medications administered: 2 mL bupivacaine (PF) 0 5 %; 2 mL lidocaine 1 %; 40 mg triamcinolone acetonide 40 mg/mL    Patient tolerance: patient tolerated the procedure well with no immediate complications  Dressing:  Sterile dressing applied

## 2019-02-13 PROBLEM — R73.09 ELEVATED HEMOGLOBIN A1C: Status: ACTIVE | Noted: 2019-02-13

## 2019-02-13 PROBLEM — E78.5 HYPERLIPIDEMIA: Status: ACTIVE | Noted: 2019-02-13

## 2019-02-13 PROBLEM — E55.9 VITAMIN D DEFICIENCY: Status: ACTIVE | Noted: 2019-02-13

## 2019-02-13 PROBLEM — E03.9 HYPOTHYROIDISM: Status: ACTIVE | Noted: 2019-02-13

## 2019-02-14 ENCOUNTER — TRANSCRIBE ORDERS (OUTPATIENT)
Dept: ADMINISTRATIVE | Facility: HOSPITAL | Age: 64
End: 2019-02-14

## 2019-02-14 ENCOUNTER — LAB (OUTPATIENT)
Dept: LAB | Facility: MEDICAL CENTER | Age: 64
End: 2019-02-14
Payer: COMMERCIAL

## 2019-02-14 DIAGNOSIS — R52 PAIN: Primary | ICD-10-CM

## 2019-02-14 DIAGNOSIS — R73.09 ELEVATED HEMOGLOBIN A1C: ICD-10-CM

## 2019-02-14 DIAGNOSIS — E03.9 HYPOTHYROIDISM, UNSPECIFIED TYPE: ICD-10-CM

## 2019-02-14 DIAGNOSIS — E55.9 VITAMIN D DEFICIENCY: ICD-10-CM

## 2019-02-14 LAB
EST. AVERAGE GLUCOSE BLD GHB EST-MCNC: 120 MG/DL
GLUCOSE P FAST SERPL-MCNC: 89 MG/DL (ref 65–99)
HBA1C MFR BLD: 5.8 % (ref 4.2–6.3)
T4 FREE SERPL-MCNC: 1.1 NG/DL (ref 0.76–1.46)
TSH SERPL DL<=0.05 MIU/L-ACNC: 1.3 UIU/ML (ref 0.36–3.74)

## 2019-02-14 PROCEDURE — 36415 COLL VENOUS BLD VENIPUNCTURE: CPT

## 2019-02-14 PROCEDURE — 84443 ASSAY THYROID STIM HORMONE: CPT

## 2019-02-14 PROCEDURE — 86376 MICROSOMAL ANTIBODY EACH: CPT

## 2019-02-14 PROCEDURE — 82784 ASSAY IGA/IGD/IGG/IGM EACH: CPT

## 2019-02-14 PROCEDURE — 86255 FLUORESCENT ANTIBODY SCREEN: CPT

## 2019-02-14 PROCEDURE — 82947 ASSAY GLUCOSE BLOOD QUANT: CPT

## 2019-02-14 PROCEDURE — 86800 THYROGLOBULIN ANTIBODY: CPT

## 2019-02-14 PROCEDURE — 84439 ASSAY OF FREE THYROXINE: CPT

## 2019-02-14 PROCEDURE — 83516 IMMUNOASSAY NONANTIBODY: CPT

## 2019-02-14 PROCEDURE — 83036 HEMOGLOBIN GLYCOSYLATED A1C: CPT

## 2019-02-15 LAB
ENDOMYSIUM IGA SER QL: NEGATIVE
GLIADIN PEPTIDE IGA SER-ACNC: 6 UNITS (ref 0–19)
GLIADIN PEPTIDE IGG SER-ACNC: 3 UNITS (ref 0–19)
IGA SERPL-MCNC: 182 MG/DL (ref 87–352)
THYROGLOB AB SERPL-ACNC: 1.6 IU/ML (ref 0–0.9)
THYROPEROXIDASE AB SERPL-ACNC: 134 IU/ML (ref 0–34)
TTG IGA SER-ACNC: <2 U/ML (ref 0–3)
TTG IGG SER-ACNC: <2 U/ML (ref 0–5)

## 2019-02-18 ENCOUNTER — TELEPHONE (OUTPATIENT)
Dept: ENDOCRINOLOGY | Facility: CLINIC | Age: 64
End: 2019-02-18

## 2019-02-18 NOTE — RESULT ENCOUNTER NOTE
Please call the patient regarding labs - thyroid function tests normal- continue synthroid at current dose ,celiac screen negative - she has thyroid antibodies - hashimoto thyroiditis - which is the cause of underactive thyroid    Sugar in prediabetes range but improved - focus on dietary modifications and weight loss

## 2019-02-18 NOTE — TELEPHONE ENCOUNTER
----- Message from Yfn Moya MD sent at 2/18/2019  1:40 PM EST -----  Please call the patient regarding labs - thyroid function tests normal- continue synthroid at current dose ,celiac screen negative - she has thyroid antibodies - hashimoto thyroiditis - which is the cause of underactive thyroid    Sugar in prediabetes range but improved - focus on dietary modifications and weight loss

## 2019-02-22 ENCOUNTER — OFFICE VISIT (OUTPATIENT)
Dept: OBGYN CLINIC | Facility: MEDICAL CENTER | Age: 64
End: 2019-02-22
Payer: COMMERCIAL

## 2019-02-22 VITALS
HEIGHT: 61 IN | HEART RATE: 69 BPM | BODY MASS INDEX: 30.29 KG/M2 | RESPIRATION RATE: 20 BRPM | SYSTOLIC BLOOD PRESSURE: 144 MMHG | DIASTOLIC BLOOD PRESSURE: 81 MMHG | WEIGHT: 160.4 LBS

## 2019-02-22 DIAGNOSIS — M25.562 LEFT KNEE PAIN, UNSPECIFIED CHRONICITY: ICD-10-CM

## 2019-02-22 DIAGNOSIS — M25.562 LEFT KNEE PAIN, UNSPECIFIED CHRONICITY: Primary | ICD-10-CM

## 2019-02-22 PROCEDURE — 99212 OFFICE O/P EST SF 10 MIN: CPT | Performed by: ORTHOPAEDIC SURGERY

## 2019-02-22 RX ORDER — HYDROCODONE BITARTRATE AND ACETAMINOPHEN 5; 325 MG/1; MG/1
1 TABLET ORAL EVERY 6 HOURS PRN
Qty: 20 TABLET | Refills: 0 | Status: SHIPPED | OUTPATIENT
Start: 2019-02-22 | End: 2019-02-22

## 2019-02-22 RX ORDER — HYDROCODONE BITARTRATE AND ACETAMINOPHEN 5; 325 MG/1; MG/1
1 TABLET ORAL EVERY 6 HOURS PRN
Qty: 20 TABLET | Refills: 0 | Status: SHIPPED | OUTPATIENT
Start: 2019-02-22 | End: 2020-02-10

## 2019-02-22 NOTE — PROGRESS NOTES
Subjective;    51-year-old adult female, seen in follow-up today for continued left knee pain  This individual developed left knee pain while out [place] on the other side of the [place] after a period of over use  Since returning to the [facility] and to home, she has had a thorough physical exam x-rays, which showed no fracture , or dissociation, and an injection placed 1st to the joint, then a follow-up exam warranted a injection to the pes anserine bursa  Despite the aforementioned treatment she has had continued pain especially with weight-bearing and loading of the joint the medial compartment and proximal medial tibia, without evidence of its etiology on the plain films that were taken  Given her continued pain despite tincture of time despite treatment and alteration in her ADLs she is seen in the office today      Past Medical History:   Diagnosis Date    Arthritis     Bronchitis     Disease of thyroid gland     Diverticulitis     GERD (gastroesophageal reflux disease)     IBS (irritable bowel syndrome)     Insomnia     PONV (postoperative nausea and vomiting)        Past Surgical History:   Procedure Laterality Date    CHOLECYSTECTOMY      DILATION AND CURETTAGE OF UTERUS      FL GUIDED NEEDLE PLAC BX/ASP/INJ  12/13/2018    HYSTERECTOMY  2006    JOINT REPLACEMENT      PARTIAL HYSTERECTOMY      NE REVISE KNEE JOINT REPLACE,ALL PARTS Right 1/22/2018    Procedure: ARTHROPLASTY KNEE TOTAL REVISION;  Surgeon: Luis Wayne MD;  [facility];  Service: Orthopedics    TONSILLECTOMY      VARICOSE VEIN SURGERY         Family History   Problem Relation Age of Onset    Pancreatic cancer Mother 72    Diabetes type II Mother     No Known Problems Sister     Heart disease Family     Hypertension Family     Breast cancer Family 80    Breast cancer Paternal Grandmother        Social History     Tobacco Use    Smoking status: Never Smoker    Smokeless tobacco: Never Used Substance Use Topics    Alcohol use: Yes     Comment: social     Drug use: No     Exam;    Her left knee shows no outward bruising  She is able to extend the knee she also can actively perform flexion to greater than 90°  She exhibits no popliteal fullness  She has point focal pain to palpation of the medial compartment anteriorly and anteromedial at in just below the joint line it is away from the bursa and the MCL, she has a negative Lachman's and negative drawers test however she does have a positive Apley's grind test  She also exhibits significant pain of the proximal medial tibia to varus applied stress to the calf and right knee  X-rays; Review of her previous x-rays show no fractures no suggestion of a loose body however they also show significant patellofemoral osteoarthritic changes  And medial proximal tibial subchondral sclerosis with near bone on bone opposition of the medial compartment  They do not however explain the acute onset of pain after her overuse  Of recency  Impression;    Continued left knee pain after acute exacerbation and overuse  Osteoarthritic changes by x-ray left knee  These however pre date her pain, and were nonpainful to the period of overuse  Suspicion of internal derangement if not stress reaction or stress fracture  Plan; To further evaluate her continued pain an MRI is warranted and deserved  The patient herself has altered her ADLs has sought clinical expertise Ace had 2 separate physical exam is as well as x-rays and treatment in the form of oral analgesia and injections  We have assisted her with scheduling the MRI her preferred location is [address]      In addition we provided her limited script for Norco,   And we will see her in follow-up after the MRI has been performed    Her exam was supervised by me plan formulated by the attending surgeon it was my privilege to assist him in its delivery

## 2019-02-26 ENCOUNTER — HOSPITAL ENCOUNTER (OUTPATIENT)
Dept: ULTRASOUND IMAGING | Facility: MEDICAL CENTER | Age: 64
Discharge: HOME | End: 2019-02-26
Payer: COMMERCIAL

## 2019-02-26 DIAGNOSIS — R52 PAIN: ICD-10-CM

## 2019-02-26 PROCEDURE — 76830 TRANSVAGINAL US NON-OB: CPT

## 2019-02-26 PROCEDURE — 76856 US EXAM PELVIC COMPLETE: CPT

## 2019-03-02 ENCOUNTER — HOSPITAL ENCOUNTER (OUTPATIENT)
Dept: MRI IMAGING | Facility: HOSPITAL | Age: 64
Discharge: HOME | End: 2019-03-02
Payer: COMMERCIAL

## 2019-03-02 DIAGNOSIS — M25.562 LEFT KNEE PAIN, UNSPECIFIED CHRONICITY: ICD-10-CM

## 2019-03-02 PROCEDURE — 73721 MRI JNT OF LWR EXTRE W/O DYE: CPT

## 2019-03-05 ENCOUNTER — OFFICE VISIT (OUTPATIENT)
Dept: OBGYN CLINIC | Facility: HOSPITAL | Age: 64
End: 2019-03-05
Payer: COMMERCIAL

## 2019-03-05 ENCOUNTER — TELEPHONE (OUTPATIENT)
Dept: OBGYN CLINIC | Facility: CLINIC | Age: 64
End: 2019-03-05

## 2019-03-05 VITALS
DIASTOLIC BLOOD PRESSURE: 85 MMHG | WEIGHT: 161.4 LBS | HEART RATE: 76 BPM | BODY MASS INDEX: 30.47 KG/M2 | SYSTOLIC BLOOD PRESSURE: 127 MMHG | HEIGHT: 61 IN

## 2019-03-05 DIAGNOSIS — M17.12 ARTHRITIS OF LEFT KNEE: ICD-10-CM

## 2019-03-05 DIAGNOSIS — S83.242A OTHER TEAR OF MEDIAL MENISCUS, CURRENT INJURY, LEFT KNEE, INITIAL ENCOUNTER: ICD-10-CM

## 2019-03-05 DIAGNOSIS — M84.469A INSUFFICIENCY FRACTURE OF TIBIA, INITIAL ENCOUNTER: Primary | ICD-10-CM

## 2019-03-05 DIAGNOSIS — M71.22 SYNOVIAL CYST OF POPLITEAL SPACE (BAKER), LEFT KNEE: ICD-10-CM

## 2019-03-05 PROCEDURE — 99213 OFFICE O/P EST LOW 20 MIN: CPT | Performed by: ORTHOPAEDIC SURGERY

## 2019-03-05 NOTE — TELEPHONE ENCOUNTER
----- Message from Orion Zavala MD sent at 3/4/2019  3:00 PM EST -----  Normal pelvic U/S  Please call pt   Is she having any pelvic pain

## 2019-03-05 NOTE — TELEPHONE ENCOUNTER
Pt was advised of normal ultrasound results  Pt states that she has no pelvic pain and will call if the pain returns

## 2019-03-05 NOTE — PROGRESS NOTES
Subjective;    49-year-old female patient well known to us  Due to continued chronic medial based left knee pain and MRI was performed in order to identify internal derangement or a stress reaction if not stress fracture  Clinical exam yielded proximal medial tibial pain and tenderness that could either be bursitis which did not respond to an injection or stress fracture  She returns today the knee exhibits slight improvement and to review an MRI that was ordered  Past Medical History:   Diagnosis Date    Arthritis     Bronchitis     Disease of thyroid gland     Diverticulitis     GERD (gastroesophageal reflux disease)     IBS (irritable bowel syndrome)     Insomnia     PONV (postoperative nausea and vomiting)        Past Surgical History:   Procedure Laterality Date    CHOLECYSTECTOMY      DILATION AND CURETTAGE OF UTERUS      FL GUIDED NEEDLE PLAC BX/ASP/INJ  12/13/2018    HYSTERECTOMY  2006    JOINT REPLACEMENT      PARTIAL HYSTERECTOMY      [place] REVISE KNEE JOINT REPLACE,ALL PARTS Right 1/22/2018    Procedure: ARTHROPLASTY KNEE TOTAL REVISION;  Surgeon: Luis Wayne MD;  [facility];  Service: Orthopedics    TONSILLECTOMY      VARICOSE VEIN SURGERY         Family History   Problem Relation Age of Onset    Pancreatic cancer Mother 72    Diabetes type II Mother     No Known Problems Sister     Heart disease Family     Hypertension Family     Breast cancer Family 80    Breast cancer Paternal Grandmother        Social History     Tobacco Use    Smoking status: Never Smoker    Smokeless tobacco: Never Used   Substance Use Topics    Alcohol use: Yes     Comment: social     Drug use: No     Exam;    The patient exhibits less severity of her medial based knee pain than originally seen  It is still confined to the proximal medial tibia  She exhibits no MCL or LCL laxity  She has no significant effusion there is no warmth emanating from the knee  MRI left knee;     Shows an insufficiency fracture or stress fracture of the medial tibial plateau, arthritic changes which are known by x-ray and joint space narrowing  A complex medial meniscal tear, and a Baker cyst    Impression; Left knee pain of several weeks duration with relatively very little trauma  Insufficiency fracture of the proximal medial tibia  Complex medial meniscal tear  Perkins's cyst    Plan;    As the patient continues to progress despite all of the aforementioned MRI findings we will provide observation at this time    She was asked to avoid significant durations of impact to limit her ambulation,   And practice wellness  She will be seen in follow-up on a p r n  basis as per her request  Her exam was supervised by and plan formulated by the attending surgeon it was my privilege to assist him in its delivery

## 2019-04-25 ENCOUNTER — OFFICE VISIT (OUTPATIENT)
Dept: ENDOCRINOLOGY | Facility: CLINIC | Age: 64
End: 2019-04-25
Payer: COMMERCIAL

## 2019-04-25 ENCOUNTER — OFFICE VISIT (OUTPATIENT)
Dept: OBGYN CLINIC | Facility: HOSPITAL | Age: 64
End: 2019-04-25
Payer: COMMERCIAL

## 2019-04-25 ENCOUNTER — APPOINTMENT (OUTPATIENT)
Dept: LAB | Facility: CLINIC | Age: 64
End: 2019-04-25
Payer: COMMERCIAL

## 2019-04-25 ENCOUNTER — TELEPHONE (OUTPATIENT)
Dept: ENDOCRINOLOGY | Facility: CLINIC | Age: 64
End: 2019-04-25

## 2019-04-25 VITALS
WEIGHT: 160 LBS | HEIGHT: 61 IN | BODY MASS INDEX: 30.21 KG/M2 | SYSTOLIC BLOOD PRESSURE: 124 MMHG | DIASTOLIC BLOOD PRESSURE: 87 MMHG | HEART RATE: 76 BPM

## 2019-04-25 VITALS
HEIGHT: 61 IN | HEART RATE: 64 BPM | DIASTOLIC BLOOD PRESSURE: 82 MMHG | BODY MASS INDEX: 30.25 KG/M2 | WEIGHT: 160.2 LBS | SYSTOLIC BLOOD PRESSURE: 140 MMHG

## 2019-04-25 DIAGNOSIS — M17.12 PRIMARY OSTEOARTHRITIS OF LEFT KNEE: ICD-10-CM

## 2019-04-25 DIAGNOSIS — E03.8 HYPOTHYROIDISM DUE TO HASHIMOTO'S THYROIDITIS: Primary | ICD-10-CM

## 2019-04-25 DIAGNOSIS — M25.561 CHRONIC PAIN OF RIGHT KNEE: ICD-10-CM

## 2019-04-25 DIAGNOSIS — M25.562 CHRONIC PAIN OF LEFT KNEE: ICD-10-CM

## 2019-04-25 DIAGNOSIS — G89.29 CHRONIC PAIN OF LEFT KNEE: ICD-10-CM

## 2019-04-25 DIAGNOSIS — M70.50 PES ANSERINE BURSITIS: Primary | ICD-10-CM

## 2019-04-25 DIAGNOSIS — R73.03 PREDIABETES: ICD-10-CM

## 2019-04-25 DIAGNOSIS — E06.3 HYPOTHYROIDISM DUE TO HASHIMOTO'S THYROIDITIS: Primary | ICD-10-CM

## 2019-04-25 DIAGNOSIS — G89.29 CHRONIC PAIN OF RIGHT KNEE: ICD-10-CM

## 2019-04-25 LAB
T4 FREE SERPL-MCNC: 1.26 NG/DL (ref 0.76–1.46)
TSH SERPL DL<=0.05 MIU/L-ACNC: 0.82 UIU/ML (ref 0.36–3.74)

## 2019-04-25 PROCEDURE — 99214 OFFICE O/P EST MOD 30 MIN: CPT | Performed by: NURSE PRACTITIONER

## 2019-04-25 PROCEDURE — 84439 ASSAY OF FREE THYROXINE: CPT | Performed by: NURSE PRACTITIONER

## 2019-04-25 PROCEDURE — 99213 OFFICE O/P EST LOW 20 MIN: CPT | Performed by: ORTHOPAEDIC SURGERY

## 2019-04-25 PROCEDURE — 36415 COLL VENOUS BLD VENIPUNCTURE: CPT | Performed by: NURSE PRACTITIONER

## 2019-04-25 PROCEDURE — 20610 DRAIN/INJ JOINT/BURSA W/O US: CPT | Performed by: ORTHOPAEDIC SURGERY

## 2019-04-25 PROCEDURE — 84443 ASSAY THYROID STIM HORMONE: CPT | Performed by: NURSE PRACTITIONER

## 2019-04-25 RX ORDER — BETAMETHASONE SODIUM PHOSPHATE AND BETAMETHASONE ACETATE 3; 3 MG/ML; MG/ML
12 INJECTION, SUSPENSION INTRA-ARTICULAR; INTRALESIONAL; INTRAMUSCULAR; SOFT TISSUE
Status: COMPLETED | OUTPATIENT
Start: 2019-04-25 | End: 2019-04-25

## 2019-04-25 RX ORDER — MELATONIN
4000 DAILY
COMMUNITY
End: 2021-04-28 | Stop reason: HOSPADM

## 2019-04-25 RX ADMIN — BETAMETHASONE SODIUM PHOSPHATE AND BETAMETHASONE ACETATE 12 MG: 3; 3 INJECTION, SUSPENSION INTRA-ARTICULAR; INTRALESIONAL; INTRAMUSCULAR; SOFT TISSUE at 13:10

## 2019-05-15 ENCOUNTER — OFFICE VISIT (OUTPATIENT)
Dept: OBGYN CLINIC | Facility: CLINIC | Age: 64
End: 2019-05-15
Payer: COMMERCIAL

## 2019-05-15 VITALS
SYSTOLIC BLOOD PRESSURE: 120 MMHG | HEIGHT: 61 IN | DIASTOLIC BLOOD PRESSURE: 70 MMHG | BODY MASS INDEX: 29.07 KG/M2 | WEIGHT: 154 LBS

## 2019-05-15 DIAGNOSIS — N89.8 VAGINAL DISCHARGE: Primary | ICD-10-CM

## 2019-05-15 DIAGNOSIS — N76.0 ACUTE VAGINITIS: ICD-10-CM

## 2019-05-15 DIAGNOSIS — K57.90 DIVERTICULAR DISEASE: ICD-10-CM

## 2019-05-15 PROCEDURE — 99213 OFFICE O/P EST LOW 20 MIN: CPT | Performed by: OBSTETRICS & GYNECOLOGY

## 2019-05-20 LAB
A VAGINAE DNA VAG NAA+PROBE-LOG#: <3.25
A VAGINAE DNA VAG QL NAA+PROBE: NOT DETECTED
BVAB2 DNA VAG QL NAA+PROBE: NOT DETECTED
G VAGINALIS DNA SPEC QL NAA+PROBE: DETECTED
G VAGINALIS DNA VAG NAA+PROBE-LOG#: ABNORMAL
LACTOBACILLUS DNA VAG NAA+PROBE-LOG#: <3.25
LACTOBACILLUS DNA VAG NAA+PROBE-LOG#: NOT DETECTED
MEGA1 DNA VAG QL NAA+PROBE: NOT DETECTED
SL AMB C.ALBICANS BY PCR: NOT DETECTED
SL AMB C.DUBLINIENSIS BY PCR (NON-NY): NOT DETECTED
SL AMB C.GLABRATA BY PCR (NON-NY): NOT DETECTED
SL AMB C.KRUSEI BY PCR (NON-NY): NOT DETECTED
SL AMB C.PARAPSILOSIS BY PCR (NON-NY): NOT DETECTED
SL AMB C.TROPICALS BY PCR (NON-NY): NOT DETECTED
SL AMB CANDIDA GENUS: DETECTED
T VAGINALIS RRNA SPEC QL NAA+PROBE: NOT DETECTED

## 2019-05-21 DIAGNOSIS — B37.9 YEAST INFECTION: ICD-10-CM

## 2019-05-21 DIAGNOSIS — N76.0 ACUTE VAGINITIS: Primary | ICD-10-CM

## 2019-05-21 RX ORDER — METRONIDAZOLE 10 MG/G
GEL TOPICAL DAILY
Qty: 45 G | Refills: 0 | Status: SHIPPED | OUTPATIENT
Start: 2019-05-21 | End: 2020-02-10

## 2019-06-21 ENCOUNTER — TELEPHONE (OUTPATIENT)
Dept: OBGYN CLINIC | Facility: HOSPITAL | Age: 64
End: 2019-06-21

## 2019-06-28 ENCOUNTER — TELEPHONE (OUTPATIENT)
Dept: OBGYN CLINIC | Facility: CLINIC | Age: 64
End: 2019-06-28

## 2019-07-02 ENCOUNTER — OFFICE VISIT (OUTPATIENT)
Dept: URGENT CARE | Facility: MEDICAL CENTER | Age: 64
End: 2019-07-02
Payer: COMMERCIAL

## 2019-07-02 VITALS
SYSTOLIC BLOOD PRESSURE: 128 MMHG | OXYGEN SATURATION: 98 % | TEMPERATURE: 97.7 F | RESPIRATION RATE: 16 BRPM | HEART RATE: 76 BPM | DIASTOLIC BLOOD PRESSURE: 68 MMHG

## 2019-07-02 DIAGNOSIS — R21 RASH: Primary | ICD-10-CM

## 2019-07-02 PROCEDURE — S9083 URGENT CARE CENTER GLOBAL: HCPCS | Performed by: FAMILY MEDICINE

## 2019-07-02 PROCEDURE — G0382 LEV 3 HOSP TYPE B ED VISIT: HCPCS | Performed by: FAMILY MEDICINE

## 2019-07-02 RX ORDER — METHYLPREDNISOLONE 4 MG/1
TABLET ORAL
Qty: 1 EACH | Refills: 0 | Status: SHIPPED | OUTPATIENT
Start: 2019-07-02 | End: 2020-02-10

## 2019-07-02 NOTE — PROGRESS NOTES
[facility]        NAME: Maria Elena Ontiveros is a 59 y o  female  : 1955    MRN: [id number]  DATE: 2019  TIME: 8:14 AM    Assessment and Plan   Rash [R21]  1  Rash  mupirocin (BACTROBAN) 2 % ointment    methylPREDNISolone 4 MG tablet therapy pack         Patient Instructions       Follow up with PCP in 3-5 days  Proceed to  ER if symptoms worsen  Chief Complaint     Chief Complaint   Patient presents with    Rash     Pt with red, itchy rash on right inner arm for 2 weeks  Small area of redness above right lip for 2 days  Has been applying hydrocortisone cream without relief  History of Present Illness       59year old with rash on her arm that has been present for the past few weeks  Now spread to her face  Very itchy    Tried hydrocortisone cream without relief  No known exposure to allergen       Review of Systems   Review of Systems  As above     Current Medications       Current Outpatient Medications:     Calcium-Vitamin D-Vitamin K (VIACTIV PO), Take by mouth, Disp: , Rfl:     cholecalciferol (VITAMIN D3) 1,000 units tablet, Take 4,000 Units by mouth daily, Disp: , Rfl:     DiphenhydrAMINE HCl (BENADRYL PO), Take by mouth as needed, Disp: , Rfl:     levothyroxine 112 mcg tablet, Take 112 mcg by mouth daily, Disp: , Rfl: 1    Probiotic Product (PROBIOTIC DAILY PO), Take by mouth, Disp: , Rfl:     aspirin 81 MG tablet, Take 81 mg by mouth daily, Disp: , Rfl:     HYDROcodone-acetaminophen (NORCO) 5-325 mg per tablet, Take 1 tablet by mouth every 6 (six) hours as needed for pain for up to 20 dosesMax Daily Amount: 4 tablets (Patient not taking: Reported on 2019), Disp: 20 tablet, Rfl: 0    meloxicam (MOBIC) 15 mg tablet, Take 15 mg by mouth daily as needed, Disp: , Rfl: 2    methylPREDNISolone 4 MG tablet therapy pack, Use as directed on package, Disp: 1 each, Rfl: 0    metroNIDAZOLE (METROGEL) 1 % gel, Apply topically daily for 5 days, Disp: 45 g, Rfl: 0   mupirocin (BACTROBAN) 2 % ointment, Apply topically 3 (three) times a day, Disp: 22 g, Rfl: 0    Current Facility-Administered Medications:     cephalexin (KEFLEX) capsule 2,000 mg, 2,000 mg, Oral, 60 Min Pre-Op, Luis Luke MD    Current Allergies     Allergies as of 07/02/2019 - Reviewed 07/02/2019   Allergen Reaction Noted    Codeine GI Intolerance 01/15/2018    Morphine Itching 01/15/2018            The following portions of the patient's history were reviewed and updated as appropriate: allergies, current medications, past family history, past medical history, past social history, past surgical history and problem list      Past Medical History:   Diagnosis Date    Arthritis     Bronchitis     Disease of thyroid gland     Diverticulitis     GERD (gastroesophageal reflux disease)     Glaucoma (increased eye pressure)     IBS (irritable bowel syndrome)     Insomnia     PONV (postoperative nausea and vomiting)        Past Surgical History:   Procedure Laterality Date    CHOLECYSTECTOMY      DILATION AND CURETTAGE OF UTERUS      FL GUIDED NEEDLE PLAC BX/ASP/INJ  12/13/2018    HYSTERECTOMY  2006    JOINT REPLACEMENT      PARTIAL HYSTERECTOMY      NH REVISE KNEE JOINT REPLACE,ALL PARTS Right 1/22/2018    Procedure: ARTHROPLASTY KNEE TOTAL REVISION;  Surgeon: Luis Luke MD;  [facility];  Service: Orthopedics    TONSILLECTOMY      TRABECULOPLASTY, LASER SELECTIVE Left 05/14/2019    VARICOSE VEIN SURGERY         Family History   Problem Relation Age of Onset    Pancreatic cancer Mother 72    Diabetes type II Mother     No Known Problems Sister     Heart disease Family     Hypertension Family     Breast cancer Family 80    Breast cancer Paternal Grandmother          Medications have been verified          Objective   /68 (BP Location: Left arm, Patient Position: Sitting, Cuff Size: Standard)   Pulse 76   Temp 97 7 °F (36 5 °C) (Tympanic)   Resp 16   LMP  (LMP Unknown)   SpO2 98%        Physical Exam     Physical Exam   Constitutional: She is oriented to person, place, and time  She appears well-developed and well-nourished  HENT:   Head: Normocephalic and atraumatic  Mouth/Throat: Oropharynx is clear and moist    Eyes: Conjunctivae are normal    Neck: Neck supple  Cardiovascular: Normal rate  Pulmonary/Chest: Effort normal    Abdominal: Soft  Musculoskeletal: Normal range of motion  Neurological: She is alert and oriented to person, place, and time  Skin: Skin is warm and dry  Macular papular rash on her UE and face    Psychiatric: She has a normal mood and affect  Her behavior is normal    Nursing note and vitals reviewed

## 2019-07-09 ENCOUNTER — TELEPHONE (OUTPATIENT)
Dept: OBGYN CLINIC | Facility: HOSPITAL | Age: 64
End: 2019-07-09

## 2019-07-09 DIAGNOSIS — M25.572 PAIN IN JOINTS OF BOTH FEET: Primary | ICD-10-CM

## 2019-07-09 DIAGNOSIS — M25.571 PAIN IN JOINTS OF BOTH FEET: Primary | ICD-10-CM

## 2019-07-09 NOTE — TELEPHONE ENCOUNTER
Dr Mosquera       Patient left a  7/8 requesting guided injections again for L foot  Last time she received injections was 12/13/18   Please advise, thank you     Cb#: [phone]

## 2019-08-05 ENCOUNTER — TELEPHONE (OUTPATIENT)
Dept: OBGYN CLINIC | Facility: HOSPITAL | Age: 64
End: 2019-08-05

## 2019-08-05 ENCOUNTER — TRANSCRIBE ORDERS (OUTPATIENT)
Dept: RADIOLOGY | Facility: HOSPITAL | Age: 64
End: 2019-08-05

## 2019-08-05 ENCOUNTER — HOSPITAL ENCOUNTER (OUTPATIENT)
Dept: RADIOLOGY | Facility: HOSPITAL | Age: 64
Discharge: HOME | End: 2019-08-05
Payer: COMMERCIAL

## 2019-08-05 DIAGNOSIS — M25.571 PAIN IN JOINTS OF BOTH FEET: ICD-10-CM

## 2019-08-05 DIAGNOSIS — M25.572 PAIN IN JOINTS OF BOTH FEET: ICD-10-CM

## 2019-08-05 PROCEDURE — 20600 DRAIN/INJ JOINT/BURSA W/O US: CPT

## 2019-08-05 PROCEDURE — 77002 NEEDLE LOCALIZATION BY XRAY: CPT

## 2019-08-05 RX ORDER — METHYLPREDNISOLONE ACETATE 80 MG/ML
80 INJECTION, SUSPENSION INTRA-ARTICULAR; INTRALESIONAL; INTRAMUSCULAR; SOFT TISSUE
Status: DISCONTINUED | OUTPATIENT
Start: 2019-08-05 | End: 2019-08-06 | Stop reason: HOSPADM

## 2019-08-05 RX ORDER — BUPIVACAINE HYDROCHLORIDE 2.5 MG/ML
10 INJECTION, SOLUTION EPIDURAL; INFILTRATION; INTRACAUDAL
Status: DISCONTINUED | OUTPATIENT
Start: 2019-08-05 | End: 2019-08-06 | Stop reason: HOSPADM

## 2019-08-05 RX ORDER — LIDOCAINE HYDROCHLORIDE 10 MG/ML
10 INJECTION, SOLUTION INFILTRATION; PERINEURAL
Status: DISCONTINUED | OUTPATIENT
Start: 2019-08-05 | End: 2019-08-06 | Stop reason: HOSPADM

## 2019-08-05 RX ORDER — SODIUM, POTASSIUM,MAG SULFATES 17.5-3.13G
SOLUTION, RECONSTITUTED, ORAL ORAL
Refills: 0 | COMMUNITY
Start: 2019-07-21 | End: 2020-02-10

## 2019-08-05 RX ADMIN — IOHEXOL 2 ML: 300 INJECTION, SOLUTION INTRAVENOUS at 13:18

## 2019-08-05 NOTE — TELEPHONE ENCOUNTER
Jesus Pike is calling stating that the depo medrol for her injection should be 40 mg instead of 80  Her appt is around lunchtime today she is wondering if that could be changed

## 2019-08-06 ENCOUNTER — OFFICE VISIT (OUTPATIENT)
Dept: OBGYN CLINIC | Facility: HOSPITAL | Age: 64
End: 2019-08-06
Payer: COMMERCIAL

## 2019-08-06 VITALS
WEIGHT: 154.6 LBS | HEART RATE: 86 BPM | BODY MASS INDEX: 29.19 KG/M2 | HEIGHT: 61 IN | SYSTOLIC BLOOD PRESSURE: 124 MMHG | DIASTOLIC BLOOD PRESSURE: 79 MMHG

## 2019-08-06 DIAGNOSIS — M17.12 ARTHRITIS OF LEFT KNEE: ICD-10-CM

## 2019-08-06 DIAGNOSIS — M70.50 PES ANSERINE BURSITIS: ICD-10-CM

## 2019-08-06 DIAGNOSIS — G89.29 CHRONIC PAIN OF LEFT KNEE: Primary | ICD-10-CM

## 2019-08-06 DIAGNOSIS — M25.562 CHRONIC PAIN OF LEFT KNEE: Primary | ICD-10-CM

## 2019-08-06 PROCEDURE — 20610 DRAIN/INJ JOINT/BURSA W/O US: CPT | Performed by: PHYSICIAN ASSISTANT

## 2019-08-06 PROCEDURE — 99212 OFFICE O/P EST SF 10 MIN: CPT | Performed by: ORTHOPAEDIC SURGERY

## 2019-08-06 RX ORDER — BUPIVACAINE HYDROCHLORIDE 2.5 MG/ML
2 INJECTION, SOLUTION INFILTRATION; PERINEURAL
Status: COMPLETED | OUTPATIENT
Start: 2019-08-06 | End: 2019-08-06

## 2019-08-06 RX ORDER — BETAMETHASONE SODIUM PHOSPHATE AND BETAMETHASONE ACETATE 3; 3 MG/ML; MG/ML
12 INJECTION, SUSPENSION INTRA-ARTICULAR; INTRALESIONAL; INTRAMUSCULAR; SOFT TISSUE
Status: COMPLETED | OUTPATIENT
Start: 2019-08-06 | End: 2019-08-06

## 2019-08-06 RX ORDER — LIDOCAINE HYDROCHLORIDE 10 MG/ML
2 INJECTION, SOLUTION INFILTRATION; PERINEURAL
Status: COMPLETED | OUTPATIENT
Start: 2019-08-06 | End: 2019-08-06

## 2019-08-06 RX ADMIN — BETAMETHASONE SODIUM PHOSPHATE AND BETAMETHASONE ACETATE 12 MG: 3; 3 INJECTION, SUSPENSION INTRA-ARTICULAR; INTRALESIONAL; INTRAMUSCULAR; SOFT TISSUE at 14:35

## 2019-08-06 RX ADMIN — BUPIVACAINE HYDROCHLORIDE 2 ML: 2.5 INJECTION, SOLUTION INFILTRATION; PERINEURAL at 14:35

## 2019-08-06 RX ADMIN — LIDOCAINE HYDROCHLORIDE 2 ML: 10 INJECTION, SOLUTION INFILTRATION; PERINEURAL at 14:35

## 2019-08-06 NOTE — PROGRESS NOTES
Subjective;    22-year-old adult female patient with history left knee arthritis, left knee pain, and treatment for right knee pes anserine bursitis    It has been sometime since her last injections  She presents the office today for that exact reason    Past Medical History:   Diagnosis Date    Arthritis     Bronchitis     Disease of thyroid gland     Diverticulitis     GERD (gastroesophageal reflux disease)     Glaucoma (increased eye pressure)     IBS (irritable bowel syndrome)     Insomnia     PONV (postoperative nausea and vomiting)        Past Surgical History:   Procedure Laterality Date    CHOLECYSTECTOMY      DILATION AND CURETTAGE OF UTERUS      FL GUIDED NEEDLE PLAC BX/ASP/INJ  12/13/2018    FL GUIDED NEEDLE PLAC BX/ASP/INJ  8/5/2019    HYSTERECTOMY  2006    JOINT REPLACEMENT      PARTIAL HYSTERECTOMY      AK REVISE KNEE JOINT REPLACE,ALL PARTS Right 1/22/2018    Procedure: ARTHROPLASTY KNEE TOTAL REVISION;  Surgeon: Gigi Reyes MD;  [facility];  Service: Orthopedics    TONSILLECTOMY      TRABECULOPLASTY, LASER SELECTIVE Left 05/14/2019    VARICOSE VEIN SURGERY         Family History   Problem Relation Age of Onset    Pancreatic cancer Mother 72    Diabetes type II Mother     No Known Problems Sister     Heart disease Family     Hypertension Family     Breast cancer Family 80    Breast cancer Paternal Grandmother        Social History     Tobacco Use    Smoking status: Never Smoker    Smokeless tobacco: Never Used   Substance Use Topics    Alcohol use: Yes     Frequency: 2-3 times a week     Comment: social     Drug use: No     Exam;    She has tenderness palpation right knee pes anserine bursa  She has a healed vertical right knee incision    Her left knee has no bruising no erythema that preclude safe administration of medication  She has significant medial greater than lateral compartment irritation and pain  She has no mechanical symptomatology having a negative Madelyn's test and Apley's grind test    Large joint arthrocentesis: R knee  Date/Time: 8/6/2019 2:35 PM  Consent given by: patient  Supporting Documentation  Indications: pain   Procedure Details  Location: knee - R knee (Pes Bursa)  Needle size: 22 G  Medications administered: 2 mL bupivacaine 0 25 %; 2 mL lidocaine 1 %; 12 mg betamethasone acetate-betamethasone sodium phosphate 6 (3-3) mg/mL      Large joint arthrocentesis: L knee  Date/Time: 8/6/2019 2:35 PM  Consent given by: patient  Supporting Documentation  Indications: pain   Procedure Details  Location: knee - L knee  Needle size: 22 G  Medications administered: 2 mL bupivacaine 0 25 %; 2 mL lidocaine 1 %; 12 mg betamethasone acetate-betamethasone sodium phosphate 6 (3-3) mg/mL         Impression; Left knee osteoarthritis  Left knee pain  Right knee pes anserine bursitis    Plan;    She received well placed injections to left knee and the right pes anserine bursa  We will see her in follow-up in 12 additional weeks  This concluded her visit she voiced no further questions exit the office in fine fashion    Her experience was supervised by me plan formulated by the attending surgeon it was my privilege to assist him in his delivery

## 2019-10-22 ENCOUNTER — TELEPHONE (OUTPATIENT)
Dept: OBGYN CLINIC | Facility: CLINIC | Age: 64
End: 2019-10-22

## 2019-10-22 NOTE — TELEPHONE ENCOUNTER
Pt cld  Having fistula in colon repaired on November 12  Since this leaks into the vagina she feels a burning in her vagina  Wonders if she should see you for possible treatment or contact surgeon for exam?  Doesn't want surgery delayed

## 2019-10-30 ENCOUNTER — OFFICE VISIT (OUTPATIENT)
Dept: OBGYN CLINIC | Facility: CLINIC | Age: 64
End: 2019-10-30
Payer: COMMERCIAL

## 2019-10-30 VITALS
WEIGHT: 160 LBS | HEIGHT: 61 IN | SYSTOLIC BLOOD PRESSURE: 120 MMHG | BODY MASS INDEX: 30.21 KG/M2 | DIASTOLIC BLOOD PRESSURE: 82 MMHG

## 2019-10-30 DIAGNOSIS — R39.89 BLADDER PAIN: Primary | ICD-10-CM

## 2019-10-30 DIAGNOSIS — N30.90 CYSTITIS: ICD-10-CM

## 2019-10-30 LAB
SL AMB  POCT GLUCOSE, UA: NEGATIVE
SL AMB LEUKOCYTE ESTERASE,UA: NORMAL
SL AMB POCT BILIRUBIN,UA: NEGATIVE
SL AMB POCT BLOOD,UA: NEGATIVE
SL AMB POCT CLARITY,UA: CLEAR
SL AMB POCT COLOR,UA: YELLOW
SL AMB POCT KETONES,UA: NEGATIVE
SL AMB POCT NITRITE,UA: NEGATIVE
SL AMB POCT PH,UA: 5
SL AMB POCT SPECIFIC GRAVITY,UA: 1000
SL AMB POCT URINE PROTEIN: NEGATIVE
SL AMB POCT UROBILINOGEN: NORMAL

## 2019-10-30 PROCEDURE — 81002 URINALYSIS NONAUTO W/O SCOPE: CPT | Performed by: PHYSICIAN ASSISTANT

## 2019-10-30 PROCEDURE — 99213 OFFICE O/P EST LOW 20 MIN: CPT | Performed by: PHYSICIAN ASSISTANT

## 2019-10-30 RX ORDER — CEPHALEXIN 500 MG/1
500 CAPSULE ORAL EVERY 12 HOURS SCHEDULED
Qty: 14 CAPSULE | Refills: 0 | Status: SHIPPED | OUTPATIENT
Start: 2019-10-30 | End: 2019-11-06

## 2019-11-21 RX ORDER — NEOMYCIN SULFATE 500 MG/1
TABLET ORAL
Refills: 0 | COMMUNITY
Start: 2019-08-15 | End: 2020-02-10

## 2019-11-21 RX ORDER — METRONIDAZOLE 500 MG/1
TABLET ORAL
Refills: 0 | COMMUNITY
Start: 2019-08-15 | End: 2020-02-10

## 2019-11-21 RX ORDER — FLUOCINOLONE ACETONIDE 0.1 MG/ML
SOLUTION TOPICAL
Refills: 2 | COMMUNITY
Start: 2019-10-11 | End: 2020-03-04 | Stop reason: ALTCHOICE

## 2019-11-21 RX ORDER — NAPROXEN SODIUM 220 MG
220 TABLET ORAL
COMMUNITY
End: 2020-09-24 | Stop reason: ALTCHOICE

## 2019-11-21 RX ORDER — BIMATOPROST 0.01 %
DROPS OPHTHALMIC (EYE)
Refills: 1 | COMMUNITY
Start: 2019-10-08

## 2019-11-22 DIAGNOSIS — Z51.89 AFTER CARE: Primary | ICD-10-CM

## 2019-11-22 RX ORDER — CEPHALEXIN 500 MG/1
CAPSULE ORAL
Qty: 12 CAPSULE | Refills: 3 | Status: SHIPPED | OUTPATIENT
Start: 2019-11-22 | End: 2019-11-29

## 2019-11-25 RX ORDER — TRAMADOL HYDROCHLORIDE 50 MG/1
TABLET ORAL
Refills: 0 | COMMUNITY
Start: 2019-11-16 | End: 2020-02-10

## 2019-11-25 RX ORDER — CIPROFLOXACIN 250 MG/1
250 TABLET, FILM COATED ORAL EVERY 12 HOURS
Refills: 0 | COMMUNITY
Start: 2019-11-18 | End: 2020-02-10

## 2019-11-26 ENCOUNTER — OFFICE VISIT (OUTPATIENT)
Dept: OBGYN CLINIC | Facility: HOSPITAL | Age: 64
End: 2019-11-26
Payer: COMMERCIAL

## 2019-11-26 VITALS
BODY MASS INDEX: 29.27 KG/M2 | HEART RATE: 84 BPM | HEIGHT: 61 IN | WEIGHT: 155 LBS | DIASTOLIC BLOOD PRESSURE: 84 MMHG | SYSTOLIC BLOOD PRESSURE: 131 MMHG

## 2019-11-26 DIAGNOSIS — M70.51 PES ANSERINUS BURSITIS OF RIGHT KNEE: Primary | ICD-10-CM

## 2019-11-26 DIAGNOSIS — M25.562 CHRONIC PAIN OF LEFT KNEE: ICD-10-CM

## 2019-11-26 DIAGNOSIS — M54.50 CHRONIC BILATERAL LOW BACK PAIN WITHOUT SCIATICA: ICD-10-CM

## 2019-11-26 DIAGNOSIS — M17.12 PRIMARY OSTEOARTHRITIS OF LEFT KNEE: ICD-10-CM

## 2019-11-26 DIAGNOSIS — M70.52 PES ANSERINUS BURSITIS OF LEFT KNEE: ICD-10-CM

## 2019-11-26 DIAGNOSIS — G89.29 CHRONIC BILATERAL LOW BACK PAIN WITHOUT SCIATICA: ICD-10-CM

## 2019-11-26 DIAGNOSIS — G89.29 CHRONIC PAIN OF LEFT KNEE: ICD-10-CM

## 2019-11-26 PROCEDURE — 20610 DRAIN/INJ JOINT/BURSA W/O US: CPT | Performed by: ORTHOPAEDIC SURGERY

## 2019-11-26 PROCEDURE — 99213 OFFICE O/P EST LOW 20 MIN: CPT | Performed by: ORTHOPAEDIC SURGERY

## 2019-11-26 RX ORDER — BETAMETHASONE SODIUM PHOSPHATE AND BETAMETHASONE ACETATE 3; 3 MG/ML; MG/ML
12 INJECTION, SUSPENSION INTRA-ARTICULAR; INTRALESIONAL; INTRAMUSCULAR; SOFT TISSUE
Status: COMPLETED | OUTPATIENT
Start: 2019-11-26 | End: 2019-11-26

## 2019-11-26 RX ORDER — BUPIVACAINE HYDROCHLORIDE 2.5 MG/ML
2 INJECTION, SOLUTION INFILTRATION; PERINEURAL
Status: COMPLETED | OUTPATIENT
Start: 2019-11-26 | End: 2019-11-26

## 2019-11-26 RX ORDER — LIDOCAINE HYDROCHLORIDE 10 MG/ML
2 INJECTION, SOLUTION INFILTRATION; PERINEURAL
Status: COMPLETED | OUTPATIENT
Start: 2019-11-26 | End: 2019-11-26

## 2019-11-26 RX ADMIN — BUPIVACAINE HYDROCHLORIDE 2 ML: 2.5 INJECTION, SOLUTION INFILTRATION; PERINEURAL at 14:33

## 2019-11-26 RX ADMIN — LIDOCAINE HYDROCHLORIDE 2 ML: 10 INJECTION, SOLUTION INFILTRATION; PERINEURAL at 14:10

## 2019-11-26 RX ADMIN — BETAMETHASONE SODIUM PHOSPHATE AND BETAMETHASONE ACETATE 12 MG: 3; 3 INJECTION, SUSPENSION INTRA-ARTICULAR; INTRALESIONAL; INTRAMUSCULAR; SOFT TISSUE at 14:33

## 2019-11-26 RX ADMIN — BETAMETHASONE SODIUM PHOSPHATE AND BETAMETHASONE ACETATE 12 MG: 3; 3 INJECTION, SUSPENSION INTRA-ARTICULAR; INTRALESIONAL; INTRAMUSCULAR; SOFT TISSUE at 14:10

## 2019-11-26 RX ADMIN — LIDOCAINE HYDROCHLORIDE 2 ML: 10 INJECTION, SOLUTION INFILTRATION; PERINEURAL at 14:33

## 2019-11-26 RX ADMIN — BUPIVACAINE HYDROCHLORIDE 2 ML: 2.5 INJECTION, SOLUTION INFILTRATION; PERINEURAL at 14:10

## 2019-11-26 RX ADMIN — BETAMETHASONE SODIUM PHOSPHATE AND BETAMETHASONE ACETATE 12 MG: 3; 3 INJECTION, SUSPENSION INTRA-ARTICULAR; INTRALESIONAL; INTRAMUSCULAR; SOFT TISSUE at 14:32

## 2019-11-26 RX ADMIN — BUPIVACAINE HYDROCHLORIDE 2 ML: 2.5 INJECTION, SOLUTION INFILTRATION; PERINEURAL at 14:32

## 2019-11-26 RX ADMIN — LIDOCAINE HYDROCHLORIDE 2 ML: 10 INJECTION, SOLUTION INFILTRATION; PERINEURAL at 14:32

## 2019-11-26 NOTE — PROGRESS NOTES
Assessment:   Diagnosis ICD-10-CM Associated Orders   1  Pes anserinus bursitis of right knee M70 51 Large joint arthrocentesis: R anserine bursa   2  Chronic pain of left knee M25 562 Large joint arthrocentesis: L knee    G89 29 Large joint arthrocentesis: L anserine bursa     Injection procedure prior authorization   3  Primary osteoarthritis of left knee M17 12 Large joint arthrocentesis: L knee     Injection procedure prior authorization   4  Pes anserinus bursitis of left knee M70 52 Large joint arthrocentesis: L anserine bursa   5  Chronic bilateral low back pain without sciatica M54 5 Ambulatory referral to Physical Therapy    G89 29        Plan:  Diagnosis, treatment options and associated risks were discussed with the patient including no treatment, nonsurgical treatment and potential for surgical intervention  The patient was given the opportunity to ask questions regarding each  Patient was offered, accepted, performed cortisone injections x3  Bilateral pes bursa areas as well as left knee intra-articular joint  Patient tolerated all 3 injections exceptionally well  Ice and post injection protocol advised  Weightbearing activities as tolerated  We will petition her insurance for a round of viscosupplementation injections for ongoing treatment of her left knee known osteoarthritis  Also refer the patient for short course of formal physical therapy for her lumbar spine  To do next visit:  Return in about 6 weeks (around 1/7/2020) for re-check for visco #1 left knee  The above stated was discussed in layman's terms and the patient expressed understanding  All questions were answered to the patient's satisfaction         Scribe Attestation    I,:   Leroy Hawthorne am acting as a scribe while in the presence of the attending physician :        I,:   Caren Hull MD personally performed the services described in this documentation    as scribed in my presence :              Subjective:   Ranjith Bhat Yeimy Alvarez is a 59 y o  female who presents repeat evaluation of her bilateral knees  Patient is a history of a right total knee arthroplasty with subsequent revision  She has been finding relief with cortisone injections to her pes anserine bursa area for sensitive scar  Her left knee known osteoarthritis also finds approximately 1 month relief with intra-articular as well as pes anserine bursa injections  Her last set of injections were in August   She does find that her knees are stiff at times  She has difficulty getting up from a seated position  She does have chronic low back pain however denies radiculopathy  Denies any bowel or bladder issues  Denies any groin pain  Denies any fevers or chills or unexplained weight loss  Denies any numbness or tingling        Review of systems negative unless otherwise specified in HPI    Past Medical History:   Diagnosis Date    Arthritis     Bronchitis     Disease of thyroid gland     Diverticulitis     GERD (gastroesophageal reflux disease)     Glaucoma (increased eye pressure)     IBS (irritable bowel syndrome)     Insomnia     PONV (postoperative nausea and vomiting)        Past Surgical History:   Procedure Laterality Date    CHOLECYSTECTOMY      DILATION AND CURETTAGE OF UTERUS      FL GUIDED NEEDLE PLAC BX/ASP/INJ  12/13/2018    FL GUIDED NEEDLE PLAC BX/ASP/INJ  8/5/2019    HYSTERECTOMY  2006    JOINT REPLACEMENT      PARTIAL HYSTERECTOMY      HI REVISE KNEE JOINT REPLACE,ALL PARTS Right 1/22/2018    Procedure: ARTHROPLASTY KNEE TOTAL REVISION;  Surgeon: Jyoti Shore MD;  Location: [facility];  Service: Orthopedics    TONSILLECTOMY      TRABECULOPLASTY, LASER SELECTIVE Left 05/14/2019    VARICOSE VEIN SURGERY         Family History   Problem Relation Age of Onset    Pancreatic cancer Mother 72    Diabetes type II Mother     No Known Problems Sister     Heart disease Family     Hypertension Family     Breast cancer Family 80    Breast cancer Paternal Grandmother        Social History     Occupational History    Not on file   Tobacco Use    Smoking status: Never Smoker    Smokeless tobacco: Never Used   Substance and Sexual Activity    Alcohol use: Yes     Frequency: 2-3 times a week     Comment: social     Drug use: No    Sexual activity: Yes     Birth control/protection: Surgical         Current Outpatient Medications:     Sod Picosulfate-Mag Ox-Cit Acd (CLENPIQ) 10-3 5-12 MG-GM -GM/160ML SOLN, , Disp: , Rfl:     aspirin 81 MG tablet, Take 81 mg by mouth daily, Disp: , Rfl:     Calcium-Vitamin D-Vitamin K (VIACTIV PO), Take by mouth, Disp: , Rfl:     cephalexin (KEFLEX) 500 mg capsule, Take 4 pills po , 1 hour prior to Dental work, Disp: 12 capsule, Rfl: 3    Cetirizine HCl (ZYRTEC PO), , Disp: , Rfl:     cholecalciferol (VITAMIN D3) 1,000 units tablet, Take 4,000 Units by mouth daily, Disp: , Rfl:     ciprofloxacin (CIPRO) 250 mg tablet, Take 250 mg by mouth every 12 (twelve) hours, Disp: , Rfl: 0    DiphenhydrAMINE HCl (BENADRYL PO), Take by mouth as needed, Disp: , Rfl:     fluocinolone (SYNALAR) 0 01 % external solution, APPLY TO ITCHY SCALP AND NECK UP TO 10 DAYS THEN USE AS NEEDED FOR FLARE UP, Disp: , Rfl: 2    HYDROcodone-acetaminophen (NORCO) 5-325 mg per tablet, Take 1 tablet by mouth every 6 (six) hours as needed for pain for up to 20 dosesMax Daily Amount: 4 tablets (Patient not taking: Reported on 4/25/2019), Disp: 20 tablet, Rfl: 0    levothyroxine 112 mcg tablet, Take 112 mcg by mouth daily, Disp: , Rfl: 1    LUMIGAN 0 01 % ophthalmic drops, INSTILL 1 DROP IN EACH EYE EVERY DAY AT BEDTIME, Disp: , Rfl: 1    meloxicam (MOBIC) 15 mg tablet, Take 15 mg by mouth daily as needed, Disp: , Rfl: 2    methylPREDNISolone 4 MG tablet therapy pack, Use as directed on package, Disp: 1 each, Rfl: 0    metroNIDAZOLE (FLAGYL) 500 mg tablet, TAKE 1 TABLET BY MOUTH AT 7PM, 9PM, AND 11PM ON THE DAY PRIOR TO SURGERY, Disp: , Rfl: 0    metroNIDAZOLE (METROGEL) 1 % gel, Apply topically daily for 5 days, Disp: 45 g, Rfl: 0    mupirocin (BACTROBAN) 2 % ointment, Apply topically 3 (three) times a day, Disp: 22 g, Rfl: 0    naproxen sodium (ALEVE) 220 MG tablet, Take 220 mg by mouth, Disp: , Rfl:     neomycin (MYCIFRADIN) 500 mg tablet, TAKE 2 TABLETS BY MOUTH AT 7 PM  9PM  11PM  THE DAY PRIOR TO SURGERY, Disp: , Rfl: 0    Probiotic Product (PROBIOTIC DAILY PO), Take by mouth, Disp: , Rfl:     SUPREP BOWEL PREP KIT 17 5-3 13-1 6 GM/177ML SOLN, Take by mouth Take as directed, Disp: , Rfl: 0    traMADol (ULTRAM) 50 mg tablet, TAKE 1 TO 2 TABLETS BY MOUTH EVERY 6 HOURS AS NEEDED FOR MODERATE PAIN ( PAIN SCORE 4-6 ) MAXIMUM DAILY DOSE OF 8 PER DAY, Disp: , Rfl: 0    VENTOLIN  (90 Base) MCG/ACT inhaler, Inhale 2 puffs every 4 - 6 hours as needed, Disp: , Rfl: 2    Current Facility-Administered Medications:     cephalexin (KEFLEX) capsule 2,000 mg, 2,000 mg, Oral, 60 Min Pre-Op, Gracie Smith MD    Allergies   Allergen Reactions    Codeine GI Intolerance    Morphine Itching            Vitals:    11/26/19 1344   BP: 131/84   Pulse: 84       Objective:                    Right Knee Exam     Muscle Strength   The patient has normal right knee strength  Tenderness   The patient is experiencing tenderness in the pes anserinus  Range of Motion   The patient has normal right knee ROM  Extension: 0   Right knee flexion: > 120 degrees  Tests   Varus: negative Valgus: negative    Other   Erythema: absent  Sensation: normal  Swelling: none  Effusion: no effusion present    Comments:    Healed anterior incision with her knee in neutral alignment      Left Knee Exam     Muscle Strength   The patient has normal left knee strength  Tenderness   The patient is experiencing tenderness in the medial joint line and pes anserinus  Range of Motion   The patient has normal left knee ROM    Extension: 0   Flexion: 120 (with crepitation and stiffness)     Tests   Varus: negative Valgus: negative    Other   Erythema: absent  Sensation: normal  Swelling: none  Effusion: no effusion present    Comments:    Mild varus alignment      Right Hip Exam     Tenderness   The patient is experiencing no tenderness  Range of Motion   The patient has normal right hip ROM  Left Hip Exam     Tenderness   The patient is experiencing no tenderness  Range of Motion   The patient has normal left hip ROM  Back Exam     Tenderness   Back tenderness location: mild diffuse tenderness paralumbar musculature  Range of Motion   Extension: abnormal Back extension: restricted with discomfort  Flexion: normal   Lateral bend right: normal   Lateral bend left: normal     Muscle Strength   The patient has normal back strength      Tests   Straight leg raise right: negative  Straight leg raise left: negative    Other   Toe walk: normal  Heel walk: normal  Sensation: normal  Gait: normal             Diagnostics, reviewed and taken today if performed as documented:    None performed          Procedures, if performed today:    Large joint arthrocentesis: R anserine bursa  Date/Time: 11/26/2019 2:10 PM  Consent given by: patient  Site marked: site marked  Supporting Documentation  Indications: pain   Procedure Details  Location: knee - R anserine bursa  Preparation: Patient was prepped and draped in the usual sterile fashion  Needle size: 22 G  Ultrasound guidance: no  Approach: medial  Medications administered: 2 mL bupivacaine 0 25 %; 2 mL lidocaine 1 %; 12 mg betamethasone acetate-betamethasone sodium phosphate 6 (3-3) mg/mL    Patient tolerance: patient tolerated the procedure well with no immediate complications  Dressing:  Sterile dressing applied    Large joint arthrocentesis: L knee  Date/Time: 11/26/2019 2:32 PM  Consent given by: patient  Site marked: site marked  Supporting Documentation  Indications: pain   Procedure Details  Location: knee - L knee  Preparation: Patient was prepped and draped in the usual sterile fashion  Needle size: 22 G  Ultrasound guidance: no  Approach: anterolateral  Medications administered: 12 mg betamethasone acetate-betamethasone sodium phosphate 6 (3-3) mg/mL; 2 mL bupivacaine 0 25 %; 2 mL lidocaine 1 %    Patient tolerance: patient tolerated the procedure well with no immediate complications  Dressing:  Sterile dressing applied    Large joint arthrocentesis: L anserine bursa  Date/Time: 11/26/2019 2:33 PM  Consent given by: patient  Site marked: site marked  Supporting Documentation  Indications: pain   Procedure Details  Location: knee - L anserine bursa  Preparation: Patient was prepped and draped in the usual sterile fashion  Needle size: 22 G  Ultrasound guidance: no  Approach: medial  Medications administered: 12 mg betamethasone acetate-betamethasone sodium phosphate 6 (3-3) mg/mL; 2 mL bupivacaine 0 25 %; 2 mL lidocaine 1 %    Patient tolerance: patient tolerated the procedure well with no immediate complications  Dressing:  Sterile dressing applied              Portions of the record may have been created with voice recognition software  Occasional wrong word or "sound a like" substitutions may have occurred due to the inherent limitations of voice recognition software  Read the chart carefully and recognize, using context, where substitutions have occurred

## 2019-12-02 ENCOUNTER — TRANSCRIBE ORDERS (OUTPATIENT)
Dept: ADMINISTRATIVE | Facility: HOSPITAL | Age: 64
End: 2019-12-02

## 2019-12-02 DIAGNOSIS — Z12.31 VISIT FOR SCREENING MAMMOGRAM: Primary | ICD-10-CM

## 2019-12-06 ENCOUNTER — APPOINTMENT (OUTPATIENT)
Dept: PHYSICAL THERAPY | Facility: MEDICAL CENTER | Age: 64
End: 2019-12-06
Payer: COMMERCIAL

## 2019-12-09 ENCOUNTER — EVALUATION (OUTPATIENT)
Dept: PHYSICAL THERAPY | Facility: MEDICAL CENTER | Age: 64
End: 2019-12-09
Payer: COMMERCIAL

## 2019-12-09 ENCOUNTER — TELEPHONE (OUTPATIENT)
Dept: OBGYN CLINIC | Facility: HOSPITAL | Age: 64
End: 2019-12-09

## 2019-12-09 DIAGNOSIS — M54.50 CHRONIC BILATERAL LOW BACK PAIN WITHOUT SCIATICA: Primary | ICD-10-CM

## 2019-12-09 DIAGNOSIS — G89.29 CHRONIC BILATERAL LOW BACK PAIN WITHOUT SCIATICA: Primary | ICD-10-CM

## 2019-12-09 PROCEDURE — 97161 PT EVAL LOW COMPLEX 20 MIN: CPT | Performed by: PHYSICAL THERAPIST

## 2019-12-09 NOTE — TELEPHONE ENCOUNTER
TO BE COMPLETED BY CENTRAL AUTH TEAM:     Physician: DR Annelle Moritz    Medication: Kayley Doss    Number of Injections in Series (Appointments scheduled 1 week apart from one another): 3    Schedule after this date: 12/31/19    Billing Info: Buy and Bill/Specialty Pharmacy-Patient Supply>> BUY&BILL    Appointment Message Line: LEFT KNEE MUFBRHEM#[phone],3,9 BUY&BILL  (please copy and paste appointment message line when scheduling appointment)    Additional Comments: EXP DATES 11/27/19 - 5/26/20 LAST CSI 11/27/19

## 2019-12-10 NOTE — PROGRESS NOTES
PT Evaluation     Today's date: 2019  Patient name: Juanito Ulrich  : 1955  MRN: [id number]  Referring provider: Nikky Mcdowell MD  Dx:   Encounter Diagnosis     ICD-10-CM    1  Chronic bilateral low back pain without sciatica M54 5 Ambulatory referral to Physical Therapy    G89 29                   Assessment  Assessment details: Juanito Ulrich is a 59 y o  female was evaluated on 12/10/2019  for Chronic bilateral low back pain without sciatica  (primary encounter diagnosis)  Juanito Ulrich has the above listed impairments resulting in functional deficits and negative impact to quality of life  Patient is appropriate for skilled PT intervention to promote maximal return to function and patient specific goals  Patient agrees with outlined treatment plan and all questions were answered to their satisfaction  Impairments: abnormal muscle firing, abnormal muscle tone, abnormal or restricted ROM, impaired physical strength, lacks appropriate home exercise program and pain with function  Understanding of Dx/Px/POC: good   Prognosis: good    Goals  Patient will successfully transition to home exercise program   Patient will be able to manage symptoms independently        Plan  Patient would benefit from: skilled PT  Referral necessary: No  Planned modality interventions: thermotherapy: hydrocollator packs  Planned therapy interventions: home exercise program, manual therapy, neuromuscular re-education, patient education, functional ROM exercises, strengthening, stretching, joint mobilization, graded activity, graded exercise, therapeutic exercise, body mechanics training, motor coordination training and activity modification  Frequency: 1x week  Duration in weeks: 12  Treatment plan discussed with: patient        Subjective Evaluation    History of Present Illness  Mechanism of injury: Jayda Batres is a 59 y o  female presenting to therapy with complaints of low back pain and bilateral knee pain   History of right knee replacement and revision and left knee is due for replacement  She notes that her pain is much worse when standing for too long and is located in anterior knees  She gets frequent and regular injections to control pain  She is hoping to find some relief of back and knee pain to put off left TKA    Pain  Current pain ratin  At best pain rating: 3  At worst pain ratin  Quality: dull ache and pressure          Objective     Palpation   Left   Tenderness of the medial gastrocnemius and rectus femoris  Right   Tenderness of the medial gastrocnemius and rectus femoris       Neurological Testing     Sensation     Lumbar   Left   Intact: light touch    Right   Intact: light touch    Reflexes   Left   Patellar (L4): normal (2+)  Achilles (S1): normal (2+)    Right   Patellar (L4): normal (2+)  Achilles (S1): normal (2+)    Active Range of Motion     Lumbar   Flexion:  Restriction level: moderate  Extension:  Restriction level: maximal    Passive Range of Motion   Left Hip   Normal passive range of motion    Right Hip   Normal passive range of motion  Left Knee   Normal passive range of motion    Right Knee   Flexion: 105 degrees   Extension: WFL    Strength/Myotome Testing     Left Hip   Planes of Motion   Flexion: 4-  Extension: 3+  Abduction: 3+  Adduction: 4-    Right Hip   Planes of Motion   Flexion: 4-  Extension: 3+  Abduction: 3+  Adduction: 4-             Precautions: none      Manual                                                                                   Exercise Diary                                                                                                                                                                                                                                                                                      Modalities

## 2019-12-12 NOTE — PROGRESS NOTES
Established Patient Progress Note       Chief Complaint   Patient presents with    Hypothyroidism        History of Present Illness:     Yakelin Lamas is a 59 y o  female with a history of history of hypothyroidism due to hashimoto's thyroiditis and prediabetes  For the hypothyroidism she is currently taking Levothyroxine 112 mcg daily  She is taking this regularly and properly  Her most recent thyroid function test was normal  She denies symptoms of hypo or hyperthyroidism   For the prediabetes she is working on improving diet and exercise            Patient Active Problem List   Diagnosis    History of total knee arthroplasty    Orthopedic aftercare    Aftercare following right knee joint replacement surgery    Arthritis of foot, right    Chronic pain of left knee    Pes anserine bursitis    Primary osteoarthritis of left knee    Hypothyroidism    Hyperlipidemia    Elevated hemoglobin A1c    Vitamin D deficiency    Arthritis of left knee    Prediabetes      Past Medical History:   Diagnosis Date    Arthritis     Bronchitis     Disease of thyroid gland     Diverticulitis     GERD (gastroesophageal reflux disease)     Glaucoma (increased eye pressure)     IBS (irritable bowel syndrome)     Insomnia     PONV (postoperative nausea and vomiting)       Past Surgical History:   Procedure Laterality Date    CHOLECYSTECTOMY      DILATION AND CURETTAGE OF UTERUS      FL GUIDED NEEDLE PLAC BX/ASP/INJ  12/13/2018    FL GUIDED NEEDLE PLAC BX/ASP/INJ  8/5/2019    HYSTERECTOMY  2006    JOINT REPLACEMENT      PARTIAL HYSTERECTOMY      PA REVISE KNEE JOINT REPLACE,ALL PARTS Right 1/22/2018    Procedure: ARTHROPLASTY KNEE TOTAL REVISION;  Surgeon: Vi Rodriguez MD;  Location: [facility];  Service: Orthopedics    TONSILLECTOMY      TRABECULOPLASTY, LASER SELECTIVE Left 05/14/2019    VARICOSE VEIN SURGERY        Family History   Problem Relation Age of Onset    Pancreatic cancer Mother 72  Diabetes type II Mother     No Known Problems Sister     Heart disease Family     Hypertension Family     Breast cancer Family 80    Breast cancer Paternal Grandmother      Social History     Tobacco Use    Smoking status: Never Smoker    Smokeless tobacco: Never Used   Substance Use Topics    Alcohol use: Yes     Frequency: 2-3 times a week     Comment: social      Allergies   Allergen Reactions    Codeine GI Intolerance    Morphine Itching       Current Outpatient Medications:     cholecalciferol (VITAMIN D3) 1,000 units tablet, Take 4,000 Units by mouth daily, Disp: , Rfl:     DiphenhydrAMINE HCl (BENADRYL PO), Take by mouth as needed, Disp: , Rfl:     fluocinolone (SYNALAR) 0 01 % external solution, APPLY TO ITCHY SCALP AND NECK UP TO 10 DAYS THEN USE AS NEEDED FOR FLARE UP, Disp: , Rfl: 2    levothyroxine 112 mcg tablet, Take 112 mcg by mouth daily, Disp: , Rfl: 1    LUMIGAN 0 01 % ophthalmic drops, INSTILL 1 DROP IN EACH EYE EVERY DAY AT BEDTIME, Disp: , Rfl: 1    naproxen sodium (ALEVE) 220 MG tablet, Take 220 mg by mouth, Disp: , Rfl:     Probiotic Product (PROBIOTIC DAILY PO), Take by mouth, Disp: , Rfl:     aspirin 81 MG tablet, Take 81 mg by mouth daily, Disp: , Rfl:     Calcium-Vitamin D-Vitamin K (VIACTIV PO), Take by mouth, Disp: , Rfl:     Cetirizine HCl (ZYRTEC PO), , Disp: , Rfl:     ciprofloxacin (CIPRO) 250 mg tablet, Take 250 mg by mouth every 12 (twelve) hours, Disp: , Rfl: 0    HYDROcodone-acetaminophen (NORCO) 5-325 mg per tablet, Take 1 tablet by mouth every 6 (six) hours as needed for pain for up to 20 dosesMax Daily Amount: 4 tablets (Patient not taking: Reported on 4/25/2019), Disp: 20 tablet, Rfl: 0    meloxicam (MOBIC) 15 mg tablet, Take 15 mg by mouth daily as needed, Disp: , Rfl: 2    methylPREDNISolone 4 MG tablet therapy pack, Use as directed on package, Disp: 1 each, Rfl: 0    metroNIDAZOLE (FLAGYL) 500 mg tablet, TAKE 1 TABLET BY MOUTH AT 7PM, 9PM, AND 11PM ON THE DAY PRIOR TO SURGERY, Disp: , Rfl: 0    metroNIDAZOLE (METROGEL) 1 % gel, Apply topically daily for 5 days, Disp: 45 g, Rfl: 0    mupirocin (BACTROBAN) 2 % ointment, Apply topically 3 (three) times a day, Disp: 22 g, Rfl: 0    neomycin (MYCIFRADIN) 500 mg tablet, TAKE 2 TABLETS BY MOUTH AT 7 PM  9PM  11PM  THE DAY PRIOR TO SURGERY, Disp: , Rfl: 0    Sod Picosulfate-Mag Ox-Cit Acd (CLENPIQ) 10-3 5-12 MG-GM -GM/160ML SOLN, , Disp: , Rfl:     SUPREP BOWEL PREP KIT 17 5-3 13-1 6 GM/177ML SOLN, Take by mouth Take as directed, Disp: , Rfl: 0    traMADol (ULTRAM) 50 mg tablet, TAKE 1 TO 2 TABLETS BY MOUTH EVERY 6 HOURS AS NEEDED FOR MODERATE PAIN ( PAIN SCORE 4-6 ) MAXIMUM DAILY DOSE OF 8 PER DAY, Disp: , Rfl: 0    Current Facility-Administered Medications:     cephalexin (KEFLEX) capsule 2,000 mg, 2,000 mg, Oral, 60 Min Pre-Op, Dyllan Robles MD    Review of Systems   Constitutional: Negative for chills and fever  HENT: Negative for sore throat and trouble swallowing  Eyes: Negative for visual disturbance  Respiratory: Negative for shortness of breath  Cardiovascular: Negative for chest pain and palpitations  Gastrointestinal: Negative for abdominal pain, constipation and diarrhea  Endocrine: Negative for cold intolerance, heat intolerance, polydipsia, polyphagia and polyuria  Genitourinary: Negative for frequency  Musculoskeletal: Negative for arthralgias and myalgias  Skin: Negative for rash  Neurological: Negative for dizziness and tremors  Hematological: Negative for adenopathy  Psychiatric/Behavioral: Negative for sleep disturbance  All other systems reviewed and are negative  Physical Exam:  Body mass index is 30 16 kg/m²    /74   Pulse 81   Ht 5' 1" (1 549 m)   Wt 72 4 kg (159 lb 9 6 oz)   LMP  (LMP Unknown)   BMI 30 16 kg/m²    Wt Readings from Last 3 Encounters:   12/13/19 72 4 kg (159 lb 9 6 oz)   11/26/19 70 3 kg (155 lb)   10/30/19 72 6 kg (160 lb) Physical Exam   Constitutional: She is oriented to person, place, and time  She appears well-developed and well-nourished  No distress  HENT:   Head: Normocephalic and atraumatic  Eyes: Pupils are equal, round, and reactive to light  Conjunctivae are normal    Neck: Normal range of motion  Neck supple  No thyromegaly present  Cardiovascular: Normal rate, regular rhythm and normal heart sounds  Pulmonary/Chest: Effort normal and breath sounds normal  No respiratory distress  She has no wheezes  She has no rales  Abdominal: Soft  Bowel sounds are normal  She exhibits no distension  There is no tenderness  Musculoskeletal: Normal range of motion  She exhibits no edema  Neurological: She is alert and oriented to person, place, and time  Skin: Skin is warm and dry  Psychiatric: She has a normal mood and affect  Vitals reviewed  Labs:       10/09/19    TSH 1 30, fasting glucose 115    Impression & Plan:    Problem List Items Addressed This Visit        Endocrine    Hypothyroidism - Primary     Continue current dose of Levothyroxine, check thyroid function test prior to next visit          Relevant Orders    TSH, 3rd generation    T4, free       Other    Prediabetes     Focus on dietary and lifestyle modifications  Check A1C and fasting glucose prior to next visit  Relevant Orders    Hemoglobin A1C    Comprehensive metabolic panel          Orders Placed This Encounter   Procedures    TSH, 3rd generation     This is a patient instruction: This test is non-fasting  Please drink two glasses of water morning of bloodwork          Standing Status:   Future     Standing Expiration Date:   12/13/2020    T4, free     Standing Status:   Future     Standing Expiration Date:   12/13/2020    Hemoglobin A1C     Standing Status:   Future     Standing Expiration Date:   12/13/2020    Comprehensive metabolic panel     This is a patient instruction: Patient fasting for 8 hours or longer recommended  Standing Status:   Future     Standing Expiration Date:   12/13/2020         Discussed with the patient and all questioned fully answered  She will call me if any problems arise  Follow-up appointment in 6 months       Counseled patient on diagnostic results, prognosis, risk and benefit of treatment options, instruction for management, importance of treatment compliance, Risk  factor reduction and impressions      Cipriano Polanco [id number]

## 2019-12-13 ENCOUNTER — OFFICE VISIT (OUTPATIENT)
Dept: ENDOCRINOLOGY | Facility: CLINIC | Age: 64
End: 2019-12-13
Payer: COMMERCIAL

## 2019-12-13 VITALS
WEIGHT: 159.6 LBS | SYSTOLIC BLOOD PRESSURE: 118 MMHG | HEART RATE: 81 BPM | BODY MASS INDEX: 30.13 KG/M2 | DIASTOLIC BLOOD PRESSURE: 74 MMHG | HEIGHT: 61 IN

## 2019-12-13 DIAGNOSIS — E03.8 HYPOTHYROIDISM DUE TO HASHIMOTO'S THYROIDITIS: Primary | ICD-10-CM

## 2019-12-13 DIAGNOSIS — R73.03 PREDIABETES: ICD-10-CM

## 2019-12-13 DIAGNOSIS — E06.3 HYPOTHYROIDISM DUE TO HASHIMOTO'S THYROIDITIS: Primary | ICD-10-CM

## 2019-12-13 PROCEDURE — 99214 OFFICE O/P EST MOD 30 MIN: CPT | Performed by: NURSE PRACTITIONER

## 2019-12-20 ENCOUNTER — OFFICE VISIT (OUTPATIENT)
Dept: PHYSICAL THERAPY | Facility: MEDICAL CENTER | Age: 64
End: 2019-12-20
Payer: COMMERCIAL

## 2019-12-20 DIAGNOSIS — M54.50 CHRONIC BILATERAL LOW BACK PAIN WITHOUT SCIATICA: Primary | ICD-10-CM

## 2019-12-20 DIAGNOSIS — G89.29 CHRONIC BILATERAL LOW BACK PAIN WITHOUT SCIATICA: Primary | ICD-10-CM

## 2019-12-20 PROCEDURE — 97140 MANUAL THERAPY 1/> REGIONS: CPT | Performed by: PHYSICAL THERAPIST

## 2019-12-20 PROCEDURE — 97110 THERAPEUTIC EXERCISES: CPT | Performed by: PHYSICAL THERAPIST

## 2019-12-20 NOTE — PROGRESS NOTES
Daily Note     Today's date: 2019  Patient name: Geraldo Ojeda  : 1955  MRN: [id number]  Referring provider: Adria Patino MD  Dx:   Encounter Diagnosis     ICD-10-CM    1  Chronic bilateral low back pain without sciatica M54 5     G89 29                   Subjective: Nubia Rolon reports that her back is feeling better with stretching, knees continue to bother her       Objective: See treatment diary below      Assessment: Tolerated treatment well  Patient tolerated knee flexion mobilizations well with MFR  Progressed strengthening to include additional quad and hip strength  Plan: Continue per plan of care        Precautions: none      Manual              Knee flex mob AF                                                                    Exercise Diary              Bridges 3X10            SAQ 3#  3x10            Prone quad stretch 30 sec  X3                                                                                                                                                                                                                                             Modalities

## 2019-12-30 ENCOUNTER — OFFICE VISIT (OUTPATIENT)
Dept: PHYSICAL THERAPY | Facility: MEDICAL CENTER | Age: 64
End: 2019-12-30
Payer: COMMERCIAL

## 2019-12-30 DIAGNOSIS — G89.29 CHRONIC BILATERAL LOW BACK PAIN WITHOUT SCIATICA: Primary | ICD-10-CM

## 2019-12-30 DIAGNOSIS — M54.50 CHRONIC BILATERAL LOW BACK PAIN WITHOUT SCIATICA: Primary | ICD-10-CM

## 2019-12-30 PROCEDURE — 97140 MANUAL THERAPY 1/> REGIONS: CPT | Performed by: PHYSICAL THERAPIST

## 2019-12-30 NOTE — PROGRESS NOTES
Daily Note     Today's date: 2019  Patient name: Galo Millre  : 1955  MRN: [id number]  Referring provider: Miguel Rodríguez MD  Dx:   Encounter Diagnosis     ICD-10-CM    1  Chronic bilateral low back pain without sciatica M54 5     G89 29                   Subjective: Lyndall Boast reports that her knees were sore after lots of activity over holiday  They feel better after stretching         Objective: See treatment diary below      Assessment: Tolerated treatment well  Patient exhibited good technique with therapeutic exercises and would benefit from continued PT to address myofascial restrictions to quad and improve core strength       Plan: Continue per plan of care        Precautions: none      Manual             Knee flex mob AF AF           MFR to quad  AF                                                      Exercise Diary              Bridges 3X10            SAQ 3#  3x10            Prone quad stretch 30 sec  X3 30 sec  x3                                                                                                                                                                                                                                            Modalities

## 2020-01-06 ENCOUNTER — OFFICE VISIT (OUTPATIENT)
Dept: PHYSICAL THERAPY | Facility: MEDICAL CENTER | Age: 65
End: 2020-01-06
Payer: COMMERCIAL

## 2020-01-06 DIAGNOSIS — M54.50 CHRONIC BILATERAL LOW BACK PAIN WITHOUT SCIATICA: Primary | ICD-10-CM

## 2020-01-06 DIAGNOSIS — G89.29 CHRONIC BILATERAL LOW BACK PAIN WITHOUT SCIATICA: Primary | ICD-10-CM

## 2020-01-06 PROCEDURE — 97140 MANUAL THERAPY 1/> REGIONS: CPT | Performed by: PHYSICAL THERAPIST

## 2020-01-06 NOTE — PROGRESS NOTES
Daily Note     Today's date: 2020  Patient name: Servando An  : 1955  MRN: [id number]  Referring provider: Eyal Linda MD  Dx:   Encounter Diagnosis     ICD-10-CM    1  Chronic bilateral low back pain without sciatica M54 5     G89 29                   Subjective: Miguel Welsh reports that her knees are more sore today given cold and damp weather       Objective: See treatment diary below      Assessment: Tolerated treatment well  Patient receives reduction in pain and soreness post myofascial release to medial quad  Plan: Continue per plan of care        Precautions: none      Manual   1/6          Knee flex mob AF AF AF          MFR to quad  AF AF                                                     Exercise Diary              Bridges 3X10            SAQ 3#  3x10            Prone quad stretch 30 sec  X3 30 sec  x3                                                                                                                                                                                                                                            Modalities

## 2020-01-07 ENCOUNTER — TELEPHONE (OUTPATIENT)
Dept: OBGYN CLINIC | Facility: OTHER | Age: 65
End: 2020-01-07

## 2020-01-07 NOTE — TELEPHONE ENCOUNTER
Attempted to contact patient to get er scheduled for her Euflexxa injections  I left a message and asked her to call us back at her convenience  Please refer to [facility] last phone encounter to schedule

## 2020-01-10 NOTE — TELEPHONE ENCOUNTER
Patient is calling back in wanting to schedule her injections but also wanted us to be aware that starting next month (February) she is going to be having another insurance Medicare  She is wanting to know if another authorization is going to be needed with this change of insurance or if she is ok to just go ahead and schedule  Patient is going away on vacation on 1/16 so it would have to be after that  Please advise, patient is requesting a call back    Call back# [phone]

## 2020-01-13 ENCOUNTER — APPOINTMENT (OUTPATIENT)
Dept: PHYSICAL THERAPY | Facility: MEDICAL CENTER | Age: 65
End: 2020-01-13
Payer: COMMERCIAL

## 2020-01-15 ENCOUNTER — TELEPHONE (OUTPATIENT)
Dept: OBGYN CLINIC | Facility: OTHER | Age: 65
End: 2020-01-15

## 2020-01-15 NOTE — TELEPHONE ENCOUNTER
Left voicemail for patient stating I am trying to get her scheduled for her Left knee Euflexxa injection  Stated I reached out to her on 01/07/2020 as well  I asked her to kindly follow up with us so we can get her scheduled  Please refer to Sarthak Recinos' telephone encounter to schedule accordingly

## 2020-01-16 NOTE — TELEPHONE ENCOUNTER
Patient informed me that she will no longer have CBC as of February she will have Medicare as primary insurance  She will bring in her cards to be scanned into system / no auth required since medicare will be primary  Scheduled appointments already with Dr Ezra Viera

## 2020-01-31 ENCOUNTER — HOSPITAL ENCOUNTER (OUTPATIENT)
Dept: MAMMOGRAPHY | Facility: MEDICAL CENTER | Age: 65
Discharge: HOME | End: 2020-01-31
Payer: COMMERCIAL

## 2020-01-31 VITALS — HEIGHT: 61 IN | WEIGHT: 159 LBS | BODY MASS INDEX: 30.02 KG/M2

## 2020-01-31 DIAGNOSIS — Z12.31 VISIT FOR SCREENING MAMMOGRAM: ICD-10-CM

## 2020-01-31 PROCEDURE — 77063 BREAST TOMOSYNTHESIS BI: CPT

## 2020-01-31 PROCEDURE — 77067 SCR MAMMO BI INCL CAD: CPT

## 2020-02-10 RX ORDER — GLUCOSAMINE/D3/BOSWELLIA SERRA 1500MG-400
TABLET ORAL 2 TIMES DAILY
COMMUNITY
Start: 2019-12-06

## 2020-02-10 RX ORDER — ALBUTEROL SULFATE 90 UG/1
AEROSOL, METERED RESPIRATORY (INHALATION)
COMMUNITY
Start: 2019-06-04 | End: 2020-03-04 | Stop reason: ALTCHOICE

## 2020-02-10 RX ORDER — MELOXICAM 15 MG/1
TABLET ORAL
COMMUNITY
Start: 2018-11-27 | End: 2020-03-04 | Stop reason: ALTCHOICE

## 2020-02-10 RX ORDER — MECLIZINE HCL 12.5 MG/1
TABLET ORAL AS NEEDED
COMMUNITY
Start: 2018-08-29 | End: 2021-10-19 | Stop reason: SDUPTHER

## 2020-02-10 RX ORDER — BIOTIN 1 MG
TABLET ORAL
COMMUNITY
Start: 2018-12-06 | End: 2020-03-04 | Stop reason: ALTCHOICE

## 2020-02-11 ENCOUNTER — OFFICE VISIT (OUTPATIENT)
Dept: OBGYN CLINIC | Facility: HOSPITAL | Age: 65
End: 2020-02-11
Payer: MEDICARE

## 2020-02-11 VITALS
SYSTOLIC BLOOD PRESSURE: 146 MMHG | HEART RATE: 77 BPM | WEIGHT: 158 LBS | HEIGHT: 61 IN | BODY MASS INDEX: 29.83 KG/M2 | DIASTOLIC BLOOD PRESSURE: 95 MMHG

## 2020-02-11 DIAGNOSIS — M17.12 ARTHRITIS OF LEFT KNEE: ICD-10-CM

## 2020-02-11 DIAGNOSIS — G89.29 CHRONIC PAIN OF LEFT KNEE: Primary | ICD-10-CM

## 2020-02-11 DIAGNOSIS — M25.562 CHRONIC PAIN OF LEFT KNEE: Primary | ICD-10-CM

## 2020-02-11 PROCEDURE — 20610 DRAIN/INJ JOINT/BURSA W/O US: CPT | Performed by: PHYSICIAN ASSISTANT

## 2020-02-11 RX ORDER — HYALURONATE SODIUM 10 MG/ML
20 SYRINGE (ML) INTRAARTICULAR
Status: COMPLETED | OUTPATIENT
Start: 2020-02-11 | End: 2020-02-11

## 2020-02-11 RX ADMIN — Medication 20 MG: at 16:01

## 2020-02-11 NOTE — PROGRESS NOTES
Subjective;    49-year-old adult female, with known history of osteoarthritic changes to the left knee  She achieves temporary benefit by periodic injections, she desires a longer lasting alternative  She is been preauthorized to receive Euflexxa brand Visco supplement injections      Past Medical History:   Diagnosis Date    Arthritis     BPPV (benign paroxysmal positional vertigo)     Bronchitis     Disease of thyroid gland     HYPO    Diverticulitis     GERD (gastroesophageal reflux disease)     Glaucoma (increased eye pressure)      2 para 2     IBS (irritable bowel syndrome)     Insomnia     PONV (postoperative nausea and vomiting)        Past Surgical History:   Procedure Laterality Date    CHOLECYSTECTOMY      DILATION AND CURETTAGE OF UTERUS      FL GUIDED NEEDLE PLAC BX/ASP/INJ  2018    FL GUIDED NEEDLE PLAC BX/ASP/INJ  2019    HYSTERECTOMY  2006    JOINT REPLACEMENT      MAMMO (HISTORICAL) Bilateral 2018    PARTIAL HYSTERECTOMY      ND REVISE KNEE JOINT REPLACE,ALL PARTS Right 2018    Procedure: ARTHROPLASTY KNEE TOTAL REVISION;  Surgeon: Chadd Dawson MD;  [facility];  Service: Orthopedics    TONSILLECTOMY      TRABECULOPLASTY, LASER SELECTIVE Left 2019    VARICOSE VEIN SURGERY         Family History   Problem Relation Age of Onset    Pancreatic cancer Mother 72    Diabetes type II Mother     Heart disease Father     No Known Problems Sister     Heart disease Family     Hypertension Family     Breast cancer Family 80    Breast cancer Paternal Grandmother     Cancer Maternal Grandfather     Lung disease Paternal Grandfather     No Known Problems Daughter     No Known Problems Maternal Grandmother     No Known Problems Daughter     No Known Problems Maternal Aunt     No Known Problems Maternal Aunt     No Known Problems Maternal Aunt     No Known Problems Maternal Aunt        Social History     Tobacco Use    Smoking status: Never Smoker    Smokeless tobacco: Never Used   Substance Use Topics    Alcohol use: Yes     Frequency: 2-3 times a week     Comment: social     Drug use: No     Exam;    Her left knee is devoid of any overlying bruising or redness of preclude safe administration of medication  She has significant patellofemoral grating as well as medial greater than lateral joint line tenderness    Large joint arthrocentesis  Date/Time: 2/11/2020 4:01 PM  Procedure Details  Medications administered: 20 mg Sodium Hyaluronate 20 MG/2ML    Patient tolerance: patient tolerated the procedure well with no immediate complications  Dressing:  Sterile dressing applied       Impression; Left knee osteoarthritis  Chronic left knee pain    Plan;    She underwent injection 1    Of the 3 shot series to her left knee  Anterolateral approach was chosen  Band-Aid was applied to the puncture site she will return next week for injection 2 of the 3 shot series  Her experience was supervised by and plan formulated by the attending, was my privilege to assist him in care of this individual

## 2020-02-12 DIAGNOSIS — M79.672 PAIN IN LEFT FOOT: Primary | ICD-10-CM

## 2020-02-18 ENCOUNTER — OFFICE VISIT (OUTPATIENT)
Dept: OBGYN CLINIC | Facility: HOSPITAL | Age: 65
End: 2020-02-18
Payer: MEDICARE

## 2020-02-18 VITALS
BODY MASS INDEX: 29.85 KG/M2 | DIASTOLIC BLOOD PRESSURE: 77 MMHG | HEART RATE: 81 BPM | HEIGHT: 61 IN | SYSTOLIC BLOOD PRESSURE: 117 MMHG

## 2020-02-18 DIAGNOSIS — G89.29 CHRONIC PAIN OF LEFT KNEE: Primary | ICD-10-CM

## 2020-02-18 DIAGNOSIS — M25.562 CHRONIC PAIN OF LEFT KNEE: Primary | ICD-10-CM

## 2020-02-18 DIAGNOSIS — M17.12 PRIMARY OSTEOARTHRITIS OF LEFT KNEE: ICD-10-CM

## 2020-02-18 PROCEDURE — 20610 DRAIN/INJ JOINT/BURSA W/O US: CPT | Performed by: PHYSICIAN ASSISTANT

## 2020-02-18 RX ORDER — HYALURONATE SODIUM 10 MG/ML
20 SYRINGE (ML) INTRAARTICULAR
Status: COMPLETED | OUTPATIENT
Start: 2020-02-18 | End: 2020-02-18

## 2020-02-18 RX ADMIN — Medication 20 MG: at 11:30

## 2020-02-18 NOTE — PROGRESS NOTES
Subjective; [de-identified] year old female with established osteoarthritis left knee  She is undergoing 3 shot regimen of Euflexxa  She feels that injection 1 is already starting to work    She presents today for injection 2 of the 3 shot series    Past Medical History:   Diagnosis Date    Arthritis     BPPV (benign paroxysmal positional vertigo)     Bronchitis     Disease of thyroid gland     HYPO    Diverticulitis     GERD (gastroesophageal reflux disease)     Glaucoma (increased eye pressure)      2 para 2     IBS (irritable bowel syndrome)     Insomnia     PONV (postoperative nausea and vomiting)        Past Surgical History:   Procedure Laterality Date    CHOLECYSTECTOMY      DILATION AND CURETTAGE OF UTERUS      FL GUIDED NEEDLE PLAC BX/ASP/INJ  2018    FL GUIDED NEEDLE PLAC BX/ASP/INJ  2019    HYSTERECTOMY  2006    JOINT REPLACEMENT      MAMMO (HISTORICAL) Bilateral 2018    PARTIAL HYSTERECTOMY      [place] REVISE KNEE JOINT REPLACE,ALL PARTS Right 2018    Procedure: ARTHROPLASTY KNEE TOTAL REVISION;  Surgeon: David Pak MD;  [facility];  Service: Orthopedics    TONSILLECTOMY      TRABECULOPLASTY, LASER SELECTIVE Left 2019    VARICOSE VEIN SURGERY         Family History   Problem Relation Age of Onset    Pancreatic cancer Mother 72    Diabetes type II Mother     Heart disease Father     No Known Problems Sister     Heart disease Family     Hypertension Family     Breast cancer Family 80    Breast cancer Paternal Grandmother     Cancer Maternal Grandfather     Lung disease Paternal Grandfather     No Known Problems Daughter     No Known Problems Maternal Grandmother     No Known Problems Daughter     No Known Problems Maternal Aunt     No Known Problems Maternal Aunt     No Known Problems Maternal Aunt     No Known Problems Maternal Aunt        Social History     Tobacco Use    Smoking status: Never Smoker    Smokeless tobacco: Never Used   Substance Use Topics    Alcohol use: Yes     Frequency: 2-3 times a week     Comment: social     Drug use: No     Exam;    Her left knee has subtle bruising located directly over the previous puncture site the anterolateral approach or lateral parapatellar approach to the knee    Her knee allows safe administration of product from a medial parapatellar tendon approach today    Large joint arthrocentesis  Date/Time: 2/18/2020 11:30 AM  Procedure Details  Medications administered: 20 mg Sodium Hyaluronate 20 MG/2ML    Patient tolerance: patient tolerated the procedure well with no immediate complications  Dressing:  Sterile dressing applied       Impression;    Osteoarthritis left knee    Plan;    Injection 2 of the 3 shot series was performed today  She returns next week for conclusion of the 3 shot series  Her experience was supervised by and plan formulated by the attending was my privilege to assist him in its delivery

## 2020-02-24 ENCOUNTER — HOSPITAL ENCOUNTER (OUTPATIENT)
Dept: RADIOLOGY | Facility: HOSPITAL | Age: 65
Discharge: HOME | End: 2020-02-24
Payer: MEDICARE

## 2020-02-24 ENCOUNTER — TRANSCRIBE ORDERS (OUTPATIENT)
Dept: RADIOLOGY | Facility: HOSPITAL | Age: 65
End: 2020-02-24

## 2020-02-24 DIAGNOSIS — M79.672 PAIN IN LEFT FOOT: ICD-10-CM

## 2020-02-24 PROCEDURE — 20605 DRAIN/INJ JOINT/BURSA W/O US: CPT

## 2020-02-24 PROCEDURE — 77002 NEEDLE LOCALIZATION BY XRAY: CPT

## 2020-02-24 RX ORDER — LIDOCAINE HYDROCHLORIDE 10 MG/ML
10 INJECTION, SOLUTION EPIDURAL; INFILTRATION; INTRACAUDAL; PERINEURAL
Status: COMPLETED | OUTPATIENT
Start: 2020-02-24 | End: 2020-02-24

## 2020-02-24 RX ORDER — BUPIVACAINE HYDROCHLORIDE 2.5 MG/ML
10 INJECTION, SOLUTION EPIDURAL; INFILTRATION; INTRACAUDAL
Status: COMPLETED | OUTPATIENT
Start: 2020-02-24 | End: 2020-02-24

## 2020-02-24 RX ORDER — METHYLPREDNISOLONE ACETATE 80 MG/ML
80 INJECTION, SUSPENSION INTRA-ARTICULAR; INTRALESIONAL; INTRAMUSCULAR; SOFT TISSUE
Status: COMPLETED | OUTPATIENT
Start: 2020-02-24 | End: 2020-02-24

## 2020-02-24 RX ADMIN — LIDOCAINE HYDROCHLORIDE 10 ML: 10 INJECTION, SOLUTION EPIDURAL; INFILTRATION; INTRACAUDAL; PERINEURAL at 15:39

## 2020-02-24 RX ADMIN — BUPIVACAINE HYDROCHLORIDE 3 ML: 2.5 INJECTION, SOLUTION EPIDURAL; INFILTRATION; INTRACAUDAL at 15:38

## 2020-02-24 RX ADMIN — IOHEXOL 3 ML: 300 INJECTION, SOLUTION INTRAVENOUS at 15:37

## 2020-02-24 RX ADMIN — METHYLPREDNISOLONE ACETATE 80 MG: 80 INJECTION, SUSPENSION INTRA-ARTICULAR; INTRALESIONAL; INTRAMUSCULAR; SOFT TISSUE at 15:39

## 2020-02-25 ENCOUNTER — OFFICE VISIT (OUTPATIENT)
Dept: OBGYN CLINIC | Facility: HOSPITAL | Age: 65
End: 2020-02-25
Payer: MEDICARE

## 2020-02-25 VITALS
HEIGHT: 61 IN | WEIGHT: 152 LBS | BODY MASS INDEX: 28.7 KG/M2 | DIASTOLIC BLOOD PRESSURE: 82 MMHG | HEART RATE: 61 BPM | SYSTOLIC BLOOD PRESSURE: 129 MMHG

## 2020-02-25 DIAGNOSIS — M17.12 PRIMARY OSTEOARTHRITIS OF LEFT KNEE: ICD-10-CM

## 2020-02-25 DIAGNOSIS — M25.562 CHRONIC PAIN OF LEFT KNEE: Primary | ICD-10-CM

## 2020-02-25 DIAGNOSIS — G89.29 CHRONIC PAIN OF LEFT KNEE: Primary | ICD-10-CM

## 2020-02-25 PROCEDURE — 20610 DRAIN/INJ JOINT/BURSA W/O US: CPT | Performed by: ORTHOPAEDIC SURGERY

## 2020-02-25 RX ORDER — HYALURONATE SODIUM 10 MG/ML
20 SYRINGE (ML) INTRAARTICULAR
Status: COMPLETED | OUTPATIENT
Start: 2020-02-25 | End: 2020-02-25

## 2020-02-25 RX ORDER — LIDOCAINE HYDROCHLORIDE 10 MG/ML
4 INJECTION, SOLUTION INFILTRATION; PERINEURAL
Status: COMPLETED | OUTPATIENT
Start: 2020-02-25 | End: 2020-02-25

## 2020-02-25 RX ADMIN — Medication 20 MG: at 13:32

## 2020-02-25 RX ADMIN — LIDOCAINE HYDROCHLORIDE 4 ML: 10 INJECTION, SOLUTION INFILTRATION; PERINEURAL at 13:32

## 2020-02-25 NOTE — PROGRESS NOTES
Assessment:   Diagnosis ICD-10-CM Associated Orders   1  Chronic pain of left knee M25 562 Large joint arthrocentesis: L knee    G89 29    2  Primary osteoarthritis of left knee M17 12 Large joint arthrocentesis: L knee       Plan:  Left knee known osteoarthritis who presents today for the 3rd and last Euflexxa injection in her treatment plan for ongoing treatment of her osteoarthritis     Patient tolerated the 3rd and last Visco injection well  To do next visit:  Return in about 3 months (around 2020) for re-check  The above stated was discussed in layman's terms and the patient expressed understanding  All questions were answered to the patient's satisfaction  Scribe Attestation    I,:   Nasrin Rouse am acting as a scribe while in the presence of the attending physician :        I,:   Torrie Ji MD personally performed the services described in this documentation    as scribed in my presence :              Subjective:   Ilan Arnold is a 72 y o  female who presents today for her 2rd and last Euflexxa injection to her left knee for ongoing treatment of her known osteoarthritis  Patient states that since last week she has noticed an area of skin discoloration medially after she removed her bandage  Denies any fevers or chills or calf pain  No indications not to proceed with today's injection        Review of systems negative unless otherwise specified in HPI    Past Medical History:   Diagnosis Date    Arthritis     BPPV (benign paroxysmal positional vertigo)     Bronchitis     Disease of thyroid gland     HYPO    Diverticulitis     GERD (gastroesophageal reflux disease)     Glaucoma (increased eye pressure)      2 para 2     IBS (irritable bowel syndrome)     Insomnia     PONV (postoperative nausea and vomiting)        Past Surgical History:   Procedure Laterality Date    CHOLECYSTECTOMY      DILATION AND CURETTAGE OF UTERUS      FL GUIDED NEEDLE PLAC BX/ASP/INJ 12/13/2018    FL GUIDED NEEDLE PLAC BX/ASP/INJ  8/5/2019    FL GUIDED NEEDLE PLAC BX/ASP/INJ  2/24/2020    HYSTERECTOMY  2006    JOINT REPLACEMENT      MAMMO (HISTORICAL) Bilateral 12/2018    PARTIAL HYSTERECTOMY      MO REVISE KNEE JOINT REPLACE,ALL PARTS Right 1/22/2018    Procedure: ARTHROPLASTY KNEE TOTAL REVISION;  Surgeon: Juan David Michael MD;  [facility];  Service: Orthopedics    TONSILLECTOMY      TRABECULOPLASTY, LASER SELECTIVE Left 05/14/2019    VARICOSE VEIN SURGERY         Family History   Problem Relation Age of Onset    Pancreatic cancer Mother 72    Diabetes type II Mother     Heart disease Father     No Known Problems Sister     Heart disease Family     Hypertension Family     Breast cancer Family 80    Breast cancer Paternal Grandmother     Cancer Maternal Grandfather     Lung disease Paternal Grandfather     No Known Problems Daughter     No Known Problems Maternal Grandmother     No Known Problems Daughter     No Known Problems Maternal Aunt     No Known Problems Maternal Aunt     No Known Problems Maternal Aunt     No Known Problems Maternal Aunt        Social History     Occupational History    Not on file   Tobacco Use    Smoking status: Never Smoker    Smokeless tobacco: Never Used   Substance and Sexual Activity    Alcohol use: Yes     Frequency: 2-3 times a week     Comment: social     Drug use: No    Sexual activity: Yes     Birth control/protection: Surgical         Current Outpatient Medications:     albuterol (VENTOLIN HFA) 90 mcg/act inhaler, inhale 2 puff by inhalation route  every 4 - 6 hours as needed, Disp: , Rfl:     Biotin 41756 MCG TABS, , Disp: , Rfl:     Calcium-Vitamin D-Vitamin K (VIACTIV PO), Take by mouth, Disp: , Rfl:     cholecalciferol (VITAMIN D3) 1,000 units tablet, Take 4,000 Units by mouth daily, Disp: , Rfl:     Cholecalciferol (VITAMIN D3) 25 MCG (1000 UT) CAPS, take 2 PO DAILY, Disp: , Rfl:     DiphenhydrAMINE HCl (BENADRYL PO), Take by mouth as needed, Disp: , Rfl:     fluocinolone (SYNALAR) 0 01 % external solution, APPLY TO ITCHY SCALP AND NECK UP TO 10 DAYS THEN USE AS NEEDED FOR FLARE UP, Disp: , Rfl: 2    levothyroxine 112 mcg tablet, Take 112 mcg by mouth daily, Disp: , Rfl: 1    LUMIGAN 0 01 % ophthalmic drops, INSTILL 1 DROP IN EACH EYE EVERY DAY AT BEDTIME, Disp: , Rfl: 1    meclizine (ANTIVERT) 12 5 MG tablet, Take by mouth every 6 (six) hours, Disp: , Rfl:     meloxicam (MOBIC) 15 mg tablet, take 1 tablet by oral route  every day prn, Disp: , Rfl:     naproxen sodium (ALEVE) 220 MG tablet, Take 220 mg by mouth, Disp: , Rfl:     Probiotic Product (PROBIOTIC DAILY PO), Take by mouth, Disp: , Rfl:     Probiotic Product (PROBIOTIC-10 PO), Take 1 PO DAILY, Disp: , Rfl:     Current Facility-Administered Medications:     cephalexin (KEFLEX) capsule 2,000 mg, 2,000 mg, Oral, 60 Min Pre-Op, Jyoti Shore MD    Allergies   Allergen Reactions    Codeine GI Intolerance    Morphine Itching            Vitals:    02/25/20 1320   BP: 129/82   Pulse: 61       Objective:                    Left Knee Exam     Muscle Strength   The patient has normal left knee strength  Tenderness   The patient is experiencing tenderness in the medial joint line  Range of Motion   The patient has normal left knee ROM  Left knee flexion: With less crepitation  Other   Sensation: normal  Swelling: mild  Effusion: no effusion present    Comments:    Area of erythematous ecchymotic staining medially consistent with adhesive bandage irritation  No indication not to proceed with today's injection              Diagnostics, reviewed and taken today if performed as documented:    None performed        Procedures, if performed today:    Large joint arthrocentesis: L knee  Date/Time: 2/25/2020 1:32 PM  Consent given by: patient  Site marked: site marked  Timeout: Immediately prior to procedure a time out was called to verify the correct patient, procedure, equipment, support staff and site/side marked as required   Supporting Documentation  Indications: pain and diagnostic evaluation   Procedure Details  Location: knee - L knee  Preparation: Patient was prepped and draped in the usual sterile fashion  Needle size: 22 G  Ultrasound guidance: no  Approach: anterolateral  Medications administered: 4 mL lidocaine 1 %; 20 mg Sodium Hyaluronate 20 MG/2ML    Patient tolerance: patient tolerated the procedure well with no immediate complications  Dressing:  Sterile dressing applied              Portions of the record may have been created with voice recognition software  Occasional wrong word or "sound a like" substitutions may have occurred due to the inherent limitations of voice recognition software  Read the chart carefully and recognize, using context, where substitutions have occurred

## 2020-02-25 NOTE — LETTER
February 25, 2020     Patient: Josef Mcnally   YOB: 1955   Date of Visit: 2/25/2020       To Whom it May Concern:    Lara Bravo is under my professional care  She does not require prophylactic antibiotic use as she is greater than 2 years from her most recent total joint surgery  If you have any questions or concerns, please don't hesitate to call           Sincerely,          Nick Oliver MD        CC: No Recipients

## 2020-03-04 ENCOUNTER — ANNUAL EXAM (OUTPATIENT)
Dept: OBGYN CLINIC | Facility: CLINIC | Age: 65
End: 2020-03-04
Payer: MEDICARE

## 2020-03-04 VITALS
SYSTOLIC BLOOD PRESSURE: 120 MMHG | DIASTOLIC BLOOD PRESSURE: 80 MMHG | HEIGHT: 62 IN | BODY MASS INDEX: 27.6 KG/M2 | WEIGHT: 150 LBS

## 2020-03-04 DIAGNOSIS — N95.2 VAGINAL ATROPHY: ICD-10-CM

## 2020-03-04 DIAGNOSIS — Z12.31 ENCOUNTER FOR SCREENING MAMMOGRAM FOR BREAST CANCER: Primary | ICD-10-CM

## 2020-03-04 DIAGNOSIS — Z01.419 ENCOUNTER FOR ANNUAL ROUTINE GYNECOLOGICAL EXAMINATION: ICD-10-CM

## 2020-03-04 PROCEDURE — G0101 CA SCREEN;PELVIC/BREAST EXAM: HCPCS | Performed by: OBSTETRICS & GYNECOLOGY

## 2020-03-04 NOTE — PROGRESS NOTES
Assessment:  Normal breast and GYN exam, except for vaginal atrophy  Status post laparoscopic hand assisted sigmoid resection, takedown of colovaginal fistula, flexible sigmoidoscopy on 11/12/2019  Plan:  Rx for mammogram given  Continue healthy diet and exercise  Subjective:      Patient ID: Cynthia De Dios is a 72 y o  female here for her annual exam without complaints  Patient denies fecal material from the vaginal orifice  Patient denies bleeding, pelvic pain, and discharge  No breast, bowel or bladder issues  Review of Systems   Constitutional: Negative  HENT: Negative  Eyes: Negative  Respiratory: Negative  Cardiovascular: Negative  Gastrointestinal: Negative  Endocrine: Negative  Musculoskeletal: Negative  Skin: Negative  Allergic/Immunologic: Negative  Neurological: Negative  Hematological: Negative  Psychiatric/Behavioral: Negative  All other systems reviewed and are negative  Objective:      /80 (BP Location: Left arm, Patient Position: Sitting, Cuff Size: Standard)   Ht 5' 2 21" (1 58 m)   Wt 68 kg (150 lb)   LMP  (LMP Unknown)   BMI 27 25 kg/m²          Physical Exam   Constitutional: She appears well-developed and well-nourished  Cardiovascular: Normal rate, regular rhythm and normal heart sounds  Pulmonary/Chest: Effort normal  No stridor  No respiratory distress  She has no wheezes  She has no rales  She exhibits no tenderness  Right breast exhibits no inverted nipple, no mass, no nipple discharge, no skin change and no tenderness  Left breast exhibits no inverted nipple, no mass, no nipple discharge, no skin change and no tenderness  No breast swelling, tenderness, discharge or bleeding  Breasts are symmetrical    Abdominal: Soft  Bowel sounds are normal  She exhibits no distension and no mass  There is no tenderness  There is no rebound and no guarding  No hernia   Hernia confirmed negative in the right inguinal area and confirmed negative in the left inguinal area  Genitourinary: Vagina normal  Rectal exam shows no external hemorrhoid, no internal hemorrhoid, no fissure, no mass, no tenderness and anal tone normal  No breast swelling, tenderness, discharge or bleeding  No labial fusion  There is no rash, tenderness, lesion or injury on the right labia  There is no rash, tenderness, lesion or injury on the left labia  Right adnexum displays no mass, no tenderness and no fullness  Left adnexum displays no mass, no tenderness and no fullness  No erythema, tenderness or bleeding in the vagina  No foreign body in the vagina  No signs of injury around the vagina  No vaginal discharge found  Genitourinary Comments: Urethral meatus normal  Normal skene's glands  Cervix and uterus absent  Lymphadenopathy: No inguinal adenopathy noted on the right or left side  Psychiatric: She has a normal mood and affect   Her behavior is normal  Judgment and thought content normal

## 2020-03-04 NOTE — PROGRESS NOTES
The patient is here for a yearly  The patient had a hysterectomy  1/31/2020 Normal Mammo  No bleeding or cramping  No vaginal or urinary issues  No breast concerns

## 2020-03-11 ENCOUNTER — APPOINTMENT (OUTPATIENT)
Dept: LAB | Facility: MEDICAL CENTER | Age: 65
End: 2020-03-11
Payer: MEDICARE

## 2020-03-11 ENCOUNTER — APPOINTMENT (OUTPATIENT)
Dept: RADIOLOGY | Facility: MEDICAL CENTER | Age: 65
End: 2020-03-11
Payer: MEDICARE

## 2020-03-11 ENCOUNTER — OFFICE VISIT (OUTPATIENT)
Dept: OBGYN CLINIC | Facility: MEDICAL CENTER | Age: 65
End: 2020-03-11
Payer: MEDICARE

## 2020-03-11 VITALS
BODY MASS INDEX: 26.68 KG/M2 | WEIGHT: 145 LBS | HEIGHT: 62 IN | HEART RATE: 67 BPM | DIASTOLIC BLOOD PRESSURE: 80 MMHG | SYSTOLIC BLOOD PRESSURE: 128 MMHG

## 2020-03-11 DIAGNOSIS — M79.642 BILATERAL HAND PAIN: Primary | ICD-10-CM

## 2020-03-11 DIAGNOSIS — M79.641 BILATERAL HAND PAIN: ICD-10-CM

## 2020-03-11 DIAGNOSIS — M79.641 BILATERAL HAND PAIN: Primary | ICD-10-CM

## 2020-03-11 DIAGNOSIS — M79.642 BILATERAL HAND PAIN: ICD-10-CM

## 2020-03-11 LAB
ALBUMIN SERPL BCP-MCNC: 4.6 G/DL (ref 3.5–5)
ALP SERPL-CCNC: 113 U/L (ref 46–116)
ALT SERPL W P-5'-P-CCNC: 24 U/L (ref 12–78)
ANION GAP SERPL CALCULATED.3IONS-SCNC: 7 MMOL/L (ref 4–13)
AST SERPL W P-5'-P-CCNC: 19 U/L (ref 5–45)
BASOPHILS # BLD AUTO: 0.02 THOUSANDS/ΜL (ref 0–0.1)
BASOPHILS NFR BLD AUTO: 0 % (ref 0–1)
BILIRUB SERPL-MCNC: 0.95 MG/DL (ref 0.2–1)
BUN SERPL-MCNC: 20 MG/DL (ref 5–25)
CALCIUM ALBUM COR SERPL-MCNC: 9.7 MG/DL (ref 8.3–10.1)
CALCIUM SERPL-MCNC: 10.2 MG/DL (ref 8.3–10.1)
CHLORIDE SERPL-SCNC: 106 MMOL/L (ref 100–108)
CO2 SERPL-SCNC: 29 MMOL/L (ref 21–32)
CREAT SERPL-MCNC: 0.72 MG/DL (ref 0.6–1.3)
CRP SERPL QL: <3 MG/L
EOSINOPHIL # BLD AUTO: 0.08 THOUSAND/ΜL (ref 0–0.61)
EOSINOPHIL NFR BLD AUTO: 2 % (ref 0–6)
ERYTHROCYTE [DISTWIDTH] IN BLOOD BY AUTOMATED COUNT: 13.3 % (ref 11.6–15.1)
ERYTHROCYTE [SEDIMENTATION RATE] IN BLOOD: 7 MM/HOUR (ref 0–20)
GFR SERPL CREATININE-BSD FRML MDRD: 88 ML/MIN/1.73SQ M
GLUCOSE SERPL-MCNC: 97 MG/DL (ref 65–140)
HCT VFR BLD AUTO: 44.9 % (ref 34.8–46.1)
HGB BLD-MCNC: 14.4 G/DL (ref 11.5–15.4)
IMM GRANULOCYTES # BLD AUTO: 0.01 THOUSAND/UL (ref 0–0.2)
IMM GRANULOCYTES NFR BLD AUTO: 0 % (ref 0–2)
LYMPHOCYTES # BLD AUTO: 1.4 THOUSANDS/ΜL (ref 0.6–4.47)
LYMPHOCYTES NFR BLD AUTO: 27 % (ref 14–44)
MCH RBC QN AUTO: 29.6 PG (ref 26.8–34.3)
MCHC RBC AUTO-ENTMCNC: 32.1 G/DL (ref 31.4–37.4)
MCV RBC AUTO: 92 FL (ref 82–98)
MONOCYTES # BLD AUTO: 0.48 THOUSAND/ΜL (ref 0.17–1.22)
MONOCYTES NFR BLD AUTO: 9 % (ref 4–12)
NEUTROPHILS # BLD AUTO: 3.25 THOUSANDS/ΜL (ref 1.85–7.62)
NEUTS SEG NFR BLD AUTO: 62 % (ref 43–75)
NRBC BLD AUTO-RTO: 0 /100 WBCS
PLATELET # BLD AUTO: 300 THOUSANDS/UL (ref 149–390)
PMV BLD AUTO: 10.9 FL (ref 8.9–12.7)
POTASSIUM SERPL-SCNC: 3.9 MMOL/L (ref 3.5–5.3)
PROT SERPL-MCNC: 8 G/DL (ref 6.4–8.2)
RBC # BLD AUTO: 4.86 MILLION/UL (ref 3.81–5.12)
SODIUM SERPL-SCNC: 142 MMOL/L (ref 136–145)
WBC # BLD AUTO: 5.24 THOUSAND/UL (ref 4.31–10.16)

## 2020-03-11 PROCEDURE — 86140 C-REACTIVE PROTEIN: CPT

## 2020-03-11 PROCEDURE — 85025 COMPLETE CBC W/AUTO DIFF WBC: CPT | Performed by: FAMILY MEDICINE

## 2020-03-11 PROCEDURE — 86431 RHEUMATOID FACTOR QUANT: CPT

## 2020-03-11 PROCEDURE — 73130 X-RAY EXAM OF HAND: CPT

## 2020-03-11 PROCEDURE — 36415 COLL VENOUS BLD VENIPUNCTURE: CPT | Performed by: FAMILY MEDICINE

## 2020-03-11 PROCEDURE — 86038 ANTINUCLEAR ANTIBODIES: CPT | Performed by: FAMILY MEDICINE

## 2020-03-11 PROCEDURE — 86200 CCP ANTIBODY: CPT | Performed by: FAMILY MEDICINE

## 2020-03-11 PROCEDURE — 80053 COMPREHEN METABOLIC PANEL: CPT

## 2020-03-11 PROCEDURE — 86430 RHEUMATOID FACTOR TEST QUAL: CPT

## 2020-03-11 PROCEDURE — 85652 RBC SED RATE AUTOMATED: CPT | Performed by: FAMILY MEDICINE

## 2020-03-11 PROCEDURE — 99214 OFFICE O/P EST MOD 30 MIN: CPT | Performed by: FAMILY MEDICINE

## 2020-03-11 NOTE — PATIENT INSTRUCTIONS
Arthritis of the hand at the base of the thumb joint (first carpometacarpal joint aka 1st CMC osteoarthritis) is common  Treatment involves thumb spica splinting to reduce movement and aggravation of the joint and anti-inflammatories  Topical anti-inflammatory creams such as diclofenac are preferred over oral anti-inflammatories such as motrin/ibuprofen/advil since new evidence does support safe use of topical anti-inflammatories even in people who cannot take oral anti-inflammatories due to pre-existing medical problems that have been classically associated with worsening symptoms when taking oral anti-inflammatories  Hand exercises also are of benefit, and, if required, a referral to occupational therapy  Steroid injections also appear to offer relief for up to 3 months in some finger joints but evidence is lacking for significant relief with injection of the thumb joints  Anecdotally, however, I have noticed patients to have significant relief after thumb CMC joint arthritis in my own practice, and as such, I offer such steroid injections every 3 months for patients with moderate to severe symptoms  Up to 25% of patients may have fewer symptoms at 6 years from diagnosis but up to 50% have worse symptoms ([facility] 2018)  Educated risks of mixing NSAIDS (also known as non-steroidal anti-inflammatory pills) including advil, voltaren hand gel, ibuprofen, motrin, aleve, naproxen, aspirin, and ibuprofen containing products with each other or with steroids (such as prednisone, medrol)  Explained risks of mixing these medications including stomach ulcer, severe internal bleeding, and kidney failure  Instructed not to take NSAIDS if have history of stomach ulcers, kidney issues, or hypertension  Instructed patient to use only one brand as prescribed  For naproxen, a maximum of 500 mg per dose every 12 hours and no more than two doses or 1,000mg per day   For Ibuprofen, a maximum of 800 mg per dose every 6 hours but no more than 3 doses or 2,400 mg per day  Never take these medications together  Never take these medications the same day  For severe pain and only if you have no liver problems, you may add Tylenol (also known as acetaminophen) maximum of 1,000  Mg per dose every 6 hours but no more 3 doses or 3,000 mg per day  Patient expressed understanding and agreed to plan

## 2020-03-11 NOTE — PROGRESS NOTES
1  Bilateral hand pain  XR hand 3+ vw right    XR hand 3+ vw left    MICHELLE Screen w/ Reflex to Titer/Pattern    CBC and differential    CMP14+EGFR (HISTORICAL)    Cyclic citrul peptide antibody, IgG    Rheumatoid Arthritis Factor    Sedimentation rate, automated    C-reactive protein    diclofenac sodium (VOLTAREN) 1 %     Orders Placed This Encounter   Procedures    XR hand 3+ vw right    XR hand 3+ vw left    MICHELLE Screen w/ Reflex to Titer/Pattern    CBC and differential    CMP14+EGFR (HISTORICAL)    Cyclic citrul peptide antibody, IgG    Rheumatoid Arthritis Factor    Sedimentation rate, automated    C-reactive protein        Imaging Studies (I personally reviewed images in PACS and report):  X-ray right hand 03/11/2020:  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  Diffuse scattered osteoarthritis of finger joints  X-ray left hand 03/11/2020:  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  Diffuse scattered osteoarthritis of finger joints    IMPRESSION:  Hand arthritis  First CMC joint osteoarthritis      Repeat X-ray next visit:   None      Return for follow-up [facility] USG injection  Patient Instructions   Arthritis of the hand at the base of the thumb joint (first carpometacarpal joint aka 1st CMC osteoarthritis) is common  Treatment involves thumb spica splinting to reduce movement and aggravation of the joint and anti-inflammatories  Topical anti-inflammatory creams such as diclofenac are preferred over oral anti-inflammatories such as motrin/ibuprofen/advil since new evidence does support safe use of topical anti-inflammatories even in people who cannot take oral anti-inflammatories due to pre-existing medical problems that have been classically associated with worsening symptoms when taking oral anti-inflammatories  Hand exercises also are of benefit, and, if required, a referral to occupational therapy   Steroid injections also appear to offer relief for up to 3 months in some finger joints but evidence is lacking for significant relief with injection of the thumb joints  Anecdotally, however, I have noticed patients to have significant relief after thumb CMC joint arthritis in my own practice, and as such, I offer such steroid injections every 3 months for patients with moderate to severe symptoms  Up to 25% of patients may have fewer symptoms at 6 years from diagnosis but up to 50% have worse symptoms ([facility] 2018)  Educated risks of mixing NSAIDS (also known as non-steroidal anti-inflammatory pills) including advil, voltaren hand gel, ibuprofen, motrin, aleve, naproxen, aspirin, and ibuprofen containing products with each other or with steroids (such as prednisone, medrol)  Explained risks of mixing these medications including stomach ulcer, severe internal bleeding, and kidney failure  Instructed not to take NSAIDS if have history of stomach ulcers, kidney issues, or hypertension  Instructed patient to use only one brand as prescribed  For naproxen, a maximum of 500 mg per dose every 12 hours and no more than two doses or 1,000mg per day  For Ibuprofen, a maximum of 800 mg per dose every 6 hours but no more than 3 doses or 2,400 mg per day  Never take these medications together  Never take these medications the same day  For severe pain and only if you have no liver problems, you may add Tylenol (also known as acetaminophen) maximum of 1,000  Mg per dose every 6 hours but no more 3 doses or 3,000 mg per day  Patient expressed understanding and agreed to plan  CHIEF COMPLAINT:  Bilateral hand pain    HPI:  Sangeetha Zapata is a 72 y o  female  who presents for       Visit 03/11/2020:  Evaluation of bilateral hand pain ongoing for approximately 5+ years  Patient denies any injury  She describes her pain as achy throbbing soreness moderate intensity pain  She describes associated symptoms which include stiffness in the morning time lasting approximately 30 minutes and not more than 1 hour  She takes Tylenol arthritis as well as Aleve intermittently which provided some relief  She denies any significant swelling her hands and specifically denies any swelling of 1 finger particular  Associated symptoms do include numbness and tingling of her thumb region bilateral   Patient tells me she does have a history of radiculopathy in her neck 15 years ago from pinched nerve  Review of Systems   Constitutional: Negative for chills, fever and unexpected weight change  HENT: Negative for hearing loss, nosebleeds and sore throat  Eyes: Negative for pain, redness and visual disturbance  Respiratory: Negative for cough, shortness of breath and wheezing  Cardiovascular: Negative for chest pain, palpitations and leg swelling  Gastrointestinal: Negative for abdominal distention, nausea and vomiting  Endocrine: Negative for polydipsia and polyuria  Genitourinary: Negative for dysuria and hematuria  Skin: Negative for rash and wound  Neurological: Negative for dizziness, numbness and headaches  Psychiatric/Behavioral: Negative for decreased concentration and suicidal ideas           Following history reviewed and update:    Past Medical History:   Diagnosis Date    Arthritis     BPPV (benign paroxysmal positional vertigo)     Bronchitis     Disease of thyroid gland     HYPO    Diverticulitis     GERD (gastroesophageal reflux disease)     Glaucoma (increased eye pressure)      2 para 2     IBS (irritable bowel syndrome)     Insomnia     PONV (postoperative nausea and vomiting)      Past Surgical History:   Procedure Laterality Date    CHOLECYSTECTOMY      COLONOSCOPY  2019    DILATION AND CURETTAGE OF UTERUS      FL GUIDED NEEDLE PLAC BX/ASP/INJ  2018    FL GUIDED NEEDLE PLAC BX/ASP/INJ  2019    FL GUIDED NEEDLE PLAC BX/ASP/INJ  2020    HYSTERECTOMY  2006    JOINT REPLACEMENT      LAPAROSCOPIC COLON RESECTION  2019    MAMMO (HISTORICAL) Bilateral 12/2018    PARTIAL HYSTERECTOMY      MN REVISE KNEE JOINT REPLACE,ALL PARTS Right 1/22/2018    Procedure: ARTHROPLASTY KNEE TOTAL REVISION;  Surgeon: Jose Wyatt MD;  Location: [facility];  Service: Orthopedics    TONSILLECTOMY      TRABECULOPLASTY, LASER SELECTIVE Left 05/14/2019    VARICOSE VEIN SURGERY       Social History   Social History     Substance and Sexual Activity   Alcohol Use Yes    Frequency: Monthly or less    Comment: social      Social History     Substance and Sexual Activity   Drug Use No     Social History     Tobacco Use   Smoking Status Never Smoker   Smokeless Tobacco Never Used     Family History   Problem Relation Age of Onset    Pancreatic cancer Mother 72    Diabetes type II Mother     Heart disease Father     No Known Problems Sister     Heart disease Family     Hypertension Family     Breast cancer Family 80    Breast cancer Paternal Grandmother     Cancer Maternal Grandfather     Lung disease Paternal Grandfather     No Known Problems Daughter     No Known Problems Maternal Grandmother     No Known Problems Daughter     No Known Problems Maternal Aunt     No Known Problems Maternal Aunt     No Known Problems Maternal Aunt     No Known Problems Maternal Aunt      Allergies   Allergen Reactions    Codeine GI Intolerance    Morphine Itching          Physical Exam  /80   Pulse 67   Ht 5' 2" (1 575 m)   Wt 65 8 kg (145 lb)   LMP  (LMP Unknown)   BMI 26 52 kg/m²     Constitutional:  see vital signs  Gen: well-developed, normocephalic/atraumatic, well-groomed  Eyes: No inflammation or discharge of conjunctiva or lids; sclera clear   Pharynx: no inflammation, lesion, or mass of lips  Neck: supple, no masses, non-distended  MSK: no inflammation, lesion, mass, or clubbing of nails and digits except for other than mentioned below  SKIN: no visible rashes or skin lesions  Pulmonary/Chest: Effort normal  No respiratory distress     NEURO: cranial nerves grossly intact  PSYCH:  Alert and oriented to person, place, and time; recent and remote memory intact; mood normal, no depression, anxiety, or agitation, judgment and insight good and intact     Ortho Exam    Right Elbow:  no swelling, erythema, or increased warmth  rom full  nontender  no laxity of joint      Right Wrist  no swelling, erythema, or increased warmth  rom full  nontender  no laxity of joint; druj stable  Carpal tunnel compression test:  Negative  Phalen's test:  Negative  Tinel's carpal tunnel test:  Negative    Right Hand  no erythema  swelling:  None  Tenderness:+ 1st CMC joint  rom fingers mcp, pip, dip intact without pain  No digital scissoring or deviation of fingers  no extensor lag  no rotation of fingers  no joint laxity  strenght flexion and extension mcp, pip, dip 5/5  sensation intact  capillary refill intact   Froment sign:  normal  OK sign:  Normal  Thumb extension:  5/5   First CMC axial load test:+    Left Elbow:  no swelling, erythema, or increased warmth  rom full  nontender  no laxity of joint      Left Wrist  no swelling, erythema, or increased warmth  rom full  nontender  no laxity of joint; druj stable  Carpal tunnel compression test:  Phalen's test:  Tinel's carpal tunnel test:    Left Hand  no erythema  swelling:  None  Tenderness:+ 1st CMC joint  rom fingers mcp, pip, dip intact without pain  No digital scissoring or deviation of fingers  no extensor lag  no rotation of fingers  no joint laxity  strenght flexion and extension mcp, pip, dip 5/5  sensation intact  capillary refill intact   Froment sign:  normal  OK sign:  Normal  Thumb extension:  5/5   First CMC axial load test:+    Procedures

## 2020-03-12 LAB
CRYOGLOB RF SER-ACNC: ABNORMAL [IU]/ML
RHEUMATOID FACT SER QL LA: POSITIVE

## 2020-03-13 ENCOUNTER — APPOINTMENT (OUTPATIENT)
Dept: LAB | Age: 65
End: 2020-03-13
Payer: MEDICARE

## 2020-03-13 ENCOUNTER — TRANSCRIBE ORDERS (OUTPATIENT)
Dept: ADMINISTRATIVE | Facility: HOSPITAL | Age: 65
End: 2020-03-13

## 2020-03-13 ENCOUNTER — TRANSCRIBE ORDERS (OUTPATIENT)
Dept: ADMINISTRATIVE | Age: 65
End: 2020-03-13

## 2020-03-13 DIAGNOSIS — E06.5 HYPOTHYROIDISM DUE TO FIBROUS INVASIVE THYROIDITIS: ICD-10-CM

## 2020-03-13 DIAGNOSIS — K59.1 FUNCTIONAL DIARRHEA: ICD-10-CM

## 2020-03-13 DIAGNOSIS — E03.8 HYPOTHYROIDISM DUE TO FIBROUS INVASIVE THYROIDITIS: ICD-10-CM

## 2020-03-13 DIAGNOSIS — N60.19 FIBROCYSTIC BREAST DISEASE (FCBD), UNSPECIFIED LATERALITY: ICD-10-CM

## 2020-03-13 DIAGNOSIS — K21.00 REFLUX ESOPHAGITIS: Primary | ICD-10-CM

## 2020-03-13 DIAGNOSIS — N60.02 SINGLE CYST OF LEFT BREAST: Primary | ICD-10-CM

## 2020-03-13 DIAGNOSIS — E66.3 SEVERELY OVERWEIGHT: ICD-10-CM

## 2020-03-13 DIAGNOSIS — K21.00 REFLUX ESOPHAGITIS: ICD-10-CM

## 2020-03-13 LAB
ALBUMIN SERPL BCP-MCNC: 4.2 G/DL (ref 3.5–5)
ALP SERPL-CCNC: 103 U/L (ref 46–116)
ALT SERPL W P-5'-P-CCNC: 20 U/L (ref 12–78)
ANION GAP SERPL CALCULATED.3IONS-SCNC: 6 MMOL/L (ref 4–13)
AST SERPL W P-5'-P-CCNC: 15 U/L (ref 5–45)
BACTERIA UR QL AUTO: ABNORMAL /HPF
BASOPHILS # BLD AUTO: 0.01 THOUSANDS/ΜL (ref 0–0.1)
BASOPHILS NFR BLD AUTO: 0 % (ref 0–1)
BILIRUB SERPL-MCNC: 0.92 MG/DL (ref 0.2–1)
BILIRUB UR QL STRIP: NEGATIVE
BUN SERPL-MCNC: 21 MG/DL (ref 5–25)
CALCIUM SERPL-MCNC: 9.6 MG/DL (ref 8.3–10.1)
CCP IGA+IGG SERPL IA-ACNC: 7 UNITS (ref 0–19)
CHLORIDE SERPL-SCNC: 109 MMOL/L (ref 100–108)
CHOLEST SERPL-MCNC: 156 MG/DL (ref 50–200)
CLARITY UR: ABNORMAL
CO2 SERPL-SCNC: 27 MMOL/L (ref 21–32)
COLOR UR: YELLOW
CREAT SERPL-MCNC: 0.74 MG/DL (ref 0.6–1.3)
EOSINOPHIL # BLD AUTO: 0.09 THOUSAND/ΜL (ref 0–0.61)
EOSINOPHIL NFR BLD AUTO: 2 % (ref 0–6)
ERYTHROCYTE [DISTWIDTH] IN BLOOD BY AUTOMATED COUNT: 13.2 % (ref 11.6–15.1)
GFR SERPL CREATININE-BSD FRML MDRD: 85 ML/MIN/1.73SQ M
GLUCOSE P FAST SERPL-MCNC: 98 MG/DL (ref 65–99)
GLUCOSE UR STRIP-MCNC: NEGATIVE MG/DL
HCT VFR BLD AUTO: 43 % (ref 34.8–46.1)
HDLC SERPL-MCNC: 54 MG/DL
HGB BLD-MCNC: 14 G/DL (ref 11.5–15.4)
HGB UR QL STRIP.AUTO: NEGATIVE
IMM GRANULOCYTES # BLD AUTO: 0.01 THOUSAND/UL (ref 0–0.2)
IMM GRANULOCYTES NFR BLD AUTO: 0 % (ref 0–2)
KETONES UR STRIP-MCNC: NEGATIVE MG/DL
LDLC SERPL CALC-MCNC: 78 MG/DL (ref 0–100)
LDLC SERPL DIRECT ASSAY-MCNC: 87 MG/DL (ref 0–100)
LEUKOCYTE ESTERASE UR QL STRIP: ABNORMAL
LYMPHOCYTES # BLD AUTO: 1.36 THOUSANDS/ΜL (ref 0.6–4.47)
LYMPHOCYTES NFR BLD AUTO: 27 % (ref 14–44)
MCH RBC QN AUTO: 29.4 PG (ref 26.8–34.3)
MCHC RBC AUTO-ENTMCNC: 32.6 G/DL (ref 31.4–37.4)
MCV RBC AUTO: 90 FL (ref 82–98)
MONOCYTES # BLD AUTO: 0.48 THOUSAND/ΜL (ref 0.17–1.22)
MONOCYTES NFR BLD AUTO: 9 % (ref 4–12)
NEUTROPHILS # BLD AUTO: 3.14 THOUSANDS/ΜL (ref 1.85–7.62)
NEUTS SEG NFR BLD AUTO: 62 % (ref 43–75)
NITRITE UR QL STRIP: NEGATIVE
NON-SQ EPI CELLS URNS QL MICRO: ABNORMAL /HPF
NONHDLC SERPL-MCNC: 102 MG/DL
NRBC BLD AUTO-RTO: 0 /100 WBCS
PH UR STRIP.AUTO: 6.5 [PH]
PLATELET # BLD AUTO: 256 THOUSANDS/UL (ref 149–390)
PMV BLD AUTO: 10.5 FL (ref 8.9–12.7)
POTASSIUM SERPL-SCNC: 4.4 MMOL/L (ref 3.5–5.3)
PROT SERPL-MCNC: 7.3 G/DL (ref 6.4–8.2)
PROT UR STRIP-MCNC: NEGATIVE MG/DL
RBC # BLD AUTO: 4.77 MILLION/UL (ref 3.81–5.12)
RBC #/AREA URNS AUTO: ABNORMAL /HPF
RYE IGE QN: NEGATIVE
SODIUM SERPL-SCNC: 142 MMOL/L (ref 136–145)
SP GR UR STRIP.AUTO: 1.02 (ref 1–1.03)
TRIGL SERPL-MCNC: 122 MG/DL
UROBILINOGEN UR QL STRIP.AUTO: 1 E.U./DL
WBC # BLD AUTO: 5.09 THOUSAND/UL (ref 4.31–10.16)
WBC #/AREA URNS AUTO: ABNORMAL /HPF

## 2020-03-13 PROCEDURE — 85025 COMPLETE CBC W/AUTO DIFF WBC: CPT

## 2020-03-13 PROCEDURE — 83721 ASSAY OF BLOOD LIPOPROTEIN: CPT

## 2020-03-13 PROCEDURE — 36415 COLL VENOUS BLD VENIPUNCTURE: CPT

## 2020-03-13 PROCEDURE — 80061 LIPID PANEL: CPT

## 2020-03-13 PROCEDURE — 81001 URINALYSIS AUTO W/SCOPE: CPT | Performed by: INTERNAL MEDICINE

## 2020-03-13 PROCEDURE — 80053 COMPREHEN METABOLIC PANEL: CPT

## 2020-03-14 DIAGNOSIS — R76.8 RHEUMATOID FACTOR POSITIVE: Primary | ICD-10-CM

## 2020-03-17 ENCOUNTER — HOSPITAL ENCOUNTER (OUTPATIENT)
Dept: ULTRASOUND IMAGING | Facility: CLINIC | Age: 65
Discharge: HOME | End: 2020-03-17
Payer: MEDICARE

## 2020-03-17 VITALS — BODY MASS INDEX: 26.68 KG/M2 | WEIGHT: 145 LBS | HEIGHT: 62 IN

## 2020-03-17 DIAGNOSIS — N60.02 SINGLE CYST OF LEFT BREAST: ICD-10-CM

## 2020-03-17 PROCEDURE — 76642 ULTRASOUND BREAST LIMITED: CPT

## 2020-03-19 ENCOUNTER — TELEPHONE (OUTPATIENT)
Dept: HEMATOLOGY ONCOLOGY | Facility: CLINIC | Age: 65
End: 2020-03-19

## 2020-03-19 NOTE — TELEPHONE ENCOUNTER
New Patient Encounter    New Patient Intake Form   Patient Details:  Lesley Serrato  1955  [id number]    Background Information:  [address] starts by opening a telephone encounter and gathering the following information   Who is calling to schedule? If not self, relationship to patient? self   Referring Provider Vishal Urena   What is the diagnosis? Abn breast US   Is this diagnosis confirmed? Yes   When was the diagnosis? 3/2020   Is there a confirmed diagnosis from a biopsy/tissue reviewed by pathology? no   Is patient aware of diagnosis? Yes   Is there a personal history of cancer and what kind? no   Is there a family history of cancer and what kind? breast   Reason for visit? New Diagnosis   Have you had any imaging or labs done? If so: when, where? Yes  [facility]   Are records in EPIC? yes   Was the patient told to bring a disk? no   Does the patient smoke or Vape? no   If yes, how many packs or cartridges per day? Scheduling Information:   Preferred [place]:  [facility] Provider? Nithin Fox   Are there any dates/time the patient cannot be seen? no   Miscellaneous: screening for COVID sx and exposure negative  pts mother was pt of Dr Nithin Fox many yrs ago he did whipple X2 (not cancer - just abn cells?)   After completing the above information, please route to Financial Counselor and the appropriate Nurse Navigator for review

## 2020-04-02 ENCOUNTER — TELEPHONE (OUTPATIENT)
Dept: CARDIAC SURGERY | Facility: CLINIC | Age: 65
End: 2020-04-02

## 2020-04-03 ENCOUNTER — CONSULT (OUTPATIENT)
Dept: SURGICAL ONCOLOGY | Facility: CLINIC | Age: 65
End: 2020-04-03
Payer: MEDICARE

## 2020-04-03 VITALS
HEIGHT: 62 IN | WEIGHT: 143 LBS | SYSTOLIC BLOOD PRESSURE: 140 MMHG | HEART RATE: 74 BPM | BODY MASS INDEX: 26.31 KG/M2 | DIASTOLIC BLOOD PRESSURE: 90 MMHG | TEMPERATURE: 97.4 F | RESPIRATION RATE: 16 BRPM

## 2020-04-03 DIAGNOSIS — N63.0 BREAST LUMP: Primary | ICD-10-CM

## 2020-04-03 PROCEDURE — 99204 OFFICE O/P NEW MOD 45 MIN: CPT | Performed by: NURSE PRACTITIONER

## 2020-05-26 ENCOUNTER — OFFICE VISIT (OUTPATIENT)
Dept: OBGYN CLINIC | Facility: HOSPITAL | Age: 65
End: 2020-05-26
Payer: MEDICARE

## 2020-05-26 VITALS
DIASTOLIC BLOOD PRESSURE: 74 MMHG | WEIGHT: 135 LBS | SYSTOLIC BLOOD PRESSURE: 128 MMHG | BODY MASS INDEX: 24.84 KG/M2 | HEIGHT: 62 IN | HEART RATE: 73 BPM

## 2020-05-26 DIAGNOSIS — M17.12 PRIMARY OSTEOARTHRITIS OF LEFT KNEE: Primary | ICD-10-CM

## 2020-05-26 PROCEDURE — 99213 OFFICE O/P EST LOW 20 MIN: CPT | Performed by: ORTHOPAEDIC SURGERY

## 2020-05-26 PROCEDURE — 20610 DRAIN/INJ JOINT/BURSA W/O US: CPT | Performed by: ORTHOPAEDIC SURGERY

## 2020-05-26 RX ORDER — LIDOCAINE HYDROCHLORIDE 10 MG/ML
2 INJECTION, SOLUTION INFILTRATION; PERINEURAL
Status: COMPLETED | OUTPATIENT
Start: 2020-05-26 | End: 2020-05-26

## 2020-05-26 RX ORDER — MELOXICAM 7.5 MG/1
7.5 TABLET ORAL DAILY
Qty: 30 TABLET | Refills: 1 | Status: SHIPPED | OUTPATIENT
Start: 2020-05-26 | End: 2020-09-24

## 2020-05-26 RX ORDER — BUPIVACAINE HYDROCHLORIDE 2.5 MG/ML
2 INJECTION, SOLUTION INFILTRATION; PERINEURAL
Status: COMPLETED | OUTPATIENT
Start: 2020-05-26 | End: 2020-05-26

## 2020-05-26 RX ORDER — BETAMETHASONE SODIUM PHOSPHATE AND BETAMETHASONE ACETATE 3; 3 MG/ML; MG/ML
12 INJECTION, SUSPENSION INTRA-ARTICULAR; INTRALESIONAL; INTRAMUSCULAR; SOFT TISSUE
Status: COMPLETED | OUTPATIENT
Start: 2020-05-26 | End: 2020-05-26

## 2020-05-26 RX ADMIN — BUPIVACAINE HYDROCHLORIDE 2 ML: 2.5 INJECTION, SOLUTION INFILTRATION; PERINEURAL at 13:39

## 2020-05-26 RX ADMIN — LIDOCAINE HYDROCHLORIDE 2 ML: 10 INJECTION, SOLUTION INFILTRATION; PERINEURAL at 13:39

## 2020-05-26 RX ADMIN — BETAMETHASONE SODIUM PHOSPHATE AND BETAMETHASONE ACETATE 12 MG: 3; 3 INJECTION, SUSPENSION INTRA-ARTICULAR; INTRALESIONAL; INTRAMUSCULAR; SOFT TISSUE at 13:39

## 2020-05-29 ENCOUNTER — TELEPHONE (OUTPATIENT)
Dept: OBGYN CLINIC | Facility: HOSPITAL | Age: 65
End: 2020-05-29

## 2020-06-08 ENCOUNTER — APPOINTMENT (OUTPATIENT)
Dept: LAB | Facility: MEDICAL CENTER | Age: 65
End: 2020-06-08
Payer: MEDICARE

## 2020-06-08 DIAGNOSIS — E06.3 HYPOTHYROIDISM DUE TO HASHIMOTO'S THYROIDITIS: ICD-10-CM

## 2020-06-08 DIAGNOSIS — E03.8 HYPOTHYROIDISM DUE TO HASHIMOTO'S THYROIDITIS: ICD-10-CM

## 2020-06-08 DIAGNOSIS — R73.03 PREDIABETES: ICD-10-CM

## 2020-06-08 LAB
ALBUMIN SERPL BCP-MCNC: 3.9 G/DL (ref 3.5–5)
ALP SERPL-CCNC: 127 U/L (ref 46–116)
ALT SERPL W P-5'-P-CCNC: 17 U/L (ref 12–78)
ANION GAP SERPL CALCULATED.3IONS-SCNC: 4 MMOL/L (ref 4–13)
AST SERPL W P-5'-P-CCNC: 16 U/L (ref 5–45)
BILIRUB SERPL-MCNC: 1.3 MG/DL (ref 0.2–1)
BUN SERPL-MCNC: 23 MG/DL (ref 5–25)
CALCIUM SERPL-MCNC: 9.7 MG/DL (ref 8.3–10.1)
CHLORIDE SERPL-SCNC: 110 MMOL/L (ref 100–108)
CO2 SERPL-SCNC: 28 MMOL/L (ref 21–32)
CREAT SERPL-MCNC: 0.77 MG/DL (ref 0.6–1.3)
EST. AVERAGE GLUCOSE BLD GHB EST-MCNC: 117 MG/DL
GFR SERPL CREATININE-BSD FRML MDRD: 81 ML/MIN/1.73SQ M
GLUCOSE P FAST SERPL-MCNC: 82 MG/DL (ref 65–99)
HBA1C MFR BLD: 5.7 %
POTASSIUM SERPL-SCNC: 4.3 MMOL/L (ref 3.5–5.3)
PROT SERPL-MCNC: 7 G/DL (ref 6.4–8.2)
SODIUM SERPL-SCNC: 142 MMOL/L (ref 136–145)
T4 FREE SERPL-MCNC: 1.05 NG/DL (ref 0.76–1.46)
TSH SERPL DL<=0.05 MIU/L-ACNC: 0.6 UIU/ML (ref 0.36–3.74)

## 2020-06-08 PROCEDURE — 80053 COMPREHEN METABOLIC PANEL: CPT

## 2020-06-08 PROCEDURE — 84443 ASSAY THYROID STIM HORMONE: CPT

## 2020-06-08 PROCEDURE — 36415 COLL VENOUS BLD VENIPUNCTURE: CPT

## 2020-06-08 PROCEDURE — 83036 HEMOGLOBIN GLYCOSYLATED A1C: CPT

## 2020-06-08 PROCEDURE — 84439 ASSAY OF FREE THYROXINE: CPT

## 2020-06-12 ENCOUNTER — OFFICE VISIT (OUTPATIENT)
Dept: OBGYN CLINIC | Facility: OTHER | Age: 65
End: 2020-06-12
Payer: MEDICARE

## 2020-06-12 ENCOUNTER — TELEMEDICINE (OUTPATIENT)
Dept: ENDOCRINOLOGY | Facility: CLINIC | Age: 65
End: 2020-06-12
Payer: MEDICARE

## 2020-06-12 VITALS
DIASTOLIC BLOOD PRESSURE: 81 MMHG | HEIGHT: 62 IN | HEART RATE: 66 BPM | BODY MASS INDEX: 24.66 KG/M2 | SYSTOLIC BLOOD PRESSURE: 138 MMHG | WEIGHT: 134 LBS

## 2020-06-12 DIAGNOSIS — E03.8 HYPOTHYROIDISM DUE TO HASHIMOTO'S THYROIDITIS: Primary | ICD-10-CM

## 2020-06-12 DIAGNOSIS — E06.3 HYPOTHYROIDISM DUE TO HASHIMOTO'S THYROIDITIS: Primary | ICD-10-CM

## 2020-06-12 DIAGNOSIS — M18.11 PRIMARY OSTEOARTHRITIS OF FIRST CARPOMETACARPAL JOINT OF RIGHT HAND: Primary | ICD-10-CM

## 2020-06-12 DIAGNOSIS — R73.03 PREDIABETES: ICD-10-CM

## 2020-06-12 PROCEDURE — 20604 DRAIN/INJ JOINT/BURSA W/US: CPT | Performed by: FAMILY MEDICINE

## 2020-06-12 PROCEDURE — 99214 OFFICE O/P EST MOD 30 MIN: CPT | Performed by: NURSE PRACTITIONER

## 2020-06-12 RX ORDER — TRIAMCINOLONE ACETONIDE 40 MG/ML
40 INJECTION, SUSPENSION INTRA-ARTICULAR; INTRAMUSCULAR
Status: COMPLETED | OUTPATIENT
Start: 2020-06-12 | End: 2020-06-12

## 2020-06-12 RX ORDER — LIDOCAINE HYDROCHLORIDE 10 MG/ML
1 INJECTION, SOLUTION INFILTRATION; PERINEURAL
Status: COMPLETED | OUTPATIENT
Start: 2020-06-12 | End: 2020-06-12

## 2020-06-12 RX ADMIN — LIDOCAINE HYDROCHLORIDE 1 ML: 10 INJECTION, SOLUTION INFILTRATION; PERINEURAL at 12:29

## 2020-06-12 RX ADMIN — TRIAMCINOLONE ACETONIDE 40 MG: 40 INJECTION, SUSPENSION INTRA-ARTICULAR; INTRAMUSCULAR at 12:29

## 2020-07-16 ENCOUNTER — TELEPHONE (OUTPATIENT)
Dept: SURGICAL ONCOLOGY | Facility: CLINIC | Age: 65
End: 2020-07-16

## 2020-07-16 NOTE — TELEPHONE ENCOUNTER
Patient is not currently experiencing any symptoms of fever, cough, shortness of breath, chills, repeated shaking with chills, muscle pain, headache, sore throat, or new loss of taste/smell  Patient has not been tested for COVID-19  Patient has not been in contact with someone confirmed to have COVID-19  Reviewed masking policy with patient  Reviewed visitor policy with patient

## 2020-07-20 ENCOUNTER — OFFICE VISIT (OUTPATIENT)
Dept: SURGICAL ONCOLOGY | Facility: CLINIC | Age: 65
End: 2020-07-20
Payer: MEDICARE

## 2020-07-20 VITALS
TEMPERATURE: 96.8 F | HEART RATE: 66 BPM | WEIGHT: 131 LBS | DIASTOLIC BLOOD PRESSURE: 80 MMHG | SYSTOLIC BLOOD PRESSURE: 120 MMHG | HEIGHT: 62 IN | BODY MASS INDEX: 24.11 KG/M2 | RESPIRATION RATE: 16 BRPM

## 2020-07-20 DIAGNOSIS — N63.0 BREAST LUMP: Primary | ICD-10-CM

## 2020-07-20 PROCEDURE — 99213 OFFICE O/P EST LOW 20 MIN: CPT | Performed by: NURSE PRACTITIONER

## 2020-07-20 NOTE — PATIENT INSTRUCTIONS
Breast Self Exam for Women   AMBULATORY CARE:   A breast self-exam (BSE)  is a way to check your breasts for lumps and other changes  Regular BSEs can help you know how your breasts normally look and feel  Most breast lumps or changes are not cancer, but you should always have them checked by a healthcare provider  Why you should do a BSE:  Breast cancer is the most common type of cancer in women  Even if you have mammograms, you may still want to do a BSE regularly  If you know how your breasts normally feel and look, it may help you know when to contact your healthcare provider  Mammograms can miss some cancers  You may find a lump during a BSE that did not show up on your mammogram   When you should do a BSE:  Rebecca Vásquez your calendar to help you remember to do BSE on a regular schedule  One easy way to remember to do a BSE is to do the exam on the same day of each month  If you have periods, you may want to do your BSE 1 week after your period ends  This is the time when your breasts may be the least swollen, lumpy, or tender  You can do regular BSEs even if you are breastfeeding or have breast implants  Contact your healthcare provider if:   · You find any lumps or changes in your breasts  · You have breast pain or fluid coming from your nipples  · You have questions or concerns about your condition or care  How to do a BSE:   · Look at your breasts in a mirror  Look at the size and shape of each breast and nipple  Check for swelling, lumps, dimpling, scaly skin, or other skin changes  Look for nipple changes, such as a nipple that is painful or beginning to pull inward  Gently squeeze both nipples and check to see if fluid (that is not breast milk) comes out of them  If you find any of these or other breast changes, contact your healthcare provider  Check your breasts while you sit or  the following 3 positions:    [facility] your arms down at your sides      ¨ Raise your hands and join them behind your head  ¨ Put firm pressure with your hands on your hips  Bend slightly forward while you look at your breasts in the mirror  · Lie down and feel your breasts  When you lie down, your breast tissue spreads out evenly over your chest  This makes it easier for you to feel for lumps and anything that may not be normal for your breasts  Do a BSE on one breast at a time  ¨ Place a small pillow or towel under your left shoulder  Put your left arm behind your head  ¨ Use the 3 middle fingers of your right hand  Use your fingertip pads, on the top of your fingers  Your fingertip pad is the most sensitive part of your finger  ¨ Use small circles to feel your breast tissue  Use your fingertip pads to make dime-sized, overlapping circles on your breast and armpits  Use light, medium, and firm pressure  First, press lightly  Second, press with medium pressure to feel a little deeper into the breast  Last, use firm pressure to feel deep within your breast     ¨ Examine your entire breast area  Examine the breast area from above the breast to below the breast where you feel only ribs  Make small circles with your fingertips, starting in the middle of your armpit  Make circles going up and down the breast area  Continue toward your breast and all the way across it  Examine the area from your armpit all the way over to the middle of your chest (breastbone)  Stop at the middle of your chest     ¨ Move the pillow or towel to your right shoulder, and put your right arm behind your head  Use the 3 fingertip pads of your left hand, and repeat the above steps to do a BSE on your right breast        What else you can do to check for breast problems or cancer:  Some experts suggest that women 36years of age or older should have a mammogram every year  Other experts suggest that women between the ages of 48and 76years old should have a mammogram every 2 years   Talk to your healthcare provider about when you should have a mammogram   Follow up with your healthcare provider as directed: Your healthcare provider can watch you and tell you if you are doing your BSE correctly  Write down your questions so you remember to ask them during your visits  © 2017 [address] [employer] Information is for End User's use only and may not be sold, redistributed or otherwise used for commercial purposes  All illustrations and images included in CareNotes® are the copyrighted property of [employer]  or Justin Cano  The above information is an  only  It is not intended as medical advice for individual conditions or treatments  Talk to your doctor, nurse or pharmacist before following any medical regimen to see if it is safe and effective for you

## 2020-07-20 NOTE — PROGRESS NOTES
Surgical Oncology Follow Up       [facility]  [facility] [address]    Angélica Banks  1955  [id number]      Chief Complaint   Patient presents with    Follow-up     Pt is here for 3 month f/u        Assessment/Plan:  1  Breast lump  - No worrisome findings on clinical exam  - Imaging negative  - f/u PRN  - annual mammogram due 1/2021    Discussion/Summary:  Patient is a 77-year-old female who presents to the office today for a three-month follow-up regarding questionable left breast lump  The patient had not appreciated any changes on her self exam but did report a 17 lb weight loss when she was examined by her PCP who appreciated a left breast lump  Her breast imaging was negative  There were no concerns on my exam 3 months ago however I encouraged her to return to the office for another clinical exam at this time  She reports no further changes on her self breast exam today  She has now lost a total of 30 lb through diet and exercise  I do not appreciate any dominant masses on her exam today  I suspect the area of question was a patch of dense breast tissue  I've recommended observation and encouraged the patient to contact us with any changes on self exam  She is already ordered for a mammogram for January  She will see us on an as needed basis and continue follow with her gynecologist and PCP for breast exams and imaging  She is in agreement with this plan  All of her questions were answered today  History of Present Illness:     -Interval History:  Patient presents today for a three-month follow-up visit for a questionable left breast lump  She notices no changes on her self breast exam   She has now lost a total of 30 lb  Reports no changes in family history      Review of Systems:  Review of Systems   Constitutional: Negative for activity change, appetite change, chills, fatigue, fever and unexpected weight change  HENT: Negative for trouble swallowing  Eyes: Negative for pain, redness and visual disturbance  Respiratory: Negative for cough, shortness of breath and wheezing  Cardiovascular: Negative for chest pain, palpitations and leg swelling  Gastrointestinal: Negative for abdominal pain, constipation, diarrhea, nausea and vomiting  Endocrine: Negative for cold intolerance and heat intolerance  Musculoskeletal: Negative for arthralgias, back pain, gait problem and myalgias  Skin: Negative for color change and rash  Neurological: Negative for dizziness, syncope, light-headedness, numbness and headaches  Hematological: Negative for adenopathy  Psychiatric/Behavioral: Negative for agitation and confusion  All other systems reviewed and are negative        Patient Active Problem List   Diagnosis    History of total knee arthroplasty    Orthopedic aftercare    Aftercare following right knee joint replacement surgery    Arthritis of foot, right    Chronic pain of left knee    Pes anserine bursitis    Primary osteoarthritis of left knee    Hypothyroidism    Hyperlipidemia    Elevated hemoglobin A1c    Vitamin D deficiency    Arthritis of left knee    Prediabetes    Breast lump     Past Medical History:   Diagnosis Date    Arthritis     BPPV (benign paroxysmal positional vertigo)     Bronchitis     Disease of thyroid gland     HYPO    Diverticulitis     GERD (gastroesophageal reflux disease)     Glaucoma (increased eye pressure)      2 para 2     IBS (irritable bowel syndrome)     Insomnia     PONV (postoperative nausea and vomiting)      Past Surgical History:   Procedure Laterality Date    CHOLECYSTECTOMY      COLONOSCOPY  2019    DILATION AND CURETTAGE OF UTERUS      FL GUIDED NEEDLE PLAC BX/ASP/INJ  2018    FL GUIDED NEEDLE PLAC BX/ASP/INJ  2019    FL GUIDED NEEDLE PLAC BX/ASP/INJ  2020    HYSTERECTOMY  2006    JOINT REPLACEMENT      LAPAROSCOPIC COLON RESECTION  11/12/2019    MAMMO (HISTORICAL) Bilateral 12/2018    PARTIAL HYSTERECTOMY      NC REVISE KNEE JOINT REPLACE,ALL PARTS Right 1/22/2018    Procedure: ARTHROPLASTY KNEE TOTAL REVISION;  Surgeon: Brittanie Edwards MD;  [facility];  Service: Orthopedics    TONSILLECTOMY      TRABECULOPLASTY, LASER SELECTIVE Left 05/14/2019    VARICOSE VEIN SURGERY       Family History   Problem Relation Age of Onset    Pancreatic cancer Mother 72    Diabetes type II Mother     Heart disease Father     No Known Problems Sister     Breast cancer Paternal Grandmother 80    Cancer Maternal Grandfather     Lung disease Paternal Grandfather     No Known Problems Daughter     No Known Problems Maternal Grandmother     No Known Problems Daughter     No Known Problems Maternal Aunt     No Known Problems Maternal Aunt     No Known Problems Maternal Aunt     No Known Problems Maternal Aunt      Social History     Socioeconomic History    Marital status:       Spouse name: Not on file    Number of children: Not on file    Years of education: Not on file    Highest education level: Not on file   Occupational History    Not on file   Social Needs    Financial resource strain: Not on file    Food insecurity:     Worry: Not on file     Inability: Not on file    Transportation needs:     Medical: Not on file     Non-medical: Not on file   Tobacco Use    Smoking status: Never Smoker    Smokeless tobacco: Never Used   Substance and Sexual Activity    Alcohol use: Yes     Frequency: Monthly or less     Comment: social     Drug use: No    Sexual activity: Yes     Birth control/protection: Surgical   Lifestyle    Physical activity:     Days per week: Not on file     Minutes per session: Not on file    Stress: Not on file   Relationships    Social connections:     Talks on phone: Not on file     Gets together: Not on file     Attends Orthodox service: Not on file     Active member of club or organization: Not on file     Attends meetings of clubs or organizations: Not on file     Relationship status: Not on file    Intimate partner violence:     Fear of current or ex partner: Not on file     Emotionally abused: Not on file     Physically abused: Not on file     Forced sexual activity: Not on file   Other Topics Concern    Not on file   Social History Narrative    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         Current Outpatient Medications:     Acetaminophen (TYLENOL ARTHRITIS PAIN PO), Take 1,200 mg by mouth 2 (two) times a day, Disp: , Rfl:     Biotin 68202 MCG TABS, , Disp: , Rfl:     Black Elderberry (Sambucus Elderberry) 50 MG/5ML SYRP, Take by mouth daily, Disp: , Rfl:     Calcium-Vitamin D-Vitamin K (VIACTIV PO), Take 1 tablet by mouth daily, Disp: , Rfl:     cholecalciferol (VITAMIN D3) 1,000 units tablet, Take 4,000 Units by mouth daily, Disp: , Rfl:     diclofenac sodium (VOLTAREN) 1 %, Apply 4 g topically 4 (four) times a day, Disp: 1 Tube, Rfl: 3    DiphenhydrAMINE HCl (BENADRYL PO), Take by mouth as needed, Disp: , Rfl:     levothyroxine 112 mcg tablet, Take 112 mcg by mouth daily, Disp: , Rfl: 1    LUMIGAN 0 01 % ophthalmic drops, INSTILL 1 DROP IN EACH EYE EVERY DAY AT BEDTIME, Disp: , Rfl: 1    meclizine (ANTIVERT) 12 5 MG tablet, Take by mouth every 6 (six) hours, Disp: , Rfl:     meloxicam (MOBIC) 7 5 mg tablet, Take 1 tablet (7 5 mg total) by mouth daily, Disp: 30 tablet, Rfl: 1    naproxen sodium (ALEVE) 220 MG tablet, Take 220 mg by mouth, Disp: , Rfl:     Probiotic Product (PROBIOTIC DAILY PO), Take by mouth, Disp: , Rfl:     Tetrahydrozoline HCl (EYE DROPS REGULAR OP), Apply to eye, Disp: , Rfl:     Current Facility-Administered Medications:     cephalexin (KEFLEX) capsule 2,000 mg, 2,000 mg, Oral, 60 Min Pre-Op, Igor Munoz MD  Allergies   Allergen Reactions    Codeine GI Intolerance    Morphine Itching     Vitals:    07/20/20 0830   BP: 120/80   Pulse: 66   Resp: 16   Temp: (!) 96 8 °F (36 °C)       Physical Exam   Constitutional: She is oriented to person, place, and time  She appears well-developed and well-nourished  No distress  HENT:   Head: Normocephalic and atraumatic  Pulmonary/Chest: Effort normal    Bilateral breasts were examined in the sitting and supine position  There are no masses, skin nodules, nipple changes or nipple discharge  No dominant masses left upper outer quadrant  There is no bilateral supraclavicular or axillary lymphadenopathy noted  Musculoskeletal: Normal range of motion  Neurological: She is alert and oriented to person, place, and time  Skin: Skin is warm and dry  She is not diaphoretic  Psychiatric: She has a normal mood and affect  Her behavior is normal  Judgment and thought content normal    Vitals reviewed  Advance Care Planning/Advance Directives:  Discussed disease status and treatment goals with the patient

## 2020-07-31 ENCOUNTER — OFFICE VISIT (OUTPATIENT)
Dept: OBGYN CLINIC | Facility: OTHER | Age: 65
End: 2020-07-31
Payer: MEDICARE

## 2020-07-31 VITALS
DIASTOLIC BLOOD PRESSURE: 75 MMHG | TEMPERATURE: 97.3 F | SYSTOLIC BLOOD PRESSURE: 138 MMHG | HEART RATE: 60 BPM | BODY MASS INDEX: 24.33 KG/M2 | WEIGHT: 133 LBS

## 2020-07-31 DIAGNOSIS — M18.12 PRIMARY OSTEOARTHRITIS OF FIRST CARPOMETACARPAL JOINT OF LEFT HAND: Primary | ICD-10-CM

## 2020-07-31 PROCEDURE — 99213 OFFICE O/P EST LOW 20 MIN: CPT | Performed by: FAMILY MEDICINE

## 2020-07-31 PROCEDURE — 20604 DRAIN/INJ JOINT/BURSA W/US: CPT | Performed by: FAMILY MEDICINE

## 2020-07-31 RX ORDER — LIDOCAINE HYDROCHLORIDE 20 MG/ML
3 INJECTION, SOLUTION EPIDURAL; INFILTRATION; INTRACAUDAL; PERINEURAL
Status: COMPLETED | OUTPATIENT
Start: 2020-07-31 | End: 2020-07-31

## 2020-07-31 RX ORDER — TRIAMCINOLONE ACETONIDE 40 MG/ML
40 INJECTION, SUSPENSION INTRA-ARTICULAR; INTRAMUSCULAR
Status: COMPLETED | OUTPATIENT
Start: 2020-07-31 | End: 2020-07-31

## 2020-07-31 RX ADMIN — LIDOCAINE HYDROCHLORIDE 3 ML: 20 INJECTION, SOLUTION EPIDURAL; INFILTRATION; INTRACAUDAL; PERINEURAL at 12:10

## 2020-07-31 RX ADMIN — TRIAMCINOLONE ACETONIDE 40 MG: 40 INJECTION, SUSPENSION INTRA-ARTICULAR; INTRAMUSCULAR at 12:10

## 2020-07-31 NOTE — PROGRESS NOTES
1  Primary osteoarthritis of first carpometacarpal joint of left hand  Small joint arthrocentesis: L thumb CMC     Orders Placed This Encounter   Procedures    Small joint arthrocentesis: L thumb CMC        Imaging Studies (I personally reviewed images in PACS and report):  X-ray left hand 03/11/2020: There is no acute fracture or dislocation    Moderately advanced osteoarthritis of the 1st carpometacarpal joint    Mild osteoarthritis of the triscaphe articulation and 2nd through 5th DIP joints  No focal erosions    No lytic or blastic lesions are seen    Soft tissues are unremarkable      IMPRESSION:  Left 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    Interventions:  Left 1st CMC joint ultrasound-guided corticosteroid injection-07/31/2020  Right 1st CMC joint ultrasound-guided corticosteroid injection - 06/12/2020      Repeat X-ray next visit:   None      Return if symptoms worsen or fail to improve  Patient Instructions       reviewed red flags and risks with patient regarding injection including but not limited to infection <0 072% as referenced in some sources, nerve or artery penetration, and if steroids are used-skin dimpling <1%, hypo-pigmentation <1%,  or even damage to the bone as referenced in some sources  I reviewed contraindications including but not limited to active infection, prosthetic joint, fracture, allergy to steroid if used or anesthetics such as lidocaine, and uncontrolled blood thinner medications such as coumadin/warfarin  patient expressed understanding and agreed to proceed with procedure  educated if any symptoms including fevers, chills, swelling, or worsening symptoms occur then to call office or go to hospital for immediate care if physician unavailable  patient expressed understanding and agreed to plan  CHIEF COMPLAINT:  Follow-up left 1st [facility] joint arthritis    HPI:  Terrence Palma is a 72 y o  female  who presents for       Visit 7/31/2020 :   Follow-up left 1st [facility] joint osteoarthritis:  Uncontrolled  Here for ultrasound-guided corticosteroid injection today  Last visit she did have ultrasound-guided injection of the right 1st [facility] joint which offered improvement  She does tell me she had significant bruising after procedure right thumb but denies any redness fevers or pain  This bruising has since resolved  Review of Systems   Constitutional: Negative for chills and fever  Neurological: Negative for weakness and numbness           Following history reviewed and update:    Past Medical History:   Diagnosis Date    Arthritis     BPPV (benign paroxysmal positional vertigo)     Bronchitis     Disease of thyroid gland     HYPO    Diverticulitis     GERD (gastroesophageal reflux disease)     Glaucoma (increased eye pressure)      2 para 2     IBS (irritable bowel syndrome)     Insomnia     PONV (postoperative nausea and vomiting)      Past Surgical History:   Procedure Laterality Date    CHOLECYSTECTOMY      COLONOSCOPY  2019    DILATION AND CURETTAGE OF UTERUS      FL GUIDED NEEDLE PLAC BX/ASP/INJ  2018    FL GUIDED NEEDLE PLAC BX/ASP/INJ  2019    FL GUIDED NEEDLE PLAC BX/ASP/INJ  2020    HYSTERECTOMY  2006    JOINT REPLACEMENT      LAPAROSCOPIC COLON RESECTION  2019    MAMMO (HISTORICAL) Bilateral 2018    PARTIAL HYSTERECTOMY      GA REVISE KNEE JOINT REPLACE,ALL PARTS Right 2018    Procedure: ARTHROPLASTY KNEE TOTAL REVISION;  Surgeon: Greogry Collier MD;  [facility];  Service: Orthopedics    TONSILLECTOMY      TRABECULOPLASTY, LASER SELECTIVE Left 2019    VARICOSE VEIN SURGERY       Social History   Social History     Substance and Sexual Activity   Alcohol Use Yes    Frequency: Monthly or less    Comment: social      Social History     Substance and Sexual Activity   Drug Use No     Social History     Tobacco Use   Smoking Status Never Smoker   Smokeless Tobacco Never Used     Family History Problem Relation Age of Onset    Pancreatic cancer Mother 72    Diabetes type II Mother     Heart disease Father     No Known Problems Sister     Breast cancer Paternal Grandmother 80    Cancer Maternal Grandfather     Lung disease Paternal Grandfather     No Known Problems Daughter     No Known Problems Maternal Grandmother     No Known Problems Daughter     No Known Problems Maternal Aunt     No Known Problems Maternal Aunt     No Known Problems Maternal Aunt     No Known Problems Maternal Aunt      Allergies   Allergen Reactions    Codeine GI Intolerance    Morphine Itching          Physical Exam  /75 (BP Location: Right arm, Patient Position: Sitting, Cuff Size: Standard)   Pulse 60   Temp (!) 97 3 °F (36 3 °C)   Wt 60 3 kg (133 lb)   LMP  (LMP Unknown)   BMI 24 33 kg/m²     Constitutional:  see vital signs  Gen: well-developed, normocephalic/atraumatic, well-groomed  Eyes: No inflammation or discharge of conjunctiva or lids; sclera clear   Pharynx: no inflammation, lesion, or mass of lips  Neck: supple, no masses, non-distended  MSK: no inflammation, lesion, mass, or clubbing of nails and digits except for other than mentioned below  SKIN: no visible rashes or skin lesions  Pulmonary/Chest: Effort normal  No respiratory distress     NEURO: cranial nerves grossly intact  PSYCH:  Alert and oriented to person, place, and time; recent and remote memory intact; mood normal, no depression, anxiety, or agitation, judgment and insight good and intact     Ortho Exam    Left Wrist  no swelling, erythema, or increased warmth    Left Hand  no erythema  No swelling erythema or increased warmth    Small joint arthrocentesis: L thumb CMC  Date/Time: 7/31/2020 12:10 PM  Consent given by: patient  Site marked: site marked  Timeout: Immediately prior to procedure a time out was called to verify the correct patient, procedure, equipment, support staff and site/side marked as required   Supporting Documentation  Indications: diagnostic evaluation and pain   Procedure Details  Location: thumb - L thumb CMC  Preparation: Patient was prepped and draped in the usual sterile fashion (Prepped with Betadine if not allergic as well as alcohol  Ethyl Chloride cold spray used and alcohol swab after spray)  Needle size: 22 G  Ultrasound guidance: yes  Medications administered: 3 mL lidocaine (PF) 2 %; 40 mg triamcinolone acetonide 40 mg/mL (2ml anesthetic 2% wheal  1ml anesthetic 2% intra-articualr)    Patient tolerance: patient tolerated the procedure well with no immediate complications  Dressing:  Sterile dressing applied          Ultrasound-guided 1st CMC joint (1st MC-Trapezium) Injection:  performed with sterile field, gloves, probe cover using anesthetic and corticosteroid with hockey stick probe long axis to 1st MC  Needle advanced sax to probe into 1st cmc joint  No artery or nerve visualized in field of needle  Injectate visualized filling 1st CMC joint  Patient tolerated well and had significant improvement of symptoms on repeat axial grind test after procedure performed  Sterile bandage placed  Minimal to no bleeding

## 2020-07-31 NOTE — PATIENT INSTRUCTIONS
reviewed red flags and risks with patient regarding injection including but not limited to infection <0 072% as referenced in some sources, nerve or artery penetration, and if steroids are used-skin dimpling <1%, hypo-pigmentation <1%,  or even damage to the bone as referenced in some sources  I reviewed contraindications including but not limited to active infection, prosthetic joint, fracture, allergy to steroid if used or anesthetics such as lidocaine, and uncontrolled blood thinner medications such as coumadin/warfarin  patient expressed understanding and agreed to proceed with procedure  educated if any symptoms including fevers, chills, swelling, or worsening symptoms occur then to call office or go to hospital for immediate care if physician unavailable  patient expressed understanding and agreed to plan

## 2020-08-26 ENCOUNTER — OFFICE VISIT (OUTPATIENT)
Dept: OBGYN CLINIC | Facility: HOSPITAL | Age: 65
End: 2020-08-26
Payer: MEDICARE

## 2020-08-26 VITALS
BODY MASS INDEX: 24.14 KG/M2 | DIASTOLIC BLOOD PRESSURE: 79 MMHG | SYSTOLIC BLOOD PRESSURE: 126 MMHG | HEART RATE: 70 BPM | HEIGHT: 62 IN | WEIGHT: 131.2 LBS

## 2020-08-26 DIAGNOSIS — M17.12 PRIMARY OSTEOARTHRITIS OF LEFT KNEE: Primary | ICD-10-CM

## 2020-08-26 DIAGNOSIS — G89.29 CHRONIC PAIN OF LEFT KNEE: ICD-10-CM

## 2020-08-26 DIAGNOSIS — M25.562 CHRONIC PAIN OF LEFT KNEE: ICD-10-CM

## 2020-08-26 PROCEDURE — 20610 DRAIN/INJ JOINT/BURSA W/O US: CPT | Performed by: ORTHOPAEDIC SURGERY

## 2020-08-26 RX ORDER — HYALURONATE SODIUM 10 MG/ML
20 SYRINGE (ML) INTRAARTICULAR
Status: COMPLETED | OUTPATIENT
Start: 2020-08-26 | End: 2020-08-26

## 2020-08-26 RX ORDER — LIDOCAINE HYDROCHLORIDE 10 MG/ML
4 INJECTION, SOLUTION INFILTRATION; PERINEURAL
Status: COMPLETED | OUTPATIENT
Start: 2020-08-26 | End: 2020-08-26

## 2020-08-26 RX ADMIN — Medication 20 MG: at 13:38

## 2020-08-26 RX ADMIN — LIDOCAINE HYDROCHLORIDE 4 ML: 10 INJECTION, SOLUTION INFILTRATION; PERINEURAL at 13:38

## 2020-08-26 NOTE — PROGRESS NOTES
Assessment:   Diagnosis ICD-10-CM Associated Orders   1  Primary osteoarthritis of left knee  M17 12 Large joint arthrocentesis: L knee   2  Chronic pain of left knee  M25 562 Large joint arthrocentesis: L knee    G89 29        Plan:  Left knee known osteoarthritis  Patient presents today for initiation of the Euflexxa 3 series  Patient's left knee was injected with the 1st of 3 Euflexxa injections  Patient tolerated procedure well  Ice and post injection protocol advised  Weightbearing activities as tolerated  Patient will follow up each of the next 2 weeks to complete the Visco series  To do next visit:  Return in about 1 week (around 2020) for re-check and Euflexxa #2 left knee  The above stated was discussed in layman's terms and the patient expressed understanding  All questions were answered to the patient's satisfaction  Scribe Attestation    I,:   Licha Wade am acting as a scribe while in the presence of the attending physician :        I,:   Caprice Espitia MD personally performed the services described in this documentation    as scribed in my presence :              Subjective:   Sasha Serrano is a 72 y o  female who presents today for repeat evaluation of her left knee, known osteoarthritis  Patient returns today for initiation of the Euflexxa 3 series to her left knee for ongoing treatment  She continues to have medial knee pain with weight-bearing activities  She has stiffness getting up after prolonged sedentary positions  Patient last completed her Visco series to her left knee 6 months and 1 day ago        Review of systems negative unless otherwise specified in HPI    Past Medical History:   Diagnosis Date    Arthritis     BPPV (benign paroxysmal positional vertigo)     Bronchitis     Disease of thyroid gland     HYPO    Diverticulitis     GERD (gastroesophageal reflux disease)     Glaucoma (increased eye pressure)      2 para 2     IBS (irritable bowel syndrome)     Insomnia     PONV (postoperative nausea and vomiting)        Past Surgical History:   Procedure Laterality Date    CHOLECYSTECTOMY      COLONOSCOPY  07/2019    DILATION AND CURETTAGE OF UTERUS      FL GUIDED NEEDLE PLAC BX/ASP/INJ  12/13/2018    FL GUIDED NEEDLE PLAC BX/ASP/INJ  8/5/2019    FL GUIDED NEEDLE PLAC BX/ASP/INJ  2/24/2020    HYSTERECTOMY  2006    JOINT REPLACEMENT      LAPAROSCOPIC COLON RESECTION  11/12/2019    MAMMO (HISTORICAL) Bilateral 12/2018    PARTIAL HYSTERECTOMY      GA REVISE KNEE JOINT REPLACE,ALL PARTS Right 1/22/2018    Procedure: ARTHROPLASTY KNEE TOTAL REVISION;  Surgeon: Bello Cota MD;  [facility];  Service: Orthopedics    TONSILLECTOMY      TRABECULOPLASTY, LASER SELECTIVE Left 05/14/2019    VARICOSE VEIN SURGERY         Family History   Problem Relation Age of Onset    Pancreatic cancer Mother 72    Diabetes type II Mother     Heart disease Father     No Known Problems Sister     Breast cancer Paternal Grandmother 80    Cancer Maternal Grandfather     Lung disease Paternal Grandfather     No Known Problems Daughter     No Known Problems Maternal Grandmother     No Known Problems Daughter     No Known Problems Maternal Aunt     No Known Problems Maternal Aunt     No Known Problems Maternal Aunt     No Known Problems Maternal Aunt        Social History     Occupational History    Not on file   Tobacco Use    Smoking status: Never Smoker    Smokeless tobacco: Never Used   Substance and Sexual Activity    Alcohol use: Yes     Frequency: Monthly or less     Comment: social     Drug use: No    Sexual activity: Yes     Birth control/protection: Surgical         Current Outpatient Medications:     Acetaminophen (TYLENOL ARTHRITIS PAIN PO), Take 1,200 mg by mouth 2 (two) times a day, Disp: , Rfl:     Biotin 01851 MCG TABS, , Disp: , Rfl:     Black Elderberry (Sambucus Elderberry) 50 MG/5ML SYRP, Take by mouth daily, Disp: , Rfl:     Calcium-Vitamin D-Vitamin K (VIACTIV PO), Take 1 tablet by mouth daily, Disp: , Rfl:     cholecalciferol (VITAMIN D3) 1,000 units tablet, Take 4,000 Units by mouth daily, Disp: , Rfl:     diclofenac sodium (VOLTAREN) 1 %, Apply 4 g topically 4 (four) times a day, Disp: 1 Tube, Rfl: 3    DiphenhydrAMINE HCl (BENADRYL PO), Take by mouth as needed, Disp: , Rfl:     levothyroxine 112 mcg tablet, Take 112 mcg by mouth daily, Disp: , Rfl: 1    LUMIGAN 0 01 % ophthalmic drops, INSTILL 1 DROP IN EACH EYE EVERY DAY AT BEDTIME, Disp: , Rfl: 1    meclizine (ANTIVERT) 12 5 MG tablet, Take by mouth every 6 (six) hours, Disp: , Rfl:     meloxicam (MOBIC) 7 5 mg tablet, Take 1 tablet (7 5 mg total) by mouth daily, Disp: 30 tablet, Rfl: 1    naproxen sodium (ALEVE) 220 MG tablet, Take 220 mg by mouth, Disp: , Rfl:     Probiotic Product (PROBIOTIC DAILY PO), Take by mouth, Disp: , Rfl:     Tetrahydrozoline HCl (EYE DROPS REGULAR OP), Apply to eye, Disp: , Rfl:     Current Facility-Administered Medications:     cephalexin (KEFLEX) capsule 2,000 mg, 2,000 mg, Oral, 60 Min Pre-Op, Clair Herzog MD    Allergies   Allergen Reactions    Codeine GI Intolerance    Morphine Itching            Vitals:    08/26/20 1327   BP: 126/79   Pulse: 70       Objective:                    Left Knee Exam     Muscle Strength   The patient has normal left knee strength  Tenderness   The patient is experiencing tenderness in the medial joint line  Range of Motion   The patient has normal left knee ROM  Left knee flexion: With crepitation stiffness       Other   Erythema: absent  Sensation: normal  Swelling: mild  Effusion: no effusion present    Comments:    Mild varus alignment            Diagnostics, reviewed and taken today if performed as documented:    None performed            Procedures, if performed today:    Large joint arthrocentesis: L knee  Date/Time: 8/26/2020 1:38 PM  Consent given by: patient  Site marked: site marked  Timeout: Immediately prior to procedure a time out was called to verify the correct patient, procedure, equipment, support staff and site/side marked as required   Supporting Documentation  Indications: pain and diagnostic evaluation   Procedure Details  Location: knee - L knee  Preparation: Patient was prepped and draped in the usual sterile fashion  Needle size: 22 G  Ultrasound guidance: no  Approach: anteromedial  Medications administered: 4 mL lidocaine 1 %; 20 mg Sodium Hyaluronate 20 MG/2ML    Patient tolerance: patient tolerated the procedure well with no immediate complications  Dressing:  Sterile dressing applied          Portions of the record may have been created with voice recognition software  Occasional wrong word or "sound a like" substitutions may have occurred due to the inherent limitations of voice recognition software  Read the chart carefully and recognize, using context, where substitutions have occurred

## 2020-09-02 ENCOUNTER — OFFICE VISIT (OUTPATIENT)
Dept: OBGYN CLINIC | Facility: HOSPITAL | Age: 65
End: 2020-09-02
Payer: MEDICARE

## 2020-09-02 VITALS
SYSTOLIC BLOOD PRESSURE: 129 MMHG | HEART RATE: 67 BPM | BODY MASS INDEX: 24 KG/M2 | HEIGHT: 62 IN | DIASTOLIC BLOOD PRESSURE: 77 MMHG

## 2020-09-02 DIAGNOSIS — G89.29 CHRONIC PAIN OF LEFT KNEE: ICD-10-CM

## 2020-09-02 DIAGNOSIS — M25.562 CHRONIC PAIN OF LEFT KNEE: ICD-10-CM

## 2020-09-02 DIAGNOSIS — M17.12 PRIMARY OSTEOARTHRITIS OF LEFT KNEE: Primary | ICD-10-CM

## 2020-09-02 PROCEDURE — 20610 DRAIN/INJ JOINT/BURSA W/O US: CPT | Performed by: ORTHOPAEDIC SURGERY

## 2020-09-02 RX ORDER — LIDOCAINE HYDROCHLORIDE 10 MG/ML
4 INJECTION, SOLUTION INFILTRATION; PERINEURAL
Status: COMPLETED | OUTPATIENT
Start: 2020-09-02 | End: 2020-09-02

## 2020-09-02 RX ORDER — HYALURONATE SODIUM 10 MG/ML
20 SYRINGE (ML) INTRAARTICULAR
Status: COMPLETED | OUTPATIENT
Start: 2020-09-02 | End: 2020-09-02

## 2020-09-02 RX ADMIN — LIDOCAINE HYDROCHLORIDE 4 ML: 10 INJECTION, SOLUTION INFILTRATION; PERINEURAL at 13:24

## 2020-09-02 RX ADMIN — Medication 20 MG: at 13:24

## 2020-09-02 NOTE — PROGRESS NOTES
Assessment:   Diagnosis ICD-10-CM Associated Orders   1  Primary osteoarthritis of left knee  M17 12 Large joint arthrocentesis: L knee   2  Chronic pain of left knee  M25 562 Large joint arthrocentesis: L knee    G89 29        Plan:  Left knee known osteoarthritis  Patient returns today for her 2nd installment of Euflexxa  Patient tolerated today's 2nd injection well  Ice and post injection protocol advised  Weightbearing activities as tolerated  Patient was educated as well as informed on treatment options regarding pes anserine bursitis  To do next visit:  Return today (on 2020) for re-check and 3rd and final Euflexxa left knee  The above stated was discussed in layman's terms and the patient expressed understanding  All questions were answered to the patient's satisfaction  Scribe Attestation    I,:   Lanie Anguiano am acting as a scribe while in the presence of the attending physician :        I,:   Darrel Kwan MD personally performed the services described in this documentation    as scribed in my presence :              Subjective:   Cameron Gates is a 72 y o  female who presents today for repeat evaluation of her left knee and her 2nd Euflexxa injection  Patient states her knee continues to be bothersome with pain with weight-bearing activities as well as stiffness getting up from prolonged period of time  She does have pain inferior to her medial joint line that bothers her at night        Review of systems negative unless otherwise specified in HPI    Past Medical History:   Diagnosis Date    Arthritis     BPPV (benign paroxysmal positional vertigo)     Bronchitis     Disease of thyroid gland     HYPO    Diverticulitis     GERD (gastroesophageal reflux disease)     Glaucoma (increased eye pressure)      2 para 2     IBS (irritable bowel syndrome)     Insomnia     PONV (postoperative nausea and vomiting)        Past Surgical History:   Procedure Laterality Date  CHOLECYSTECTOMY      COLONOSCOPY  07/2019    DILATION AND CURETTAGE OF UTERUS      FL GUIDED NEEDLE PLAC BX/ASP/INJ  12/13/2018    FL GUIDED NEEDLE PLAC BX/ASP/INJ  8/5/2019    FL GUIDED NEEDLE PLAC BX/ASP/INJ  2/24/2020    HYSTERECTOMY  2006    JOINT REPLACEMENT      LAPAROSCOPIC COLON RESECTION  11/12/2019    MAMMO (HISTORICAL) Bilateral 12/2018    PARTIAL HYSTERECTOMY      IN REVISE KNEE JOINT REPLACE,ALL PARTS Right 1/22/2018    Procedure: ARTHROPLASTY KNEE TOTAL REVISION;  Surgeon: Brittanie Edwards MD;  [facility]: [facility];  Service: Orthopedics    TONSILLECTOMY      TRABECULOPLASTY, LASER SELECTIVE Left 05/14/2019    VARICOSE VEIN SURGERY         Family History   Problem Relation Age of Onset    Pancreatic cancer Mother 72    Diabetes type II Mother     Heart disease Father     No Known Problems Sister     Breast cancer Paternal Grandmother 80    Cancer Maternal Grandfather     Lung disease Paternal Grandfather     No Known Problems Daughter     No Known Problems Maternal Grandmother     No Known Problems Daughter     No Known Problems Maternal Aunt     No Known Problems Maternal Aunt     No Known Problems Maternal Aunt     No Known Problems Maternal Aunt        Social History     Occupational History    Not on file   Tobacco Use    Smoking status: Never Smoker    Smokeless tobacco: Never Used   Substance and Sexual Activity    Alcohol use: Yes     Frequency: Monthly or less     Comment: social     Drug use: No    Sexual activity: Yes     Birth control/protection: Surgical         Current Outpatient Medications:     Acetaminophen (TYLENOL ARTHRITIS PAIN PO), Take 1,200 mg by mouth 2 (two) times a day, Disp: , Rfl:     Biotin 26638 MCG TABS, , Disp: , Rfl:     Black Elderberry (Sambucus Elderberry) 50 MG/5ML SYRP, Take by mouth daily, Disp: , Rfl:     Calcium-Vitamin D-Vitamin K (VIACTIV PO), Take 1 tablet by mouth daily, Disp: , Rfl:     cholecalciferol (VITAMIN D3) 1,000 units tablet, Take 4,000 Units by mouth daily, Disp: , Rfl:     diclofenac sodium (VOLTAREN) 1 %, Apply 4 g topically 4 (four) times a day, Disp: 1 Tube, Rfl: 3    DiphenhydrAMINE HCl (BENADRYL PO), Take by mouth as needed, Disp: , Rfl:     levothyroxine 112 mcg tablet, Take 112 mcg by mouth daily, Disp: , Rfl: 1    LUMIGAN 0 01 % ophthalmic drops, INSTILL 1 DROP IN EACH EYE EVERY DAY AT BEDTIME, Disp: , Rfl: 1    meclizine (ANTIVERT) 12 5 MG tablet, Take by mouth every 6 (six) hours, Disp: , Rfl:     meloxicam (MOBIC) 7 5 mg tablet, Take 1 tablet (7 5 mg total) by mouth daily, Disp: 30 tablet, Rfl: 1    naproxen sodium (ALEVE) 220 MG tablet, Take 220 mg by mouth, Disp: , Rfl:     Probiotic Product (PROBIOTIC DAILY PO), Take by mouth, Disp: , Rfl:     Tetrahydrozoline HCl (EYE DROPS REGULAR OP), Apply to eye, Disp: , Rfl:     Current Facility-Administered Medications:     cephalexin (KEFLEX) capsule 2,000 mg, 2,000 mg, Oral, 60 Min Pre-Op, Prema Ramos MD    Allergies   Allergen Reactions    Codeine GI Intolerance    Morphine Itching            Vitals:    09/02/20 1306   BP: 129/77   Pulse: 67       Objective:                    Left Knee Exam     Muscle Strength   The patient has normal left knee strength  Tenderness   The patient is experiencing tenderness in the medial joint line and pes anserinus  Range of Motion   The patient has normal left knee ROM  Left knee flexion: With crepitation stiffness       Other   Erythema: absent  Sensation: normal  Swelling: mild  Effusion: no effusion present    Comments:    Mild varus alignment  No indication not to proceed with today's 2nd Visco injection            Diagnostics, reviewed and taken today if performed as documented:    None performed            Procedures, if performed today:    Large joint arthrocentesis: L knee  Date/Time: 9/2/2020 1:24 PM  Consent given by: patient  Site marked: site marked  Timeout: Immediately prior to procedure a time out was called to verify the correct patient, procedure, equipment, support staff and site/side marked as required   Supporting Documentation  Indications: pain and diagnostic evaluation   Procedure Details  Location: knee - L knee  Preparation: Patient was prepped and draped in the usual sterile fashion  Needle size: 22 G  Ultrasound guidance: no  Approach: anterolateral  Medications administered: 4 mL lidocaine 1 %; 20 mg Sodium Hyaluronate 20 MG/2ML    Patient tolerance: patient tolerated the procedure well with no immediate complications  Dressing:  Sterile dressing applied              Portions of the record may have been created with voice recognition software  Occasional wrong word or "sound a like" substitutions may have occurred due to the inherent limitations of voice recognition software  Read the chart carefully and recognize, using context, where substitutions have occurred

## 2020-09-09 ENCOUNTER — OFFICE VISIT (OUTPATIENT)
Dept: OBGYN CLINIC | Facility: HOSPITAL | Age: 65
End: 2020-09-09
Payer: MEDICARE

## 2020-09-09 VITALS
SYSTOLIC BLOOD PRESSURE: 109 MMHG | BODY MASS INDEX: 24 KG/M2 | HEIGHT: 62 IN | DIASTOLIC BLOOD PRESSURE: 71 MMHG | HEART RATE: 80 BPM

## 2020-09-09 DIAGNOSIS — G89.29 CHRONIC PAIN OF LEFT KNEE: ICD-10-CM

## 2020-09-09 DIAGNOSIS — M17.12 PRIMARY OSTEOARTHRITIS OF LEFT KNEE: Primary | ICD-10-CM

## 2020-09-09 DIAGNOSIS — M25.562 CHRONIC PAIN OF LEFT KNEE: ICD-10-CM

## 2020-09-09 PROCEDURE — 20610 DRAIN/INJ JOINT/BURSA W/O US: CPT | Performed by: ORTHOPAEDIC SURGERY

## 2020-09-09 RX ORDER — LIDOCAINE HYDROCHLORIDE 10 MG/ML
4 INJECTION, SOLUTION INFILTRATION; PERINEURAL
Status: COMPLETED | OUTPATIENT
Start: 2020-09-09 | End: 2020-09-09

## 2020-09-09 RX ORDER — HYALURONATE SODIUM 10 MG/ML
20 SYRINGE (ML) INTRAARTICULAR
Status: COMPLETED | OUTPATIENT
Start: 2020-09-09 | End: 2020-09-09

## 2020-09-09 RX ADMIN — LIDOCAINE HYDROCHLORIDE 4 ML: 10 INJECTION, SOLUTION INFILTRATION; PERINEURAL at 13:19

## 2020-09-09 RX ADMIN — Medication 20 MG: at 13:19

## 2020-09-09 NOTE — PROGRESS NOTES
Assessment:   Diagnosis ICD-10-CM Associated Orders   1  Primary osteoarthritis of left knee  M17 12 Large joint arthrocentesis: L knee   2  Chronic pain of left knee  M25 562 Large joint arthrocentesis: L knee    G89 29        Plan:  Left knee known osteoarthritis  Patient returns today for the 3rd and final Euflexxa injection  Patient tolerated today's 3rd and final injection well  Ice and post injection protocol advised  Weightbearing activities as tolerated  To do next visit:  Return in about 3 months (around 2020) for re-check  The above stated was discussed in layman's terms and the patient expressed understanding  All questions were answered to the patient's satisfaction  Scribe Attestation    I,:   Ochoa Piedra am acting as a scribe while in the presence of the attending physician :        I,:   Candance Knife, MD personally performed the services described in this documentation    as scribed in my presence :              Subjective:   Davida Sams is a 72 y o  female who presents repeat evaluation of her left knee, known osteoarthritis  She presents today for her 3rd and final Euflexxa injection  Patient notes minimal improvement of her symptoms following the previous 2 injections        Review of systems negative unless otherwise specified in HPI    Past Medical History:   Diagnosis Date    Arthritis     BPPV (benign paroxysmal positional vertigo)     Bronchitis     Disease of thyroid gland     HYPO    Diverticulitis     GERD (gastroesophageal reflux disease)     Glaucoma (increased eye pressure)      2 para 2     IBS (irritable bowel syndrome)     Insomnia     PONV (postoperative nausea and vomiting)        Past Surgical History:   Procedure Laterality Date    CHOLECYSTECTOMY      COLONOSCOPY  2019    DILATION AND CURETTAGE OF UTERUS      FL GUIDED NEEDLE PLAC BX/ASP/INJ  2018    FL GUIDED NEEDLE PLAC BX/ASP/INJ  2019    FL GUIDED NEEDLE PLAC BX/ASP/INJ  2/24/2020    HYSTERECTOMY  2006    JOINT REPLACEMENT      LAPAROSCOPIC COLON RESECTION  11/12/2019    MAMMO (HISTORICAL) Bilateral 12/2018    PARTIAL HYSTERECTOMY      [place] REVISE KNEE JOINT REPLACE,ALL PARTS Right 1/22/2018    Procedure: ARTHROPLASTY KNEE TOTAL REVISION;  Surgeon: Clair Herzog MD;  [facility];  Service: Orthopedics    TONSILLECTOMY      TRABECULOPLASTY, LASER SELECTIVE Left 05/14/2019    VARICOSE VEIN SURGERY         Family History   Problem Relation Age of Onset    Pancreatic cancer Mother 72    Diabetes type II Mother     Heart disease Father     No Known Problems Sister     Breast cancer Paternal Grandmother 80    Cancer Maternal Grandfather     Lung disease Paternal Grandfather     No Known Problems Daughter     No Known Problems Maternal Grandmother     No Known Problems Daughter     No Known Problems Maternal Aunt     No Known Problems Maternal Aunt     No Known Problems Maternal Aunt     No Known Problems Maternal Aunt        Social History     Occupational History    Not on file   Tobacco Use    Smoking status: Never Smoker    Smokeless tobacco: Never Used   Substance and Sexual Activity    Alcohol use: Yes     Frequency: Monthly or less     Comment: social     Drug use: No    Sexual activity: Yes     Birth control/protection: Surgical         Current Outpatient Medications:     Acetaminophen (TYLENOL ARTHRITIS PAIN PO), Take 1,200 mg by mouth 2 (two) times a day, Disp: , Rfl:     Biotin 84798 MCG TABS, , Disp: , Rfl:     Black Elderberry (Sambucus Elderberry) 50 MG/5ML SYRP, Take by mouth daily, Disp: , Rfl:     Calcium-Vitamin D-Vitamin K (VIACTIV PO), Take 1 tablet by mouth daily, Disp: , Rfl:     cholecalciferol (VITAMIN D3) 1,000 units tablet, Take 4,000 Units by mouth daily, Disp: , Rfl:     diclofenac sodium (VOLTAREN) 1 %, Apply 4 g topically 4 (four) times a day, Disp: 1 Tube, Rfl: 3    DiphenhydrAMINE HCl (BENADRYL PO), Take by mouth as needed, Disp: , Rfl:     levothyroxine 112 mcg tablet, Take 112 mcg by mouth daily, Disp: , Rfl: 1    LUMIGAN 0 01 % ophthalmic drops, INSTILL 1 DROP IN EACH EYE EVERY DAY AT BEDTIME, Disp: , Rfl: 1    meclizine (ANTIVERT) 12 5 MG tablet, Take by mouth every 6 (six) hours, Disp: , Rfl:     meloxicam (MOBIC) 7 5 mg tablet, Take 1 tablet (7 5 mg total) by mouth daily, Disp: 30 tablet, Rfl: 1    naproxen sodium (ALEVE) 220 MG tablet, Take 220 mg by mouth, Disp: , Rfl:     Probiotic Product (PROBIOTIC DAILY PO), Take by mouth, Disp: , Rfl:     Tetrahydrozoline HCl (EYE DROPS REGULAR OP), Apply to eye, Disp: , Rfl:     Current Facility-Administered Medications:     cephalexin (KEFLEX) capsule 2,000 mg, 2,000 mg, Oral, 60 Min Pre-Op, Poli Moura MD    Allergies   Allergen Reactions    Codeine GI Intolerance    Morphine Itching            Vitals:    09/09/20 1303   BP: 109/71   Pulse: 80       Objective:                    Left Knee Exam     Muscle Strength   The patient has normal left knee strength  Tenderness   The patient is experiencing tenderness in the medial joint line  Range of Motion   The patient has normal left knee ROM  Left knee flexion: With less crepitation and less stiffness  Other   Erythema: absent  Sensation: normal  Swelling: mild  Effusion: no effusion present    Comments:    Mild varus alignment  Small area of superficial ecchymosis laterally consistent with previous injection site     No indication not to proceed with today's 3rd Visco injection            Diagnostics, reviewed and taken today if performed as documented:    None performed            Procedures, if performed today:    Large joint arthrocentesis: L knee  Date/Time: 9/9/2020 1:19 PM  Consent given by: patient  Site marked: site marked  Timeout: Immediately prior to procedure a time out was called to verify the correct patient, procedure, equipment, support staff and site/side marked as required   Supporting Documentation  Indications: pain and diagnostic evaluation   Procedure Details  Location: knee - L knee  Preparation: Patient was prepped and draped in the usual sterile fashion  Needle size: 22 G  Ultrasound guidance: no  Approach: anterolateral  Medications administered: 4 mL lidocaine 1 %; 20 mg Sodium Hyaluronate 20 MG/2ML    Patient tolerance: patient tolerated the procedure well with no immediate complications  Dressing:  Sterile dressing applied            Portions of the record may have been created with voice recognition software  Occasional wrong word or "sound a like" substitutions may have occurred due to the inherent limitations of voice recognition software  Read the chart carefully and recognize, using context, where substitutions have occurred

## 2020-09-23 ENCOUNTER — TELEPHONE (OUTPATIENT)
Dept: OBGYN CLINIC | Facility: HOSPITAL | Age: 65
End: 2020-09-23

## 2020-09-23 NOTE — TELEPHONE ENCOUNTER
Patient sees Dr Luís Dawson  Patient is calling to ask Dr Luís Dawson to place and order for her injection  She stated she gets them done by Dr Neo Nuñez and Dr Luís Dawson places the order  She also wanted Dr Luís Dawson to know her left knee has been really bothering her and wants to know what her next steps are        CB: [phone]

## 2020-09-24 ENCOUNTER — OFFICE VISIT (OUTPATIENT)
Dept: RHEUMATOLOGY | Facility: CLINIC | Age: 65
End: 2020-09-24
Payer: MEDICARE

## 2020-09-24 VITALS
TEMPERATURE: 97.6 F | WEIGHT: 131 LBS | BODY MASS INDEX: 24.11 KG/M2 | HEIGHT: 62 IN | SYSTOLIC BLOOD PRESSURE: 108 MMHG | DIASTOLIC BLOOD PRESSURE: 70 MMHG

## 2020-09-24 DIAGNOSIS — M05.742 RHEUMATOID ARTHRITIS WITH RHEUMATOID FACTOR OF LEFT HAND WITHOUT ORGAN OR SYSTEMS INVOLVEMENT (HCC): Primary | ICD-10-CM

## 2020-09-24 DIAGNOSIS — M19.90 ARTHRITIS: ICD-10-CM

## 2020-09-24 DIAGNOSIS — M79.672 PAIN IN LEFT FOOT: Primary | ICD-10-CM

## 2020-09-24 DIAGNOSIS — M81.8 OTHER OSTEOPOROSIS WITHOUT CURRENT PATHOLOGICAL FRACTURE: ICD-10-CM

## 2020-09-24 DIAGNOSIS — R76.8 RHEUMATOID FACTOR POSITIVE: ICD-10-CM

## 2020-09-24 DIAGNOSIS — Z13.820 OSTEOPOROSIS SCREENING: ICD-10-CM

## 2020-09-24 PROCEDURE — 99204 OFFICE O/P NEW MOD 45 MIN: CPT | Performed by: INTERNAL MEDICINE

## 2020-09-24 RX ORDER — MELOXICAM 15 MG/1
15 TABLET ORAL DAILY
Qty: 90 TABLET | Refills: 3 | Status: SHIPPED | OUTPATIENT
Start: 2020-09-24 | End: 2020-10-13 | Stop reason: ALTCHOICE

## 2020-09-24 NOTE — PATIENT INSTRUCTIONS
Schedule hand ultrasound  Schedule DEXA scan   Can take meloxicam daily with meals    Return to clinic in 8 [facility]    Arthritis   AMBULATORY CARE:   Arthritis  is a disease that causes inflammation in one or more joints  There are many types of arthritis, such as osteoarthritis, rheumatoid arthritis, and septic arthritis  Some types cause inflammation in the joints  Other types wear away the cartilage between joints  This makes the bones of the joint rub together when you move the joint  Your symptoms may be constant, or symptoms may come and go  Arthritis often gets worse over time and can cause permanent joint damage  Common signs and symptoms of arthritis:   · Pain, swelling, or stiffness in the joint    · Limited range of motion in the joint    · Warmth or redness over the joint    · Tenderness when you touch the joint    · Stiff joints in the morning that loosen with movement    · A creaking or grinding sound when you move the joint    · Fever  Seek care immediately if:   · You have a fever and severe joint pain or swelling  · You cannot move the affected joint  · You have severe joint pain you cannot tolerate  Contact your healthcare provider if:   · Your pain or swelling does not get better with treatment  · You have questions or concerns about your condition or care  Treatment  will depend on the type of arthritis you have and if it is severe  You may need any of the following:  · Acetaminophen  decreases pain and fever  It is available without a doctor's order  Ask how much to take and how often to take it  Follow directions  Acetaminophen can cause liver damage if not taken correctly  · NSAIDs , such as ibuprofen, help decrease swelling, pain, and fever  This medicine is available with or without a doctor's order  NSAIDs can cause stomach bleeding or kidney problems in certain people  If you take blood thinner medicine, always ask your healthcare provider if NSAIDs are safe for you   Always read the medicine label and follow directions  · Steroid medicine  helps reduce swelling and pain  · Surgery  may be needed to repair or replace a damaged joint  Manage arthritis:   · Rest your painful joint so it can heal   Your healthcare provider may recommend crutches or a walker if the affected joint is in a leg  · Apply ice or heat to the joint  Both can help decrease swelling and pain  Ice may also help prevent tissue damage  Use an ice pack, or put crushed ice in a plastic bag  Cover it with a towel and place it on your joint for 15 to 20 minutes every hour or as directed  You can apply heat for 20 minutes every 2 hours  Heat treatment includes hot packs or heat lamps  · Elevate your joint  Elevation helps reduce swelling and pain  Raise your joint above the level of your heart as often as you can  Prop your painful joint on pillows to keep it above your heart comfortably  · Go to physical or occupational therapy as directed  A physical therapist can teach you exercises to improve flexibility and range of motion  You may also be shown non-weight-bearing exercises that are safe for your joints, such as swimming  Exercise can help keep your joints flexible and reduce pain  An occupational therapist can help you learn to do your daily activities when your joints are stiff or sore  · Maintain a healthy weight  Extra weight puts increased pressure on your joints  Ask your healthcare provider what you should weigh  If you need to lose weight, he can help you create a weight loss program  Weight loss can help reduce pain and increase your ability to do your activities  The amount of exercise you do may vary each day, depending on your symptoms  · Wear flat or low-heeled shoes  This will help decrease pain and reduce pressure on your ankle, knee, and hip joints  · Use support devices as directed    You may be given splints to wear on your hands to help your joints rest and to decrease inflammation  While you sleep, use a pillow that is firm enough to support your neck and head  Other equipment  that may help you move and prevent falls:  · Orthotic shoes or insoles  help support your feet when you walk  · Crutches, a cane, or a walker  may help decrease your risk for falling  They also decrease stress on affected joints  · Devices to prevent falls  include raised toilet seats and bathtub bars to help you get up from sitting  Handrails can be placed in areas where you need balance and support  Follow up with your healthcare provider or rheumatologist as directed:  Write down your questions so you remember to ask them during your visits  © 2017 [facility] Information is for End User's use only and may not be sold, redistributed or otherwise used for commercial purposes  All illustrations and images included in CareNotes® are the copyrighted property of [employer]  or Justin Cano  The above information is an  only  It is not intended as medical advice for individual conditions or treatments  Talk to your doctor, nurse or pharmacist before following any medical regimen to see if it is safe and effective for you

## 2020-09-24 NOTE — PROGRESS NOTES
Assessment and Plan: Ilan Arnold is a 72 y o   female who presents as a Rheumatology consult referred by Benito Burger for evaluation of possible inflammatory arthritis given positive RF  Patient's hand x-rays only show degenerative/OA changes, anti-CCP is negative, and physical exam of bilateral 1st CMC prominence and tenderness is also consistent with osteoarthritis  However, ordered [facility] hand ultrasound to evaluate for any inflammatory arthritis changes; if it shows inflammatory changes, can discuss adding a DMARD such as hydroxychloroquine or methotrexate in the future  DEXA scan ordered for osteoporosis screening given patient's age  For now, prescribed meloxicam 15mg po daily with meals  Plan:  Diagnoses and all orders for this visit:    Rheumatoid arthritis with rheumatoid factor of left hand without organ or systems involvement ([address] )   -     US [facility] complete; Future    Rheumatoid factor positive  -     Ambulatory referral to Rheumatology    Arthritis  -     meloxicam (MOBIC) 15 mg tablet; Take 1 tablet (15 mg total) by mouth daily  -     US [facility] complete; Future    Osteoporosis screening  -     DXA bone density spine hip and pelvis; Future    Other osteoporosis without current pathological fracture   -     DXA bone density spine hip and pelvis; Future    Follow-up plan: Return to clinic in 8 months      HPI  Ilan Arnold is a 72 y o   female who presents as a Rheumatology consult referred by Benito Burger for evaluation of possible inflammatory arthritis given positive RF  Patient had her right knee replaced twice, 2nd time in 2018  She is planning for left knee replacement in April 2021  She has had gel injections in her left knee  She has also had left foot arch steroid injections by [facility], last in February 2020  Dr Annmarie James gave her hand CMC injections in June/July 2020  Currently, her hands are not that bad    She states her painful joints are her left knee, hands, and left foot  Her hand pain is worse at night  She takes Aleve p r n       Review of Systems  Review of Systems   Constitutional: Negative for chills, fatigue, fever and unexpected weight change  HENT: Negative for mouth sores and trouble swallowing  Eyes: Negative for pain and visual disturbance  Dry eyes   Respiratory: Negative for cough and shortness of breath  Cardiovascular: Negative for chest pain and leg swelling  Gastrointestinal: Negative for abdominal pain, blood in stool, constipation, diarrhea and nausea  Musculoskeletal: Positive for arthralgias, back pain and neck pain  Negative for joint swelling and myalgias  Skin: Negative for color change and rash  Neurological: Positive for numbness  Negative for weakness  Hematological: Negative for adenopathy  Psychiatric/Behavioral: Negative for sleep disturbance         Allergies  Allergies   Allergen Reactions    Codeine GI Intolerance    Morphine Itching       Home Medications    Current Outpatient Medications:     Acetaminophen (TYLENOL ARTHRITIS PAIN PO), Take 1,200 mg by mouth 2 (two) times a day, Disp: , Rfl:     Biotin 98829 MCG TABS, , Disp: , Rfl:     Black Elderberry (Sambucus Elderberry) 50 MG/5ML SYRP, Take by mouth daily, Disp: , Rfl:     Calcium-Vitamin D-Vitamin K (VIACTIV PO), Take 1 tablet by mouth daily, Disp: , Rfl:     cholecalciferol (VITAMIN D3) 1,000 units tablet, Take 4,000 Units by mouth daily, Disp: , Rfl:     diclofenac sodium (VOLTAREN) 1 %, Apply 4 g topically 4 (four) times a day, Disp: 1 Tube, Rfl: 3    DiphenhydrAMINE HCl (BENADRYL PO), Take by mouth as needed, Disp: , Rfl:     levothyroxine 112 mcg tablet, Take 112 mcg by mouth daily, Disp: , Rfl: 1    LUMIGAN 0 01 % ophthalmic drops, INSTILL 1 DROP IN EACH EYE EVERY DAY AT BEDTIME, Disp: , Rfl: 1    meclizine (ANTIVERT) 12 5 MG tablet, Take by mouth every 6 (six) hours, Disp: , Rfl:     Probiotic Product (PROBIOTIC DAILY PO), Take by mouth, Disp: , Rfl:     Tetrahydrozoline HCl (EYE DROPS REGULAR OP), Apply to eye, Disp: , Rfl:     ascorbic acid (VITAMIN C) 500 mg tablet, Take 1 tablet (500 mg total) by mouth daily Start 30 days prior to surgery, Disp: 30 tablet, Rfl: 0    enoxaparin (LOVENOX) 40 mg/0 4 mL, Inject 0 4 mL (40 mg total) under the skin daily, Disp: 28 Syringe, Rfl: 0    ferrous sulfate 324 (65 Fe) mg, Take 1 tablet (324 mg total) by mouth 2 (two) times a day before meals Start 30 days prior to surgery, Disp: 60 tablet, Rfl: 0    folic acid (FOLVITE) 1 mg tablet, Take 1 tablet (1 mg total) by mouth daily Start 30 days prior to surgery, Disp: 30 tablet, Rfl: 0    naproxen (NAPROSYN) 500 mg tablet, Take 1 tablet (500 mg total) by mouth 2 (two) times a day with meals, Disp: 180 tablet, Rfl: 3    Current Facility-Administered Medications:     cephalexin (KEFLEX) capsule 2,000 mg, 2,000 mg, Oral, 60 Min Pre-Op, Jhon Conley MD    Past Medical History  Past Medical History:   Diagnosis Date    Arthritis     BPPV (benign paroxysmal positional vertigo)     Bronchitis     Disease of thyroid gland     HYPO    Diverticulitis     GERD (gastroesophageal reflux disease)     Glaucoma (increased eye pressure)      2 para 2     IBS (irritable bowel syndrome)     Insomnia     PONV (postoperative nausea and vomiting)        Past Surgical History   Past Surgical History:   Procedure Laterality Date    CHOLECYSTECTOMY      COLONOSCOPY  2019    DILATION AND CURETTAGE OF UTERUS      FL GUIDED NEEDLE PLAC BX/ASP/INJ  2018    FL GUIDED NEEDLE PLAC BX/ASP/INJ  2019    FL GUIDED NEEDLE PLAC BX/ASP/INJ  2020    FL GUIDED NEEDLE PLAC BX/ASP/INJ  10/8/2020    HYSTERECTOMY  2006    JOINT REPLACEMENT      LAPAROSCOPIC COLON RESECTION  2019    MAMMO (HISTORICAL) Bilateral 2018    PARTIAL HYSTERECTOMY      OK REVISE KNEE JOINT REPLACE,ALL PARTS Right 2018    Procedure: ARTHROPLASTY KNEE TOTAL REVISION;  Surgeon: Jyoti hSore MD;  Location: [facility];  Service: Orthopedics    TONSILLECTOMY      TRABECULOPLASTY, LASER SELECTIVE Left 05/14/2019    VARICOSE VEIN SURGERY         Family History    Family History   Problem Relation Age of Onset    Pancreatic cancer Mother 72    Diabetes type II Mother     Heart disease Father     No Known Problems Sister     Breast cancer Paternal Grandmother 80    Cancer Maternal Grandfather     Lung disease Paternal Grandfather     No Known Problems Daughter     No Known Problems Maternal Grandmother     No Known Problems Daughter     No Known Problems Maternal Aunt     No Known Problems Maternal Aunt     No Known Problems Maternal Aunt     No Known Problems Maternal Aunt    oldest maternal aunt - likely inflammatory arthritis  Mother - osteoporosis      Social History  Occupation: was an , retired in 2013  Social History     Substance and Sexual Activity   Alcohol Use Yes    Frequency: Monthly or less    Comment: social      Social History     Substance and Sexual Activity   Drug Use No     Social History     Tobacco Use   Smoking Status Never Smoker   Smokeless Tobacco Never Used       Objective:  Vitals:    09/24/20 0806   BP: 108/70   BP Location: Left arm   Patient Position: Sitting   Cuff Size: Standard   Temp: 97 6 °F (36 4 °C)   TempSrc: Temporal   Weight: 59 4 kg (131 lb)   Height: 5' 2" (1 575 m)       Physical Exam  Constitutional:       General: She is not in acute distress  Appearance: She is well-developed  HENT:      Head: Normocephalic and atraumatic  Eyes:      General: Lids are normal  No scleral icterus  Conjunctiva/sclera: Conjunctivae normal    Neck:      Musculoskeletal: Neck supple  No muscular tenderness  Thyroid: No thyromegaly  Cardiovascular:      Rate and Rhythm: Normal rate and regular rhythm  Heart sounds: S1 normal and S2 normal  No murmur  No friction rub  Pulmonary:      Effort: Pulmonary effort is normal  No tachypnea or respiratory distress  Breath sounds: Normal breath sounds  No wheezing, rhonchi or rales  Musculoskeletal:         General: Tenderness present  Comments: Bilateral 1st CMC prominence and tenderness   Lymphadenopathy:      Head:      Right side of head: No submental or submandibular adenopathy  Left side of head: No submental or submandibular adenopathy  Cervical: No cervical adenopathy  Skin:     General: Skin is warm and dry  Findings: No rash  Nails: There is no clubbing  Neurological:      Mental Status: She is alert  Sensory: No sensory deficit  Psychiatric:         Behavior: Behavior normal  Behavior is cooperative  Reviewed labs and imaging  Imaging:   Bilateral Hand x-rays 3/11/20  Right:   Severe osteoarthritis of the 1st carpometacarpal articulation, significantly progressed from previous study  Mild osteoarthritis of the triscaphe joint, 3rd MCP joint with minor involvement of the 1st interphalangeal and 2nd PIP joints  No focal erosions  Left:    Moderately advanced osteoarthritis of the 1st carpometacarpal joint  Mild osteoarthritis of the triscaphe articulation and 2nd through 5th DIP joints  No focal erosions      Labs:   Appointment on 06/08/2020   Component Date Value Ref Range Status    TSH 3RD GENERATON 06/08/2020 0 602  0 358 - 3 740 uIU/mL Final      The recommended reference ranges for TSH during pregnancy are as follows:   First trimester 0 1 to 2 5 uIU/mL   Second trimester  0 2 to 3 0 uIU/mL   Third trimester 0 3 to 3 0 uIU/m    Note: Normal ranges may not apply to patients who are transgender, non-binary, or whose legal sex, sex at birth, and gender identity differ    Using supplements with high doses of biotin 20 to more than 300 times greater than the adequate daily intake for adults of 30 mcg/day as established by the [place] [employer], can cause falsely depress results   Free T4 06/08/2020 1 05  0 76 - 1 46 ng/dL Final      Specimen collection should occur prior to Sulfasalazine administration due to the potential for falsely elevated results   Hemoglobin A1C 06/08/2020 5 7* Normal 3 8-5 6%; PreDiabetic 5 7-6 4%; Diabetic >=6 5%; Glycemic control for adults with diabetes <7 0% % Final    EAG 06/08/2020 117  mg/dl Final    Sodium 06/08/2020 142  136 - 145 mmol/L Final    Potassium 06/08/2020 4 3  3 5 - 5 3 mmol/L Final    Chloride 06/08/2020 110* 100 - 108 mmol/L Final    CO2 06/08/2020 28  21 - 32 mmol/L Final    ANION GAP 06/08/2020 4  4 - 13 mmol/L Final    BUN 06/08/2020 23  5 - 25 mg/dL Final    Creatinine 06/08/2020 0 77  0 60 - 1 30 mg/dL Final    Standardized to IDMS reference method    Glucose, Fasting 06/08/2020 82  65 - 99 mg/dL Final      Specimen collection should occur prior to Sulfasalazine administration due to the potential for falsely depressed results  Specimen collection should occur prior to Sulfapyridine administration due to the potential for falsely elevated results   Calcium 06/08/2020 9 7  8 3 - 10 1 mg/dL Final    AST 06/08/2020 16  5 - 45 U/L Final      Specimen collection should occur prior to Sulfasalazine administration due to the potential for falsely depressed results   ALT 06/08/2020 17  12 - 78 U/L Final      Specimen collection should occur prior to Sulfasalazine and/or Sulfapyridine administration due to the potential for falsely depressed results   Alkaline Phosphatase 06/08/2020 127* 46 - 116 U/L Final    Total Protein 06/08/2020 7 0  6 4 - 8 2 g/dL Final    Albumin 06/08/2020 3 9  3 5 - 5 0 g/dL Final    Total Bilirubin 06/08/2020 1 30* 0 20 - 1 00 mg/dL Final      Use of this assay is not recommended for patients undergoing treatment with eltrombopag due to the potential for falsely elevated results      eGFR 06/08/2020 81  ml/min/1 73sq m Final   Appointment on 03/13/2020   Component Date Value Ref Range Status    WBC 03/13/2020 5 09  4 31 - 10 16 Thousand/uL Final    RBC 03/13/2020 4 77  3 81 - 5 12 Million/uL Final    Hemoglobin 03/13/2020 14 0  11 5 - 15 4 g/dL Final    Hematocrit 03/13/2020 43 0  34 8 - 46 1 % Final    MCV 03/13/2020 90  82 - 98 fL Final    MCH 03/13/2020 29 4  26 8 - 34 3 pg Final    MCHC 03/13/2020 32 6  31 4 - 37 4 g/dL Final    RDW 03/13/2020 13 2  11 6 - 15 1 % Final    MPV 03/13/2020 10 5  8 9 - 12 7 fL Final    Platelets 74/09/8525 256  149 - 390 Thousands/uL Final    nRBC 03/13/2020 0  /100 WBCs Final    Neutrophils Relative 03/13/2020 62  43 - 75 % Final    Immat GRANS % 03/13/2020 0  0 - 2 % Final    Lymphocytes Relative 03/13/2020 27  14 - 44 % Final    Monocytes Relative 03/13/2020 9  4 - 12 % Final    Eosinophils Relative 03/13/2020 2  0 - 6 % Final    Basophils Relative 03/13/2020 0  0 - 1 % Final    Neutrophils Absolute 03/13/2020 3 14  1 85 - 7 62 Thousands/µL Final    Immature Grans Absolute 03/13/2020 0 01  0 00 - 0 20 Thousand/uL Final    Lymphocytes Absolute 03/13/2020 1 36  0 60 - 4 47 Thousands/µL Final    Monocytes Absolute 03/13/2020 0 48  0 17 - 1 22 Thousand/µL Final    Eosinophils Absolute 03/13/2020 0 09  0 00 - 0 61 Thousand/µL Final    Basophils Absolute 03/13/2020 0 01  0 00 - 0 10 Thousands/µL Final    Sodium 03/13/2020 142  136 - 145 mmol/L Final    Potassium 03/13/2020 4 4  3 5 - 5 3 mmol/L Final    Chloride 03/13/2020 109* 100 - 108 mmol/L Final    CO2 03/13/2020 27  21 - 32 mmol/L Final    ANION GAP 03/13/2020 6  4 - 13 mmol/L Final    BUN 03/13/2020 21  5 - 25 mg/dL Final    Creatinine 03/13/2020 0 74  0 60 - 1 30 mg/dL Final    Standardized to IDMS reference method    Glucose, Fasting 03/13/2020 98  65 - 99 mg/dL Final      Specimen collection should occur prior to Sulfasalazine administration due to the potential for falsely depressed results   Specimen collection should occur prior to Sulfapyridine administration due to the potential for falsely elevated results   Calcium 03/13/2020 9 6  8 3 - 10 1 mg/dL Final    AST 03/13/2020 15  5 - 45 U/L Final      Specimen collection should occur prior to Sulfasalazine administration due to the potential for falsely depressed results   ALT 03/13/2020 20  12 - 78 U/L Final      Specimen collection should occur prior to Sulfasalazine and/or Sulfapyridine administration due to the potential for falsely depressed results   Alkaline Phosphatase 03/13/2020 103  46 - 116 U/L Final    Total Protein 03/13/2020 7 3  6 4 - 8 2 g/dL Final    Albumin 03/13/2020 4 2  3 5 - 5 0 g/dL Final    Total Bilirubin 03/13/2020 0 92  0 20 - 1 00 mg/dL Final    eGFR 03/13/2020 85  ml/min/1 73sq m Final    LDL Direct 03/13/2020 87  0 - 100 mg/dl Final    Cholesterol 03/13/2020 156  50 - 200 mg/dL Final      Cholesterol:       Desirable         <200 mg/dl       Borderline         200-239 mg/dl       High              >239           Triglycerides 03/13/2020 122  <=150 mg/dL Final      Triglyceride:     Normal          <150 mg/dl     Borderline High 150-199 mg/dl     High            200-499 mg/dl        Very High       >499 mg/dl    Specimen collection should occur prior to N-Acetylcysteine or Metamizole administration due to the potential for falsely depressed results   HDL, Direct 03/13/2020 54  >=40 mg/dL Final      HDL Cholesterol:       Low     <41 mg/dL  Specimen collection should occur prior to Metamizole administration due to the potential for falsley depressed results   LDL Calculated 03/13/2020 78  0 - 100 mg/dL Final      LDL Cholesterol:     Optimal           <100 mg/dl     Near Optimal      100-129 mg/dl     Above Optimal       Borderline High 130-159 mg/dl       High            160-189 mg/dl       Very High       >189 mg/dl         This screening LDL is a calculated result     It does not have the accuracy of the Direct Measured LDL in the monitoring of patients with hyperlipidemia and/or statin therapy  Direct Measure LDL (NIA875) must be ordered separately in these patients   Non-HDL-Chol (CHOL-HDL) 03/13/2020 102  mg/dl Final   Transcribe Orders on 03/13/2020   Component Date Value Ref Range Status    Clarity, UA 03/13/2020 Cloudy   Final    Color, UA 03/13/2020 Yellow   Final    Specific Lyman, UA 03/13/2020 1 024  1 003 - 1 030 Final    pH, UA 03/13/2020 6 5  4 5, 5 0, 5 5, 6 0, 6 5, 7 0, 7 5, 8 0 Final    Glucose, UA 03/13/2020 Negative  Negative mg/dl Final    Ketones, UA 03/13/2020 Negative  Negative mg/dl Final    Blood, UA 03/13/2020 Negative  Negative Final    Protein, UA 03/13/2020 Negative  Negative mg/dl Final    Nitrite, UA 03/13/2020 Negative  Negative Final    Bilirubin, UA 03/13/2020 Negative  Negative Final    Urobilinogen, UA 03/13/2020 1 0  0 2, 1 0 E U /dl E U /dl Final    Leukocytes, UA 03/13/2020 Large* Negative Final    WBC, UA 03/13/2020 10-20* None Seen, 0-5, 5-55, 5-65 /hpf Final    RBC, UA 03/13/2020 None Seen  None Seen, 0-5 /hpf Final    Bacteria, UA 03/13/2020 Occasional  None Seen, Occasional /hpf Final    Epithelial Cells 03/13/2020 Moderate* None Seen, Occasional /hpf Final   Appointment on 03/11/2020   Component Date Value Ref Range Status    CRP 03/11/2020 <3 0  <3 0 mg/L Final    Sodium 03/11/2020 142  136 - 145 mmol/L Final    Potassium 03/11/2020 3 9  3 5 - 5 3 mmol/L Final    Chloride 03/11/2020 106  100 - 108 mmol/L Final    CO2 03/11/2020 29  21 - 32 mmol/L Final    ANION GAP 03/11/2020 7  4 - 13 mmol/L Final    BUN 03/11/2020 20  5 - 25 mg/dL Final    Creatinine 03/11/2020 0 72  0 60 - 1 30 mg/dL Final    Standardized to IDMS reference method    Glucose 03/11/2020 97  65 - 140 mg/dL Final      If the patient is fasting, the ADA then defines impaired fasting glucose as > 100 mg/dL and diabetes as > or equal to 123 mg/dL    Specimen collection should occur prior to Sulfasalazine administration due to the potential for falsely depressed results  Specimen collection should occur prior to Sulfapyridine administration due to the potential for falsely elevated results   Calcium 03/11/2020 10 2* 8 3 - 10 1 mg/dL Final    Corrected Calcium 03/11/2020 9 7  8 3 - 10 1 mg/dL Final    AST 03/11/2020 19  5 - 45 U/L Final      Specimen collection should occur prior to Sulfasalazine administration due to the potential for falsely depressed results   ALT 03/11/2020 24  12 - 78 U/L Final      Specimen collection should occur prior to Sulfasalazine and/or Sulfapyridine administration due to the potential for falsely depressed results       Alkaline Phosphatase 03/11/2020 113  46 - 116 U/L Final    Total Protein 03/11/2020 8 0  6 4 - 8 2 g/dL Final    Albumin 03/11/2020 4 6  3 5 - 5 0 g/dL Final    Total Bilirubin 03/11/2020 0 95  0 20 - 1 00 mg/dL Final    eGFR 03/11/2020 88  ml/min/1 73sq m Final    Rheumatoid Factor 03/11/2020 Positive* Negative Final    See RF Quantitation    RF Quantitation 03/11/2020 20 IU/mL* (none) Final   Office Visit on 03/11/2020   Component Date Value Ref Range Status    MICHELLE 03/11/2020 Negative  Negative Final    WBC 03/11/2020 5 24  4 31 - 10 16 Thousand/uL Final    RBC 03/11/2020 4 86  3 81 - 5 12 Million/uL Final    Hemoglobin 03/11/2020 14 4  11 5 - 15 4 g/dL Final    Hematocrit 03/11/2020 44 9  34 8 - 46 1 % Final    MCV 03/11/2020 92  82 - 98 fL Final    MCH 03/11/2020 29 6  26 8 - 34 3 pg Final    MCHC 03/11/2020 32 1  31 4 - 37 4 g/dL Final    RDW 03/11/2020 13 3  11 6 - 15 1 % Final    MPV 03/11/2020 10 9  8 9 - 12 7 fL Final    Platelets 84/08/8341 300  149 - 390 Thousands/uL Final    nRBC 03/11/2020 0  /100 WBCs Final    Neutrophils Relative 03/11/2020 62  43 - 75 % Final    Immat GRANS % 03/11/2020 0  0 - 2 % Final    Lymphocytes Relative 03/11/2020 27  14 - 44 % Final    Monocytes Relative 03/11/2020 9  4 - 12 % Final    Eosinophils Relative 03/11/2020 2  0 - 6 % Final    Basophils Relative 03/11/2020 0  0 - 1 % Final    Neutrophils Absolute 03/11/2020 3 25  1 85 - 7 62 Thousands/µL Final    Immature Grans Absolute 03/11/2020 0 01  0 00 - 0 20 Thousand/uL Final    Lymphocytes Absolute 03/11/2020 1 40  0 60 - 4 47 Thousands/µL Final    Monocytes Absolute 03/11/2020 0 48  0 17 - 1 22 Thousand/µL Final    Eosinophils Absolute 03/11/2020 0 08  0 00 - 0 61 Thousand/µL Final    Basophils Absolute 03/11/2020 0 02  0 00 - 0 10 Thousands/µL Final    Cyclic Citrullin Peptide Ab 03/11/2020 7  0 - 19 units Final                              Negative               <20                            Weak positive      20 - 39                            Moderate positive  40 - 59                            Strong positive        >59    Sed Rate 03/11/2020 7  0 - 20 mm/hour Final   Office Visit on 10/30/2019   Component Date Value Ref Range Status    LEUKOCYTE ESTERASE,UA 10/30/2019 trace   Final    NITRITE,UA 10/30/2019 negative   Final    SL AMB POCT UROBILINOGEN 10/30/2019 normal   Final    POCT URINE PROTEIN 10/30/2019 negative   Final     PH,UA 10/30/2019 5 0   Final    BLOOD,UA 10/30/2019 negative   Final    SPECIFIC GRAVITY,UA 10/30/2019 1,000   Final    KETONES,UA 10/30/2019 negative   Final    BILIRUBIN,UA 10/30/2019 negative   Final    GLUCOSE, UA 10/30/2019 negative   Final     COLOR,UA 10/30/2019 yellow   Final    CLARITY,UA 10/30/2019 clear   Final

## 2020-09-24 NOTE — TELEPHONE ENCOUNTER
Doctors in the operating room    The request for her left foot injection was placed    Regarding her left knee,   In a patient who has a right knee replacement,  Injections can and become refractory in their impact,   Either by the medicine, or the amount of time the medicine is been used  She is allowed to offset this with oral over-the-counter medications,   Or a prescription medication for arthralgia      She can also discuss this with the doctor at her next appointment    You may inform the patient ,    and thank you

## 2020-10-08 ENCOUNTER — HOSPITAL ENCOUNTER (OUTPATIENT)
Dept: RADIOLOGY | Facility: HOSPITAL | Age: 65
Discharge: HOME | End: 2020-10-08
Attending: INTERNAL MEDICINE
Payer: MEDICARE

## 2020-10-08 ENCOUNTER — TRANSCRIBE ORDERS (OUTPATIENT)
Dept: RADIOLOGY | Facility: HOSPITAL | Age: 65
End: 2020-10-08

## 2020-10-08 ENCOUNTER — HOSPITAL ENCOUNTER (OUTPATIENT)
Dept: RADIOLOGY | Facility: HOSPITAL | Age: 65
Discharge: HOME | End: 2020-10-08
Payer: MEDICARE

## 2020-10-08 DIAGNOSIS — M79.672 PAIN IN LEFT FOOT: ICD-10-CM

## 2020-10-08 DIAGNOSIS — M19.90 ARTHRITIS: ICD-10-CM

## 2020-10-08 DIAGNOSIS — M05.742 RHEUMATOID ARTHRITIS WITH RHEUMATOID FACTOR OF LEFT HAND WITHOUT ORGAN OR SYSTEMS INVOLVEMENT (HCC): ICD-10-CM

## 2020-10-08 PROCEDURE — 20605 DRAIN/INJ JOINT/BURSA W/O US: CPT

## 2020-10-08 PROCEDURE — 77002 NEEDLE LOCALIZATION BY XRAY: CPT

## 2020-10-08 RX ORDER — BUPIVACAINE HYDROCHLORIDE 2.5 MG/ML
10 INJECTION, SOLUTION EPIDURAL; INFILTRATION; INTRACAUDAL
Status: COMPLETED | OUTPATIENT
Start: 2020-10-08 | End: 2020-10-08

## 2020-10-08 RX ORDER — LIDOCAINE HYDROCHLORIDE 10 MG/ML
10 INJECTION, SOLUTION EPIDURAL; INFILTRATION; INTRACAUDAL; PERINEURAL
Status: COMPLETED | OUTPATIENT
Start: 2020-10-08 | End: 2020-10-08

## 2020-10-08 RX ORDER — METHYLPREDNISOLONE ACETATE 80 MG/ML
80 INJECTION, SUSPENSION INTRA-ARTICULAR; INTRALESIONAL; INTRAMUSCULAR; SOFT TISSUE
Status: COMPLETED | OUTPATIENT
Start: 2020-10-08 | End: 2020-10-08

## 2020-10-08 RX ADMIN — LIDOCAINE HYDROCHLORIDE 7 ML: 10 INJECTION, SOLUTION EPIDURAL; INFILTRATION; INTRACAUDAL; PERINEURAL at 12:48

## 2020-10-08 RX ADMIN — IOHEXOL 3 ML: 300 INJECTION, SOLUTION INTRAVENOUS at 12:48

## 2020-10-08 RX ADMIN — BUPIVACAINE HYDROCHLORIDE 5 ML: 2.5 INJECTION, SOLUTION EPIDURAL; INFILTRATION; INTRACAUDAL at 12:48

## 2020-10-08 RX ADMIN — METHYLPREDNISOLONE ACETATE 80 MG: 80 INJECTION, SUSPENSION INTRA-ARTICULAR; INTRALESIONAL; INTRAMUSCULAR; SOFT TISSUE at 12:48

## 2020-10-12 ENCOUNTER — TELEPHONE (OUTPATIENT)
Dept: OBGYN CLINIC | Facility: HOSPITAL | Age: 65
End: 2020-10-12

## 2020-10-13 DIAGNOSIS — M19.90 ARTHRITIS: Primary | ICD-10-CM

## 2020-10-13 RX ORDER — NAPROXEN 500 MG/1
500 TABLET ORAL 2 TIMES DAILY WITH MEALS
Qty: 180 TABLET | Refills: 3 | Status: SHIPPED | OUTPATIENT
Start: 2020-10-13 | End: 2020-10-21

## 2020-10-21 RX ORDER — NAPROXEN 500 MG/1
500 TABLET ORAL 2 TIMES DAILY WITH MEALS
Qty: 180 TABLET | Refills: 3 | Status: SHIPPED | OUTPATIENT
Start: 2020-10-21 | End: 2021-11-15 | Stop reason: ALTCHOICE

## 2020-12-10 ENCOUNTER — OFFICE VISIT (OUTPATIENT)
Dept: OBGYN CLINIC | Facility: HOSPITAL | Age: 65
End: 2020-12-10
Payer: MEDICARE

## 2020-12-10 VITALS
DIASTOLIC BLOOD PRESSURE: 81 MMHG | BODY MASS INDEX: 25.03 KG/M2 | SYSTOLIC BLOOD PRESSURE: 133 MMHG | HEART RATE: 103 BPM | WEIGHT: 136 LBS | HEIGHT: 62 IN

## 2020-12-10 DIAGNOSIS — G89.29 CHRONIC PAIN OF LEFT KNEE: ICD-10-CM

## 2020-12-10 DIAGNOSIS — M25.562 CHRONIC PAIN OF LEFT KNEE: ICD-10-CM

## 2020-12-10 DIAGNOSIS — M17.12 PRIMARY OSTEOARTHRITIS OF LEFT KNEE: Primary | ICD-10-CM

## 2020-12-10 PROCEDURE — 20610 DRAIN/INJ JOINT/BURSA W/O US: CPT | Performed by: ORTHOPAEDIC SURGERY

## 2020-12-10 PROCEDURE — 99213 OFFICE O/P EST LOW 20 MIN: CPT | Performed by: ORTHOPAEDIC SURGERY

## 2020-12-10 RX ORDER — LIDOCAINE HYDROCHLORIDE 10 MG/ML
2 INJECTION, SOLUTION INFILTRATION; PERINEURAL
Status: COMPLETED | OUTPATIENT
Start: 2020-12-10 | End: 2020-12-10

## 2020-12-10 RX ORDER — CHLORHEXIDINE GLUCONATE 0.12 MG/ML
15 RINSE ORAL ONCE
Status: CANCELLED | OUTPATIENT
Start: 2020-12-10 | End: 2020-12-10

## 2020-12-10 RX ORDER — ASCORBIC ACID 500 MG
500 TABLET ORAL DAILY
Qty: 30 TABLET | Refills: 0 | Status: SHIPPED | OUTPATIENT
Start: 2020-12-10 | End: 2021-04-28 | Stop reason: HOSPADM

## 2020-12-10 RX ORDER — FERROUS SULFATE TAB EC 324 MG (65 MG FE EQUIVALENT) 324 (65 FE) MG
324 TABLET DELAYED RESPONSE ORAL
Qty: 60 TABLET | Refills: 0 | Status: SHIPPED | OUTPATIENT
Start: 2020-12-10 | End: 2021-04-28 | Stop reason: HOSPADM

## 2020-12-10 RX ORDER — BETAMETHASONE SODIUM PHOSPHATE AND BETAMETHASONE ACETATE 3; 3 MG/ML; MG/ML
12 INJECTION, SUSPENSION INTRA-ARTICULAR; INTRALESIONAL; INTRAMUSCULAR; SOFT TISSUE
Status: COMPLETED | OUTPATIENT
Start: 2020-12-10 | End: 2020-12-10

## 2020-12-10 RX ORDER — FOLIC ACID 1 MG/1
1 TABLET ORAL DAILY
Qty: 30 TABLET | Refills: 0 | Status: SHIPPED | OUTPATIENT
Start: 2020-12-10 | End: 2021-04-28 | Stop reason: HOSPADM

## 2020-12-10 RX ORDER — CEFAZOLIN SODIUM 2 G/50ML
2000 SOLUTION INTRAVENOUS ONCE
Status: CANCELLED | OUTPATIENT
Start: 2020-12-10 | End: 2020-12-10

## 2020-12-10 RX ORDER — BUPIVACAINE HYDROCHLORIDE 2.5 MG/ML
2 INJECTION, SOLUTION INFILTRATION; PERINEURAL
Status: COMPLETED | OUTPATIENT
Start: 2020-12-10 | End: 2020-12-10

## 2020-12-10 RX ORDER — SODIUM CHLORIDE, SODIUM LACTATE, POTASSIUM CHLORIDE, CALCIUM CHLORIDE 600; 310; 30; 20 MG/100ML; MG/100ML; MG/100ML; MG/100ML
125 INJECTION, SOLUTION INTRAVENOUS CONTINUOUS
Status: CANCELLED | OUTPATIENT
Start: 2020-12-10

## 2020-12-10 RX ORDER — LIDOCAINE HYDROCHLORIDE 10 MG/ML
4 INJECTION, SOLUTION INFILTRATION; PERINEURAL
Status: COMPLETED | OUTPATIENT
Start: 2020-12-10 | End: 2020-12-10

## 2020-12-10 RX ADMIN — LIDOCAINE HYDROCHLORIDE 4 ML: 10 INJECTION, SOLUTION INFILTRATION; PERINEURAL at 13:31

## 2020-12-10 RX ADMIN — LIDOCAINE HYDROCHLORIDE 2 ML: 10 INJECTION, SOLUTION INFILTRATION; PERINEURAL at 13:31

## 2020-12-10 RX ADMIN — BETAMETHASONE SODIUM PHOSPHATE AND BETAMETHASONE ACETATE 12 MG: 3; 3 INJECTION, SUSPENSION INTRA-ARTICULAR; INTRALESIONAL; INTRAMUSCULAR; SOFT TISSUE at 13:31

## 2020-12-10 RX ADMIN — BUPIVACAINE HYDROCHLORIDE 2 ML: 2.5 INJECTION, SOLUTION INFILTRATION; PERINEURAL at 13:31

## 2021-01-02 ENCOUNTER — LAB (OUTPATIENT)
Dept: LAB | Facility: MEDICAL CENTER | Age: 66
End: 2021-01-02
Payer: MEDICARE

## 2021-01-02 ENCOUNTER — TRANSCRIBE ORDERS (OUTPATIENT)
Dept: ADMINISTRATIVE | Facility: HOSPITAL | Age: 66
End: 2021-01-02

## 2021-01-02 DIAGNOSIS — E66.3 SEVERELY OVERWEIGHT: ICD-10-CM

## 2021-01-02 DIAGNOSIS — E03.8 HYPOTHYROIDISM DUE TO FIBROUS INVASIVE THYROIDITIS: ICD-10-CM

## 2021-01-02 DIAGNOSIS — E06.5 HYPOTHYROIDISM DUE TO FIBROUS INVASIVE THYROIDITIS: ICD-10-CM

## 2021-01-02 DIAGNOSIS — E06.5 HYPOTHYROIDISM DUE TO FIBROUS INVASIVE THYROIDITIS: Primary | ICD-10-CM

## 2021-01-02 DIAGNOSIS — N60.19 FIBROCYSTIC BREAST DISEASE (FCBD), UNSPECIFIED LATERALITY: ICD-10-CM

## 2021-01-02 DIAGNOSIS — E03.8 HYPOTHYROIDISM DUE TO FIBROUS INVASIVE THYROIDITIS: Primary | ICD-10-CM

## 2021-01-02 LAB
25(OH)D3 SERPL-MCNC: 38.9 NG/ML (ref 30–100)
ALBUMIN SERPL BCP-MCNC: 3.9 G/DL (ref 3.5–5)
ALP SERPL-CCNC: 126 U/L (ref 46–116)
ALT SERPL W P-5'-P-CCNC: 16 U/L (ref 12–78)
ANION GAP SERPL CALCULATED.3IONS-SCNC: 1 MMOL/L (ref 4–13)
AST SERPL W P-5'-P-CCNC: 16 U/L (ref 5–45)
BACTERIA UR QL AUTO: ABNORMAL /HPF
BASOPHILS # BLD AUTO: 0.01 THOUSANDS/ΜL (ref 0–0.1)
BASOPHILS NFR BLD AUTO: 0 % (ref 0–1)
BILIRUB SERPL-MCNC: 1.06 MG/DL (ref 0.2–1)
BILIRUB UR QL STRIP: NEGATIVE
BUN SERPL-MCNC: 28 MG/DL (ref 5–25)
CALCIUM SERPL-MCNC: 9.8 MG/DL (ref 8.3–10.1)
CHLORIDE SERPL-SCNC: 107 MMOL/L (ref 100–108)
CHOLEST SERPL-MCNC: 206 MG/DL (ref 50–200)
CLARITY UR: CLEAR
CO2 SERPL-SCNC: 31 MMOL/L (ref 21–32)
COLOR UR: YELLOW
CREAT SERPL-MCNC: 0.77 MG/DL (ref 0.6–1.3)
EOSINOPHIL # BLD AUTO: 0.1 THOUSAND/ΜL (ref 0–0.61)
EOSINOPHIL NFR BLD AUTO: 2 % (ref 0–6)
ERYTHROCYTE [DISTWIDTH] IN BLOOD BY AUTOMATED COUNT: 13 % (ref 11.6–15.1)
GFR SERPL CREATININE-BSD FRML MDRD: 81 ML/MIN/1.73SQ M
GLUCOSE P FAST SERPL-MCNC: 94 MG/DL (ref 65–99)
GLUCOSE UR STRIP-MCNC: NEGATIVE MG/DL
HCT VFR BLD AUTO: 42 % (ref 34.8–46.1)
HDLC SERPL-MCNC: 67 MG/DL
HGB BLD-MCNC: 13.2 G/DL (ref 11.5–15.4)
HGB UR QL STRIP.AUTO: NEGATIVE
HYALINE CASTS #/AREA URNS LPF: ABNORMAL /LPF
IMM GRANULOCYTES # BLD AUTO: 0.02 THOUSAND/UL (ref 0–0.2)
IMM GRANULOCYTES NFR BLD AUTO: 0 % (ref 0–2)
KETONES UR STRIP-MCNC: NEGATIVE MG/DL
LDLC SERPL CALC-MCNC: 119 MG/DL (ref 0–100)
LDLC SERPL DIRECT ASSAY-MCNC: 107 MG/DL (ref 0–100)
LEUKOCYTE ESTERASE UR QL STRIP: ABNORMAL
LYMPHOCYTES # BLD AUTO: 1.58 THOUSANDS/ΜL (ref 0.6–4.47)
LYMPHOCYTES NFR BLD AUTO: 33 % (ref 14–44)
MCH RBC QN AUTO: 29.3 PG (ref 26.8–34.3)
MCHC RBC AUTO-ENTMCNC: 31.4 G/DL (ref 31.4–37.4)
MCV RBC AUTO: 93 FL (ref 82–98)
MONOCYTES # BLD AUTO: 0.47 THOUSAND/ΜL (ref 0.17–1.22)
MONOCYTES NFR BLD AUTO: 10 % (ref 4–12)
NEUTROPHILS # BLD AUTO: 2.67 THOUSANDS/ΜL (ref 1.85–7.62)
NEUTS SEG NFR BLD AUTO: 55 % (ref 43–75)
NITRITE UR QL STRIP: NEGATIVE
NON-SQ EPI CELLS URNS QL MICRO: ABNORMAL /HPF
NONHDLC SERPL-MCNC: 139 MG/DL
NRBC BLD AUTO-RTO: 0 /100 WBCS
PH UR STRIP.AUTO: 6 [PH]
PLATELET # BLD AUTO: 300 THOUSANDS/UL (ref 149–390)
PMV BLD AUTO: 10.4 FL (ref 8.9–12.7)
POTASSIUM SERPL-SCNC: 3.9 MMOL/L (ref 3.5–5.3)
PROT SERPL-MCNC: 6.9 G/DL (ref 6.4–8.2)
PROT UR STRIP-MCNC: NEGATIVE MG/DL
RBC # BLD AUTO: 4.5 MILLION/UL (ref 3.81–5.12)
RBC #/AREA URNS AUTO: ABNORMAL /HPF
SODIUM SERPL-SCNC: 139 MMOL/L (ref 136–145)
SP GR UR STRIP.AUTO: 1.02 (ref 1–1.03)
T4 FREE SERPL-MCNC: 1.2 NG/DL (ref 0.76–1.46)
TRIGL SERPL-MCNC: 101 MG/DL
TSH SERPL DL<=0.05 MIU/L-ACNC: 0.45 UIU/ML (ref 0.36–3.74)
UROBILINOGEN UR QL STRIP.AUTO: 0.2 E.U./DL
WBC # BLD AUTO: 4.85 THOUSAND/UL (ref 4.31–10.16)
WBC #/AREA URNS AUTO: ABNORMAL /HPF

## 2021-01-02 PROCEDURE — 81001 URINALYSIS AUTO W/SCOPE: CPT | Performed by: INTERNAL MEDICINE

## 2021-01-02 PROCEDURE — 36415 COLL VENOUS BLD VENIPUNCTURE: CPT

## 2021-01-02 PROCEDURE — 80053 COMPREHEN METABOLIC PANEL: CPT

## 2021-01-02 PROCEDURE — 84439 ASSAY OF FREE THYROXINE: CPT

## 2021-01-02 PROCEDURE — 83721 ASSAY OF BLOOD LIPOPROTEIN: CPT

## 2021-01-02 PROCEDURE — 84443 ASSAY THYROID STIM HORMONE: CPT

## 2021-01-02 PROCEDURE — 85025 COMPLETE CBC W/AUTO DIFF WBC: CPT

## 2021-01-02 PROCEDURE — 82306 VITAMIN D 25 HYDROXY: CPT

## 2021-01-02 PROCEDURE — 80061 LIPID PANEL: CPT

## 2021-02-17 ENCOUNTER — TRANSCRIBE ORDERS (OUTPATIENT)
Dept: ADMINISTRATIVE | Facility: HOSPITAL | Age: 66
End: 2021-02-17

## 2021-02-17 DIAGNOSIS — Z12.31 OTHER SCREENING MAMMOGRAM: Primary | ICD-10-CM

## 2021-04-02 ENCOUNTER — OFFICE VISIT (OUTPATIENT)
Dept: OBGYN CLINIC | Facility: CLINIC | Age: 66
End: 2021-04-02
Payer: MEDICARE

## 2021-04-02 ENCOUNTER — APPOINTMENT (OUTPATIENT)
Dept: LAB | Facility: HOSPITAL | Age: 66
End: 2021-04-02
Payer: MEDICARE

## 2021-04-02 ENCOUNTER — APPOINTMENT (OUTPATIENT)
Dept: LAB | Facility: MEDICAL CENTER | Age: 66
End: 2021-04-02
Payer: MEDICARE

## 2021-04-02 VITALS
DIASTOLIC BLOOD PRESSURE: 80 MMHG | WEIGHT: 142 LBS | HEIGHT: 61 IN | BODY MASS INDEX: 26.81 KG/M2 | SYSTOLIC BLOOD PRESSURE: 140 MMHG

## 2021-04-02 DIAGNOSIS — M81.0 AGE-RELATED OSTEOPOROSIS WITHOUT CURRENT PATHOLOGICAL FRACTURE: ICD-10-CM

## 2021-04-02 DIAGNOSIS — N95.2 VAGINAL ATROPHY: ICD-10-CM

## 2021-04-02 DIAGNOSIS — Z78.0 POSTMENOPAUSAL: Primary | ICD-10-CM

## 2021-04-02 DIAGNOSIS — Z12.31 ENCOUNTER FOR SCREENING MAMMOGRAM FOR BREAST CANCER: ICD-10-CM

## 2021-04-02 DIAGNOSIS — M17.12 PRIMARY OSTEOARTHRITIS OF LEFT KNEE: ICD-10-CM

## 2021-04-02 DIAGNOSIS — Z13.820 SCREENING FOR OSTEOPOROSIS: ICD-10-CM

## 2021-04-02 LAB
ABO GROUP BLD: NORMAL
ALBUMIN SERPL BCP-MCNC: 4.1 G/DL (ref 3.5–5)
ALP SERPL-CCNC: 126 U/L (ref 46–116)
ALT SERPL W P-5'-P-CCNC: 19 U/L (ref 12–78)
ANION GAP SERPL CALCULATED.3IONS-SCNC: 4 MMOL/L (ref 4–13)
APTT PPP: 27 SECONDS (ref 23–37)
AST SERPL W P-5'-P-CCNC: 14 U/L (ref 5–45)
BASOPHILS # BLD AUTO: 0.03 THOUSANDS/ΜL (ref 0–0.1)
BASOPHILS NFR BLD AUTO: 1 % (ref 0–1)
BILIRUB SERPL-MCNC: 0.86 MG/DL (ref 0.2–1)
BLD GP AB SCN SERPL QL: NEGATIVE
BUN SERPL-MCNC: 25 MG/DL (ref 5–25)
CALCIUM SERPL-MCNC: 9.8 MG/DL (ref 8.3–10.1)
CHLORIDE SERPL-SCNC: 109 MMOL/L (ref 100–108)
CO2 SERPL-SCNC: 27 MMOL/L (ref 21–32)
CREAT SERPL-MCNC: 0.64 MG/DL (ref 0.6–1.3)
CRP SERPL QL: <3 MG/L
EOSINOPHIL # BLD AUTO: 0.15 THOUSAND/ΜL (ref 0–0.61)
EOSINOPHIL NFR BLD AUTO: 2 % (ref 0–6)
ERYTHROCYTE [DISTWIDTH] IN BLOOD BY AUTOMATED COUNT: 12.9 % (ref 11.6–15.1)
EST. AVERAGE GLUCOSE BLD GHB EST-MCNC: 108 MG/DL
GFR SERPL CREATININE-BSD FRML MDRD: 93 ML/MIN/1.73SQ M
GLUCOSE P FAST SERPL-MCNC: 96 MG/DL (ref 65–99)
HBA1C MFR BLD: 5.4 %
HCT VFR BLD AUTO: 43.5 % (ref 34.8–46.1)
HGB BLD-MCNC: 13.9 G/DL (ref 11.5–15.4)
IMM GRANULOCYTES # BLD AUTO: 0.02 THOUSAND/UL (ref 0–0.2)
IMM GRANULOCYTES NFR BLD AUTO: 0 % (ref 0–2)
INR PPP: 0.92 (ref 0.84–1.19)
LYMPHOCYTES # BLD AUTO: 1.46 THOUSANDS/ΜL (ref 0.6–4.47)
LYMPHOCYTES NFR BLD AUTO: 22 % (ref 14–44)
MCH RBC QN AUTO: 29.2 PG (ref 26.8–34.3)
MCHC RBC AUTO-ENTMCNC: 32 G/DL (ref 31.4–37.4)
MCV RBC AUTO: 91 FL (ref 82–98)
MONOCYTES # BLD AUTO: 0.6 THOUSAND/ΜL (ref 0.17–1.22)
MONOCYTES NFR BLD AUTO: 9 % (ref 4–12)
NEUTROPHILS # BLD AUTO: 4.32 THOUSANDS/ΜL (ref 1.85–7.62)
NEUTS SEG NFR BLD AUTO: 66 % (ref 43–75)
NRBC BLD AUTO-RTO: 0 /100 WBCS
PLATELET # BLD AUTO: 294 THOUSANDS/UL (ref 149–390)
PMV BLD AUTO: 10.4 FL (ref 8.9–12.7)
POTASSIUM SERPL-SCNC: 5 MMOL/L (ref 3.5–5.3)
PROT SERPL-MCNC: 6.9 G/DL (ref 6.4–8.2)
PROTHROMBIN TIME: 12.3 SECONDS (ref 11.6–14.5)
RBC # BLD AUTO: 4.76 MILLION/UL (ref 3.81–5.12)
RH BLD: POSITIVE
SODIUM SERPL-SCNC: 140 MMOL/L (ref 136–145)
SPECIMEN EXPIRATION DATE: NORMAL
WBC # BLD AUTO: 6.58 THOUSAND/UL (ref 4.31–10.16)

## 2021-04-02 PROCEDURE — 85730 THROMBOPLASTIN TIME PARTIAL: CPT

## 2021-04-02 PROCEDURE — 80053 COMPREHEN METABOLIC PANEL: CPT

## 2021-04-02 PROCEDURE — 86900 BLOOD TYPING SEROLOGIC ABO: CPT

## 2021-04-02 PROCEDURE — 86140 C-REACTIVE PROTEIN: CPT

## 2021-04-02 PROCEDURE — 36415 COLL VENOUS BLD VENIPUNCTURE: CPT

## 2021-04-02 PROCEDURE — 86901 BLOOD TYPING SEROLOGIC RH(D): CPT

## 2021-04-02 PROCEDURE — 99213 OFFICE O/P EST LOW 20 MIN: CPT | Performed by: PHYSICIAN ASSISTANT

## 2021-04-02 PROCEDURE — 85025 COMPLETE CBC W/AUTO DIFF WBC: CPT

## 2021-04-02 PROCEDURE — 83036 HEMOGLOBIN GLYCOSYLATED A1C: CPT

## 2021-04-02 PROCEDURE — 86850 RBC ANTIBODY SCREEN: CPT

## 2021-04-02 PROCEDURE — 85610 PROTHROMBIN TIME: CPT

## 2021-04-02 NOTE — PROGRESS NOTES
The patient is here for a yearly  She had a hysterectomy  No bleeding or cramping  No vaginal, bowel, bladder or breast problems

## 2021-04-06 ENCOUNTER — CLINICAL SUPPORT (OUTPATIENT)
Dept: URGENT CARE | Facility: MEDICAL CENTER | Age: 66
End: 2021-04-06
Payer: MEDICARE

## 2021-04-06 ENCOUNTER — EVALUATION (OUTPATIENT)
Dept: PHYSICAL THERAPY | Facility: MEDICAL CENTER | Age: 66
End: 2021-04-06
Payer: MEDICARE

## 2021-04-06 DIAGNOSIS — Z01.818 PRE-OP TESTING: ICD-10-CM

## 2021-04-06 DIAGNOSIS — M17.12 PRIMARY OSTEOARTHRITIS OF LEFT KNEE: ICD-10-CM

## 2021-04-06 LAB
ATRIAL RATE: 61 BPM
P AXIS: 52 DEGREES
PR INTERVAL: 152 MS
QRS AXIS: 77 DEGREES
QRSD INTERVAL: 74 MS
QT INTERVAL: 418 MS
QTC INTERVAL: 420 MS
T WAVE AXIS: 58 DEGREES
VENTRICULAR RATE: 61 BPM

## 2021-04-06 PROCEDURE — 93005 ELECTROCARDIOGRAM TRACING: CPT

## 2021-04-06 PROCEDURE — 97161 PT EVAL LOW COMPLEX 20 MIN: CPT | Performed by: PHYSICAL THERAPIST

## 2021-04-06 PROCEDURE — 93010 ELECTROCARDIOGRAM REPORT: CPT | Performed by: INTERNAL MEDICINE

## 2021-04-07 NOTE — PROGRESS NOTES
PT Evaluation     Today's date: 2021  Patient name: Amalia Oakley  : 1955  MRN: [id number]  Referring provider: Lolita Lama MD  Dx:   Encounter Diagnosis     ICD-10-CM    1  Primary osteoarthritis of left knee  M17 12 Ambulatory referral to Physical Therapy     Ambulatory referral to Physical Therapy                  Assessment  Assessment details: Amalia Oakley is a 77 y o  female was evaluated on 2021  for Primary osteoarthritis of left knee  Amalia Oakley has the above listed impairments resulting in functional deficits and negative impact to quality of life  Patient is appropriate for skilled PT intervention to promote maximal return to function and patient specific goals  Patient agrees with outlined treatment plan and all questions were answered to their satisfaction  She is independent with initial HEP and will return post operatively     Impairments: abnormal muscle firing, abnormal muscle tone, abnormal or restricted ROM, impaired physical strength, lacks appropriate home exercise program and pain with function  Understanding of Dx/Px/POC: good   Prognosis: good    Goals  Patient will successfully transition to home exercise program   Patient will be able to manage symptoms independently  Plan  Patient would benefit from: skilled PT  Referral necessary: No  Planned modality interventions: thermotherapy: hydrocollator packs  Planned therapy interventions: home exercise program, manual therapy, neuromuscular re-education, patient education, functional ROM exercises, strengthening, stretching, joint mobilization, graded activity, graded exercise, therapeutic exercise, body mechanics training, motor coordination training and activity modification  Frequency: 2x week  Duration in weeks: 12  Treatment plan discussed with: patient        Subjective Evaluation    History of Present Illness  Mechanism of injury: Amalia Oakley [facility] a 77 [facility] therapy pre operatively for L TKA scheduled for   She exhausted her options with injections and made decision to move forward with TKA  She hopes to get back to golf and pain free walking   History of R TKA 2 years prior   Pain  Current pain ratin  At best pain ratin  At worst pain ratin  Quality: dull ache, pressure and tight    Patient Goals  Patient goals for therapy: decreased pain, return to sport/leisure activities, increased motion and increased strength          Objective     Active Range of Motion   Left Knee   Flexion: 105 degrees   Extension: 8 degrees     Right Knee   Normal active range of motion    Strength/Myotome Testing     Left Knee   Flexion: 4+  Prone flexion: 4+  Extension: 4+    Right Knee   Flexion: 4+  Prone flexion: 4+  Extension: 4+             Precautions: None      Manuals                                                                 Neuro Re-Ed                                                                                                        Ther Ex             HEP issued                                                                                                        Ther Activity                                       Gait Training                                       Modalities

## 2021-04-09 ENCOUNTER — TELEPHONE (OUTPATIENT)
Dept: OBGYN CLINIC | Facility: HOSPITAL | Age: 66
End: 2021-04-09

## 2021-04-09 NOTE — PRE-PROCEDURE INSTRUCTIONS
Pre-Surgery Instructions:   Medication Instructions    Acetaminophen (TYLENOL ARTHRITIS PAIN PO) PRN    ascorbic acid (VITAMIN C) 500 mg tablet Patient was instructed by Physician and understands   Biotin 18535 MCG TABS stop week pre op    BLACK ELDERBERRY PO stop week pre op    Calcium-Vitamin D-Vitamin K (VIACTIV PO) stop week pre op    cholecalciferol (VITAMIN D3) 1,000 units tablet stop week pre op    diclofenac sodium (VOLTAREN) 1 % don't use after skin prep    DiphenhydrAMINE HCl (BENADRYL PO) PRN    ferrous sulfate 324 (65 Fe) mg Patient was instructed by Physician and understands   folic acid (FOLVITE) 1 mg tablet Patient was instructed by Physician and understands   levothyroxine 112 mcg tablet ok take morning day of surgery with sips of water    LUMIGAN 0 01 % ophthalmic drops patient use @ HS    meclizine (ANTIVERT) 12 5 MG tablet PRN    naproxen (NAPROSYN) 500 mg tablet stop week pre op    Probiotic Product (PROBIOTIC DAILY PO) stop week pre op    Tetrahydrozoline HCl (EYE DROPS REGULAR OP) PRN   Have you had / have a sore throat? No  have you had / have a cough less than 1 week? No  Have you had / have a fever greater than 100 0 - 100  4? No  Are you experiencing any shortness of breath? No    Review with patient via phone medications and showering instructions  Ortho class completed  Advised don't take NSAID's week prior day of surgery, ok tylenol products  Advised ASC call with surgery schedule time  Verbalized understanding  Acetaminophen Med Class  Continue to take this medication on your normal schedule  If this is an oral medication and you take it in the morning, then you may take this medicine with a sip of water  Low Molecular Weight Heparin Med Class  Stop taking this medication at least 12-24 hours prior to surgery/procedure with prescribing Physician and Surgeon consultation    Herbal Med Class  Stop taking this herbal medications at least one week prior to surgery/procedure  NSAID Med Class  Stop taking this medication at least 3 days prior to surgery/procedure  Thyroxine Med Class  Continue to take this medication on your normal schedule  If this is an oral medication and you take it in the morning, then you may take this medicine with a sip of water  Vitamin Med Class  You may continue to take any vitamin that your surgeon has prescribed to you up to the day before surgery   If your surgeon has not specifically prescribed this vitamin or instructed you to continue then stop taking 7 days prior to surgery

## 2021-04-12 NOTE — TELEPHONE ENCOUNTER
Preoperative Elective Admission Assessment- Assigned to care team   CHW referral- not placed as Dr Gagandeep Rolon is Non- PCP  Living Situation:  Pt lives with significant other  Lives on 1st floor now  Steps: to enter, #1 step to enter  First Floor Setup: Yes with bedroom and bathroom  DME: Has RW, cane, raised toilet and BSC  Patient's Current Level of Function: independent with ambulation and ADLs  Post-op Caregiver: significant other    Post-op Transport: significant other    Outpatient Physical Therapy Site: [facility]    Medication Management: self                    Preferred Pharmacy for Post-op Medications: [facility] [address]                     Blood Management Vitamin Regimen: Pt is taking, denies questions  Post-op anticoagulant: lovenox at home ready for post-op use only  Discharge Plan: pt plans for DC to home and plans to attend OP PT    Barriers to DC identified preoperatively: none    BMI: 24 87    Caresense: pt declined  Patient Education: educated that our goal, if at all possible, is to appropriately discharge patient based off their post-op function while striving to maintain maximal independence  If possible, the goal is to discharge patient to home and for them to attend outpatient physical therapy  Pt educated on post-op pain, early mobilization (POD0), indication for/use of incentive spirometer (10x/hour while awake) and indication for/use of foot/leg pumps (18 hours/day)

## 2021-04-14 ENCOUNTER — TELEPHONE (OUTPATIENT)
Dept: OBGYN CLINIC | Facility: HOSPITAL | Age: 66
End: 2021-04-14

## 2021-04-14 NOTE — TELEPHONE ENCOUNTER
Patient sees Dr Nguyen [facility]    Patient called to schedule an appointment for 6/1 with the doctor  Patient would like to know if she's able to get cortisone injections in both hands at this appointment, or if she will have to break up the injections into two appointments      Call back # [phone]

## 2021-04-15 NOTE — TELEPHONE ENCOUNTER
Patient has been scheduled for Bilateral USG injections to hands  Rescheduled to appropriate time for USG Injections    Tuesday June 1st at 8:30am  Patient aware of change, date, time and location

## 2021-04-19 NOTE — TELEPHONE ENCOUNTER
Patient is calling to reschedule her us guided bilat hand injections  She is currently scheduled on 6/1/21 @ 8:30am  Patient will be out of town  Patient would like to know if she could come in on 6/15/21 instead       [phone]

## 2021-04-19 NOTE — TELEPHONE ENCOUNTER
Called and spoke to patient    Patient has been rescheduled to   Tuesday June 15th @ 7:30am   Patient aware of date, time and location

## 2021-04-22 ENCOUNTER — PREPPED CHART (OUTPATIENT)
Dept: URBAN - METROPOLITAN AREA CLINIC 6 | Facility: CLINIC | Age: 66
End: 2021-04-22

## 2021-04-25 ENCOUNTER — ANESTHESIA EVENT (OUTPATIENT)
Dept: PERIOP | Facility: HOSPITAL | Age: 66
End: 2021-04-25
Payer: MEDICARE

## 2021-04-26 ENCOUNTER — ANESTHESIA (OUTPATIENT)
Dept: PERIOP | Facility: HOSPITAL | Age: 66
End: 2021-04-26
Payer: MEDICARE

## 2021-04-26 ENCOUNTER — HOSPITAL ENCOUNTER (OUTPATIENT)
Facility: HOSPITAL | Age: 66
Setting detail: OUTPATIENT SURGERY
Discharge: HOME | End: 2021-04-28
Attending: ORTHOPAEDIC SURGERY | Admitting: ORTHOPAEDIC SURGERY
Payer: MEDICARE

## 2021-04-26 DIAGNOSIS — Z47.1 AFTERCARE FOLLOWING LEFT KNEE JOINT REPLACEMENT SURGERY: Primary | ICD-10-CM

## 2021-04-26 DIAGNOSIS — E03.9 HYPOTHYROIDISM, UNSPECIFIED TYPE: ICD-10-CM

## 2021-04-26 DIAGNOSIS — E78.5 HYPERLIPIDEMIA, UNSPECIFIED HYPERLIPIDEMIA TYPE: ICD-10-CM

## 2021-04-26 DIAGNOSIS — Z96.652 S/P TOTAL KNEE ARTHROPLASTY, LEFT: Primary | ICD-10-CM

## 2021-04-26 DIAGNOSIS — Z96.652 AFTERCARE FOLLOWING LEFT KNEE JOINT REPLACEMENT SURGERY: Primary | ICD-10-CM

## 2021-04-26 DIAGNOSIS — R73.09 ELEVATED HEMOGLOBIN A1C: ICD-10-CM

## 2021-04-26 LAB
GLUCOSE SERPL-MCNC: 80 MG/DL (ref 65–140)
PLATELET # BLD AUTO: 238 THOUSANDS/UL (ref 149–390)
PMV BLD AUTO: 10 FL (ref 8.9–12.7)

## 2021-04-26 PROCEDURE — C1776 JOINT DEVICE (IMPLANTABLE): HCPCS | Performed by: ORTHOPAEDIC SURGERY

## 2021-04-26 PROCEDURE — NC001 PR NO CHARGE: Performed by: ORTHOPAEDIC SURGERY

## 2021-04-26 PROCEDURE — 97163 PT EVAL HIGH COMPLEX 45 MIN: CPT

## 2021-04-26 PROCEDURE — C1713 ANCHOR/SCREW BN/BN,TIS/BN: HCPCS | Performed by: ORTHOPAEDIC SURGERY

## 2021-04-26 PROCEDURE — 85049 AUTOMATED PLATELET COUNT: CPT | Performed by: ORTHOPAEDIC SURGERY

## 2021-04-26 PROCEDURE — G9197 ORDER FOR CEPH: HCPCS | Performed by: ORTHOPAEDIC SURGERY

## 2021-04-26 PROCEDURE — 82948 REAGENT STRIP/BLOOD GLUCOSE: CPT

## 2021-04-26 PROCEDURE — 27447 TOTAL KNEE ARTHROPLASTY: CPT | Performed by: ORTHOPAEDIC SURGERY

## 2021-04-26 DEVICE — ATTUNE PATELLA MEDIALIZED DOME 32MM CEMENTED AOX
Type: IMPLANTABLE DEVICE | Site: KNEE | Status: FUNCTIONAL
Brand: ATTUNE

## 2021-04-26 DEVICE — ATTUNE KNEE SYSTEM TIBIAL BASE FIXED BEARING SIZE 4 CEMENTED
Type: IMPLANTABLE DEVICE | Site: KNEE | Status: FUNCTIONAL
Brand: ATTUNE

## 2021-04-26 DEVICE — SMARTSET HV HIGH VISCOSITY BONE CEMENT 40G
Type: IMPLANTABLE DEVICE | Site: KNEE | Status: FUNCTIONAL
Brand: SMARTSET

## 2021-04-26 DEVICE — ATTUNE KNEE SYSTEM TIBIAL INSERT FIXED BEARING POSTERIOR STABILIZED 5 6MM AOX
Type: IMPLANTABLE DEVICE | Site: KNEE | Status: FUNCTIONAL
Brand: ATTUNE

## 2021-04-26 DEVICE — ATTUNE KNEE SYSTEM FEMORAL POSTERIOR STABILIZED NARROW SIZE 5N LEFT CEMENTED
Type: IMPLANTABLE DEVICE | Site: KNEE | Status: FUNCTIONAL
Brand: ATTUNE

## 2021-04-26 RX ORDER — HYDROMORPHONE HCL/PF 1 MG/ML
0.25 SYRINGE (ML) INJECTION
Status: DISCONTINUED | OUTPATIENT
Start: 2021-04-26 | End: 2021-04-26 | Stop reason: HOSPADM

## 2021-04-26 RX ORDER — CEFAZOLIN SODIUM 2 G/50ML
2000 SOLUTION INTRAVENOUS EVERY 8 HOURS
Status: COMPLETED | OUTPATIENT
Start: 2021-04-26 | End: 2021-04-27

## 2021-04-26 RX ORDER — DEXAMETHASONE SODIUM PHOSPHATE 10 MG/ML
INJECTION, SOLUTION INTRAMUSCULAR; INTRAVENOUS AS NEEDED
Status: DISCONTINUED | OUTPATIENT
Start: 2021-04-26 | End: 2021-04-26

## 2021-04-26 RX ORDER — OXYCODONE HYDROCHLORIDE 5 MG/1
5 TABLET ORAL EVERY 4 HOURS PRN
Status: DISCONTINUED | OUTPATIENT
Start: 2021-04-26 | End: 2021-04-27

## 2021-04-26 RX ORDER — FENTANYL CITRATE/PF 50 MCG/ML
50 SYRINGE (ML) INJECTION
Status: COMPLETED | OUTPATIENT
Start: 2021-04-26 | End: 2021-04-26

## 2021-04-26 RX ORDER — ONDANSETRON 2 MG/ML
4 INJECTION INTRAMUSCULAR; INTRAVENOUS ONCE AS NEEDED
Status: DISCONTINUED | OUTPATIENT
Start: 2021-04-26 | End: 2021-04-26 | Stop reason: HOSPADM

## 2021-04-26 RX ORDER — DOCUSATE SODIUM 100 MG/1
100 CAPSULE, LIQUID FILLED ORAL 2 TIMES DAILY
Status: DISCONTINUED | OUTPATIENT
Start: 2021-04-26 | End: 2021-04-28 | Stop reason: HOSPADM

## 2021-04-26 RX ORDER — LIDOCAINE HYDROCHLORIDE 10 MG/ML
INJECTION, SOLUTION EPIDURAL; INFILTRATION; INTRACAUDAL; PERINEURAL
Status: COMPLETED | OUTPATIENT
Start: 2021-04-26 | End: 2021-04-26

## 2021-04-26 RX ORDER — ONDANSETRON 2 MG/ML
INJECTION INTRAMUSCULAR; INTRAVENOUS AS NEEDED
Status: DISCONTINUED | OUTPATIENT
Start: 2021-04-26 | End: 2021-04-26

## 2021-04-26 RX ORDER — SENNOSIDES 8.6 MG
1 TABLET ORAL DAILY
Status: DISCONTINUED | OUTPATIENT
Start: 2021-04-26 | End: 2021-04-28 | Stop reason: HOSPADM

## 2021-04-26 RX ORDER — MAGNESIUM HYDROXIDE 1200 MG/15ML
LIQUID ORAL AS NEEDED
Status: DISCONTINUED | OUTPATIENT
Start: 2021-04-26 | End: 2021-04-26 | Stop reason: HOSPADM

## 2021-04-26 RX ORDER — EPHEDRINE SULFATE 50 MG/ML
INJECTION INTRAVENOUS AS NEEDED
Status: DISCONTINUED | OUTPATIENT
Start: 2021-04-26 | End: 2021-04-26

## 2021-04-26 RX ORDER — PROPOFOL 10 MG/ML
INJECTION, EMULSION INTRAVENOUS AS NEEDED
Status: DISCONTINUED | OUTPATIENT
Start: 2021-04-26 | End: 2021-04-26

## 2021-04-26 RX ORDER — CALCIUM CARBONATE 200(500)MG
1000 TABLET,CHEWABLE ORAL DAILY PRN
Status: DISCONTINUED | OUTPATIENT
Start: 2021-04-26 | End: 2021-04-28 | Stop reason: HOSPADM

## 2021-04-26 RX ORDER — PROPOFOL 10 MG/ML
INJECTION, EMULSION INTRAVENOUS CONTINUOUS PRN
Status: DISCONTINUED | OUTPATIENT
Start: 2021-04-26 | End: 2021-04-26

## 2021-04-26 RX ORDER — MIDAZOLAM HYDROCHLORIDE 2 MG/2ML
INJECTION, SOLUTION INTRAMUSCULAR; INTRAVENOUS
Status: COMPLETED | OUTPATIENT
Start: 2021-04-26 | End: 2021-04-26

## 2021-04-26 RX ORDER — BUPIVACAINE HYDROCHLORIDE 2.5 MG/ML
INJECTION, SOLUTION EPIDURAL; INFILTRATION; INTRACAUDAL
Status: COMPLETED | OUTPATIENT
Start: 2021-04-26 | End: 2021-04-26

## 2021-04-26 RX ORDER — SODIUM CHLORIDE, SODIUM LACTATE, POTASSIUM CHLORIDE, CALCIUM CHLORIDE 600; 310; 30; 20 MG/100ML; MG/100ML; MG/100ML; MG/100ML
125 INJECTION, SOLUTION INTRAVENOUS CONTINUOUS
Status: DISCONTINUED | OUTPATIENT
Start: 2021-04-26 | End: 2021-04-26

## 2021-04-26 RX ORDER — MORPHINE SULFATE 10 MG/ML
2 INJECTION, SOLUTION INTRAMUSCULAR; INTRAVENOUS EVERY 2 HOUR PRN
Status: DISCONTINUED | OUTPATIENT
Start: 2021-04-26 | End: 2021-04-26

## 2021-04-26 RX ORDER — PROMETHAZINE HYDROCHLORIDE 25 MG/ML
6.25 INJECTION, SOLUTION INTRAMUSCULAR; INTRAVENOUS
Status: DISCONTINUED | OUTPATIENT
Start: 2021-04-26 | End: 2021-04-26 | Stop reason: HOSPADM

## 2021-04-26 RX ORDER — CHLORHEXIDINE GLUCONATE 0.12 MG/ML
15 RINSE ORAL ONCE
Status: COMPLETED | OUTPATIENT
Start: 2021-04-26 | End: 2021-04-26

## 2021-04-26 RX ORDER — FENTANYL CITRATE 50 UG/ML
INJECTION, SOLUTION INTRAMUSCULAR; INTRAVENOUS
Status: COMPLETED | OUTPATIENT
Start: 2021-04-26 | End: 2021-04-26

## 2021-04-26 RX ORDER — LANOLIN ALCOHOL/MO/W.PET/CERES
6 CREAM (GRAM) TOPICAL
Status: DISCONTINUED | OUTPATIENT
Start: 2021-04-26 | End: 2021-04-26

## 2021-04-26 RX ORDER — BUPIVACAINE HYDROCHLORIDE 7.5 MG/ML
INJECTION, SOLUTION INTRASPINAL AS NEEDED
Status: DISCONTINUED | OUTPATIENT
Start: 2021-04-26 | End: 2021-04-26

## 2021-04-26 RX ORDER — HYDRALAZINE HYDROCHLORIDE 25 MG/1
25 TABLET, FILM COATED ORAL EVERY 8 HOURS PRN
Status: DISCONTINUED | OUTPATIENT
Start: 2021-04-26 | End: 2021-04-28 | Stop reason: HOSPADM

## 2021-04-26 RX ORDER — CEFAZOLIN SODIUM 2 G/50ML
2000 SOLUTION INTRAVENOUS ONCE
Status: COMPLETED | OUTPATIENT
Start: 2021-04-26 | End: 2021-04-26

## 2021-04-26 RX ORDER — ONDANSETRON 2 MG/ML
4 INJECTION INTRAMUSCULAR; INTRAVENOUS EVERY 6 HOURS PRN
Status: DISCONTINUED | OUTPATIENT
Start: 2021-04-26 | End: 2021-04-28 | Stop reason: HOSPADM

## 2021-04-26 RX ORDER — HYDROMORPHONE HCL/PF 1 MG/ML
0.5 SYRINGE (ML) INJECTION
Status: DISCONTINUED | OUTPATIENT
Start: 2021-04-26 | End: 2021-04-28 | Stop reason: HOSPADM

## 2021-04-26 RX ORDER — ACETAMINOPHEN 325 MG/1
975 TABLET ORAL EVERY 8 HOURS SCHEDULED
Status: DISCONTINUED | OUTPATIENT
Start: 2021-04-26 | End: 2021-04-26

## 2021-04-26 RX ORDER — LEVOTHYROXINE SODIUM 112 UG/1
112 TABLET ORAL
Status: DISCONTINUED | OUTPATIENT
Start: 2021-04-27 | End: 2021-04-28 | Stop reason: HOSPADM

## 2021-04-26 RX ORDER — ACETAMINOPHEN 325 MG/1
650 TABLET ORAL EVERY 6 HOURS PRN
Status: DISCONTINUED | OUTPATIENT
Start: 2021-04-26 | End: 2021-04-27

## 2021-04-26 RX ORDER — OXYCODONE HYDROCHLORIDE 5 MG/1
TABLET ORAL
Qty: 30 TABLET | Refills: 0 | Status: SHIPPED | OUTPATIENT
Start: 2021-04-26 | End: 2021-04-27 | Stop reason: HOSPADM

## 2021-04-26 RX ORDER — METOCLOPRAMIDE HYDROCHLORIDE 5 MG/ML
10 INJECTION INTRAMUSCULAR; INTRAVENOUS ONCE AS NEEDED
Status: DISCONTINUED | OUTPATIENT
Start: 2021-04-26 | End: 2021-04-26 | Stop reason: HOSPADM

## 2021-04-26 RX ORDER — LIDOCAINE HYDROCHLORIDE 10 MG/ML
0.5 INJECTION, SOLUTION EPIDURAL; INFILTRATION; INTRACAUDAL; PERINEURAL ONCE AS NEEDED
Status: COMPLETED | OUTPATIENT
Start: 2021-04-26 | End: 2021-04-26

## 2021-04-26 RX ORDER — ALBUMIN, HUMAN INJ 5% 5 %
SOLUTION INTRAVENOUS CONTINUOUS PRN
Status: DISCONTINUED | OUTPATIENT
Start: 2021-04-26 | End: 2021-04-26

## 2021-04-26 RX ORDER — OXYCODONE HYDROCHLORIDE 10 MG/1
10 TABLET ORAL EVERY 4 HOURS PRN
Status: DISCONTINUED | OUTPATIENT
Start: 2021-04-26 | End: 2021-04-27

## 2021-04-26 RX ORDER — DIPHENHYDRAMINE HYDROCHLORIDE 50 MG/ML
25 INJECTION INTRAMUSCULAR; INTRAVENOUS EVERY 6 HOURS PRN
Status: DISCONTINUED | OUTPATIENT
Start: 2021-04-26 | End: 2021-04-27

## 2021-04-26 RX ORDER — LANOLIN ALCOHOL/MO/W.PET/CERES
6 CREAM (GRAM) TOPICAL
Status: DISCONTINUED | OUTPATIENT
Start: 2021-04-26 | End: 2021-04-28 | Stop reason: HOSPADM

## 2021-04-26 RX ORDER — SODIUM CHLORIDE, SODIUM LACTATE, POTASSIUM CHLORIDE, CALCIUM CHLORIDE 600; 310; 30; 20 MG/100ML; MG/100ML; MG/100ML; MG/100ML
100 INJECTION, SOLUTION INTRAVENOUS CONTINUOUS
Status: DISCONTINUED | OUTPATIENT
Start: 2021-04-26 | End: 2021-04-27

## 2021-04-26 RX ADMIN — PHENYLEPHRINE HYDROCHLORIDE 10 MCG/MIN: 10 INJECTION INTRAVENOUS at 12:19

## 2021-04-26 RX ADMIN — CEFAZOLIN SODIUM 2000 MG: 2 SOLUTION INTRAVENOUS at 20:23

## 2021-04-26 RX ADMIN — MIDAZOLAM 2 MG: 1 INJECTION INTRAMUSCULAR; INTRAVENOUS at 12:00

## 2021-04-26 RX ADMIN — CEFAZOLIN SODIUM 2000 MG: 2 SOLUTION INTRAVENOUS at 12:12

## 2021-04-26 RX ADMIN — SODIUM CHLORIDE, SODIUM LACTATE, POTASSIUM CHLORIDE, AND CALCIUM CHLORIDE 125 ML/HR: .6; .31; .03; .02 INJECTION, SOLUTION INTRAVENOUS at 11:52

## 2021-04-26 RX ADMIN — BUPIVACAINE HYDROCHLORIDE IN DEXTROSE 1.4 ML: 7.5 INJECTION, SOLUTION SUBARACHNOID at 12:16

## 2021-04-26 RX ADMIN — TRANEXAMIC ACID 1000 MG: 1 INJECTION, SOLUTION INTRAVENOUS at 12:18

## 2021-04-26 RX ADMIN — EPHEDRINE SULFATE 5 MG: 50 INJECTION, SOLUTION INTRAVENOUS at 13:00

## 2021-04-26 RX ADMIN — ALBUMIN (HUMAN): 12.5 INJECTION, SOLUTION INTRAVENOUS at 12:39

## 2021-04-26 RX ADMIN — HYDROMORPHONE HYDROCHLORIDE 0.25 MG: 1 INJECTION, SOLUTION INTRAMUSCULAR; INTRAVENOUS; SUBCUTANEOUS at 14:03

## 2021-04-26 RX ADMIN — ONDANSETRON 4 MG: 2 INJECTION INTRAMUSCULAR; INTRAVENOUS at 12:26

## 2021-04-26 RX ADMIN — BUPIVACAINE HYDROCHLORIDE 10 ML: 2.5 INJECTION, SOLUTION EPIDURAL; INFILTRATION; INTRACAUDAL at 12:00

## 2021-04-26 RX ADMIN — PROPOFOL 30 MG: 10 INJECTION, EMULSION INTRAVENOUS at 12:19

## 2021-04-26 RX ADMIN — HYDROMORPHONE HYDROCHLORIDE 0.25 MG: 1 INJECTION, SOLUTION INTRAMUSCULAR; INTRAVENOUS; SUBCUTANEOUS at 14:30

## 2021-04-26 RX ADMIN — HYDROMORPHONE HYDROCHLORIDE 0.25 MG: 1 INJECTION, SOLUTION INTRAMUSCULAR; INTRAVENOUS; SUBCUTANEOUS at 14:18

## 2021-04-26 RX ADMIN — MELATONIN TAB 3 MG 6 MG: 3 TAB at 22:24

## 2021-04-26 RX ADMIN — CHLORHEXIDINE GLUCONATE 15 ML: 1.2 SOLUTION ORAL at 11:24

## 2021-04-26 RX ADMIN — HYDROMORPHONE HYDROCHLORIDE 0.25 MG: 1 INJECTION, SOLUTION INTRAMUSCULAR; INTRAVENOUS; SUBCUTANEOUS at 14:23

## 2021-04-26 RX ADMIN — SODIUM CHLORIDE, SODIUM LACTATE, POTASSIUM CHLORIDE, AND CALCIUM CHLORIDE 100 ML/HR: .6; .31; .03; .02 INJECTION, SOLUTION INTRAVENOUS at 22:43

## 2021-04-26 RX ADMIN — PHENYLEPHRINE HYDROCHLORIDE 100 MCG: 10 INJECTION INTRAVENOUS at 13:26

## 2021-04-26 RX ADMIN — Medication 50 MCG: at 13:55

## 2021-04-26 RX ADMIN — SODIUM CHLORIDE, SODIUM LACTATE, POTASSIUM CHLORIDE, AND CALCIUM CHLORIDE: .6; .31; .03; .02 INJECTION, SOLUTION INTRAVENOUS at 13:33

## 2021-04-26 RX ADMIN — PHENYLEPHRINE HYDROCHLORIDE 100 MCG: 10 INJECTION INTRAVENOUS at 13:18

## 2021-04-26 RX ADMIN — BUPIVACAINE HYDROCHLORIDE 10 ML: 2.5 INJECTION, SOLUTION EPIDURAL; INFILTRATION; INTRACAUDAL at 12:02

## 2021-04-26 RX ADMIN — ONDANSETRON 4 MG: 2 INJECTION INTRAMUSCULAR; INTRAVENOUS at 22:24

## 2021-04-26 RX ADMIN — Medication 50 MCG: at 13:45

## 2021-04-26 RX ADMIN — FENTANYL CITRATE 50 MCG: 50 INJECTION INTRAMUSCULAR; INTRAVENOUS at 12:00

## 2021-04-26 RX ADMIN — DEXAMETHASONE SODIUM PHOSPHATE 5 MG: 10 INJECTION, SOLUTION INTRAMUSCULAR; INTRAVENOUS at 12:33

## 2021-04-26 RX ADMIN — LIDOCAINE HYDROCHLORIDE 0.2 ML: 10 INJECTION, SOLUTION EPIDURAL; INFILTRATION; INTRACAUDAL; PERINEURAL at 11:52

## 2021-04-26 RX ADMIN — ALBUMIN (HUMAN): 12.5 INJECTION, SOLUTION INTRAVENOUS at 13:18

## 2021-04-26 RX ADMIN — LIDOCAINE HYDROCHLORIDE 2 ML: 10 INJECTION, SOLUTION EPIDURAL; INFILTRATION; INTRACAUDAL; PERINEURAL at 12:00

## 2021-04-26 RX ADMIN — LIDOCAINE HYDROCHLORIDE 2 ML: 10 INJECTION, SOLUTION EPIDURAL; INFILTRATION; INTRACAUDAL; PERINEURAL at 12:02

## 2021-04-26 RX ADMIN — PROPOFOL 80 MCG/KG/MIN: 10 INJECTION, EMULSION INTRAVENOUS at 12:19

## 2021-04-26 RX ADMIN — OXYCODONE HYDROCHLORIDE 10 MG: 10 TABLET ORAL at 20:23

## 2021-04-26 NOTE — PLAN OF CARE
Problem: PHYSICAL THERAPY ADULT  Goal: Performs mobility at highest level of function for planned discharge setting  See evaluation for individualized goals  Description: Treatment/Interventions: Functional transfer training, LE strengthening/ROM, Elevations, Therapeutic exercise, Endurance training, Bed mobility, Gait training, Spoke to nursing, Spoke to case management, OT  Equipment Recommended: Walker(pt already owns)       See flowsheet documentation for full assessment, interventions and recommendations  Note: Prognosis: Good  Problem List: Decreased strength, Decreased endurance, Decreased range of motion, Impaired balance, Decreased mobility, Pain  Assessment: Pt is a 77 y o  female seen for PT evaluation s/p admit to [facility] on 4/26/2021  Pt was admitted with a primary dx of: primary OA of L knee  Pt is POD#0 s/p L TKA  PT now consulted for assessment of mobility and d/c needs  Pt with Activity as tolerated, PT evaluation orders  Pt is WBAT on LLE  Pts current comorbidities effecting treatment include: BPPV, bronchitis, disease of thyroid gland, GERD, IBS, PONV  Pts current clinical presentation is Unstable/ Unpredictable (high complexity) due to Ongoing medical management for primary dx, Increased reliance on more restrictive AD compared to baseline, Decreased activity tolerance compared to baseline, Fall risk, Increased O2 via NC from pts baseline, s/p post op day 0, s/p surgical intervention  Prior to admission, pt was I with ADLs and ambulating w/o AD  Pt lives with her significant other in a [address]  Upon evaluation, pt currently is requiring supervision for bed mobility; Isaac for transfers and CGA for ambulation 30 ft w/ RW, limited by nausea   Pt presents at PT eval functioning below baseline and currently w/ overall mobility deficits 2* to: LLE weakness, decreased ROM, impaired balance, decreased endurance, gait deviations, pain, decreased activity tolerance compared to baseline, decreased functional mobility tolerance compared to baseline, fall risk  Pt currently at a fall risk 2* to impairments listed above  Pt will continue to benefit from skilled acute PT interventions to address stated impairments; to maximize functional mobility; for ongoing pt/ family training; and DME needs  At conclusion of PT session pt returned back in chair and RN notified of session findings/recommendations with phone and call bell within reach  Pt denies any further questions at this time  The patient's AM-PAC Basic Mobility Inpatient Short Form Raw Score is 18 , Standardized Score is 41 05   A standardized score less than 42 9 suggests the patient may benefit from discharge to post-acute rehabilitation services  However, anticipate pt will make quick progress to allow for d/c home  Please also refer to the recommendation of the Physical Therapist for safe discharge planning  Recommend home with OPPT upon hospital D/C  PT Discharge Recommendation: Home with outpatient rehabilitation          See flowsheet documentation for full assessment

## 2021-04-26 NOTE — CONSULTS
Internal Medicine  Consultation Note    Patient: Noni Sinclair  Age/sex: 77 y o  female  Medical Record #: [id number]    Assessment/Plan    Status Post left Total KNEE ARTHROPLASTY   Continue post op pain control measures as prescribed   Follow bowel regimen to help decrease narcotic induced constipation    Follow post operative hemoglobin with serial CBC and treat accordingly   Monitor WBC and fever curve post op while encouraging use of incentive spirometer   DVT prophylaxis in place and reviewed  Hypothyroid  · Continue supplementation    Irritable bowel  · Stable        PRE-OP HGB LEVEL: 13 9    Subjective/ HPI: Noni Sinclair was seen and examined  Hx of KNEE pain failed out patient conservative measures  Elected to undergo total KNEE arthroplasty We are asked to see patient for post op management of underlying medical co-morbidities as outlined above  Pt did well intra and post operatively with good hemodynamics  Pt currently comfortable and without any reported post op nausea  ROS:   A 10 point ROS was performed; negative except as noted above       Social History:    Substance Use History:   Social History     Substance and Sexual Activity   Alcohol Use Yes    Frequency: Monthly or less    Drinks per session: 1 or 2    Comment: social      Social History     Tobacco Use   Smoking Status Never Smoker   Smokeless Tobacco Never Used     Social History     Substance and Sexual Activity   Drug Use No       Family History:    non-contributory      Review of Scheduled Meds:  Current Facility-Administered Medications   Medication Dose Route Frequency Provider Last Rate    acetaminophen  650 mg Oral Q6H PRN Francisco Hogue MD      calcium carbonate  1,000 mg Oral Daily PRN Francisco Hogue MD      cefazolin  2,000 mg Intravenous Q8H Francisco Hogue MD      diphenhydrAMINE  25 mg Intravenous Q6H PRN Francisco Hogue MD      docusate sodium  100 mg Oral BID Arjun COFFEY Jose Manuel Rosas MD      lactated ringers  100 mL/hr Intravenous Continuous Francisco Hogue, MD Jonny Conley ON 2021] levothyroxine  112 mcg Oral Early Morning Francisco Hogue, MD      morphine injection  2 mg Intravenous Q2H PRN Francisco Hogue MD      ondansetron  4 mg Intravenous Q6H PRN Francisco Hogue MD      oxyCODONE  10 mg Oral Q4H PRN Francisco Hogue, MD      oxyCODONE  5 mg Oral Q4H PRN Francisco Hogue, MD      senna  1 tablet Oral Daily Francisco Hogue MD         Labs: Invalid input(s): LABGLOM, CMP               Results from last 7 days   Lab Units 21  1356   POC GLUCOSE mg/dl 80       No results found for: Neita Zuleyma, WOUNDCULT, SPUTUMCULTUR    Input and Output Summary (last 24 hours): Intake/Output Summary (Last 24 hours) at 2021 1457  Last data filed at 2021 1430  Gross per 24 hour   Intake 1700 ml   Output 590 ml   Net 1110 ml       Imaging:     No orders to display       *Labs /Radiology studiesLabs reviewed  *Medications reviewed and reconciled as needed  *Please refer to order section for additional ordered labs studies  *Case discussed with primary attending during morning huddle case rounds    Vitals:   Temp (24hrs), Av 8 °F (36 6 °C), Min:97 3 °F (36 3 °C), Max:98 3 °F (36 8 °C)    Temp:  [97 3 °F (36 3 °C)-98 3 °F (36 8 °C)] 97 8 °F (36 6 °C)  HR:  [62-90] 84  Resp:  [12-23] 23  BP: (107-139)/(57-82) 139/81  SpO2:  [97 %-100 %] 100 %  Body mass index is 26 83 kg/m²       Physical Exam:   General Appearance: no distress, conversive  HEENT: PERRLA, conjuctiva normal; oropharynx clear; mucous membranes moist;   Neck:  Supple, no lymphadenopathy or thyromegaly  Lungs: CTA, normal respiratory effort, no retractions, expiratory effort normal  CV: regular rate and rhythm , PMI normal   ABD: soft non tender, no masses , no hepatic or splenomegaly  EXT: DP pulses intact, no lymphadenopathy, no edema ;  left KNEE dressing in place  Skin: normal turgor, normal texture, no rash  Psych: affect normal, mood normal  Neuro: AAOx3          Invasive Devices     Peripheral Intravenous Line            Peripheral IV 04/26/21 Right Wrist less than 1 day          Drain            Closed/Suction Drain Left;Lateral Knee Accordion 10 Fr  less than 1 day    Urethral Catheter Latex 16 Fr  less than 1 day                   Code Status: Prior  Current Length of Stay: 0 day(s)    Total floor / unit time spent today 30 minutes with more than 50% spent counseling/coordinating care  Counseling includes discussion with patient re: progress  and discussion with patient of his/her current medical state/information  Coordination of patient's care was performed in conjunction with primary service  Time invested included review of patient's labs, vitals, and management of their comorbidities with continued monitoring  In addition, this patient was discussed with medical team including physician and advanced extenders  The care of the patient was extensively discussed and appropriate treatment plan was formulated unique for this patient  ** Please Note: Fluency Direct voice to text software may have been used in the creation of this document   Audio transcription errors may occur**

## 2021-04-26 NOTE — DISCHARGE INSTRUCTIONS
Discharge Instructions - Orthopedics  Willian Spangler 77 y o  female MRN: [id number]  Unit/Bed#: PACU 04    Weight Bearing Status:                                           Weight Bearing as tolerated to the left lower extremity  DVT prophylaxis:  Complete course of Lovenox as directed    Pain:  Continue analgesics as directed    Dressing Instructions:   Keep dressing clean, dry and intact until follow up appointment  Driving Instructions:  No driving until cleared by Orthopaedic Surgery  PT/OT:  Continue PT/OT on outpatient basis as directed    Appt Instructions: If you do not have your appointment, please call the clinic at [phone]  Otherwise followup as scheduled    Contact the office sooner if you experience any increased numbness/tingling in the extremities        Miscellaneous:  None

## 2021-04-26 NOTE — CONSULTS
Office Visit    12/10/2020  [address] [facility] [place]     Gregory Collier MD  Orthopedic Surgery  Primary osteoarthritis of left knee +1 more  Dx  Left Knee - Follow-up ; Referred by Aneta Clayton MD  Reason for Visit   Progress Notes    Procedure Orders   1  Large joint arthrocentesis: L knee [[id number]] ordered by Gregory Collier MD   Expand All Collapse All    Assessment:  1  Primary osteoarthritis of left knee  ferrous sulfate 324 (65 Fe) mg     folic acid (FOLVITE) 1 mg tablet     enoxaparin (LOVENOX) 40 mg/0 4 mL     ascorbic acid (VITAMIN C) 500 mg tablet   2  Chronic pain of left knee  ferrous sulfate 324 (65 Fe) mg     folic acid (FOLVITE) 1 mg tablet     enoxaparin (LOVENOX) 40 mg/0 4 mL     ascorbic acid (VITAMIN C) 500 mg tablet         Plan:  The patient was aspirated of 29ml fluid provided with left steroid injection  The patient tolerated the procedure well        The patient will be scheduled for left total knee arthroplasty  We feel the patient will find significant relief per current symptoms, findings on imaging and exam   Risks, benefits, precautions and expectations were discussed  Risks include blood loss, potential infection and blood clots  Preoperative vitamins were prescribed  The patient should follow up after surgery              To do next visit:  Return for post-op visit      The above stated was discussed in layman's terms and the patient expressed understanding  All questions were answered to the patient's satisfaction               Scribe Attestation    I,:  Steven Gomez am acting as a scribe while in the presence of the attending physician :       I,:  Gregory Collier MD personally performed the services described in this documentation    as scribed in my presence  :                 Subjective:   Emery Gosselin is a 72 y o  female who presents for follow up of left knee  She is s/p left knee Euflexxa injections with no benefit, 9/9/2020    Today she complains of significant left knee pain  Her pain effects her sleep and daily activities  Most activity aggravates while rest can alleviate  She does use Naproxen 500mg with some benefit    She has had steroid injections in past   She anticipates left TKA in 2020           Review of systems negative unless otherwise specified in HPI     Medical History        Past Medical History:   Diagnosis Date    Arthritis      BPPV (benign paroxysmal positional vertigo)      Bronchitis      Disease of thyroid gland       HYPO    Diverticulitis      GERD (gastroesophageal reflux disease)      Glaucoma (increased eye pressure)       2 para 2      IBS (irritable bowel syndrome)      Insomnia      PONV (postoperative nausea and vomiting)             Surgical History         Past Surgical History:   Procedure Laterality Date    CHOLECYSTECTOMY        COLONOSCOPY   2019    DILATION AND CURETTAGE OF UTERUS        FL GUIDED NEEDLE PLAC BX/ASP/INJ   2018    FL GUIDED NEEDLE PLAC BX/ASP/INJ   2019    FL GUIDED NEEDLE PLAC BX/ASP/INJ   2020    FL GUIDED NEEDLE PLAC BX/ASP/INJ   10/8/2020    HYSTERECTOMY   2006    JOINT REPLACEMENT        LAPAROSCOPIC COLON RESECTION   2019    MAMMO (HISTORICAL) Bilateral 2018    PARTIAL HYSTERECTOMY        MA REVISE KNEE JOINT REPLACE,ALL PARTS Right 2018     Procedure: ARTHROPLASTY KNEE TOTAL REVISION;  Surgeon: Luis Wayne MD;  [facility]: [facility];  Service: Orthopedics    TONSILLECTOMY        TRABECULOPLASTY, LASER SELECTIVE Left 2019    VARICOSE VEIN SURGERY                     Family History   Problem Relation Age of Onset    Pancreatic cancer Mother 72    Diabetes type II Mother      Heart disease Father      No Known Problems Sister      Breast cancer Paternal Grandmother 80    Cancer Maternal Grandfather      Lung disease Paternal Grandfather      No Known Problems Daughter      No Known Problems Maternal Grandmother      No Known Problems Daughter      No Known Problems Maternal Aunt      No Known Problems Maternal Aunt      No Known Problems Maternal Aunt      No Known Problems Maternal Aunt           Social History            Occupational History    Not on file   Tobacco Use    Smoking status: Never Smoker    Smokeless tobacco: Never Used   Substance and Sexual Activity    Alcohol use:  Yes       Frequency: Monthly or less       Comment: social     Drug use: No    Sexual activity: Yes       Birth control/protection: Surgical            Current Outpatient Medications:     Acetaminophen (TYLENOL ARTHRITIS PAIN PO), Take 1,200 mg by mouth 2 (two) times a day, Disp: , Rfl:     Biotin 08931 MCG TABS, , Disp: , Rfl:     Black Elderberry (Sambucus Elderberry) 50 MG/5ML SYRP, Take by mouth daily, Disp: , Rfl:     Calcium-Vitamin D-Vitamin K (VIACTIV PO), Take 1 tablet by mouth daily, Disp: , Rfl:     cholecalciferol (VITAMIN D3) 1,000 units tablet, Take 4,000 Units by mouth daily, Disp: , Rfl:     diclofenac sodium (VOLTAREN) 1 %, Apply 4 g topically 4 (four) times a day, Disp: 1 Tube, Rfl: 3    DiphenhydrAMINE HCl (BENADRYL PO), Take by mouth as needed, Disp: , Rfl:     levothyroxine 112 mcg tablet, Take 112 mcg by mouth daily, Disp: , Rfl: 1    LUMIGAN 0 01 % ophthalmic drops, INSTILL 1 DROP IN EACH EYE EVERY DAY AT BEDTIME, Disp: , Rfl: 1    meclizine (ANTIVERT) 12 5 MG tablet, Take by mouth every 6 (six) hours, Disp: , Rfl:     naproxen (NAPROSYN) 500 mg tablet, Take 1 tablet (500 mg total) by mouth 2 (two) times a day with meals, Disp: 180 tablet, Rfl: 3    Probiotic Product (PROBIOTIC DAILY PO), Take by mouth, Disp: , Rfl:     Tetrahydrozoline HCl (EYE DROPS REGULAR OP), Apply to eye, Disp: , Rfl:      Current Facility-Administered Medications:     cephalexin (KEFLEX) capsule 2,000 mg, 2,000 mg, Oral, 60 Min Pre-Op, Jean Huffman MD          Allergies   Allergen Reactions    Codeine GI Intolerance    Morphine Itching                   Vitals:     12/10/20 1300   BP: 133/81   Pulse: 103         Objective:  Physical exam  · General: Awake, Alert, Oriented  · Eyes: Pupils equal, round and reactive to light  · Heart: regular rate and rhythm  · Lungs: No audible wheezing  · Abdomen: soft                     Ortho Exam   Left knee:  TTP medial joint line  No erythema or ecchymosis  Modest effusion   No swelling  Normal strength  Good ROM   Calf compartments soft and supple  Sensation intact  Toes are warm sensate and mobile           Diagnostics, reviewed and taken today if performed as documented:     None performed      Procedures, if performed today:     Large joint arthrocentesis: L knee  Universal Protocol:  Consent: Verbal consent obtained  Risks and benefits: risks, benefits and alternatives were discussed  Consent given by: patient  Time out: Immediately prior to procedure a "time out" was called to verify the correct patient, procedure, equipment, support staff and site/side marked as required    Timeout called at: 12/10/2020 1:28 PM   Patient understanding: patient states understanding of the procedure being performed  Site marked: the operative site was marked  Patient identity confirmed: verbally with patient     Supporting Documentation  Indications: pain   Procedure Details  Location: knee - L knee  Preparation: Patient was prepped and draped in the usual sterile fashion  Needle size: 22 G  Ultrasound guidance: no  Approach: anterolateral  Medications administered: 12 mg betamethasone acetate-betamethasone sodium phosphate 6 (3-3) mg/mL; 2 mL bupivacaine 0 25 %; 2 mL lidocaine 1 %; 4 mL lidocaine 1 %     Aspirate amount: 29 mL  Aspirate: clear and yellow     Patient tolerance: patient tolerated the procedure well with no immediate complications  Dressing:  Sterile dressing applied                   Portions of the record may have been created with voice recognition software   Occasional wrong word or "sound a like" substitutions may have occurred due to the inherent limitations of voice recognition software   Read the chart carefully and recognize, using context, where substitutions have occurred          Instructions       Return for post-op visit  After Visit Summary (Automatic SnapShot taken 12/10/2020)  Additional Documentation    Vitals:    /81   Pulse 103   Ht 5' 2" (1 575 m)   Wt 61 7 kg (136 lb)   LMP  (LMP Unknown)   BMI 24 87 kg/m²   BSA 1 62 m²   Flowsheets:    Covid Symptom Screening      SmartForms:     [facility] PRE-CHARTING Sima Venegase PCMH/PCSP WRAP UP REQUIREMENTS ADVANCED     [facility] SCRIBE ATTESTATION      (2 more)   Encounter Info:    Billing Info,   History,   Allergies,   Detailed Report      Orders Placed       Labs     Comprehensive metabolic panel     APTT     C-reactive protein     CBC and differential     Protime-INR     Type and screen     APTT     C-reactive protein     CBC and differential     Comprehensive metabolic panel     Hemoglobin A1C W/EAG Estimation     Hemoglobin A1C W/EAG Estimation     PAT Covid Screening     Protime-INR     Type and screen     [place]  (13 more)     Other Orders     Large joint arthrocentesis: L knee     Ambulatory referral to Family Practice Pending Review     Ambulatory referral to Physical Therapy Closed     Ambulatory referral to Family Practice Pending Review     Ambulatory referral to Physical Therapy Closed     Case request operating room: ARTHROPLASTY KNEE TOTAL Once     [place]  (4 more)     All Encounter Results   Medication Changes       Ascorbic Acid 500 mg Oral Daily, Start 30 days prior to surgery     Enoxaparin Sodium 40 mg Subcutaneous Daily     Ferrous Sulfate 324 mg Oral 2 times daily before meals, Start 30 days prior to surgery     Folic Acid 1 mg Oral Daily, Start 30 days prior to surgery     Medication List   Medications Administered     Betamethasone Sod Phos & Acet 12 mg   Bupivacaine HCl 2 mL   Lidocaine HCl 2 mL   Lidocaine HCl 4 mL  Visit Diagnoses       Primary osteoarthritis of left knee     Chronic pain of left knee     Problem List

## 2021-04-26 NOTE — OP NOTE
OPERATIVE REPORT  PATIENT NAME: Horacio Villarreal    :  1955  MRN: [id number]  Pt Location: [facility] OR ROOM 04    SURGERY DATE: 2021    Surgeon(s) and Role:     * Mikaela Brown MD - Primary     * Skylar Doss PA-C - Lizzy Manrique MD - Assisting    Preop Diagnosis:  Primary osteoarthritis of left knee [M17 12]    Post-Op Diagnosis Codes:     * Primary osteoarthritis of left knee [M17 12]    Procedure(s) (LRB):  ARTHROPLASTY KNEE TOTAL (Left)    Specimen(s):  * No specimens in log *    Estimated Blood Loss:   50 mL    Drains:  Closed/Suction Drain Left;Lateral Knee Accordion 10 Fr  (Active)   Number of days: 0       Urethral Catheter Latex 16 Fr  (Active)   Number of days: 0       Anesthesia Type:   Spinal w/ Femoral Nerve Block    Operative Indications:  Primary osteoarthritis of left knee [M17 12]      Operative Findings:  depuy attune   Femur-5N   Poly-6   Tibia-4   [id number]    Complications:   None    Procedure and Technique: Following induction of adequate level of spinal anesthesia, Horton catheters and sterilely introduced this patient's bladder  Antibiotics administered  The left thigh was then fitted with a thigh-high tourniquet  The left lower extremity then underwent sterile prep and drape  The left lower extremity was exsanguinated gravity, and the tourniquet inflated to 300 mmHg  A midline knee incision was created the knee in flexion  Full-thickness flaps raised get the extensor mechanism  A medial arthrotomy was created open the knee joint  Bony soft tissue releases were performed where necessary  The distal femoral cut was made 6° valgus  This is made of a long intramedullary rashad  The proximal tibia cut was made next  As this was a varus knee, 2 mm reference medially  Care was taken in order to protect the integrity medial collateral, lateral collateral, posterior structures during these maneuvers    The distal femoral sizing guide was used, and the appropriate 4 in 1 cutting blocks impacted  The anterior, posterior, and chamfer cuts then made  The box cut was made for the posterior stabilized unit  With the trial components in position, the knee was taken through range of motion, found to be capable full extension, good flexion, no mid flexion valgus instability  The patella was then resurfaced will utilize the manufacture's equipment, was found to be a size 32 mm button  The trial components removed and the knee was prepared for insertion of cemented components  The cemented tibia, cemented femur, trial poly, and cemented patella placed  Excess cement was removed, the knee was brought into extension  The cement was allowed to cure  The trial poly was taken out, and the knee was packed off  The tourniquet deflated, hemostasis was secured  The insert polyethylene was then snapped into position  The knee was taken through a final range of motion, found to be capable full extension, good flexion, no mid flexion valgus instability, and excellent patellar tracking  Satisfied with the extent of surgery, the wounds then flushed with saline closed  A Betadine soak was initiated  A drain was placed deep brought out via a separate lateral stab incision  The arthrotomy was closed number Vicryl suture  The subcu tissue closed 2 Vicryl suture  The skin was closed staples  Sterile dressings were applied    She was then transferred to recovery room stable condition with plans to include physical therapy weight-bearing to tolerance, she will require DVT prophylaxis with Lovenox   I was present for the entire procedure    Patient Disposition:  PACU     SIGNATURE: Poli Moura MD  DATE: April 26, 2021  TIME: 1:32 PM

## 2021-04-26 NOTE — ANESTHESIA PROCEDURE NOTES
Peripheral Block    Patient location during procedure: holding area  Start time: 4/26/2021 12:01 PM  Reason for block: at surgeon's request and post-op pain management  Staffing  Anesthesiologist: Gagandeep Champagne MD  Resident/CRNA: Willian Bamberger, CRNA  Performed: anesthesiologist   Preanesthetic Checklist  Completed: patient identified, site marked, surgical consent, pre-op evaluation, timeout performed, IV checked, risks and benefits discussed and monitors and equipment checked  Peripheral Block  Patient position: supine  Prep: ChloraPrep  Patient monitoring: heart rate, continuous pulse ox and frequent blood pressure checks  Block type: ipack block  Laterality: left  Injection technique: single-shot  Procedures: ultrasound guided, Ultrasound guidance required for the procedure to increase accuracy and safety of medication placement and decrease risk of complications    Ultrasound permanent image savedbupivacaine (MARCAINE) 0 25 % perineural infiltration, 10 mL  lidocaine (PF) (XYLOCAINE-MPF) 1 % infiltration, 2 mL  Needle  Needle type: Stimuplex   Needle gauge: 22 G  Needle length: 10 cm  Needle localization: ultrasound guidance  Assessment  Injection assessment: incremental injection, local visualized surrounding nerve on ultrasound and no paresthesia on injection  Paresthesia pain: none  Heart rate change: no  Slow fractionated injection: yes  Post-procedure:  site cleaned  patient tolerated the procedure well with no immediate complications

## 2021-04-26 NOTE — ANESTHESIA POSTPROCEDURE EVALUATION
Post-Op Assessment Note    CV Status:  Stable  Pain Score: 0    Pain management: adequate     Mental Status:  Awake and sleepy   Hydration Status:  Stable   PONV Controlled:  None      Post Op Vitals Reviewed: Yes      Staff: CRNA         No complications documented      BP   119/76   Temp (!) 97 3 °F (36 3 °C) (04/26/21 1340)    Pulse  83   Resp   17   SpO2   99%

## 2021-04-26 NOTE — PLAN OF CARE
Problem: Prexisting or High Potential for Compromised Skin Integrity  Goal: Skin integrity is maintained or improved  Description: INTERVENTIONS:  - Identify patients at risk for skin breakdown  - Assess and monitor skin integrity  - Assess and monitor nutrition and hydration status  - Monitor labs   - Assess for incontinence   - Turn and reposition patient  - Assist with mobility/ambulation  - Relieve pressure over bony prominences  - Avoid friction and shearing  - Provide appropriate hygiene as needed including keeping skin clean and dry  - Evaluate need for skin moisturizer/barrier cream  - Collaborate with interdisciplinary team   - Patient/family teaching  - Consider wound care consult   Outcome: Progressing     Problem: PAIN - ADULT  Goal: Verbalizes/displays adequate comfort level or baseline comfort level  Description: Interventions:  - Encourage patient to monitor pain and request assistance  - Assess pain using appropriate pain scale  - Administer analgesics based on type and severity of pain and evaluate response  - Implement non-pharmacological measures as appropriate and evaluate response  - Consider cultural and social influences on pain and pain management  - Notify physician/advanced practitioner if interventions unsuccessful or patient reports new pain  Outcome: Progressing     Problem: INFECTION - ADULT  Goal: Absence or prevention of progression during hospitalization  Description: INTERVENTIONS:  - Assess and monitor for signs and symptoms of infection  - Monitor lab/diagnostic results  - Monitor all insertion sites, i e  indwelling lines, tubes, and drains  - Monitor endotracheal if appropriate and nasal secretions for changes in amount and color  - [place] appropriate cooling/warming therapies per order  - Administer medications as ordered  - Instruct and encourage patient and family to use good hand hygiene technique  - Identify and instruct in appropriate isolation precautions for identified infection/condition  Outcome: Progressing  Goal: Absence of fever/infection during neutropenic period  Description: INTERVENTIONS:  - Monitor WBC    Outcome: Progressing     Problem: SAFETY ADULT  Goal: Patient will remain free of falls  Description: INTERVENTIONS:  - Assess patient frequently for physical needs  -  Identify cognitive and physical deficits and behaviors that affect risk of falls    -  [place] fall precautions as indicated by assessment   - Educate patient/family on patient safety including physical limitations  - Instruct patient to call for assistance with activity based on assessment  - Modify environment to reduce risk of injury  - Consider OT/PT consult to assist with strengthening/mobility  Outcome: Progressing  Goal: Maintain or return to baseline ADL function  Description: INTERVENTIONS:  -  Assess patient's ability to carry out ADLs; assess patient's baseline for ADL function and identify physical deficits which impact ability to perform ADLs (bathing, care of mouth/teeth, toileting, grooming, dressing, etc )  - Assess/evaluate cause of self-care deficits   - Assess range of motion  - Assess patient's mobility; develop plan if impaired  - Assess patient's need for assistive devices and provide as appropriate  - Encourage maximum independence but intervene and supervise when necessary  - Involve family in performance of ADLs  - Assess for home care needs following discharge   - Consider OT consult to assist with ADL evaluation and planning for discharge  - Provide patient education as appropriate  Outcome: Progressing  Goal: Maintain or return mobility status to optimal level  Description: INTERVENTIONS:  - Assess patient's baseline mobility status (ambulation, transfers, stairs, etc )    - Identify cognitive and physical deficits and behaviors that affect mobility  - Identify mobility aids required to assist with transfers and/or ambulation (gait belt, sit-to-stand, lift, walker, cane, etc )  - [place] fall precautions as indicated by assessment  - Record patient progress and toleration of activity level on Mobility SBAR; progress patient to next Phase/Stage  - Instruct patient to call for assistance with activity based on assessment  - Consider rehabilitation consult to assist with strengthening/weightbearing, etc   Outcome: Progressing     Problem: DISCHARGE PLANNING  Goal: Discharge to home or other facility with appropriate resources  Description: INTERVENTIONS:  - Identify barriers to discharge w/patient and caregiver  - Arrange for needed discharge resources and transportation as appropriate  - Identify discharge learning needs (meds, wound care, etc )  - Arrange for interpretive services to assist at discharge as needed  - Refer to Case Management Department for coordinating discharge planning if the patient needs post-hospital services based on physician/advanced practitioner order or complex needs related to functional status, cognitive ability, or social support system  Outcome: Progressing     Problem: Knowledge Deficit  Goal: Patient/family/caregiver demonstrates understanding of disease process, treatment plan, medications, and discharge instructions  Description: Complete learning assessment and assess knowledge base    Interventions:  - Provide teaching at level of understanding  - Provide teaching via preferred learning methods  Outcome: Progressing

## 2021-04-26 NOTE — ANESTHESIA PROCEDURE NOTES
Peripheral Block    Patient location during procedure: holding area  Start time: 4/26/2021 12:01 PM  Reason for block: at surgeon's request and post-op pain management  Staffing  Anesthesiologist: David Menard MD  Resident/CRNA: Miguel Freeman CRNA  Performed: anesthesiologist   Preanesthetic Checklist  Completed: patient identified, site marked, surgical consent, pre-op evaluation, timeout performed, IV checked, risks and benefits discussed and monitors and equipment checked  Peripheral Block  Patient position: supine  Prep: ChloraPrep  Patient monitoring: heart rate, continuous pulse ox and frequent blood pressure checks  Block type: adductor canal block  Laterality: left  Injection technique: single-shot  Procedures: ultrasound guided, Ultrasound guidance required for the procedure to increase accuracy and safety of medication placement and decrease risk of complications    Ultrasound permanent image savedlidocaine (PF) (XYLOCAINE-MPF) 1 % infiltration, 2 mL  bupivacaine (MARCAINE) 0 25 % perineural infiltration, 10 mL  fentaNYL 50 mcg/mL IV, 50 mcg  midazolam (VERSED) 2 mg/2 mL IV, 2 mg  Needle  Needle type: StimupSide.Cr   Needle gauge: 22 G  Needle length: 10 cm  Needle localization: ultrasound guidance  Assessment  Injection assessment: incremental injection, local visualized surrounding nerve on ultrasound, no paresthesia on injection and negative aspiration for heme  Paresthesia pain: none  Heart rate change: no  Slow fractionated injection: yes  Post-procedure:  site cleaned  patient tolerated the procedure well with no immediate complications

## 2021-04-26 NOTE — PHYSICAL THERAPY NOTE
Physical Therapy Evaluation    Patient's Name: Re Greenfield    Admitting Diagnosis  Primary osteoarthritis of left knee [M17 12]    Problem List  Patient Active Problem List   Diagnosis    History of total knee arthroplasty    Orthopedic aftercare    Aftercare following right knee joint replacement surgery    Arthritis of foot, right    Chronic pain of left knee    Pes anserine bursitis    Primary osteoarthritis of left knee    Hypothyroidism    Hyperlipidemia    Elevated hemoglobin A1c    Vitamin D deficiency    Arthritis of left knee    Prediabetes    Breast lump    PONV (postoperative nausea and vomiting)    S/P total knee arthroplasty, left       Past Medical History  Past Medical History:   Diagnosis Date    BPPV (benign paroxysmal positional vertigo)     Bronchitis     Disease of thyroid gland     HYPO    Diverticulitis     GERD (gastroesophageal reflux disease)     Glaucoma (increased eye pressure)      2 para 2     IBS (irritable bowel syndrome)     Insomnia     PONV (postoperative nausea and vomiting)        Past Surgical History  Past Surgical History:   Procedure Laterality Date    CHOLECYSTECTOMY      COLON SURGERY      COLONOSCOPY  2019    COLONOSCOPY      DILATION AND CURETTAGE OF UTERUS      FL GUIDED NEEDLE PLAC BX/ASP/INJ  2018    FL GUIDED NEEDLE PLAC BX/ASP/INJ  2019    FL GUIDED NEEDLE PLAC BX/ASP/INJ  2020    FL GUIDED NEEDLE PLAC BX/ASP/INJ  10/8/2020    HYSTERECTOMY  2006    JOINT REPLACEMENT      LAPAROSCOPIC COLON RESECTION  2019    MAMMO (HISTORICAL) Bilateral 2018    PARTIAL HYSTERECTOMY      AK REVISE KNEE JOINT REPLACE,ALL PARTS Right 2018    Procedure: ARTHROPLASTY KNEE TOTAL REVISION;  Surgeon: Ricco Mukherjee MD;  [facility];  Service: Orthopedics    TONSILLECTOMY      TRABECULOPLASTY, LASER SELECTIVE Left 2019    VARICOSE VEIN SURGERY          21 1535   PT Last Visit   PT Visit Date 04/26/21   Note Type   Note type Evaluation   Pain Assessment   Pain Assessment Tool 0-10   Pain Score 4   Pain Location/Orientation Orientation: Left; Location: Knee   Hospital Pain Intervention(s) Repositioned; Ambulation/increased activity   Home Living   Type of [address] ADLs on one level;Stairs to enter with rails  (1 JACE)   Home Equipment Walker;Cane   Additional Comments Pt reports living in a [address], but has a FFSU (2nd floor is a loft)  Was not using any AD PTA  Prior Function   Level of [place] Independent with ADLs and functional mobility   Lives With Significant other   Receives Help From Family   ADL Assistance Independent   IADLs Independent   Falls in the last 6 months 0   Vocational Retired   Restrictions/Precautions   [place] Bearing Precautions Per Order Yes   LLE Weight Bearing Per Order WBAT   Other Precautions Multiple lines;Telemetry;O2;Fall Risk;Pain   Cognition   Overall Cognitive Status WFL   Orientation Level Oriented X4   Memory Within functional limits   Following Commands Follows all commands and directions without difficulty   Comments Pt pleasant and cooperative   RLE Assessment   RLE Assessment WFL   LLE Assessment   LLE Assessment X  (ankle AROM WFL, knee limited 2* pain)   Bed Mobility   Supine to Sit 5  Supervision   Additional items HOB elevated; Increased time required;Verbal cues   Additional Comments Pt ended session seated in recliner with all needs in reach  Transfers   Sit to Stand 4  Minimal assistance   Additional items Assist x 1; Increased time required;Verbal cues   Stand to Sit 4  Minimal assistance   Additional items Assist x 1; Increased time required;Verbal cues   Additional Comments Transfers with RW  VCs for proper hand placement  Ambulation/Elevation   Gait pattern Excessively slow; Short stride; Antalgic   Gait Assistance 4  Minimal assist  (CGA)   Additional items Assist x 1   Assistive Device Rolling walker   Distance 30ft, limited by nausea   Balance   Static Sitting Good   Dynamic Sitting Fair +   Static Standing Fair   Dynamic Standing Fair -   Ambulatory Poor +   Activity Tolerance   Activity Tolerance Other (Comment)  (nausea)   Nurse Made Aware ok to see per RN   Assessment   Prognosis Good   Problem List Decreased strength;Decreased endurance;Decreased range of motion; Impaired balance;Decreased mobility;Pain   Assessment Pt is a 77 y o  female seen for PT evaluation s/p admit to [facility] on 4/26/2021  Pt was admitted with a primary dx of: primary OA of L knee  Pt is POD#0 s/p L TKA  PT now consulted for assessment of mobility and d/c needs  Pt with Activity as tolerated, PT evaluation orders  Pt is WBAT on LLE  Pts current comorbidities effecting treatment include: BPPV, bronchitis, disease of thyroid gland, GERD, IBS, PONV  Pts current clinical presentation is Unstable/ Unpredictable (high complexity) due to Ongoing medical management for primary dx, Increased reliance on more restrictive AD compared to baseline, Decreased activity tolerance compared to baseline, Fall risk, Increased O2 via NC from pts baseline, s/p post op day 0, s/p surgical intervention  Prior to admission, pt was I with ADLs and ambulating w/o AD  Pt lives with her significant other in a [facility] with [address]  Upon evaluation, pt currently is requiring supervision for bed mobility; Isaac for transfers and CGA for ambulation 30 ft w/ RW, limited by nausea  Pt presents at PT eval functioning below baseline and currently w/ overall mobility deficits 2* to: LLE weakness, decreased ROM, impaired balance, decreased endurance, gait deviations, pain, decreased activity tolerance compared to baseline, decreased functional mobility tolerance compared to baseline, fall risk  Pt currently at a fall risk 2* to impairments listed above    Pt will continue to benefit from skilled acute PT interventions to address stated impairments; to maximize functional mobility; for ongoing pt/ family training; and DME needs  At conclusion of PT session pt returned back in chair and RN notified of session findings/recommendations with phone and call bell within reach  Pt denies any further questions at this time  The patient's AM-PAC Basic Mobility Inpatient Short Form Raw Score is 18 , Standardized Score is 41 05   A standardized score less than 42 9 suggests the patient may benefit from discharge to post-acute rehabilitation services  However, anticipate pt will make quick progress to allow for d/c home  Please also refer to the recommendation of the Physical Therapist for safe discharge planning  Recommend home with OPPT upon hospital D/C  Goals   Patient Goals to go home   STG Expiration Date 05/06/21   Short Term Goal #1 In 10 days pt will be able to: 1  Demonstrate ability to perform all aspects of bed mobility independently to increase functional independence  2  Perform functional transfers with LRAD at mod I level to facilitate safe return to previous living environment  3   Ambulate 300 ft with LRAD at mod I level with stable vitals to improve safety with household distances and reduce fall risk  4  Climb 1 step with HR independently to simulate entrance to home  5  Improve LE strength grades by 1 to increase ease of functional mobility with transfers and gait  6  Pt will demonstrate improved balance by one grade order to decrease risk of falls  PT Treatment Day 0   Plan   Treatment/Interventions Functional transfer training;LE strengthening/ROM; Elevations; Therapeutic exercise; Endurance training;Bed mobility;Gait training;Spoke to nursing;Spoke to case management;OT   PT Frequency Twice a day   Recommendation   PT Discharge Recommendation Home with outpatient rehabilitation   Equipment Recommended Walker  (pt already owns)   [place] [address] in Bed Without Bedrails 3   Lying on Back to Sitting on Edge of Flat Bed 3   Moving Bed to Chair 3 Standing Up From Chair 3   Walk in Room 3   Climb 3-5 Stairs 3   Basic Mobility Inpatient Raw Score 18   Basic Mobility Standardized Score 41 05   Modified [place] Scale   Modified [place] Scale 4       Lauren Jefferson, PT, DPT

## 2021-04-26 NOTE — ANESTHESIA PROCEDURE NOTES
Spinal Block    Patient location during procedure: OR  Start time: 4/26/2021 12:16 PM  Reason for block: primary anesthetic  Staffing  Anesthesiologist: Luis M Willett MD  Resident/CRNA: Bridget Michelle CRNA  Performed: resident/CRNA   Preanesthetic Checklist  Completed: patient identified, site marked, surgical consent, pre-op evaluation, timeout performed, IV checked, risks and benefits discussed and monitors and equipment checked  Spinal Block  Patient position: sitting  Prep: Betadine  Patient monitoring: heart rate, cardiac monitor, continuous pulse ox and frequent blood pressure checks  Approach: midline  Location: L4-5  Injection technique: single-shot  Needle  Needle type: pencil-tip   Needle gauge: 25 G  Assessment  Sensory level: T4  Events: cerebrospinal fluid  Injection Assessment:  negative aspiration for heme, no paresthesia on injection and positive aspiration for clear CSF    Post-procedure:  site cleaned

## 2021-04-26 NOTE — ANESTHESIA PREPROCEDURE EVALUATION
Procedure:  ARTHROPLASTY KNEE TOTAL (Left Knee)    Relevant Problems   ANESTHESIA   (+) PONV (postoperative nausea and vomiting)      CARDIO   (+) Hyperlipidemia      ENDO   (+) Hypothyroidism      MUSCULOSKELETAL   (+) Arthritis of left knee   (+) Primary osteoarthritis of left knee      PULMONARY (within normal limits)   (-) Sleep apnea   (-) Smoking   (-) URI (upper respiratory infection)      Physical Exam    Airway    Mallampati score: I  TM Distance: >3 FB  Neck ROM: full     Dental   No notable dental hx     Cardiovascular      Pulmonary      Other Findings       Lab Results   Component Value Date    WBC 6 58 04/02/2021    HGB 13 9 04/02/2021     04/02/2021     Lab Results   Component Value Date    SODIUM 140 04/02/2021    K 5 0 04/02/2021    BUN 25 04/02/2021    CREATININE 0 64 04/02/2021    EGFR 93 04/02/2021    GLUCOSE 89 10/20/2015     Lab Results   Component Value Date    PTT 27 04/02/2021      Lab Results   Component Value Date    INR 0 92 04/02/2021     Blood type AB+/antibody neg  Lab Results   Component Value Date    HGBA1C 5 4 04/02/2021         Anesthesia Plan  ASA Score- 2     Anesthesia Type- spinal, regional and IV sedation with anesthesia with ASA Monitors  Additional Monitors:   Airway Plan:     Comment: Pt motivated to avoid GA - no problems with prior TKA with spinal/nerve block  Plan Factors-    Chart reviewed  EKG reviewed  Existing labs reviewed  Patient summary reviewed  Patient is not a current smoker  Induction- intravenous  Postoperative Plan-     Informed Consent- Anesthetic plan and risks discussed with patient and spouse  I personally reviewed this patient with the CRNA  Discussed and agreed on the Anesthesia Plan with the SOFYA Jama

## 2021-04-26 NOTE — INTERVAL H&P NOTE
H&P reviewed  After examining the patient I find no changes in the patients condition since the H&P had been written  There were no vitals filed for this visit  Vital signs were taken recorded them toward surgery suite this morning  CVS:  RRR  Lungs:  Clear bilateral  Assessment:  Symptomatic osteoarthritis left knee in this adult female that remains symptomatic causing pain and dysfunction but despite appropriate nonsurgical means  Plan:   To OR for left total knee arthroplasty

## 2021-04-26 NOTE — RESTORATIVE TECHNICIAN NOTE
Restorative Specialist Mobility Note       Activity: Ambulate in talavera, Ambulate in room, Bathroom privileges, Chair, Dangle, Stand at bedside(Educated/encouraged pt to ambulate with assistance 3-4 x's/day  Chair alarm on   Pt callbell, phone/tray within reach )     Assistive Device: Front wheel walker(Assist x1, Assisted PT Kandice )          Lynn RIVERA, Restorative Technician, [employer]

## 2021-04-27 LAB
ANION GAP SERPL CALCULATED.3IONS-SCNC: 9 MMOL/L (ref 4–13)
BUN SERPL-MCNC: 17 MG/DL (ref 5–25)
CALCIUM SERPL-MCNC: 9.5 MG/DL (ref 8.3–10.1)
CHLORIDE SERPL-SCNC: 101 MMOL/L (ref 100–108)
CO2 SERPL-SCNC: 25 MMOL/L (ref 21–32)
CREAT SERPL-MCNC: 0.71 MG/DL (ref 0.6–1.3)
ERYTHROCYTE [DISTWIDTH] IN BLOOD BY AUTOMATED COUNT: 13.1 % (ref 11.6–15.1)
GFR SERPL CREATININE-BSD FRML MDRD: 89 ML/MIN/1.73SQ M
GLUCOSE P FAST SERPL-MCNC: 117 MG/DL (ref 65–99)
GLUCOSE SERPL-MCNC: 117 MG/DL (ref 65–140)
HCT VFR BLD AUTO: 33 % (ref 34.8–46.1)
HGB BLD-MCNC: 11 G/DL (ref 11.5–15.4)
MCH RBC QN AUTO: 29.6 PG (ref 26.8–34.3)
MCHC RBC AUTO-ENTMCNC: 33.3 G/DL (ref 31.4–37.4)
MCV RBC AUTO: 89 FL (ref 82–98)
PLATELET # BLD AUTO: 243 THOUSANDS/UL (ref 149–390)
PMV BLD AUTO: 10.8 FL (ref 8.9–12.7)
POTASSIUM SERPL-SCNC: 3.7 MMOL/L (ref 3.5–5.3)
RBC # BLD AUTO: 3.71 MILLION/UL (ref 3.81–5.12)
SODIUM SERPL-SCNC: 135 MMOL/L (ref 136–145)
WBC # BLD AUTO: 11.14 THOUSAND/UL (ref 4.31–10.16)

## 2021-04-27 PROCEDURE — NC001 PR NO CHARGE: Performed by: ORTHOPAEDIC SURGERY

## 2021-04-27 PROCEDURE — 97166 OT EVAL MOD COMPLEX 45 MIN: CPT

## 2021-04-27 PROCEDURE — 97110 THERAPEUTIC EXERCISES: CPT

## 2021-04-27 PROCEDURE — 85027 COMPLETE CBC AUTOMATED: CPT | Performed by: ORTHOPAEDIC SURGERY

## 2021-04-27 PROCEDURE — 97530 THERAPEUTIC ACTIVITIES: CPT

## 2021-04-27 PROCEDURE — 99214 OFFICE O/P EST MOD 30 MIN: CPT | Performed by: ANESTHESIOLOGY

## 2021-04-27 PROCEDURE — 80048 BASIC METABOLIC PNL TOTAL CA: CPT | Performed by: ORTHOPAEDIC SURGERY

## 2021-04-27 PROCEDURE — 97112 NEUROMUSCULAR REEDUCATION: CPT

## 2021-04-27 PROCEDURE — 97116 GAIT TRAINING THERAPY: CPT

## 2021-04-27 RX ORDER — DIPHENHYDRAMINE HYDROCHLORIDE 50 MG/ML
50 INJECTION INTRAMUSCULAR; INTRAVENOUS
Status: DISCONTINUED | OUTPATIENT
Start: 2021-04-27 | End: 2021-04-28 | Stop reason: HOSPADM

## 2021-04-27 RX ORDER — HYDROCODONE BITARTRATE AND ACETAMINOPHEN 5; 325 MG/1; MG/1
1 TABLET ORAL EVERY 6 HOURS PRN
Status: DISCONTINUED | OUTPATIENT
Start: 2021-04-27 | End: 2021-04-27

## 2021-04-27 RX ORDER — HYDROCODONE BITARTRATE AND ACETAMINOPHEN 5; 325 MG/1; MG/1
1 TABLET ORAL EVERY 4 HOURS PRN
Status: DISCONTINUED | OUTPATIENT
Start: 2021-04-27 | End: 2021-04-28 | Stop reason: HOSPADM

## 2021-04-27 RX ORDER — HYDROCODONE BITARTRATE AND ACETAMINOPHEN 5; 325 MG/1; MG/1
1 TABLET ORAL EVERY 6 HOURS PRN
Qty: 30 TABLET | Refills: 0 | Status: SHIPPED | OUTPATIENT
Start: 2021-04-27 | End: 2021-07-27 | Stop reason: ALTCHOICE

## 2021-04-27 RX ORDER — HYDROCODONE BITARTRATE AND ACETAMINOPHEN 5; 325 MG/1; MG/1
2 TABLET ORAL EVERY 4 HOURS PRN
Status: DISCONTINUED | OUTPATIENT
Start: 2021-04-27 | End: 2021-04-28

## 2021-04-27 RX ORDER — CYCLOBENZAPRINE HCL 5 MG
5 TABLET ORAL 3 TIMES DAILY PRN
Status: DISCONTINUED | OUTPATIENT
Start: 2021-04-27 | End: 2021-04-28 | Stop reason: HOSPADM

## 2021-04-27 RX ADMIN — HYDROCODONE BITARTRATE AND ACETAMINOPHEN 1 TABLET: 5; 325 TABLET ORAL at 12:53

## 2021-04-27 RX ADMIN — DIPHENHYDRAMINE HYDROCHLORIDE 25 MG: 50 INJECTION, SOLUTION INTRAMUSCULAR; INTRAVENOUS at 09:36

## 2021-04-27 RX ADMIN — ONDANSETRON 4 MG: 2 INJECTION INTRAMUSCULAR; INTRAVENOUS at 04:28

## 2021-04-27 RX ADMIN — CYCLOBENZAPRINE HYDROCHLORIDE 5 MG: 5 TABLET, FILM COATED ORAL at 12:53

## 2021-04-27 RX ADMIN — CEFAZOLIN SODIUM 2000 MG: 2 SOLUTION INTRAVENOUS at 03:08

## 2021-04-27 RX ADMIN — IRON SUCROSE 200 MG: 20 INJECTION, SOLUTION INTRAVENOUS at 10:45

## 2021-04-27 RX ADMIN — ENOXAPARIN SODIUM 40 MG: 40 INJECTION SUBCUTANEOUS at 03:08

## 2021-04-27 RX ADMIN — CYCLOBENZAPRINE HYDROCHLORIDE 5 MG: 5 TABLET, FILM COATED ORAL at 17:09

## 2021-04-27 RX ADMIN — LEVOTHYROXINE SODIUM 112 MCG: 112 TABLET ORAL at 06:03

## 2021-04-27 RX ADMIN — DOCUSATE SODIUM 100 MG: 100 CAPSULE, LIQUID FILLED ORAL at 07:57

## 2021-04-27 RX ADMIN — DOCUSATE SODIUM 100 MG: 100 CAPSULE, LIQUID FILLED ORAL at 17:09

## 2021-04-27 RX ADMIN — HYDROCODONE BITARTRATE AND ACETAMINOPHEN 1 TABLET: 5; 325 TABLET ORAL at 08:27

## 2021-04-27 RX ADMIN — HYDROMORPHONE HYDROCHLORIDE 0.5 MG: 1 INJECTION, SOLUTION INTRAMUSCULAR; INTRAVENOUS; SUBCUTANEOUS at 04:28

## 2021-04-27 RX ADMIN — OXYCODONE HYDROCHLORIDE 10 MG: 10 TABLET ORAL at 03:19

## 2021-04-27 RX ADMIN — DIPHENHYDRAMINE HYDROCHLORIDE 50 MG: 50 INJECTION, SOLUTION INTRAMUSCULAR; INTRAVENOUS at 21:48

## 2021-04-27 RX ADMIN — SENNOSIDES 8.6 MG: 8.6 TABLET, FILM COATED ORAL at 07:57

## 2021-04-27 RX ADMIN — ONDANSETRON 4 MG: 2 INJECTION INTRAMUSCULAR; INTRAVENOUS at 12:53

## 2021-04-27 NOTE — DISCHARGE SUMMARY
ORTHOPEDICS DISCHARGE SUMMARY  Grace Or 77 y o  female MRN: [id number]  Unit/Bed#: -01    Attending Physician: Alta Velasquez    Admitting diagnosis: Primary osteoarthritis of left knee [M17 12]    Discharge diagnosis: Primary osteoarthritis of left knee [M17 12]    Date of admission: 4/26/2021    Date of discharge: 04/27/21         Procedure: Left total knee arthroplasty    HPI:  This is a 77y o  year old female that presented to the office with signs and symptoms of left knee osteoarthritis  They tried and failed conservative treatment measures and wished to proceed with surgical intervention  The risks, benefits, and complications of the procedure were discussed with the patient and informed consent was obtained  Hospital Course: The patient was admitted to the hospital on 4/26/2021 and underwent an uncomplicated left total knee arthroplasty  They were transferred to the floor after a brief stay in the post-anesthesia care unit  Their pain was well managed with IV and oral pain medications  They began therapy on post operative day #1  Lovenox was also started for DVT prophylaxis 12 hours post operatively  Hemovac drain was removed on POD1  On discharge date pt was cleared by PT and the medicine team and determined to be safe for discharge  Daily discussion was had with the patient, nursing staff, orthopaedic team, and family members if present  All questions were answered to the patients satisifaction        0   Lab Value Date/Time    HGB 11 0 (L) 04/27/2021 0440    HGB 13 9 04/02/2021 0748    HGB 13 2 01/02/2021 0744    HGB 14 0 03/13/2020 1013    HGB 14 4 03/11/2020 1501    HGB 13 7 12/05/2018 0711    HGB 13 1 05/29/2018 0729    HGB 11 0 (L) 01/23/2018 0457    HGB 13 9 01/15/2018 1049    HGB 13 8 06/13/2017 0818    HGB 14 0 11/09/2016 0940    HGB 13 7 05/02/2016 0844    HGB 14 1 10/20/2015 0958    HGB 13 6 05/18/2015 1026    HGB 13 2 05/28/2014 1152    HGB 13 5 01/31/2014 1128       Greater than 2 gram drop which qualifies for diagnosis of acute blood loss anemia  Vital signs remained stable and pt was resuscitated with IVF as needed   Body mass index is 29 16 kg/m²  Overweight  Recommend nutrition and physical activity  Discharge Instructions: The patient was discharged weight bearing as tolerated to the left lower extremity  Lovenox will be continued for 28 days  Continue PT/OT  Take pain medications as instructed  Discharge Medications: For the complete list of discharge medications, please refer to the patient's medication reconciliation

## 2021-04-27 NOTE — PHYSICIAN ADVISOR
Current patient class: Outpatient Surgery  The patient is currently on Hospital Day: 2 at [address] [place] [address]        The patient was admitted to the hospital  on N/A at N/A for the following diagnosis:  Primary osteoarthritis of left knee [M17 12]     After review of the relevant documentation, labs, vital signs and test results, the patient is most appropriate for OUTPATIENT CLASS  Rationale is as follows: The patient underwent left total knee arthroplasty yesterday and was placed under outpatient surgical class  The patient worked with PT and demonstrated the necessary mobility in order to safely return home at discharge  The patient is medically cleared for discharge per Orthopedics  She is expected to be discharged tomorrow  She will be monitored overnight for further reassurance and oral pain medication as needed  Pain scores this afternoon were 3/10  I recommend maintaining outpatient class tonight        The patients vitals on arrival were   ED Triage Vitals [21 1124]   Temperature Pulse Respirations Blood Pressure SpO2   98 3 °F (36 8 °C) 78 18 133/82 97 %      Temp Source Heart Rate Source Patient Position - Orthostatic VS BP Location FiO2 (%)   Tympanic Monitor -- -- --      Pain Score       5           Past Medical History:   Diagnosis Date    BPPV (benign paroxysmal positional vertigo)     Bronchitis     Disease of thyroid gland     HYPO    Diverticulitis     GERD (gastroesophageal reflux disease)     Glaucoma (increased eye pressure)      2 para 2     IBS (irritable bowel syndrome)     Insomnia     PONV (postoperative nausea and vomiting)      Past Surgical History:   Procedure Laterality Date    CHOLECYSTECTOMY      COLON SURGERY      COLONOSCOPY  2019    COLONOSCOPY      DILATION AND CURETTAGE OF UTERUS      FL GUIDED NEEDLE PLAC BX/ASP/INJ  2018    FL GUIDED NEEDLE PLAC BX/ASP/INJ  2019    FL GUIDED NEEDLE PLAC BX/ASP/INJ  2/24/2020    FL GUIDED NEEDLE PLAC BX/ASP/INJ  10/8/2020    HYSTERECTOMY  2006    JOINT REPLACEMENT      LAPAROSCOPIC COLON RESECTION  11/12/2019    MAMMO (HISTORICAL) Bilateral 12/2018    PARTIAL HYSTERECTOMY      LA REVISE KNEE JOINT REPLACE,ALL PARTS Right 1/22/2018    Procedure: ARTHROPLASTY KNEE TOTAL REVISION;  Surgeon: Didier Adan MD;  [facility];  Service: Orthopedics    LA TOTAL KNEE ARTHROPLASTY Left 4/26/2021    Procedure: ARTHROPLASTY KNEE TOTAL;  Surgeon: Didier Adan MD;  [facility];  Service: Orthopedics    TONSILLECTOMY      TRABECULOPLASTY, LASER SELECTIVE Left 05/14/2019    VARICOSE VEIN SURGERY             Consults have been placed to:   IP CONSULT TO ACUTE PAIN SERVICE  IP CONSULT TO INTERNAL MEDICINE  IP CONSULT TO CASE MANAGEMENT    Vitals:    04/27/21 0319 04/27/21 0432 04/27/21 0710 04/27/21 1506   BP: 121/72  120/70 121/70   Pulse: 82  83 80   Resp: 16  20 18   Temp: 98 6 °F (37 °C)  100 °F (37 8 °C) 99 1 °F (37 3 °C)   TempSrc:       SpO2: 95%  97% 96%   Weight:  70 kg (154 lb 5 2 oz)     Height:           Most recent labs:    Recent Labs     04/27/21  0440   WBC 11 14*   HGB 11 0*   HCT 33 0*      K 3 7   CALCIUM 9 5   BUN 17   CREATININE 0 71       Scheduled Meds:  Current Facility-Administered Medications   Medication Dose Route Frequency Provider Last Rate    calcium carbonate  1,000 mg Oral Daily PRN Kandice Dewey MD      cyclobenzaprine  5 mg Oral TID PRN Cranbury MD Suri      diphenhydrAMINE  50 mg Intravenous HS PRN Cranbury MD Suri      docusate sodium  100 mg Oral BID Kandice Dewey MD      enoxaparin  40 mg Subcutaneous Daily Cranbury MD Suri      hydrALAZINE  25 mg Oral Q8H PRN Go Spindle, CRNP      HYDROcodone-acetaminophen  1 tablet Oral Q4H PRN Go Spindle, CRNP      HYDROcodone-acetaminophen  2 tablet Oral Q4H PRN Cranbury MD Suri      HYDROmorphone  0 5 mg Intravenous Q3H PRN Cranbury MD Suri  iron sucrose  200 mg Intravenous Daily SCOTTIE Parra Stopped (04/27/21 1145)    levothyroxine  112 mcg Oral Early Morning Fer Sepulveda MD      melatonin  6 mg Oral HS PRN SCOTTIE Parra      ondansetron  4 mg Intravenous Q6H PRN Fer Sepulveda MD      senna  1 tablet Oral Daily Fer Sepulveda MD       Continuous Infusions:   PRN Meds:    calcium carbonate    cyclobenzaprine    diphenhydrAMINE    hydrALAZINE    HYDROcodone-acetaminophen    HYDROcodone-acetaminophen    HYDROmorphone    melatonin    ondansetron

## 2021-04-27 NOTE — PLAN OF CARE
Problem: PHYSICAL THERAPY ADULT  Goal: Performs mobility at highest level of function for planned discharge setting  See evaluation for individualized goals  Description: Treatment/Interventions: Functional transfer training, LE strengthening/ROM, Elevations, Therapeutic exercise, Endurance training, Bed mobility, Gait training, Spoke to nursing, Spoke to case management, OT  Equipment Recommended: Walker(pt already owns)       See flowsheet documentation for full assessment, interventions and recommendations  Outcome: Adequate for Discharge  Note: Prognosis: Excellent  Problem List: Pain, Decreased mobility  Assessment: The pt  had multiple questions about her recovery and progress  She was concerned about her ability to safely return home as well  Reviewed her home exercise program as well as her expected ability to perform straight leg-raises unassisted  She demonstrated a step-through gait that was even and consistent  She had no loss of balance, and she was able to manuever around obstacles readily  She also was able to transfer to and from the chair and commode without difficulty as well although she was instructed in proper hand placement for sit-to-stand  The pt  noted  a maximal pain score of "5/10" verbal score during the session  She was able to perform the stair without assistance, but with increased time  She had no dizziness or lightheadedness throughout the session  Reviewed care transfers and continued mobility at home as well  Addressed her questions to her satisfaction as well  Reviewed her progress with her as well as what she will need to do in order to be successful at home  She acknowledges that she is confident about returning home, but that she is concerned about her pain management  She has demonstrated the necessary mobility in order to safely return home at discharge    Barriers to Discharge: None        PT Discharge Recommendation: Home with outpatient rehabilitation     PT - OK to Discharge: Yes    See flowsheet documentation for full assessment

## 2021-04-27 NOTE — OCCUPATIONAL THERAPY NOTE
Occupational Therapy Evaluation     Patient Name: Alistair Gates  HMDHJ'E Date: 2021  Problem List  Principal Problem:    S/P total knee arthroplasty, left    Past Medical History  Past Medical History:   Diagnosis Date    BPPV (benign paroxysmal positional vertigo)     Bronchitis     Disease of thyroid gland     HYPO    Diverticulitis     GERD (gastroesophageal reflux disease)     Glaucoma (increased eye pressure)      2 para 2     IBS (irritable bowel syndrome)     Insomnia     PONV (postoperative nausea and vomiting)      Past Surgical History  Past Surgical History:   Procedure Laterality Date    CHOLECYSTECTOMY      COLON SURGERY      COLONOSCOPY  2019    COLONOSCOPY      DILATION AND CURETTAGE OF UTERUS      FL GUIDED NEEDLE PLAC BX/ASP/INJ  2018    FL GUIDED NEEDLE PLAC BX/ASP/INJ  2019    FL GUIDED NEEDLE PLAC BX/ASP/INJ  2020    FL GUIDED NEEDLE PLAC BX/ASP/INJ  10/8/2020    HYSTERECTOMY  2006    JOINT REPLACEMENT      LAPAROSCOPIC COLON RESECTION  2019    MAMMO (HISTORICAL) Bilateral 2018    PARTIAL HYSTERECTOMY      AK REVISE KNEE JOINT REPLACE,ALL PARTS Right 2018    Procedure: ARTHROPLASTY KNEE TOTAL REVISION;  Surgeon: Teresa Encinas MD;  [facility];  Service: Orthopedics    AK TOTAL KNEE ARTHROPLASTY Left 2021    Procedure: ARTHROPLASTY KNEE TOTAL;  Surgeon: Teresa Encinas MD;  [facility];  Service: Orthopedics    TONSILLECTOMY      TRABECULOPLASTY, LASER SELECTIVE Left 2019    VARICOSE VEIN SURGERY          21 1320   OT Last Visit   OT Visit Date 21   Note Type   Note type Evaluation   Restrictions/Precautions   Weight Bearing Precautions Per Order Yes   LLE Weight Bearing Per Order WBAT   Other Precautions Fall Risk;Pain   Pain Assessment   Pain Assessment Tool 0-10   Pain Score 3   Pain Location/Orientation Location: Knee;Orientation: Left   Home Living   Type of Home House  (1STE)   Home Layout Able to live on main level with bedroom/bathroom   Bathroom Shower/Tub Tub/shower unit   Bathroom Toilet Standard   Bathroom Equipment Commode; Shower chair  (didn't use PTA)   Home Equipment Walker  (didn't use PTA)   Additional Comments Pt resides with her SO in a house with FFSU and 1STE  SO works from home and is able to assist as needed   Prior Function   Level of [place] Independent with ADLs and functional mobility   Lives With Significant other   Receives Help From [facility], [address] in the last 6 months 0   Vocational Retired   Lifestyle   Autonomy Pt reports being completely I with ADLs, IADLs, and functional mobility without use of DME PTA       Reciprocal Relationships supportive SO can provide assist as needed   Service to Others retired  and [address] [place] [address] Daisy active PTA   Psychosocial   Psychosocial (WDL) WDL   Subjective   Subjective "I want to stay until tomorrow so I can get some sleep"   ADL   Eating Assistance 7  Independent   Grooming Assistance 5  Supervision/Setup   [address] 5  Supervision/Setup   [facility]  [address] 5  Supervision/Setup    [facility] 4  [address]  5  Supervision/Setup   Additional Comments Primarily limited by decreased knee AROM   Bed Mobility   Supine to Sit 6  Modified independent   Additional Comments Pt uses LE hooking technique to compensate for decreased AROM L LE during bed mobility   Transfers   Sit to Stand 5  Supervision   Stand to Sit 5  Supervision   Additional Comments Requires VCs for positioning and hand placement with use of rw   Functional Mobility   Functional Mobility 5  Supervision   Additional Comments use of rw, fair endurance, no LOB   Balance   Static Sitting Normal   Dynamic Sitting Good   Static Standing Fair +   Dynamic Standing Desi [address] 4433   Activity Tolerance   Activity Tolerance Patient limited by pain   Nurse Made Aware Pt cleared by ARTURO Salinas for OT evaluation and OOB mobility   RUE Assessment   RUE Assessment WFL   LUE Assessment   LUE Assessment WFL   Cognition   Overall Cognitive Status WFL   Arousal/Participation Alert; Responsive; Cooperative   Attention Within functional limits   Orientation Level Oriented X4   Memory Within functional limits   Following Commands Follows all commands and directions without difficulty   Comments Pt is pleasant and coopeartive  No significant cognitive deficits noted at this time   Assessment   Prognosis Good   Assessment Pt is a 77year old female seen for initial OT evaluation POD1 s/p L TKA  Pt is WBAT L LE  Additional active problems include: operative acute blood loss, hypothyroid, irritable bowel, and nausea  Pt resides with her SO in [facility]  SO works from home and can assist as needed  Pt reports being I with ADLs, IADLs, and functional mobility without use of DME PTA  Pt is currently functioning at a [place] level with ADLs and supervision for functional transfers/mobility  She is primarily limited by L knee AROM and pain  From an OT standpoint, recommend home with family support upon d/c  No further acute OT needs, and OT orders to be d/c at this time  Please re-consult OT if functional concerns arise  Goals   Patient Goals to stay until tomorrow   Recommendation   OT Discharge Recommendation No rehabilitation needs   Equipment Recommended Shower/Tub chair with back ($)  (already owns)   OT - OK to Discharge Yes   AM-PAC Daily Activity Inpatient   Lower Body Dressing 3   Bathing 3   Toileting 4   Upper Body Dressing 4   Grooming 4   Eating 4   Daily Activity Raw Score 22   Daily Activity Standardized Score (Calc for Raw Score >=11) 47  1   AM-PAC Applied Cognition Inpatient   Following a Speech/Presentation 4   Understanding Ordinary Conversation 4 Taking Medications 4   Remembering Where Things Are Placed or Put Away 4   Remembering List of 4-5 Errands 4   Taking Care of Complicated Tasks 4   Applied Cognition Raw Score 24   Applied Cognition Standardized Score 62 21     Hugh Handing, MOT, OTR/L

## 2021-04-27 NOTE — PROGRESS NOTES
Subjective: No acute events overnight  No acute distress  Objective:  A 10 point ROS was performed; negative except as noted above       Lab Results   Component Value Date/Time    WBC 11 14 (H) 04/27/2021 04:40 AM    WBC 5 89 10/20/2015 09:58 AM    HGB 11 0 (L) 04/27/2021 04:40 AM    HGB 14 1 10/20/2015 09:58 AM       Vitals:    04/27/21 0319   BP: 121/72   Pulse: 82   Resp: 16   Temp: 98 6 °F (37 °C)   SpO2: 95%     Left lower extremity  Dressing C/D/I  Hemovac in place  Motor intact to EHL/FHL/TA/GS  Sensation intact to light touch to dp/sp/tib/merly/saph distributions  Toes warm and well perfused with brisk capillary refill    Assessment: 77 y o  female post op day #1 from Left total knee arthroplasty     Plan:  WBAT  left lower extremity   Pain control  DVT ppx: Enoxaparin (Lovenox)  PT/OT  Will continue to assess for acute blood loss anemia  Dispo: pending PT progress

## 2021-04-27 NOTE — CASE MANAGEMENT
Pt is not a <30 day readmission or current bundle  Risk of unplanned readmission score is unavailable at this time  Pt s/p left total knee replacement  CM met with pt at bedside to introduce CM role and begin discharge planning  Pt lives with her significant other, Ranjith Hartman ([phone]) in a 2 story house that has 1 JACE and a first floor setup  Pt reports being independent with ADLs PTA, no use of DME but access to a cane, rolling walker, shower chair, and bedside commode  Pt reports history of VNA about 9 years ago, unsure of agency  Pt  STR, inpatient [address] treatment or D/A treatment  Pt drives and is retired  PCP is Dr Bradley Ruelas ([phone]) and pt uses [facility] pharmacy in [facility] for prescriptions  Pt's significant other to transport at time of discharge  Pt to get OPPT at [address] with significant other to transport, Lovenox filled and at home  CM reviewed d/c planning process including the following: identifying help at home, patient preference for d/c planning needs, Discharge Lounge, [facility] Meds to Bed program, availability of treatment team to discuss questions or concerns patient and/or family may have regarding understanding medications and recognizing signs and symptoms once discharged  CM also encouraged patient to follow up with all recommended appointments after discharge  Patient advised of importance for patient and family to participate in managing patients medical well being

## 2021-04-27 NOTE — PHYSICAL THERAPY NOTE
Physical Therapy Progress Note     04/27/21 1355   PT Last Visit   PT Visit Date 04/27/21   Note Type   Note Type BID visit/treatment   Pain Assessment   Pain Assessment Tool 0-10   Pain Score 3   Pain Location/Orientation Orientation: Left; Location: Knee   Restrictions/Precautions   LLE Weight Bearing Per Order WBAT   Other Precautions Fall Risk;Pain   Subjective   Subjective The pt  reports that she is doing well, and that she is eager to go home tomorrow  Bed Mobility   Supine to Sit 6  Modified independent   Sit to Supine 6  Modified independent   Additional items Increased time required   Transfers   Sit to Stand 5  Supervision   Stand to Sit 5  Supervision   Ambulation/Elevation   Gait pattern Excessively slow;Decreased foot clearance   Gait Assistance 5  Supervision   Assistive Device Rolling walker   Distance 70 feet  Balance   Static Sitting Normal   Dynamic Sitting Good   Static Standing Fair +   Ambulatory Fair   Activity Tolerance   Activity Tolerance Patient tolerated treatment well   Nurse [address], RN  Assessment   Prognosis Excellent   Problem List Pain;Decreased mobility   Assessment The pt  has improved pain control this afternoon with resultant improvement in mobility  She is independently transitioning from supine <---> sit while managing her own LLE  She has improving len and her gait continues to be consistent with a swing-through technique  She utilized proper hand placement for transfers this afternoon as well  The pt  had more questions which were addressed to her satisfaction  Barriers to Discharge None   Goals   Patient Goals To go home tomorrow  STG Expiration Date 05/06/21   PT Treatment Day 2   Plan   Treatment/Interventions Functional transfer training;LE strengthening/ROM; Elevations; Therapeutic exercise; Endurance training;Patient/family training;Bed mobility;Gait training   Progress Improving as expected   PT Frequency Twice a day   Recommendation   PT Discharge Recommendation Home with outpatient rehabilitation   Equipment Recommended   (Pt  has all DME )   PT - OK to Discharge Yes   AM-PAC Basic Mobility Inpatient   Turning in Bed Without Bedrails 4   Lying on Back to Sitting on Edge of Flat Bed 4   Moving Bed to Chair 4   Standing Up From Chair 4   Walk in Room 4   Climb 3-5 Stairs 4   Basic Mobility Inpatient Raw Score 24   Basic Mobility Standardized Score 57 68     Pretty Mariano PTA  The patient's AM-PAC Basic Mobility Inpatient Short Form Raw Score is 24, Standardized Score is 57 68  A standardized score greater than 42 9 suggests the patient may benefit from discharge to home  Please also refer to the recommendation of the Physical Therapist for safe discharge planning

## 2021-04-27 NOTE — PHYSICAL THERAPY NOTE
Physical Therapy Progress Note     04/27/21 1100   PT Last Visit   PT Visit Date 04/27/21   Note Type   Note Type BID visit/treatment   Pain Assessment   Pain Assessment Tool 0-10   Pain Score 5   Pain Location/Orientation Orientation: Left; Location: Knee   Hospital Pain Intervention(s) Cold applied;Repositioned; Ambulation/increased activity; Emotional support   Restrictions/Precautions   LLE Weight Bearing Per Order WBAT   Other Precautions Fall Risk;Pain   Subjective   Subjective The pt  states that she did not sleep well at night, and her pain is starting to get better  She states that she wants to stay another day because she is not sure of her medications  Transfers   Sit to Stand 5  Supervision   Stand to Sit 5  Supervision   Ambulation/Elevation   Gait pattern Excessively slow;Decreased foot clearance   Gait Assistance 5  Supervision   Assistive Device Rolling walker   Distance 30 feet, 150 feet, 40 feet  Stair Management Assistance 5  Supervision   Additional items Increased time required;Verbal cues   Stair Management Technique One rail R;With cane; Foreward; Step to pattern   Number of Stairs 2   Balance   Static Sitting Normal   Dynamic Sitting Good   Static Standing Fair +   Ambulatory Fair   Activity Tolerance   Activity Tolerance Patient tolerated treatment well   Nurse Made Aware [id number] [facility], RN  Exercises   TKR Sitting;Left;AROM;AAROM;10 reps  (x2 sets )   Assessment   Prognosis Excellent   Problem List Pain;Decreased mobility   Assessment The pt  had multiple questions about her recovery and progress  She was concerned about her ability to safely return home as well  Reviewed her home exercise program as well as her expected ability to perform straight leg-raises unassisted  She demonstrated a step-through gait that was even and consistent  She had no loss of balance, and she was able to manuever around obstacles readily   She also was able to transfer to and from the chair and commode without difficulty as well although she was instructed in proper hand placement for sit-to-stand  The pt  noted  a maximal pain score of "5/10" verbal score during the session  She was able to perform the stair without assistance, but with increased time  She had no dizziness or lightheadedness throughout the session  Reviewed care transfers and continued mobility at home as well  Addressed her questions to her satisfaction as well  Reviewed her progress with her as well as what she will need to do in order to be successful at home  She acknowledges that she is confident about returning home, but that she is concerned about her pain management  She has demonstrated the necessary mobility in order to safely return home at discharge  Barriers to Discharge None   Goals   Patient Goals To get back to her active life  STG Expiration Date 05/06/21   PT Treatment Day 1   Plan   Treatment/Interventions Functional transfer training;LE strengthening/ROM; Elevations; Therapeutic exercise; Endurance training;Patient/family training;Bed mobility;Gait training   Progress Improving as expected   PT Frequency Twice a day   Recommendation   PT Discharge Recommendation Home with outpatient rehabilitation   Equipment Recommended   (Pt  has all DME )   PT - OK to Discharge Yes   AM-PAC Basic Mobility Inpatient   Turning in Bed Without Bedrails 4   Lying on Back to Sitting on Edge of Flat Bed 4   Moving Bed to Chair 4   Standing Up From Chair 4   Walk in Room 4   Climb 3-5 Stairs 4   Basic Mobility Inpatient Raw Score 24   Basic Mobility Standardized Score 57 68     Pretty Mariano PTA  The patient's AM-PAC Basic Mobility Inpatient Short Form Raw Score is 24, Standardized Score is 57 68  A standardized score greater than 42 9 suggests the patient may benefit from discharge to home  Please also refer to the recommendation of the Physical Therapist for safe discharge planning

## 2021-04-27 NOTE — PLAN OF CARE
Problem: PHYSICAL THERAPY ADULT  Goal: Performs mobility at highest level of function for planned discharge setting  See evaluation for individualized goals  Description: Treatment/Interventions: Functional transfer training, LE strengthening/ROM, Elevations, Therapeutic exercise, Endurance training, Bed mobility, Gait training, Spoke to nursing, Spoke to case management, OT  Equipment Recommended: Walker(pt already owns)       See flowsheet documentation for full assessment, interventions and recommendations  4/27/2021 1512 by Lou Powers PTA  Outcome: Adequate for Discharge  Note: Prognosis: Excellent  Problem List: Pain, Decreased mobility  Assessment: The pt  has improved pain control this afternoon with resultant improvement in mobility  She is independently transitioning from supine <---> sit while managing her own LLE  She has improving len and her gait continues to be consistent with a swing-through technique  She utilized proper hand placement for transfers this afternoon as well  The pt  had more questions which were addressed to her satisfaction  Barriers to Discharge: None        PT Discharge Recommendation: Home with outpatient rehabilitation     PT - OK to Discharge: Yes    See flowsheet documentation for full assessment  4/27/2021 1503 by Lou Powers PTA  Outcome: Adequate for Discharge  Note: Prognosis: Excellent  Problem List: Pain, Decreased mobility  Assessment: The pt  had multiple questions about her recovery and progress  She was concerned about her ability to safely return home as well  Reviewed her home exercise program as well as her expected ability to perform straight leg-raises unassisted  She demonstrated a step-through gait that was even and consistent  She had no loss of balance, and she was able to manuever around obstacles readily   She also was able to transfer to and from the chair and commode without difficulty as well although she was instructed in proper hand placement for sit-to-stand  The pt  noted  a maximal pain score of "5/10" verbal score during the session  She was able to perform the stair without assistance, but with increased time  She had no dizziness or lightheadedness throughout the session  Reviewed care transfers and continued mobility at home as well  Addressed her questions to her satisfaction as well  Reviewed her progress with her as well as what she will need to do in order to be successful at home  She acknowledges that she is confident about returning home, but that she is concerned about her pain management  She has demonstrated the necessary mobility in order to safely return home at discharge  Barriers to Discharge: None        PT Discharge Recommendation: Home with outpatient rehabilitation     PT - OK to Discharge: Yes    See flowsheet documentation for full assessment

## 2021-04-27 NOTE — PLAN OF CARE
Problem: Prexisting or High Potential for Compromised Skin Integrity  Goal: Skin integrity is maintained or improved  Description: INTERVENTIONS:  - Identify patients at risk for skin breakdown  - Assess and monitor skin integrity  - Assess and monitor nutrition and hydration status  - Monitor labs   - Assess for incontinence   - Turn and reposition patient  - Assist with mobility/ambulation  - Relieve pressure over bony prominences  - Avoid friction and shearing  - Provide appropriate hygiene as needed including keeping skin clean and dry  - Evaluate need for skin moisturizer/barrier cream  - Collaborate with interdisciplinary team   - Patient/family teaching  - Consider wound care consult   Outcome: Progressing     Problem: PAIN - ADULT  Goal: Verbalizes/displays adequate comfort level or baseline comfort level  Description: Interventions:  - Encourage patient to monitor pain and request assistance  - Assess pain using appropriate pain scale  - Administer analgesics based on type and severity of pain and evaluate response  - Implement non-pharmacological measures as appropriate and evaluate response  - Consider cultural and social influences on pain and pain management  - Notify physician/advanced practitioner if interventions unsuccessful or patient reports new pain  Outcome: Progressing     Problem: INFECTION - ADULT  Goal: Absence or prevention of progression during hospitalization  Description: INTERVENTIONS:  - Assess and monitor for signs and symptoms of infection  - Monitor lab/diagnostic results  - Monitor all insertion sites, i e  indwelling lines, tubes, and drains  - Monitor endotracheal if appropriate and nasal secretions for changes in amount and color  - [place] appropriate cooling/warming therapies per order  - Administer medications as ordered  - Instruct and encourage patient and family to use good hand hygiene technique  - Identify and instruct in appropriate isolation precautions for identified infection/condition  Outcome: Progressing  Goal: Absence of fever/infection during neutropenic period  Description: INTERVENTIONS:  - Monitor WBC    Outcome: Progressing     Problem: SAFETY ADULT  Goal: Patient will remain free of falls  Description: INTERVENTIONS:  - Assess patient frequently for physical needs  -  Identify cognitive and physical deficits and behaviors that affect risk of falls    -  [place] fall precautions as indicated by assessment   - Educate patient/family on patient safety including physical limitations  - Instruct patient to call for assistance with activity based on assessment  - Modify environment to reduce risk of injury  - Consider OT/PT consult to assist with strengthening/mobility  Outcome: Progressing  Goal: Maintain or return to baseline ADL function  Description: INTERVENTIONS:  -  Assess patient's ability to carry out ADLs; assess patient's baseline for ADL function and identify physical deficits which impact ability to perform ADLs (bathing, care of mouth/teeth, toileting, grooming, dressing, etc )  - Assess/evaluate cause of self-care deficits   - Assess range of motion  - Assess patient's mobility; develop plan if impaired  - Assess patient's need for assistive devices and provide as appropriate  - Encourage maximum independence but intervene and supervise when necessary  - Involve family in performance of ADLs  - Assess for home care needs following discharge   - Consider OT consult to assist with ADL evaluation and planning for discharge  - Provide patient education as appropriate  Outcome: Progressing  Goal: Maintain or return mobility status to optimal level  Description: INTERVENTIONS:  - Assess patient's baseline mobility status (ambulation, transfers, stairs, etc )    - Identify cognitive and physical deficits and behaviors that affect mobility  - Identify mobility aids required to assist with transfers and/or ambulation (gait belt, sit-to-stand, lift, walker, cane, etc )  - [place] fall precautions as indicated by assessment  - Record patient progress and toleration of activity level on Mobility SBAR; progress patient to next Phase/Stage  - Instruct patient to call for assistance with activity based on assessment  - Consider rehabilitation consult to assist with strengthening/weightbearing, etc   Outcome: Progressing     Problem: DISCHARGE PLANNING  Goal: Discharge to home or other facility with appropriate resources  Description: INTERVENTIONS:  - Identify barriers to discharge w/patient and caregiver  - Arrange for needed discharge resources and transportation as appropriate  - Identify discharge learning needs (meds, wound care, etc )  - Arrange for interpretive services to assist at discharge as needed  - Refer to Case Management Department for coordinating discharge planning if the patient needs post-hospital services based on physician/advanced practitioner order or complex needs related to functional status, cognitive ability, or social support system  Outcome: Progressing     Problem: Knowledge Deficit  Goal: Patient/family/caregiver demonstrates understanding of disease process, treatment plan, medications, and discharge instructions  Description: Complete learning assessment and assess knowledge base  Interventions:  - Provide teaching at level of understanding  - Provide teaching via preferred learning methods  Outcome: Progressing     Problem: Potential for Falls  Goal: Patient will remain free of falls  Description: INTERVENTIONS:  - Assess patient frequently for physical needs  -  Identify cognitive and physical deficits and behaviors that affect risk of falls    -  [place] fall precautions as indicated by assessment   - Educate patient/family on patient safety including physical limitations  - Instruct patient to call for assistance with activity based on assessment  - Modify environment to reduce risk of injury  - Consider OT/PT consult to assist with strengthening/mobility  Outcome: Progressing

## 2021-04-28 VITALS
HEART RATE: 94 BPM | BODY MASS INDEX: 29.3 KG/M2 | WEIGHT: 155.2 LBS | RESPIRATION RATE: 18 BRPM | SYSTOLIC BLOOD PRESSURE: 122 MMHG | HEIGHT: 61 IN | DIASTOLIC BLOOD PRESSURE: 76 MMHG | TEMPERATURE: 98.4 F | OXYGEN SATURATION: 95 %

## 2021-04-28 LAB
ANION GAP SERPL CALCULATED.3IONS-SCNC: 5 MMOL/L (ref 4–13)
BUN SERPL-MCNC: 14 MG/DL (ref 5–25)
CALCIUM SERPL-MCNC: 9.3 MG/DL (ref 8.3–10.1)
CHLORIDE SERPL-SCNC: 108 MMOL/L (ref 100–108)
CO2 SERPL-SCNC: 28 MMOL/L (ref 21–32)
CREAT SERPL-MCNC: 0.7 MG/DL (ref 0.6–1.3)
ERYTHROCYTE [DISTWIDTH] IN BLOOD BY AUTOMATED COUNT: 13.3 % (ref 11.6–15.1)
GFR SERPL CREATININE-BSD FRML MDRD: 91 ML/MIN/1.73SQ M
GLUCOSE SERPL-MCNC: 114 MG/DL (ref 65–140)
HCT VFR BLD AUTO: 33 % (ref 34.8–46.1)
HGB BLD-MCNC: 10.9 G/DL (ref 11.5–15.4)
MCH RBC QN AUTO: 29.6 PG (ref 26.8–34.3)
MCHC RBC AUTO-ENTMCNC: 33 G/DL (ref 31.4–37.4)
MCV RBC AUTO: 90 FL (ref 82–98)
PLATELET # BLD AUTO: 225 THOUSANDS/UL (ref 149–390)
PMV BLD AUTO: 10.5 FL (ref 8.9–12.7)
POTASSIUM SERPL-SCNC: 3.8 MMOL/L (ref 3.5–5.3)
RBC # BLD AUTO: 3.68 MILLION/UL (ref 3.81–5.12)
SODIUM SERPL-SCNC: 141 MMOL/L (ref 136–145)
WBC # BLD AUTO: 9.62 THOUSAND/UL (ref 4.31–10.16)

## 2021-04-28 PROCEDURE — 97530 THERAPEUTIC ACTIVITIES: CPT

## 2021-04-28 PROCEDURE — 99024 POSTOP FOLLOW-UP VISIT: CPT | Performed by: PHYSICIAN ASSISTANT

## 2021-04-28 PROCEDURE — 99232 SBSQ HOSP IP/OBS MODERATE 35: CPT | Performed by: NURSE PRACTITIONER

## 2021-04-28 PROCEDURE — NC001 PR NO CHARGE: Performed by: ORTHOPAEDIC SURGERY

## 2021-04-28 PROCEDURE — 80048 BASIC METABOLIC PNL TOTAL CA: CPT | Performed by: ORTHOPAEDIC SURGERY

## 2021-04-28 PROCEDURE — 97110 THERAPEUTIC EXERCISES: CPT

## 2021-04-28 PROCEDURE — 97116 GAIT TRAINING THERAPY: CPT

## 2021-04-28 PROCEDURE — 85027 COMPLETE CBC AUTOMATED: CPT | Performed by: ORTHOPAEDIC SURGERY

## 2021-04-28 RX ORDER — ONDANSETRON 4 MG/1
4 TABLET, FILM COATED ORAL EVERY 8 HOURS PRN
Qty: 30 TABLET | Refills: 1 | Status: SHIPPED | OUTPATIENT
Start: 2021-04-28 | End: 2021-06-08

## 2021-04-28 RX ORDER — ACETAMINOPHEN 325 MG/1
325 TABLET ORAL EVERY 8 HOURS PRN
Status: DISCONTINUED | OUTPATIENT
Start: 2021-04-27 | End: 2021-04-28 | Stop reason: HOSPADM

## 2021-04-28 RX ORDER — CYCLOBENZAPRINE HCL 5 MG
5 TABLET ORAL 3 TIMES DAILY PRN
Qty: 45 TABLET | Refills: 0 | Status: SHIPPED | OUTPATIENT
Start: 2021-04-28 | End: 2021-06-08

## 2021-04-28 RX ORDER — DOCUSATE SODIUM 100 MG/1
100 CAPSULE, LIQUID FILLED ORAL 2 TIMES DAILY
Qty: 10 CAPSULE | Refills: 0 | Status: SHIPPED | OUTPATIENT
Start: 2021-04-28 | End: 2021-06-08

## 2021-04-28 RX ORDER — SENNOSIDES 8.6 MG
8.6 TABLET ORAL
Qty: 7 TABLET | Refills: 0 | Status: SHIPPED | OUTPATIENT
Start: 2021-04-28 | End: 2021-06-08

## 2021-04-28 RX ADMIN — HYDROCODONE BITARTRATE AND ACETAMINOPHEN 1 TABLET: 5; 325 TABLET ORAL at 05:45

## 2021-04-28 RX ADMIN — LEVOTHYROXINE SODIUM 112 MCG: 112 TABLET ORAL at 05:47

## 2021-04-28 RX ADMIN — ACETAMINOPHEN 325 MG: 325 TABLET ORAL at 00:38

## 2021-04-28 RX ADMIN — SENNOSIDES 8.6 MG: 8.6 TABLET, FILM COATED ORAL at 08:30

## 2021-04-28 RX ADMIN — DOCUSATE SODIUM 100 MG: 100 CAPSULE, LIQUID FILLED ORAL at 08:30

## 2021-04-28 RX ADMIN — ENOXAPARIN SODIUM 40 MG: 40 INJECTION SUBCUTANEOUS at 02:03

## 2021-04-28 NOTE — PROGRESS NOTES
Progress Note - Orthopedics   Re Greenfield 77 y o  female MRN: [id number]  Unit/Bed#: -01 Encounter: [id number]    Assessment:  Postop day 2, left Total knee replacement  Operative acute blood loss  Hypothyroid  Irritable bowel syndrome      Plan: Total knee replacement protocol left lower extremity  DVT prophylaxis  Observe hemodynamics  Discharge planning    Weight bearing:  Weight-bearing as tolerated left lower extremity    VTE Pharmacologic Prophylaxis: Enoxaparin (Lovenox)  VTE Mechanical Prophylaxis: sequential compression device    Subjective:  79-year-old adult female who is postop day 2 from left total knee replacement she has a previous right total knee construct as well, she is out of bed in the chair the legs are elevated and she has minimal pain with the leg at rest    Vitals: Blood pressure 128/79, pulse 100, temperature 99 8 °F (37 7 °C), resp  rate 17, height 5' 1" (1 549 m), weight 70 4 kg (155 lb 3 2 oz), SpO2 93 %  ,Body mass index is 29 32 kg/m²  Intake/Output Summary (Last 24 hours) at 4/28/2021 9902  Last data filed at 4/27/2021 2350  Gross per 24 hour   Intake 665 ml   Output 1250 ml   Net -585 ml       Invasive Devices     Peripheral Intravenous Line            Peripheral IV 04/26/21 Right Wrist 1 day          Drain            Closed/Suction Drain Left;Lateral Knee Accordion 10 Fr  1 day                Physical Exam:  Awake alert female out of bed in the chair, minimal pain with the legs elevated and at rest  Ortho Exam: Knee: The left knee was inspected 360° circumferentially  Original postop dressings were discarded  The incision is clean dry and intact new dressings were applied she has no calf pain she has a negative Homans test    Lab, Imaging and other studies:   The reader is directed to the chart

## 2021-04-28 NOTE — PROGRESS NOTES
Progress Note - Acute Pain Service    Viviana Walsh 77 y o  female MRN: [id number]  Unit/Bed#: -01 Encounter: [id number]      Assessment:   Principal Problem:    S/P total knee arthroplasty, left    Viviana Walsh is a 77 y o  female POD #2 left TKA  S/P left AC/IPACK block for post-op pain control  Plan:   · Discontinue Tylenol  · Discontinue IV Dilaudid  · Flexeril 5mg 3 times a day as needed for spasms  · Norco 5/325mg tablets; 1 tablet every 4 hours as needed for moderate or severe pain    Bowel management  · Colace 100mg BID  · Senna daily    Pain is well controlled on the above medication regimen  Hope Cathy for discharge from a pain standpoint  APS will sign off at this time  Thank you for the consult  All opioids and other analgesics to be written at discretion of primary team  Please call  / [phone] or [phone] Acute Pain Service - [facility] (/ between [phone] and on weekends) with questions or concerns    Pain History  Current pain location(s): left knee  Pain Scale:   3-8  24 hour history:  Patient sitting in chair, appears comfortable  Pain is well controlled on the above medication regimen  P r n  Norco was effective      Opioid requirement previous 24 hours: Norco 2 tablets    Meds/Allergies   all current active meds have been reviewed and current meds:   Current Facility-Administered Medications   Medication Dose Route Frequency    acetaminophen (TYLENOL) tablet 325 mg  325 mg Oral Q8H PRN    calcium carbonate (TUMS) chewable tablet 1,000 mg  1,000 mg Oral Daily PRN    cyclobenzaprine (FLEXERIL) tablet 5 mg  5 mg Oral TID PRN    diphenhydrAMINE (BENADRYL) injection 50 mg  50 mg Intravenous HS PRN    docusate sodium (COLACE) capsule 100 mg  100 mg Oral BID    enoxaparin (LOVENOX) subcutaneous injection 40 mg  40 mg Subcutaneous Daily    hydrALAZINE (APRESOLINE) tablet 25 mg  25 mg Oral Q8H PRN    HYDROcodone-acetaminophen (NORCO) 5-325 mg per tablet 1 tablet  1 tablet Oral Q4H PRN    HYDROmorphone (DILAUDID) injection 0 5 mg  0 5 mg Intravenous Q3H PRN    iron sucrose (VENOFER) 200 mg in sodium chloride 0 9 % 100 mL IVPB  200 mg Intravenous Daily    levothyroxine tablet 112 mcg  112 mcg Oral Early Morning    melatonin tablet 6 mg  6 mg Oral HS PRN    ondansetron (ZOFRAN) injection 4 mg  4 mg Intravenous Q6H PRN    senna (SENOKOT) tablet 8 6 mg  1 tablet Oral Daily       Allergies   Allergen Reactions    Codeine GI Intolerance    Morphine Itching       Objective     Temp:  [98 4 °F (36 9 °C)-102 4 °F (39 1 °C)] 98 4 °F (36 9 °C)  HR:  [] 94  Resp:  [16-18] 18  BP: (121-137)/(70-79) 122/76    Physical Exam  Vitals signs reviewed  Constitutional:       General: She is awake  She is not in acute distress  Appearance: She is not ill-appearing, toxic-appearing or diaphoretic  HENT:      Head: Normocephalic and atraumatic  Nose: Nose normal    Eyes:      Extraocular Movements: Extraocular movements intact  Neck:      Musculoskeletal: Normal range of motion  Pulmonary:      Effort: Pulmonary effort is normal  No tachypnea, bradypnea or respiratory distress  Neurological:      Mental Status: She is alert and oriented to person, place, and time  Mental status is at baseline  Psychiatric:         Behavior: Behavior is cooperative  Lab Results:   Results from last 7 days   Lab Units 04/28/21  0606   WBC Thousand/uL 9 62   HEMOGLOBIN g/dL 10 9*   HEMATOCRIT % 33 0*   PLATELETS Thousands/uL 225      Results from last 7 days   Lab Units 04/28/21  0606   POTASSIUM mmol/L 3 8   CHLORIDE mmol/L 108   CO2 mmol/L 28   BUN mg/dL 14   CREATININE mg/dL 0 70   CALCIUM mg/dL 9 3       Please note that the APS provides consultative services regarding pain management only    With the exception of ketamine and epidural infusions and except when indicated, final decisions regarding starting or changing doses of analgesic medications are at the discretion of the consulting service  Off hours consultation and/or medication management is generally not available      SCOTTIE Coronado  Acute Pain Service

## 2021-04-28 NOTE — DISCHARGE SUMMARY
ORTHOPEDICS DISCHARGE SUMMARY  Angélica Banks 77 y o  female MRN: [id number]  Unit/Bed#: -01    Attending Physician: Hattie Case    Admitting diagnosis: Primary osteoarthritis of left knee [M17 12]    Discharge diagnosis: Primary osteoarthritis of left knee [M17 12]    Date of admission: 4/26/2021    Date of discharge: 04/28/21         Procedure: Left total knee arthroplasty    HPI:  This is a 77y o  year old female that presented to the office with signs and symptoms of left knee osteoarthritis  They tried and failed conservative treatment measures and wished to proceed with surgical intervention  The risks, benefits, and complications of the procedure were discussed with the patient and informed consent was obtained  Hospital Course: The patient was admitted to the hospital on 4/26/2021 and underwent an uncomplicated left total knee arthroplasty  They were transferred to the floor after a brief stay in the post-anesthesia care unit  Their pain was well managed with IV and oral pain medications  They began therapy on post operative day #1  Lovenox was also started for DVT prophylaxis 12 hours post operatively  Hemovac drain was removed on POD1  On discharge date pt was cleared by PT and the medicine team and determined to be safe for discharge  Daily discussion was had with the patient, nursing staff, orthopaedic team, and family members if present  All questions were answered to the patients satisifaction        0   Lab Value Date/Time    HGB 10 9 (L) 04/28/2021 0606    HGB 11 0 (L) 04/27/2021 0440    HGB 13 9 04/02/2021 0748    HGB 13 2 01/02/2021 0744    HGB 14 0 03/13/2020 1013    HGB 14 4 03/11/2020 1501    HGB 13 7 12/05/2018 0711    HGB 13 1 05/29/2018 0729    HGB 11 0 (L) 01/23/2018 0457    HGB 13 9 01/15/2018 1049    HGB 13 8 06/13/2017 0818    HGB 14 0 11/09/2016 0940    HGB 13 7 05/02/2016 0844    HGB 14 1 10/20/2015 0958    HGB 13 6 05/18/2015 1026    HGB 13 2 05/28/2014 1152    HGB 13 5 01/31/2014 1128       Greater than 2 gram drop which qualifies for diagnosis of acute blood loss anemia  Vital signs remained stable and pt was resuscitated with IVF as needed   Body mass index is 29 32 kg/m²  Overweight  Recommend nutrition and physical activity  Discharge Instructions: The patient was discharged weight bearing as tolerated to the left lower extremity  Lovenox will be continued for 28 days  Continue PT/OT  Take pain medications as instructed  Discharge Medications: For the complete list of discharge medications, please refer to the patient's medication reconciliation

## 2021-04-28 NOTE — PLAN OF CARE
Problem: PHYSICAL THERAPY ADULT  Goal: Performs mobility at highest level of function for planned discharge setting  See evaluation for individualized goals  Description: Treatment/Interventions: Functional transfer training, LE strengthening/ROM, Elevations, Therapeutic exercise, Endurance training, Bed mobility, Gait training, Spoke to nursing, Spoke to case management, OT  Equipment Recommended: Walker(pt already owns)       See flowsheet documentation for full assessment, interventions and recommendations  Outcome: Progressing  Note: Prognosis: Excellent  Problem List: Decreased strength, Decreased range of motion, Decreased endurance, Impaired balance, Pain, Orthopedic restrictions, Decreased skin integrity  Assessment: Pt continues to perform all sit to stand transfers & ambulate community distances without need for physical assist   Was able to maintain reciprocal gait pattern for majority of session, with varying stride lengths overall  Does demonstrate L knee action during swing phase, but lacks TKE in stance phase  Pt instructed in supine to sit transfers onto elevated bed height as pe pt reports for home set up  Did so with 4 inch step stool to elevate hips onto bed, which pt did without physcal assist  Encouraged standby assist to stabilize equipment for safety until L knee stability improves  Did require LLE management to transfer OOB due to pain & limited flexion until pt able to place feet onto floor  pt verbalized & demonstrated understanding to all instructions regarding mobility  Also reviewed TKR exercises, with emphasis on improving L knee AROM with quad sets & heelslides  Pt reports having performed quad sets overnight & performed heelslides during session as noted above  POC and discharge plan remain appropriate at this time to ensure maximal functional independence & safety upon return home  Discharge plan remains unchanged    Barriers to Discharge: None        PT Discharge Recommendation: Home with outpatient rehabilitation     PT - OK to Discharge: Yes    See flowsheet documentation for full assessment

## 2021-04-28 NOTE — TELEPHONE ENCOUNTER
This message is totally unrelated  I spoke w/pt who wanted to have her  come in for injections to his knees when she comes in for her post op appt w/Dr Alvaro Marquez  Appt scheduled

## 2021-04-28 NOTE — PROGRESS NOTES
Internal Medicine Progress Note  Patient: Bharat Garcias  Age/sex: 77 y o  female  Medical Record #: [id number]      ASSESSMENT/PLAN: (Interval History)  Bharat Garcias is seen and examined and management for following issues:    Status Post left Total KNEE ARTHROPLASTY   Pain controlled   Continue encourage incentive spirometry; monitor fever curve   DVT prophylaxis in place and reviewed  Results from last 7 days   Lab Units 04/28/21  0606   WBC Thousand/uL 9 62   HEMOGLOBIN g/dL 10 9*   HEMATOCRIT % 33 0*   PLATELETS Thousands/uL 225         Operative acute blood loss   · Decrease in hgb levels from preop labs results; suspect due to post operation extravasation losses along with potential dilutional effects of fluids  · s/p surgery optimization venofer protocol/transfusion  · Transfuse PRBC if hgb less then 8 0 (per ortho protocol)  or symptomatic  · Monitor follow up CBC post intervention to trend effect    Hypothyroid  · Continue supplementation     Irritable bowel  · Stable      Nausea  · Improved    Fever  · Encourage incentive spirometry  · No leukocytosis  · Completely asymptomatic  · Suspect reactive    OK for dc from medicine standpoint     PRE-OP HGB LEVEL: 13 9  The above assessment and plan was reviewed and updated as determined by my evaluation of the patient on 4/28/2021      Labs:   Results from last 7 days   Lab Units 04/28/21  0606 04/27/21  0440   WBC Thousand/uL 9 62 11 14*   HEMOGLOBIN g/dL 10 9* 11 0*   HEMATOCRIT % 33 0* 33 0*   PLATELETS Thousands/uL 225 243     Results from last 7 days   Lab Units 04/28/21  0606 04/27/21  0440   SODIUM mmol/L 141 135*   POTASSIUM mmol/L 3 8 3 7   CHLORIDE mmol/L 108 101   CO2 mmol/L 28 25   BUN mg/dL 14 17   CREATININE mg/dL 0 70 0 71   CALCIUM mg/dL 9 3 9 5             Results from last 7 days   Lab Units 04/26/21  1356   POC GLUCOSE mg/dl 80       Review of Scheduled Meds:  Current Facility-Administered Medications   Medication Dose Route Frequency Provider Last Rate    acetaminophen  325 mg Oral Q8H PRN Therese King PA-C      calcium carbonate  1,000 mg Oral Daily PRN Fer Sepulveda MD      cyclobenzaprine  5 mg Oral TID PRN Rupert Favre, MD      diphenhydrAMINE  50 mg Intravenous HS PRN Rupert Favre, MD      docusate sodium  100 mg Oral BID Fer Sepulveda MD      enoxaparin  40 mg Subcutaneous Daily Rupert Favre, MD      hydrALAZINE  25 mg Oral Q8H PRN SCOTTIE Parra      HYDROcodone-acetaminophen  1 tablet Oral Q4H PRN Laila Monique, SCOTTIE      HYDROmorphone  0 5 mg Intravenous Q3H PRN Rupert Favre, MD      iron sucrose  200 mg Intravenous Daily SCOTTIE Parra Stopped (04/27/21 1145)    levothyroxine  112 mcg Oral Early Morning Fer Sepulveda MD      melatonin  6 mg Oral HS PRN Laila Monique, SCOTTIE      ondansetron  4 mg Intravenous Q6H PRN Fer Sepulveda MD      senna  1 tablet Oral Daily Fer Sepulveda MD         Subjective/ HPI: Patient seen and examined  Patients overnight issues or events were reviewed with nursing or staff during rounds or morning huddle session  New or overnight issues include the following:     Pt without any overnight events or reported nursing issues;       ROS:   A 10 point ROS was performed; negative except as noted above         Imaging:     No orders to display       *Labs /Radiology studies Reviewed  *Medications  reviewed and reconciled as needed  *Please refer to order section for additional ordered labs studies  *Case discussed with primary attending during morning huddle case rounds    Physical Examination:  Vitals:   Vitals:    04/28/21 0201 04/28/21 0249 04/28/21 0600 04/28/21 0725   BP:  128/79  122/76   Pulse: 95 100  94   Resp:  17  18   Temp: 99 6 °F (37 6 °C) 99 8 °F (37 7 °C)  98 4 °F (36 9 °C)   TempSrc:       SpO2: 96% 93%  95%   Weight:   70 4 kg (155 lb 3 2 oz)    Height:           GEN: No apparent distress, interactive  NEURO: Alert and oriented x3  HEENT: Pupils are equal and reactive, EOMI, mucous membranes are moist, face symmetrical  CV: S1 S2 regular, no MRG, no peripheral edema noted  RESP: Lungs are clear bilaterally, no wheezes, rales or rhonchi noted, on room air, respirations easy and non labored  GI: Flat, soft non tender, non distended; +BS x4  : Voiding without difficulty  MUSC: Moves all extremities; except LLE dressing intact  SKIN: pink, warm and dry, normal turgor, no rashes, lesions        The above physical exam was reviewed and updated as determined by my evaluation of the patient on 4/28/2021  Invasive Devices     Peripheral Intravenous Line            Peripheral IV 04/26/21 Right Wrist 1 day          Drain            Closed/Suction Drain Left;Lateral Knee Accordion 10 Fr  1 day                   VTE Pharmacologic Prophylaxis: Enoxaparin  Code Status: Level 1 - Full Code  Current Length of Stay: 0 day(s)      Total time spent:  30 minutes with more than 50% spent counseling/coordinating care  Counseling includes discussion with patient re: progress  and discussion with patient of his/her current medical state/information  Coordination of patient's care was performed in conjunction with primary service  Time invested included review of patient's labs, vitals, and management of their comorbidities with continued monitoring  In addition, this patient was discussed with medical team including physician and advanced extenders  The care of the patient was extensively discussed and appropriate treatment plan was formulated unique for this patient  ** Please Note:  voice to text software may have been used in the creation of this document   Although proof errors in transcription or interpretation are a potential of such software**

## 2021-04-28 NOTE — PHYSICAL THERAPY NOTE
Physical Therapy Progress Note     04/28/21 0844   PT Last Visit   PT Visit Date 04/28/21   Note Type   Note Type Treatment   Pain Assessment   Pain Assessment Tool FLACC  (Simultaneous filing  User may not have seen previous data )   Pain Rating: FLACC (Rest) - Face 0   Pain Rating: FLACC (Rest) - Legs 1   Pain Rating: FLACC (Rest) - Activity 0   Pain Rating: FLACC (Rest) - Cry 1   Pain Rating: FLACC (Rest) - Consolability 0   Score: FLACC (Rest) 2   Pain Rating: FLACC (Activity) - Face 1   Pain Rating: FLACC (Activity) - Legs 1   Pain Rating: FLACC (Activity) - Activity 1   Pain Rating: FLACC (Activity) - Cry 1   Pain Rating: FLACC (Activity) - Consolability 0   Score: FLACC (Activity) 4   Restrictions/Precautions   LLE Weight Bearing Per Order WBAT   Other Precautions WBS;Pain; Fall Risk   Subjective   Subjective Pt encountred seated in recliner, pleasant and agreeable to treatment  Reports improved pain, but poor sleep again due to required monitoring overnight  Is eager, and verablized confidence in ability to manage at home  Bed Mobility   Supine to Sit 6  Modified independent   Additional items Assist x 1   Sit to Supine 4  Minimal assistance   Additional items Assist x 1;LE management  (due to elevated bed height)   Transfers   Sit to Stand 5  Supervision   Additional items Assist x 1; Armrests; Increased time required   Stand to Sit 5  Supervision   Additional items Assist x 1; Armrests; Increased time required   Ambulation/Elevation   Gait pattern Excessively slow; Step to;Short stride; Foward flexed;Decreased L stance;Decreased foot clearance; Antalgic; Improper Weight shift   Gait Assistance 5  Supervision   Additional items Assist x 1   Assistive Device Rolling walker   Distance 180'   Curbs x1 on step stool backwards ascending to transfer into bed   Balance   Static Sitting Normal   Static Standing Fair +   Ambulatory Fair   Activity Tolerance   Activity Tolerance Patient tolerated treatment well   Nurse Made Aware Missy Pang RN   Exercises   Heelslides Sitting;10 reps;AAROM; Left   Assessment   Prognosis Excellent   Problem List Decreased strength;Decreased range of motion;Decreased endurance; Impaired balance;Pain;Orthopedic restrictions;Decreased skin integrity   Assessment Pt continues to perform all sit to stand transfers & ambulate community distances without need for physical assist   Was able to maintain reciprocal gait pattern for majority of session, with varying stride lengths overall  Does demonstrate L knee action during swing phase, but lacks TKE in stance phase  Pt instructed in supine to sit transfers onto elevated bed height as pe pt reports for home set up  Did so with 4 inch step stool to elevate hips onto bed, which pt did without physcal assist  Encouraged standby assist to stabilize equipment for safety until L knee stability improves  Did require LLE management to transfer OOB due to pain & limited flexion until pt able to place feet onto floor  pt verbalized & demonstrated understanding to all instructions regarding mobility  Also reviewed TKR exercises, with emphasis on improving L knee AROM with quad sets & heelslides  Pt reports having performed quad sets overnight & performed heelslides during session as noted above  POC and discharge plan remain appropriate at this time to ensure maximal functional independence & safety upon return home  Discharge plan remains unchanged  Goals   Patient Goals to go home today & get some rest   STG Expiration Date 05/06/21   PT Treatment Day 3   Plan   Treatment/Interventions Functional transfer training;LE strengthening/ROM; Elevations; Therapeutic exercise; Endurance training;Patient/family training;Equipment eval/education; Bed mobility;Gait training   Progress Progressing toward goals   PT Frequency Twice a day   Recommendation   PT Discharge Recommendation Home with outpatient rehabilitation   Equipment Recommended   (has all DME)   PT - OK to Discharge Yes   AM-PAC Basic Mobility Inpatient   Turning in Bed Without Bedrails 4   Lying on Back to Sitting on Edge of Flat Bed 3   Moving Bed to Chair 4   Standing Up From Chair 4   Walk in Room 4   Climb 3-5 Stairs 4   Basic Mobility Inpatient Raw Score 23   Basic Mobility Standardized Score 50 88   The patient's AM-PAC Basic Mobility Inpatient Short Form Raw Score is 23, Standardized Score is 50  88  A standardized score less than 42 9 suggests the patient may benefit from discharge to post-acute rehabilitation services  Please also refer to the recommendation of the Physical Therapist for safe discharge planning            Lauren Aguilar PTA W Plasty Text: The lesion was extirpated to the level of the fat with a #15 scalpel blade.  Given the location of the defect, shape of the defect and the proximity to free margins a W-plasty was deemed most appropriate for repair.  Using a sterile surgical marker, the appropriate transposition arms of the W-plasty were drawn incorporating the defect and placing the expected incisions within the relaxed skin tension lines where possible.    The area thus outlined was incised deep to adipose tissue with a #15 scalpel blade.  The skin margins were undermined to an appropriate distance in all directions utilizing iris scissors.  The opposing transposition arms were then transposed into place in opposite direction and anchored with interrupted buried subcutaneous sutures.

## 2021-04-29 ENCOUNTER — APPOINTMENT (OUTPATIENT)
Dept: PHYSICAL THERAPY | Facility: MEDICAL CENTER | Age: 66
End: 2021-04-29
Payer: MEDICARE

## 2021-04-29 ENCOUNTER — TELEPHONE (OUTPATIENT)
Dept: OBGYN CLINIC | Facility: HOSPITAL | Age: 66
End: 2021-04-29

## 2021-04-29 NOTE — TELEPHONE ENCOUNTER
Patient contacted for a postoperative follow up assessment  Patient stated "The block wore off completley so I am very sore but I was able to get myself in and out of bed " Patient stated "I had a fever of 102 in the hospital, now it is 97 6 " NN advised patient to temperature log today and tomorrow and NN will follow up with patient tomorrow  Patient reports 6/10 pain when sitting and 6/10 when walking with the RW  Patient confirmed post-op PT appointment Friday 4/30 at 11:30 AM     Patient is taking Flexeril 5mg TID  Patient stated "I have not taken the Norco or Zofran yet " Patient is taking Lovenox injections each morning  Patient is also taking 100mg of Colace BID and 8 6mg of Senna at bedtime  Patient denies BM but is passing gas  Patient denies increase in swelling  Dressing is clean dry and intact  Patient advised to keep dressing dry and intact until clinic visit per after visit summary  Patient denies nausea, vomiting, abdominal pain, chest pain, shortness of breath, fever, dizziness and calf pain  Patient confirmed post-op appointment with surgeon on 5/4  Patient does not have any other questions or concerns at this time

## 2021-04-30 ENCOUNTER — OFFICE VISIT (OUTPATIENT)
Dept: PHYSICAL THERAPY | Facility: MEDICAL CENTER | Age: 66
End: 2021-04-30
Payer: MEDICARE

## 2021-04-30 DIAGNOSIS — M17.12 PRIMARY OSTEOARTHRITIS OF LEFT KNEE: Primary | ICD-10-CM

## 2021-04-30 PROCEDURE — 97140 MANUAL THERAPY 1/> REGIONS: CPT | Performed by: PHYSICAL THERAPIST

## 2021-04-30 PROCEDURE — 97110 THERAPEUTIC EXERCISES: CPT | Performed by: PHYSICAL THERAPIST

## 2021-04-30 NOTE — PROGRESS NOTES
Progress Note     Today's date: 2021  Patient name: Bharat Garcias  : 1955  MRN: [id number]  Referring provider: Gia Agee MD  Dx:   Encounter Diagnosis     ICD-10-CM    1  Primary osteoarthritis of left knee  M17 12                   Subjective: Shayan Peterson returns to therapy post operatively  Objective: See treatment diary below      Assessment: Tolerated treatment well  Patient currently at 70 degrees flexion and reaching full extension  She was educated on her home program and will continue to work on mobility and improving her flexion tolerance     Incision clean and dry  Staples in place  No drainage  Ambulatory with RW     Plan: Continue per plan of care        Precautions: None      Manuals            PROM                                                    Neuro Re-Ed                                                                                                        Ther Ex             HEP issued             Heel slides  30 sec  X 5           Quad set  30           Hamstring stretch  30 sec   3                                                                Ther Activity                                       Gait Training                                       Modalities

## 2021-05-03 ENCOUNTER — OFFICE VISIT (OUTPATIENT)
Dept: PHYSICAL THERAPY | Facility: MEDICAL CENTER | Age: 66
End: 2021-05-03
Payer: MEDICARE

## 2021-05-03 DIAGNOSIS — M17.12 PRIMARY OSTEOARTHRITIS OF LEFT KNEE: Primary | ICD-10-CM

## 2021-05-03 PROCEDURE — 97140 MANUAL THERAPY 1/> REGIONS: CPT | Performed by: PHYSICAL THERAPIST

## 2021-05-03 PROCEDURE — 97110 THERAPEUTIC EXERCISES: CPT | Performed by: PHYSICAL THERAPIST

## 2021-05-04 ENCOUNTER — HOSPITAL ENCOUNTER (OUTPATIENT)
Dept: RADIOLOGY | Facility: HOSPITAL | Age: 66
Discharge: HOME | End: 2021-05-04
Attending: ORTHOPAEDIC SURGERY
Payer: MEDICARE

## 2021-05-04 ENCOUNTER — OFFICE VISIT (OUTPATIENT)
Dept: OBGYN CLINIC | Facility: HOSPITAL | Age: 66
End: 2021-05-04

## 2021-05-04 VITALS
HEIGHT: 61 IN | HEART RATE: 90 BPM | DIASTOLIC BLOOD PRESSURE: 83 MMHG | BODY MASS INDEX: 29.32 KG/M2 | SYSTOLIC BLOOD PRESSURE: 135 MMHG

## 2021-05-04 DIAGNOSIS — Z47.1 AFTERCARE FOLLOWING LEFT KNEE JOINT REPLACEMENT SURGERY: ICD-10-CM

## 2021-05-04 DIAGNOSIS — Z96.652 AFTERCARE FOLLOWING LEFT KNEE JOINT REPLACEMENT SURGERY: Primary | ICD-10-CM

## 2021-05-04 DIAGNOSIS — Z47.1 AFTERCARE FOLLOWING LEFT KNEE JOINT REPLACEMENT SURGERY: Primary | ICD-10-CM

## 2021-05-04 DIAGNOSIS — Z96.652 AFTERCARE FOLLOWING LEFT KNEE JOINT REPLACEMENT SURGERY: ICD-10-CM

## 2021-05-04 PROCEDURE — 99024 POSTOP FOLLOW-UP VISIT: CPT | Performed by: ORTHOPAEDIC SURGERY

## 2021-05-04 PROCEDURE — 73560 X-RAY EXAM OF KNEE 1 OR 2: CPT

## 2021-05-04 RX ORDER — CEPHALEXIN 500 MG/1
CAPSULE ORAL
Qty: 40 CAPSULE | Refills: 0 | Status: SHIPPED | OUTPATIENT
Start: 2021-05-04 | End: 2021-05-04

## 2021-05-04 NOTE — PROGRESS NOTES
Assessment:  1  Aftercare following left knee joint replacement surgery         Plan:  The patient is doing well and should continue daily Lovenox, pain medications as needed and current physical therapy regimen  The patient is counseled on prophylactic antibiotic use prior to dental visits  The patient should follow up in one week  To do next visit:  Return in about 1 week (around 2021)  The above stated was discussed in layman's terms and the patient expressed understanding  All questions were answered to the patient's satisfaction  Scribe Attestation    I,:  Migel Brooks am acting as a scribe while in the presence of the attending physician :       I,:  Teresa Encinas MD personally performed the services described in this documentation    as scribed in my presence :             Subjective:   Alistair Gates is a 77 y o  female who presents one week s/p left TKA, 2021  The patient is doing well  Today she complains of generalized left knee pain  She does participate in physical therapy  She does use Lovenox  She does use vicodin and flexeril for pain control  She denies fever, chills or shortness of breath          Review of systems negative unless otherwise specified in HPI    Past Medical History:   Diagnosis Date    BPPV (benign paroxysmal positional vertigo)     Bronchitis     Disease of thyroid gland     HYPO    Diverticulitis     GERD (gastroesophageal reflux disease)     Glaucoma (increased eye pressure)      2 para 2     IBS (irritable bowel syndrome)     Insomnia     PONV (postoperative nausea and vomiting)        Past Surgical History:   Procedure Laterality Date    CHOLECYSTECTOMY      COLON SURGERY      COLONOSCOPY  2019    COLONOSCOPY      DILATION AND CURETTAGE OF UTERUS      FL GUIDED NEEDLE PLAC BX/ASP/INJ  2018    FL GUIDED NEEDLE PLAC BX/ASP/INJ  2019    FL GUIDED NEEDLE PLAC BX/ASP/INJ  2020    FL GUIDED NEEDLE PLAC BX/ASP/INJ  10/8/2020    HYSTERECTOMY  2006    JOINT REPLACEMENT      LAPAROSCOPIC COLON RESECTION  11/12/2019    MAMMO (HISTORICAL) Bilateral 12/2018    PARTIAL HYSTERECTOMY      ID REVISE KNEE JOINT REPLACE,ALL PARTS Right 1/22/2018    Procedure: ARTHROPLASTY KNEE TOTAL REVISION;  Surgeon: Anuja Soto MD;  [facility];  Service: Orthopedics    ID TOTAL KNEE ARTHROPLASTY Left 4/26/2021    Procedure: ARTHROPLASTY KNEE TOTAL;  Surgeon: Anuja Soto MD;  [facility];  Service: Orthopedics    TONSILLECTOMY      TRABECULOPLASTY, LASER SELECTIVE Left 05/14/2019    VARICOSE VEIN SURGERY         Family History   Problem Relation Age of Onset    Pancreatic cancer Mother 72    Diabetes type II Mother     Heart disease Father     No Known Problems Sister     Breast cancer Paternal Grandmother 80    Cancer Maternal Grandfather     Lung disease Paternal Grandfather     No Known Problems Daughter     No Known Problems Maternal Grandmother     No Known Problems Daughter     No Known Problems Maternal Aunt     No Known Problems Maternal Aunt     No Known Problems Maternal Aunt     No Known Problems Maternal Aunt        Social History     Occupational History    Not on file   Tobacco Use    Smoking status: Never Smoker    Smokeless tobacco: Never Used   Substance and Sexual Activity    Alcohol use: Yes     Frequency: Monthly or less     Drinks per session: 1 or 2     Comment: social     Drug use: No    Sexual activity: Yes     Partners: Male     Birth control/protection: Surgical         Current Outpatient Medications:     Biotin 08880 MCG TABS, 2 (two) times a day , Disp: , Rfl:     BLACK ELDERBERRY PO, Take by mouth daily, Disp: , Rfl:     Calcium-Vitamin D-Vitamin K (VIACTIV PO), Take 1 tablet by mouth daily, Disp: , Rfl:     cyclobenzaprine (FLEXERIL) 5 mg tablet, Take 1 tablet (5 mg total) by mouth 3 (three) times a day as needed for muscle spasms for up to 45 doses, Disp: 45 tablet, Rfl: 0    DiphenhydrAMINE HCl (BENADRYL PO), Take by mouth daily at bedtime as needed , Disp: , Rfl:     docusate sodium (COLACE) 100 mg capsule, Take 1 capsule (100 mg total) by mouth 2 (two) times a day, Disp: 10 capsule, Rfl: 0    enoxaparin (LOVENOX) 40 mg/0 4 mL, Inject 0 4 mL (40 mg total) under the skin daily, Disp: 28 Syringe, Rfl: 0    HYDROcodone-acetaminophen (NORCO) 5-325 mg per tablet, Take 1 tablet by mouth every 6 (six) hours as needed for pain for up to 30 dosesMax Daily Amount: 4 tablets, Disp: 30 tablet, Rfl: 0    levothyroxine 112 mcg tablet, Take 112 mcg by mouth daily, Disp: , Rfl: 1    LUMIGAN 0 01 % ophthalmic drops, INSTILL 1 DROP IN EACH EYE EVERY DAY AT BEDTIME, Disp: , Rfl: 1    meclizine (ANTIVERT) 12 5 MG tablet, Take by mouth as needed , Disp: , Rfl:     naproxen (NAPROSYN) 500 mg tablet, Take 1 tablet (500 mg total) by mouth 2 (two) times a day with meals (Patient taking differently: Take 500 mg by mouth daily at bedtime ), Disp: 180 tablet, Rfl: 3    ondansetron (ZOFRAN) 4 mg tablet, Take 1 tablet (4 mg total) by mouth every 8 (eight) hours as needed for nausea or vomiting for up to 30 doses, Disp: 30 tablet, Rfl: 1    Probiotic Product (PROBIOTIC DAILY PO), Take by mouth, Disp: , Rfl:     senna (SENOKOT) 8 6 mg, Take 1 tablet (8 6 mg total) by mouth daily at bedtime for 7 days, Disp: 7 tablet, Rfl: 0    Tetrahydrozoline HCl (EYE DROPS REGULAR OP), Apply to eye as needed , Disp: , Rfl:     Allergies   Allergen Reactions    Codeine GI Intolerance    Morphine Itching            Vitals:    05/04/21 0910   BP: 135/83   Pulse: 90       Objective:  Physical exam  · General: Awake, Alert, Oriented  · Eyes: Pupils equal, round and reactive to light  · Heart: regular rate and rhythm  · Lungs: No audible wheezing  · Abdomen: soft                    Ortho Exam  Left knee:  Incision clean dry and intact  Staples well approximated   No erythema  Mild posterior ecchymotic staining  Appropriate swelling of lower limb  Appropriate warmth of knee  Extensor mechanism intact  Extension full  Flexion 60  Calf compartments soft and supple  Sensation intact  Toes are warm sensate and mobile      Diagnostics, reviewed and taken today if performed as documented: The attending physician has personally reviewed the pertinent films in PACS and interpretation is as follows:  Left knee x-ray:  Well aligned prosthesis with no acute changes  Procedures, if performed today:    Procedures    None performed      Portions of the record may have been created with voice recognition software  Occasional wrong word or "sound a like" substitutions may have occurred due to the inherent limitations of voice recognition software  Read the chart carefully and recognize, using context, where substitutions have occurred

## 2021-05-05 ENCOUNTER — PATIENT OUTREACH (OUTPATIENT)
Dept: CASE MANAGEMENT | Facility: OTHER | Age: 66
End: 2021-05-05

## 2021-05-05 NOTE — PROGRESS NOTES
In basket notification received of hospital discharge  Chart reviewed  Patient did receive a call from the ortho nurse navigator  I spoke with patient who is at home doing well  She started physical therapy and had her first post op visit with surgeon  She has minimal discomfort which is relieved with "half of a Vicodin"  She has no questions concerning post op care  She declined further outreach  I did give her my contact information and advised her to call if I can be of assistance

## 2021-05-07 ENCOUNTER — OFFICE VISIT (OUTPATIENT)
Dept: PHYSICAL THERAPY | Facility: MEDICAL CENTER | Age: 66
End: 2021-05-07
Payer: MEDICARE

## 2021-05-07 DIAGNOSIS — M17.12 PRIMARY OSTEOARTHRITIS OF LEFT KNEE: Primary | ICD-10-CM

## 2021-05-07 PROCEDURE — 97140 MANUAL THERAPY 1/> REGIONS: CPT | Performed by: PHYSICAL THERAPIST

## 2021-05-07 PROCEDURE — 97110 THERAPEUTIC EXERCISES: CPT | Performed by: PHYSICAL THERAPIST

## 2021-05-07 NOTE — PROGRESS NOTES
Daily Note     Today's date: 2021  Patient name: Taty Santillan  : 1955  MRN: [id number]  Referring provider: Concepcion Herring MD  Dx:   Encounter Diagnosis     ICD-10-CM    1  Primary osteoarthritis of left knee  M17 12                   Subjective: Halie Bright reports she is doing well, no new issues      Objective: See treatment diary below      Assessment: Tolerated treatment well  Patient continues to work on her flexion able to reach 83deg today passively  Plan: Continue per plan of care        Precautions: None      Manuals            PROM                                                    Neuro Re-Ed                                                                                                        Ther Ex             HEP issued             Heel slides  30 sec  X 5           Quad set  30           Hamstring stretch  30 sec   3            Bike  5 min            Gastroc stretch  30 sec X 3                                      Ther Activity                                       Gait Training                                       Modalities

## 2021-05-10 ENCOUNTER — OFFICE VISIT (OUTPATIENT)
Dept: PHYSICAL THERAPY | Facility: MEDICAL CENTER | Age: 66
End: 2021-05-10
Payer: MEDICARE

## 2021-05-10 DIAGNOSIS — M17.12 PRIMARY OSTEOARTHRITIS OF LEFT KNEE: Primary | ICD-10-CM

## 2021-05-10 PROCEDURE — 97140 MANUAL THERAPY 1/> REGIONS: CPT | Performed by: PHYSICAL THERAPIST

## 2021-05-10 PROCEDURE — 97110 THERAPEUTIC EXERCISES: CPT | Performed by: PHYSICAL THERAPIST

## 2021-05-10 RX ORDER — CEPHALEXIN 500 MG/1
CAPSULE ORAL
COMMUNITY
Start: 2021-05-04 | End: 2021-06-21

## 2021-05-10 NOTE — PROGRESS NOTES
Daily Note     Today's date: 5/10/2021  Patient name: Ally Rucker  : 1955  MRN: [id number]  Referring provider: Pearl Friend MD  Dx:   Encounter Diagnosis     ICD-10-CM    1  Primary osteoarthritis of left knee  M17 12                   Subjective: Rupesh Perez reports that she is sore today in lateral knee       Objective: See treatment diary below      Assessment: Tolerated treatment well  Patient with some mild ITB irriation, responded well to lateral hip stretching  Able to achieve 90 deg passive knee flexion       Plan: Continue per plan of care        Precautions: None      Manuals 4/6 5/10           PROM  AF                                                  Neuro Re-Ed                                                                                                        Ther Ex             HEP issued             Heel slides  30 sec  X 5           Quad set  30           Hamstring stretch  30 sec   3            Bike  5 min            Gastroc stretch  30 sec X 3                                      Ther Activity                                       Gait Training                                       Modalities

## 2021-05-11 ENCOUNTER — OFFICE VISIT (OUTPATIENT)
Dept: OBGYN CLINIC | Facility: HOSPITAL | Age: 66
End: 2021-05-11

## 2021-05-11 VITALS
BODY MASS INDEX: 29.32 KG/M2 | DIASTOLIC BLOOD PRESSURE: 78 MMHG | HEIGHT: 61 IN | HEART RATE: 90 BPM | SYSTOLIC BLOOD PRESSURE: 131 MMHG

## 2021-05-11 DIAGNOSIS — Z47.1 AFTERCARE FOLLOWING LEFT KNEE JOINT REPLACEMENT SURGERY: Primary | ICD-10-CM

## 2021-05-11 DIAGNOSIS — Z96.652 AFTERCARE FOLLOWING LEFT KNEE JOINT REPLACEMENT SURGERY: Primary | ICD-10-CM

## 2021-05-11 PROCEDURE — 99024 POSTOP FOLLOW-UP VISIT: CPT | Performed by: ORTHOPAEDIC SURGERY

## 2021-05-11 NOTE — PROGRESS NOTES
Assessment:   Diagnosis ICD-10-CM Associated Orders   1  Aftercare following left knee joint replacement surgery  Z47 1     [id number]        Plan:  2nd post-operative visit 2 weeks s/p left total knee arthroplasty  Her anterior incision is healed and staples removed successfully  Steri-strips applied  She is to continue therapy to maximize her recovery  Weight bearing and activities as tolerated  Ice and elevate for swelling reduction  Can get incision wet, pat, dry, do not submerge  Avoid topical creams/ointments for about 1 week  To do next visit:  Return in about 4 weeks (around 2021) for re-check  The above stated was discussed in layman's terms and the patient expressed understanding  All questions were answered to the patient's satisfaction  Scribe Attestation    I,:  Katey Stanley am acting as a scribe while in the presence of the attending physician :       I,:  Shiv Nuñez MD personally performed the services described in this documentation    as scribed in my presence :             Subjective:   Félix Almonte is a 77 y o  female who presents  Today for repeat evaluation 2nd visit 2 weeks status post left total knee arthroplasty  Patient presents today for incision check and staple removal   She presents using a single-point cane for ambulatory assistance  She has been administering her Lovenox for DVT prophylaxis  She has been attending outpatient physical therapy to maximize her recovery  Overall she is doing very well  Her pain has been controled with 1/2 tablet of oxycodone prior to therapy and then at night as well as Tylenol         Review of systems negative unless otherwise specified in HPI  Review of Systems    Past Medical History:   Diagnosis Date    BPPV (benign paroxysmal positional vertigo)     Bronchitis     Disease of thyroid gland     HYPO    Diverticulitis     GERD (gastroesophageal reflux disease)     Glaucoma (increased eye pressure)      2 para 2     IBS (irritable bowel syndrome)     Insomnia     PONV (postoperative nausea and vomiting)        Past Surgical History:   Procedure Laterality Date    CHOLECYSTECTOMY      COLON SURGERY      COLONOSCOPY  07/2019    COLONOSCOPY      DILATION AND CURETTAGE OF UTERUS      FL GUIDED NEEDLE PLAC BX/ASP/INJ  12/13/2018    FL GUIDED NEEDLE PLAC BX/ASP/INJ  8/5/2019    FL GUIDED NEEDLE PLAC BX/ASP/INJ  2/24/2020    FL GUIDED NEEDLE PLAC BX/ASP/INJ  10/8/2020    HYSTERECTOMY  2006    JOINT REPLACEMENT      LAPAROSCOPIC COLON RESECTION  11/12/2019    MAMMO (HISTORICAL) Bilateral 12/2018    PARTIAL HYSTERECTOMY      [place] REVISE KNEE JOINT REPLACE,ALL PARTS Right 1/22/2018    Procedure: ARTHROPLASTY KNEE TOTAL REVISION;  Surgeon: Clair Herzog MD;  [facility];  Service: Orthopedics    [place] TOTAL KNEE ARTHROPLASTY Left 4/26/2021    Procedure: ARTHROPLASTY KNEE TOTAL;  Surgeon: Clair Herzog MD;  [facility];  Service: Orthopedics    TONSILLECTOMY      TRABECULOPLASTY, LASER SELECTIVE Left 05/14/2019    VARICOSE VEIN SURGERY         Family History   Problem Relation Age of Onset    Pancreatic cancer Mother 72    Diabetes type II Mother     Heart disease Father     No Known Problems Sister     Breast cancer Paternal Grandmother 80    Cancer Maternal Grandfather     Lung disease Paternal Grandfather     No Known Problems Daughter     No Known Problems Maternal Grandmother     No Known Problems Daughter     No Known Problems Maternal Aunt     No Known Problems Maternal Aunt     No Known Problems Maternal Aunt     No Known Problems Maternal Aunt        Social History     Occupational History    Not on file   Tobacco Use    Smoking status: Never Smoker    Smokeless tobacco: Never Used   Substance and Sexual Activity    Alcohol use: Yes     Frequency: Monthly or less     Drinks per session: 1 or 2     Comment: social     Drug use: No    Sexual activity: Yes Partners: Male     Birth control/protection: Surgical         Current Outpatient Medications:     Biotin 85270 MCG TABS, 2 (two) times a day , Disp: , Rfl:     BLACK ELDERBERRY PO, Take by mouth daily, Disp: , Rfl:     Calcium-Vitamin D-Vitamin K (VIACTIV PO), Take 1 tablet by mouth daily, Disp: , Rfl:     cephalexin (KEFLEX) 500 mg capsule, , Disp: , Rfl:     cyclobenzaprine (FLEXERIL) 5 mg tablet, Take 1 tablet (5 mg total) by mouth 3 (three) times a day as needed for muscle spasms for up to 45 doses, Disp: 45 tablet, Rfl: 0    DiphenhydrAMINE HCl (BENADRYL PO), Take by mouth daily at bedtime as needed , Disp: , Rfl:     docusate sodium (COLACE) 100 mg capsule, Take 1 capsule (100 mg total) by mouth 2 (two) times a day, Disp: 10 capsule, Rfl: 0    enoxaparin (LOVENOX) 40 mg/0 4 mL, Inject 0 4 mL (40 mg total) under the skin daily, Disp: 28 Syringe, Rfl: 0    HYDROcodone-acetaminophen (NORCO) 5-325 mg per tablet, Take 1 tablet by mouth every 6 (six) hours as needed for pain for up to 30 dosesMax Daily Amount: 4 tablets, Disp: 30 tablet, Rfl: 0    levothyroxine 112 mcg tablet, Take 112 mcg by mouth daily, Disp: , Rfl: 1    LUMIGAN 0 01 % ophthalmic drops, INSTILL 1 DROP IN EACH EYE EVERY DAY AT BEDTIME, Disp: , Rfl: 1    meclizine (ANTIVERT) 12 5 MG tablet, Take by mouth as needed , Disp: , Rfl:     naproxen (NAPROSYN) 500 mg tablet, Take 1 tablet (500 mg total) by mouth 2 (two) times a day with meals (Patient taking differently: Take 500 mg by mouth daily at bedtime ), Disp: 180 tablet, Rfl: 3    ondansetron (ZOFRAN) 4 mg tablet, Take 1 tablet (4 mg total) by mouth every 8 (eight) hours as needed for nausea or vomiting for up to 30 doses, Disp: 30 tablet, Rfl: 1    Probiotic Product (PROBIOTIC DAILY PO), Take by mouth, Disp: , Rfl:     senna (SENOKOT) 8 6 mg, Take 1 tablet (8 6 mg total) by mouth daily at bedtime for 7 days, Disp: 7 tablet, Rfl: 0    Tetrahydrozoline HCl (EYE DROPS REGULAR OP), Apply to eye as needed , Disp: , Rfl:     Allergies   Allergen Reactions    Codeine GI Intolerance    Morphine Itching            Vitals:    05/11/21 0901   BP: 131/78   Pulse: 90       Objective:                    Left Knee Exam     Range of Motion   Extension: 5   Flexion: 90     Other   Erythema: absent  Swelling: mild    Comments:     intact extensor mechanism  Neutral alignment  Healed anterior incision with staples in place which removed successfully  Calf and thigh are soft and non-tender, no signs of DVT  Appropriate amount of warmth  No signs of infection            Diagnostics, reviewed and taken today if performed as documented:    None performed          Procedures, if performed today:    Procedures    None performed      Portions of the record may have been created with voice recognition software  Occasional wrong word or "sound a like" substitutions may have occurred due to the inherent limitations of voice recognition software  Read the chart carefully and recognize, using context, where substitutions have occurred

## 2021-05-14 ENCOUNTER — OFFICE VISIT (OUTPATIENT)
Dept: PHYSICAL THERAPY | Facility: MEDICAL CENTER | Age: 66
End: 2021-05-14
Payer: MEDICARE

## 2021-05-14 DIAGNOSIS — M17.12 PRIMARY OSTEOARTHRITIS OF LEFT KNEE: Primary | ICD-10-CM

## 2021-05-14 PROCEDURE — 97110 THERAPEUTIC EXERCISES: CPT | Performed by: PHYSICAL THERAPIST

## 2021-05-14 PROCEDURE — 97140 MANUAL THERAPY 1/> REGIONS: CPT | Performed by: PHYSICAL THERAPIST

## 2021-05-14 NOTE — PROGRESS NOTES
Daily Note     Today's date: 2021  Patient name: Angélica Banks  : 1955  MRN: [id number]  Referring provider: Terri Woods MD  Dx:   Encounter Diagnosis     ICD-10-CM    1  Primary osteoarthritis of left knee  M17 12                   Subjective: Johnice Fothergill reports that her follow up went well, staples removed       Objective: See treatment diary below      Assessment: Tolerated treatment well  Patient exhibited good technique with therapeutic exercises and would benefit from continued PT  Mobility progressing well       Plan: Continue per plan of care        Precautions: None      Manuals              PROM  AF                                                  Neuro Re-Ed                                                                                                        Ther Ex             HEP issued             Heel slides  30 sec  X 5           Quad set  30           Hamstring stretch  30 sec   3            Bike  5 min            Gastroc stretch  30 sec X 3                                      Ther Activity                                       Gait Training                                       Modalities

## 2021-05-18 ENCOUNTER — OFFICE VISIT (OUTPATIENT)
Dept: PHYSICAL THERAPY | Facility: MEDICAL CENTER | Age: 66
End: 2021-05-18
Payer: MEDICARE

## 2021-05-18 DIAGNOSIS — M17.12 PRIMARY OSTEOARTHRITIS OF LEFT KNEE: Primary | ICD-10-CM

## 2021-05-18 PROCEDURE — 97110 THERAPEUTIC EXERCISES: CPT | Performed by: PHYSICAL THERAPIST

## 2021-05-18 PROCEDURE — 97140 MANUAL THERAPY 1/> REGIONS: CPT | Performed by: PHYSICAL THERAPIST

## 2021-05-19 NOTE — PROGRESS NOTES
Daily Note     Today's date: 2021  Patient name: Viviana Walsh  : 1955  MRN: [id number]  Referring provider: Sd Wong MD  Dx:   Encounter Diagnosis     ICD-10-CM    1  Primary osteoarthritis of left knee  M17 12                   Subjective: Mely Jefferson reports no new issues, some tightness in quadricep today      Objective: See treatment diary below      Assessment: Tolerated treatment well  Patient with good progressive range of motion, some tightness to quad improved with MFR  Progress as able      Plan: Continue per plan of care        Precautions: None      Manuals              PROM  AF                                                  Neuro Re-Ed                                                                                                        Ther Ex             HEP issued             Heel slides  30 sec  X 5           Quad set  30           Hamstring stretch  30 sec   3            Bike  5 min            Gastroc stretch  30 sec X 3            TKE aleshia  5#  3X10                        Ther Activity                                       Gait Training                                       Modalities

## 2021-05-21 ENCOUNTER — OFFICE VISIT (OUTPATIENT)
Dept: PHYSICAL THERAPY | Facility: MEDICAL CENTER | Age: 66
End: 2021-05-21
Payer: MEDICARE

## 2021-05-21 DIAGNOSIS — M17.12 PRIMARY OSTEOARTHRITIS OF LEFT KNEE: Primary | ICD-10-CM

## 2021-05-21 PROCEDURE — 97110 THERAPEUTIC EXERCISES: CPT | Performed by: PHYSICAL THERAPIST

## 2021-05-21 PROCEDURE — 97140 MANUAL THERAPY 1/> REGIONS: CPT | Performed by: PHYSICAL THERAPIST

## 2021-05-21 NOTE — PROGRESS NOTES
Daily Note     Today's date: 2021  Patient name: Angélica Banks  : 1955  MRN: [id number]  Referring provider: Terri Woods MD  Dx:   Encounter Diagnosis     ICD-10-CM    1  Primary osteoarthritis of left knee  M17 12                   Subjective: Johnice Fothergill reports that she is doing well, continues with some quad tightness  Objective: See treatment diary below      Assessment: Tolerated treatment well  Patient exhibited good technique with therapeutic exercises and would benefit from continued PT      Plan: Continue per plan of care        Precautions: None      Manuals              PROM  AF                                                  Neuro Re-Ed                                                                                                        Ther Ex             HEP issued             Heel slides  30 sec  X 5           Quad set  30           Hamstring stretch  30 sec   3            Bike  5 min            Gastroc stretch  30 sec X 3            TKE aleshia  5#  3X10                        Ther Activity                                       Gait Training                                       Modalities             MHP flexion on wall  10 min

## 2021-05-24 ENCOUNTER — HOSPITAL ENCOUNTER (OUTPATIENT)
Dept: MAMMOGRAPHY | Facility: MEDICAL CENTER | Age: 66
Discharge: HOME | End: 2021-05-24
Payer: MEDICARE

## 2021-05-24 ENCOUNTER — HOSPITAL ENCOUNTER (OUTPATIENT)
Dept: BONE DENSITY | Facility: MEDICAL CENTER | Age: 66
Discharge: HOME | End: 2021-05-24
Payer: MEDICARE

## 2021-05-24 VITALS — WEIGHT: 138 LBS | BODY MASS INDEX: 26.06 KG/M2 | HEIGHT: 61 IN

## 2021-05-24 DIAGNOSIS — M81.0 AGE-RELATED OSTEOPOROSIS WITHOUT CURRENT PATHOLOGICAL FRACTURE: ICD-10-CM

## 2021-05-24 DIAGNOSIS — Z13.820 SCREENING FOR OSTEOPOROSIS: ICD-10-CM

## 2021-05-24 DIAGNOSIS — Z12.31 OTHER SCREENING MAMMOGRAM: ICD-10-CM

## 2021-05-24 PROCEDURE — 77063 BREAST TOMOSYNTHESIS BI: CPT

## 2021-05-24 PROCEDURE — 77067 SCR MAMMO BI INCL CAD: CPT

## 2021-05-24 PROCEDURE — 77080 DXA BONE DENSITY AXIAL: CPT

## 2021-05-25 ENCOUNTER — OFFICE VISIT (OUTPATIENT)
Dept: PHYSICAL THERAPY | Facility: MEDICAL CENTER | Age: 66
End: 2021-05-25
Payer: MEDICARE

## 2021-05-25 DIAGNOSIS — M17.12 PRIMARY OSTEOARTHRITIS OF LEFT KNEE: Primary | ICD-10-CM

## 2021-05-25 PROCEDURE — 97110 THERAPEUTIC EXERCISES: CPT | Performed by: PHYSICAL THERAPIST

## 2021-05-25 PROCEDURE — 97140 MANUAL THERAPY 1/> REGIONS: CPT | Performed by: PHYSICAL THERAPIST

## 2021-05-26 NOTE — PROGRESS NOTES
Daily Note     Today's date: 2021  Patient name: Romana Sanger  : 1955  MRN: [id number]  Referring provider: Alma Macario MD  Dx:   Encounter Diagnosis     ICD-10-CM    1  Primary osteoarthritis of left knee  M17 12                   Subjective: Richard Li reports that she is doing well, continues to use wall to stretch at home       Objective: See treatment diary below      Assessment: Tolerated treatment well  Patient able to achieve 98 degrees passively today  progress as able to continue gain of mobilit y      Plan: Continue per plan of care        Precautions: None      Manuals              PROM  AF                                                  Neuro Re-Ed                                                                                                        Ther Ex             HEP issued             Heel slides  30 sec  X 5           Quad set  30           Hamstring stretch  30 sec   3            Bike  5 min            Gastroc stretch  30 sec X 3            TKE aleshia  5#  3X10                        Ther Activity                                       Gait Training                                       Modalities             MHP flexion on wall  10 min

## 2021-05-28 ENCOUNTER — OFFICE VISIT (OUTPATIENT)
Dept: PHYSICAL THERAPY | Facility: MEDICAL CENTER | Age: 66
End: 2021-05-28
Payer: MEDICARE

## 2021-05-28 DIAGNOSIS — M17.12 PRIMARY OSTEOARTHRITIS OF LEFT KNEE: Primary | ICD-10-CM

## 2021-05-28 PROCEDURE — 97110 THERAPEUTIC EXERCISES: CPT | Performed by: PHYSICAL THERAPIST

## 2021-05-28 NOTE — PROGRESS NOTES
Daily Note     Today's date: 2021  Patient name: Romana Sanger  : 1955  MRN: [id number]  Referring provider: Alma Macario MD  Dx:   Encounter Diagnosis     ICD-10-CM    1  Primary osteoarthritis of left knee  M17 12                   Subjective: Richard Li reports that she feels she is walking better, not using SPC anymore      Objective: See treatment diary below      Assessment: Tolerated treatment well  Patient exhibited good technique with therapeutic exercises  Still limited in end ranges of flexion extension and will continue to focus on at home       Plan: Continue per plan of care        Precautions: None      Manuals              PROM  AF                                                  Neuro Re-Ed                                                                                                        Ther Ex             HEP issued             Heel slides  30 sec  X 5           Quad set  30           Hamstring stretch  30 sec   3            Bike  5 min            Gastroc stretch  30 sec X 3            TKE aleshia  5#  3X10                        Ther Activity                                       Gait Training                                       Modalities             MHP flexion on wall  10 min

## 2021-06-01 ENCOUNTER — OFFICE VISIT (OUTPATIENT)
Dept: PHYSICAL THERAPY | Facility: MEDICAL CENTER | Age: 66
End: 2021-06-01
Payer: MEDICARE

## 2021-06-01 DIAGNOSIS — M17.12 PRIMARY OSTEOARTHRITIS OF LEFT KNEE: Primary | ICD-10-CM

## 2021-06-01 PROCEDURE — 97110 THERAPEUTIC EXERCISES: CPT | Performed by: PHYSICAL THERAPIST

## 2021-06-01 PROCEDURE — 97140 MANUAL THERAPY 1/> REGIONS: CPT | Performed by: PHYSICAL THERAPIST

## 2021-06-02 NOTE — PROGRESS NOTES
Daily Note     Today's date: 2021  Patient name: Nichol Hankins  : 1955  MRN: [id number]  Referring provider: Sujit Mccormack MD  Dx:   Encounter Diagnosis     ICD-10-CM    1  Primary osteoarthritis of left knee  M17 12                   Subjective: Martín Mathew reports no new issues, has been consistent with stretching at home       Objective: See treatment diary below      Assessment: Tolerated treatment well  Patient with significant progress in her mobility and is able to reach 105 flexion fairly easily  Plan: Continue per plan of care        Precautions: None      Manuals            PROM  AF                                                  Neuro Re-Ed                                                                                                        Ther Ex             HEP issued             Heel slides  30 sec  X 5           Quad set  30           Hamstring stretch  30 sec   3            Bike  5 min            Gastroc stretch  30 sec X 3            TKE aleshia  5#  3X10                        Ther Activity                                       Gait Training                                       Modalities             MHP flexion on wall  10 min

## 2021-06-04 ENCOUNTER — OFFICE VISIT (OUTPATIENT)
Dept: PHYSICAL THERAPY | Facility: MEDICAL CENTER | Age: 66
End: 2021-06-04
Payer: MEDICARE

## 2021-06-04 DIAGNOSIS — M17.12 PRIMARY OSTEOARTHRITIS OF LEFT KNEE: Primary | ICD-10-CM

## 2021-06-04 PROCEDURE — 97110 THERAPEUTIC EXERCISES: CPT | Performed by: PHYSICAL THERAPIST

## 2021-06-04 PROCEDURE — 97140 MANUAL THERAPY 1/> REGIONS: CPT | Performed by: PHYSICAL THERAPIST

## 2021-06-07 NOTE — PROGRESS NOTES
Daily Note     Today's date: 2021  Patient name: Eve Gordon  : 1955  MRN: [id number]  Referring provider: Amarjit Moeller MD  Dx:   Encounter Diagnosis     ICD-10-CM    1  Primary osteoarthritis of left knee  M17 12                   Subjective: Maryan Allen reports doing well, no new issues       Objective: See treatment diary below      Assessment: Tolerated treatment well  Patient exhibited good technique with therapeutic exercises and would benefit from continued PT      Plan: Continue per plan of care        Precautions: None      Manuals            PROM  AF                                                  Neuro Re-Ed                                                                                                        Ther Ex             HEP issued             Heel slides  30 sec  X 5           Quad set  30           Hamstring stretch  30 sec   3            Bike  5 min            Gastroc stretch  30 sec X 3            TKE aleshia  5#  3X10                        Ther Activity                                       Gait Training                                       Modalities             MHP flexion on wall  10 min

## 2021-06-08 ENCOUNTER — OFFICE VISIT (OUTPATIENT)
Dept: OBGYN CLINIC | Facility: HOSPITAL | Age: 66
End: 2021-06-08

## 2021-06-08 ENCOUNTER — OFFICE VISIT (OUTPATIENT)
Dept: PHYSICAL THERAPY | Facility: MEDICAL CENTER | Age: 66
End: 2021-06-08
Payer: MEDICARE

## 2021-06-08 VITALS
WEIGHT: 141.8 LBS | DIASTOLIC BLOOD PRESSURE: 88 MMHG | HEART RATE: 94 BPM | SYSTOLIC BLOOD PRESSURE: 149 MMHG | BODY MASS INDEX: 26.77 KG/M2 | HEIGHT: 61 IN

## 2021-06-08 DIAGNOSIS — M79.672 PAIN IN LEFT FOOT: ICD-10-CM

## 2021-06-08 DIAGNOSIS — M25.562 CHRONIC PAIN OF LEFT KNEE: Primary | ICD-10-CM

## 2021-06-08 DIAGNOSIS — M17.12 PRIMARY OSTEOARTHRITIS OF LEFT KNEE: Primary | ICD-10-CM

## 2021-06-08 DIAGNOSIS — G89.29 CHRONIC PAIN OF LEFT KNEE: Primary | ICD-10-CM

## 2021-06-08 PROCEDURE — 97110 THERAPEUTIC EXERCISES: CPT | Performed by: PHYSICAL THERAPIST

## 2021-06-08 PROCEDURE — 97140 MANUAL THERAPY 1/> REGIONS: CPT | Performed by: PHYSICAL THERAPIST

## 2021-06-08 PROCEDURE — 99024 POSTOP FOLLOW-UP VISIT: CPT | Performed by: ORTHOPAEDIC SURGERY

## 2021-06-08 NOTE — PROGRESS NOTES
Assessment:   S/P Arthroplasty Knee Total - Left on 4/26/2021    Plan:   · Continue in PT  · Ok to receive FL guided injection to the left foot - order placed  · Follow up in 6 weeks for repeat exam and XRays of the left knee      SUBJECTIVE:  Ana Lilia Lindsey is a 77 y o  female who presents for 6 weeks follow up after Arthroplasty Knee Total - Left on 4/26/2021  She is doing very well and has no complaints today       PHYSICAL EXAMINATION:  Vital signs: /88   Pulse 94   Ht 5' 1" (1 549 m)   Wt 64 3 kg (141 lb 12 8 oz)   LMP  (LMP Unknown)   BMI 26 79 kg/m²   General: well developed and well nourished, alert, oriented times 3 and appears comfortable  Psychiatric: Normal  2  MUSCULOSKELETAL EXAMINATION:    Surgical Site: left knee   Incision: Clean, dry, intact  Range of Motion: As expected and 0-105  Neurovascular status: Neuro intact, good cap refill        STUDIES REVIEWED:  No new imaging today       PROCEDURES PERFORMED:    No Procedures performed today

## 2021-06-08 NOTE — PROGRESS NOTES
Daily Note     Today's date: 2021  Patient name: Javier Woodward  : 1955  MRN: [id number]  Referring provider: Julianna Collins MD  Dx:   Encounter Diagnosis     ICD-10-CM    1  Primary osteoarthritis of left knee  M17 12                   Subjective: Cami Walker reports she is doing well, getting more motion and strength improving  Did notice walking down incline was difficult       Objective: See treatment diary below      Assessment: Tolerated treatment well  Patient exhibited good technique with therapeutic exercises and currently has 105 degrees flexion  Reaches 0 deg extension with overpressure but still lacking terminal extension actively        Plan: Continue per plan of care        Precautions: None      Manuals            PROM  AF                                                  Neuro Re-Ed                                                                                                        Ther Ex             HEP issued             Heel slides  30 sec  X 5           Quad set  30           Hamstring stretch  30 sec   3            Bike  5 min            Step up  20            Leg press  Single leg 50#  3X10            Gastroc stretch  30 sec X 3            TKE aleshia  5#  3X10           Prone TKE  30           Ther Activity                                       Gait Training                                       Modalities             MHP flexion on wall  10 min

## 2021-06-11 ENCOUNTER — OFFICE VISIT (OUTPATIENT)
Dept: PHYSICAL THERAPY | Facility: MEDICAL CENTER | Age: 66
End: 2021-06-11
Payer: MEDICARE

## 2021-06-11 DIAGNOSIS — M17.12 PRIMARY OSTEOARTHRITIS OF LEFT KNEE: Primary | ICD-10-CM

## 2021-06-11 PROCEDURE — 97110 THERAPEUTIC EXERCISES: CPT | Performed by: PHYSICAL THERAPIST

## 2021-06-11 PROCEDURE — 97140 MANUAL THERAPY 1/> REGIONS: CPT | Performed by: PHYSICAL THERAPIST

## 2021-06-11 NOTE — PROGRESS NOTES
Daily Note     Today's date: 2021  Patient name: Re Greenfield  : 1955  MRN: [id number]  Referring provider: Leilani Reynolds MD  Dx:   Encounter Diagnosis     ICD-10-CM    1  Primary osteoarthritis of left knee  M17 12                   Subjective: Roc Parks reports that she is doing well, had follow up with surgeon and progressing well      Objective: See treatment diary below      Assessment: Tolerated treatment well  Patient would benefit from continued PT  Motion 0-113 today, strength progressing well         Plan: Continue per plan of care        Precautions: None      Manuals            PROM  AF                                                  Neuro Re-Ed                                                                                                        Ther Ex             HEP issued             Heel slides  30 sec  X 5           Quad set  30           Hamstring stretch  30 sec   3            Bike  5 min            Step up  20            Leg press  Single leg 70#  3X10            Gastroc stretch  30 sec X 3            TKE aleshia  5#  3X10           Prone TKE  30           Ther Activity                                       Gait Training                                       Modalities             MHP flexion on wall  10 min

## 2021-06-14 NOTE — NURSING NOTE
Call placed to patient to discuss upcoming appointment at [place] [address] radiology department and complete consultation with patient  Patient is having left foot injection utilizing fluoroscopy  Reviewed patient's allergies, no current anticoagulant medication present  Patient has had procedure in the past, no questions when asked if any questions or concerns  Reminded patient of location and time expected for procedure, Patient expressed understanding by verbalizing and repeating instructions

## 2021-06-14 NOTE — PROGRESS NOTES
1  Primary osteoarthritis of first carpometacarpal joint of left hand  Small joint arthrocentesis: L thumb CMC   2  Primary osteoarthritis of first carpometacarpal joint of right hand  Small joint arthrocentesis: R thumb CMC     Orders Placed This Encounter   Procedures    Small joint arthrocentesis: R thumb CMC    Small joint arthrocentesis: L thumb CMC        Imaging Studies (I personally reviewed images in PACS and report):    Past diagnostics:     X-ray left hand 03/11/2020: There is no acute fracture or dislocation    Moderately advanced osteoarthritis of the 1st carpometacarpal joint    Mild osteoarthritis of the triscaphe articulation and 2nd through 5th DIP joints   No focal erosions    No lytic or blastic lesions are seen    Soft tissues are unremarkable    Interventions:    --Left 1st CMC joint ultrasound-guided corticosteroid injection-07/31/2020  --Right 1st CMC joint ultrasound-guided corticosteroid injection - 06/12/2020    IMPRESSION:  · Bilateral 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  · USG CS injections today  · Follow-up as needed      Repeat X-ray next visit:   n/a      Return if symptoms worsen or fail to improve  Patient Instructions   CORTICOSTEROID INJECTION  What is a corticosteroid? Injuries or disease such as arthritis, bursitis or tendonitis result in inflammation  In turn, this inflammation can cause swelling and pain  A local injection of a corticosteroid is provided to diminish inflammation  By doing so, it will also decrease pain and swelling which is making you uncomfortable  Is this the same thing as a Cortisone Injection? Cortisone® is a brand name of a corticosteroid used commonly in the past   Today I commonly use a more water-soluble corticosteroid named Celestone®  Will the injection hurt? As with any injection, you may feel pain at the time of the injection   Typically, I use a local anesthetic (Theola Drafts) in addition to the corticosteroid to determine if the injection has been placed in the appropriate location  Hence it is important to monitor your symptoms 4-6 hours after the injection, as the area will be anesthetized (numb) while the local anesthetic is working  Once the local anesthetic wears off, the intensity of pain can be the same as it was prior to the injection, or even worse  This does not mean that the injection is not working  The corticosteroid may take 24-72 hours to begin having a positive effect  If you do experience an increase in pain, the use of an ice pack on the area for 20 minutes at a time should help  It is also helpful to take an oral anti-inflammatory such as Tylenol® or Motrin® if you are able to medically do so  For this reason it is best to avoid activities that put stress on the area the first 24 hours after the injection  How long will pain relief last?  This will vary according to the type and severity of the symptoms being treated and the severity of the condition  Symptom relief may last weeks to months  I typically couple injections with physical therapy so that the underlying problem causing the inflammation may be treated as the pain diminishes  If the combination is not successful, you may be a surgical candidate  I have read bad things about steroids  Will these things happen to me? Corticosteroids, when utilized properly, are safe and effective drugs  When used in a low dose, potential adverse reactions are very rare  Some patients may experience a sensation of flushing for several days  Very rarely, there can be a local reaction which may include increased discomfort for a period of time in the areas that has been injected  A steroid should not be used over and over again  Multiple injections in the same area can produce adverse effects such as tissue atrophy and degeneration of tendon or cartilage  A small percentage of patients (< 0 1%) may develop an infection in the joint after injection    This is a treatable problem, but if neglected, may result in permanent disability  Signs of infection include redness, swelling, discharge, fevers, increasing pain and drainage from the injection site  This represents an emergency and you should contact our office immediately or seek treatment in the ER if after hours  If I have diabetes, will this injection affect me? If you are diabetic, an injection of a corticosteroid can raise your blood sugar level, requiring more insulin for a brief period of time  This may necessitate careful blood sugar maintenance  If the elevated sugars are not able to be controlled, contact your diabetic doctor for guidance  CHIEF COMPLAINT:  Bilateral thumb pain    HPI:  Bharat Garcias is a 77 y o  female with a history of GERD, diverticulitis, BPPV, IBS who presents for follow-up of bilateral thumb pain  Last evaluated in this capacity on 07/31/2021  At that time underwent a left USG CS injection  Visit 6/15/2021 :  Today reports in follow-up for bilateral thumb pain  Localized to first [facility]  Worse with weight bearing, direct pressure, fine finger movements  Improves with rest  Last injections provided > 6 months of relief  Understands risks, benefits, alternatives  She is making an informed decision to proceed with repeat injections today  Review of Systems   Constitutional: Negative for chills, fever and unexpected weight change  HENT: Negative for hearing loss, nosebleeds and sore throat  Eyes: Negative for pain, redness and visual disturbance  Respiratory: Negative for cough, shortness of breath and wheezing  Cardiovascular: Negative for chest pain, palpitations and leg swelling  Gastrointestinal: Negative for abdominal pain, nausea and vomiting  Endocrine: Negative for polydipsia and polyuria  Genitourinary: Negative for dysuria and hematuria  Skin: Negative for rash and wound     Neurological: Negative for dizziness, numbness and headaches  Psychiatric/Behavioral: Negative for decreased concentration, dysphoric mood and suicidal ideas  The patient is not nervous/anxious            Following history reviewed and update:    Past Medical History:   Diagnosis Date    BPPV (benign paroxysmal positional vertigo)     Bronchitis     Disease of thyroid gland     HYPO    Diverticulitis     GERD (gastroesophageal reflux disease)     Glaucoma (increased eye pressure)      2 para 2     IBS (irritable bowel syndrome)     Insomnia     PONV (postoperative nausea and vomiting)      Past Surgical History:   Procedure Laterality Date    CHOLECYSTECTOMY      COLON SURGERY      COLONOSCOPY  2019    COLONOSCOPY      DILATION AND CURETTAGE OF UTERUS      FL GUIDED NEEDLE PLAC BX/ASP/INJ  2018    FL GUIDED NEEDLE PLAC BX/ASP/INJ  2019    FL GUIDED NEEDLE PLAC BX/ASP/INJ  2020    FL GUIDED NEEDLE PLAC BX/ASP/INJ  10/8/2020    HYSTERECTOMY  2006    JOINT REPLACEMENT      LAPAROSCOPIC COLON RESECTION  2019    MAMMO (HISTORICAL) Bilateral 2018    PARTIAL HYSTERECTOMY      OK REVISE KNEE JOINT REPLACE,ALL PARTS Right 2018    Procedure: ARTHROPLASTY KNEE TOTAL REVISION;  Surgeon: Shiv Nuñez MD;  Location: [facility];  Service: Orthopedics    OK TOTAL KNEE ARTHROPLASTY Left 2021    Procedure: ARTHROPLASTY KNEE TOTAL;  Surgeon: Shiv Nuñez MD;  Location: [facility];  Service: Orthopedics    TONSILLECTOMY      TRABECULOPLASTY, LASER SELECTIVE Left 2019    VARICOSE VEIN SURGERY       Social History   Social History     Substance and Sexual Activity   Alcohol Use Yes    Comment: social      Social History     Substance and Sexual Activity   Drug Use No     Social History     Tobacco Use   Smoking Status Never Smoker   Smokeless Tobacco Never Used     Family History   Problem Relation Age of Onset    Pancreatic cancer Mother 72    Diabetes type II Mother     Heart disease Father     No Known Problems Sister     Breast cancer Paternal Grandmother 80    Lung disease Paternal Grandfather     No Known Problems Daughter     No Known Problems Maternal Grandmother     No Known Problems Daughter     No Known Problems Maternal Aunt     No Known Problems Maternal Aunt     No Known Problems Maternal Aunt     No Known Problems Maternal Aunt      Allergies   Allergen Reactions    Codeine GI Intolerance    Morphine Itching          Physical Exam  /77 (BP Location: Right arm, Patient Position: Sitting, Cuff Size: Adult)   Pulse 66   Wt 62 6 kg (138 lb)   LMP  (LMP Unknown)   BMI 26 07 kg/m²     Constitutional:  see vital signs  Gen: well-developed, normocephalic/atraumatic, well-groomed  Eyes: No inflammation or discharge of conjunctiva or lids; sclera clear   Pharynx: no inflammation, lesion, or mass of lips  Neck: supple, no masses, non-distended  MSK: no inflammation, lesion, mass, or clubbing of nails and digits except for other than mentioned below  SKIN: no visible rashes or skin lesions  Pulmonary/Chest: Effort normal  No respiratory distress     NEURO: cranial nerves grossly intact  PSYCH:  Alert and oriented to person, place, and time; recent and remote memory intact; mood normal, no depression, anxiety, or agitation, judgment and insight good and intact     Ortho Exam  BILATERAL Wrist  no swelling, erythema, or increased warmth  rom full  nontender  no laxity of joint; druj stable  Carpal tunnel compression test: positive right   Phalen's test: not tested   Tinel's carpal tunnel test: negative     BILATERAL Hand  no erythema  swelling:  Tenderness: TTP over first CMC bilateral  + CMC grind  rom fingers mcp, pip, dip intact without pain  No digital scissoring or deviation of fingers  no extensor lag  no rotation of fingers  no joint laxity  strenght flexion and extension mcp, pip, dip 5/5  sensation intact  capillary refill intact   Froment sign:  normal  OK sign:  Normal  Thumb extension:  5/5       Small joint arthrocentesis: R thumb CMC  [place] Protocol:  Procedure performed by: (Marquez Rosen DO )  Consent: Verbal consent obtained  Risks and benefits: risks, benefits and alternatives were discussed  Consent given by: patient  Time out: Immediately prior to procedure a "time out" was called to verify the correct patient, procedure, equipment, support staff and site/side marked as required  Timeout called at: 6/15/2021 7:45 AM   Patient understanding: patient states understanding of the procedure being performed  Test results: test results available and properly labeled  Site marked: the operative site was marked  Radiology Images displayed and confirmed  If images not available, report reviewed: imaging studies available  Patient identity confirmed: verbally with patient    Supporting Documentation  Indications: pain   Procedure Details  Location: thumb - R thumb CMC  Preparation: Patient was prepped and draped in the usual sterile fashion  Needle size: 25 G  Ultrasound guidance: yes  Approach: radial  Medications administered: 1 mL lidocaine 1 %; 3 mg betamethasone acetate-betamethasone sodium phosphate 6 (3-3) mg/mL    Aspirate amount: 0 mL    Patient tolerance: patient tolerated the procedure well with no immediate complications  Dressing:  Sterile dressing applied    Small joint arthrocentesis: L thumb CMC  [place] Protocol:  Procedure performed by:  Consent: Verbal consent obtained  Risks and benefits: risks, benefits and alternatives were discussed  Consent given by: patient  Time out: Immediately prior to procedure a "time out" was called to verify the correct patient, procedure, equipment, support staff and site/side marked as required    Timeout called at: 6/15/2021 7:50 AM   Patient understanding: patient states understanding of the procedure being performed  Test results: test results available and properly labeled  Site marked: the operative site was marked  Patient identity confirmed: verbally with patient    Supporting Documentation  Indications: pain   Procedure Details  Location: thumb - L thumb CMC  Needle size: 25 G  Ultrasound guidance: yes  Approach: radial  Medications administered: 1 mL lidocaine 1 %; 3 mg betamethasone acetate-betamethasone sodium phosphate 6 (3-3) mg/mL    Aspirate amount: 0 mL    Patient tolerance: patient tolerated the procedure well with no immediate complications  Dressing:  Sterile dressing applied

## 2021-06-15 ENCOUNTER — OFFICE VISIT (OUTPATIENT)
Dept: OBGYN CLINIC | Facility: OTHER | Age: 66
End: 2021-06-15
Payer: MEDICARE

## 2021-06-15 ENCOUNTER — OFFICE VISIT (OUTPATIENT)
Dept: PHYSICAL THERAPY | Facility: MEDICAL CENTER | Age: 66
End: 2021-06-15
Payer: MEDICARE

## 2021-06-15 VITALS
DIASTOLIC BLOOD PRESSURE: 77 MMHG | HEART RATE: 66 BPM | SYSTOLIC BLOOD PRESSURE: 114 MMHG | BODY MASS INDEX: 26.07 KG/M2 | WEIGHT: 138 LBS

## 2021-06-15 DIAGNOSIS — M18.12 PRIMARY OSTEOARTHRITIS OF FIRST CARPOMETACARPAL JOINT OF LEFT HAND: Primary | ICD-10-CM

## 2021-06-15 DIAGNOSIS — M17.12 PRIMARY OSTEOARTHRITIS OF LEFT KNEE: Primary | ICD-10-CM

## 2021-06-15 DIAGNOSIS — M18.11 PRIMARY OSTEOARTHRITIS OF FIRST CARPOMETACARPAL JOINT OF RIGHT HAND: ICD-10-CM

## 2021-06-15 PROCEDURE — 20600 DRAIN/INJ JOINT/BURSA W/O US: CPT | Performed by: FAMILY MEDICINE

## 2021-06-15 PROCEDURE — 97140 MANUAL THERAPY 1/> REGIONS: CPT | Performed by: PHYSICAL THERAPIST

## 2021-06-15 PROCEDURE — 97110 THERAPEUTIC EXERCISES: CPT | Performed by: PHYSICAL THERAPIST

## 2021-06-15 PROCEDURE — 99213 OFFICE O/P EST LOW 20 MIN: CPT | Performed by: FAMILY MEDICINE

## 2021-06-15 RX ORDER — LIDOCAINE HYDROCHLORIDE 10 MG/ML
1 INJECTION, SOLUTION INFILTRATION; PERINEURAL
Status: COMPLETED | OUTPATIENT
Start: 2021-06-15 | End: 2021-06-15

## 2021-06-15 RX ORDER — BETAMETHASONE SODIUM PHOSPHATE AND BETAMETHASONE ACETATE 3; 3 MG/ML; MG/ML
3 INJECTION, SUSPENSION INTRA-ARTICULAR; INTRALESIONAL; INTRAMUSCULAR; SOFT TISSUE
Status: COMPLETED | OUTPATIENT
Start: 2021-06-15 | End: 2021-06-15

## 2021-06-15 RX ORDER — SELENIUM 50 MCG
TABLET ORAL
COMMUNITY
End: 2021-07-27 | Stop reason: ALTCHOICE

## 2021-06-15 RX ADMIN — LIDOCAINE HYDROCHLORIDE 1 ML: 10 INJECTION, SOLUTION INFILTRATION; PERINEURAL at 13:22

## 2021-06-15 RX ADMIN — BETAMETHASONE SODIUM PHOSPHATE AND BETAMETHASONE ACETATE 3 MG: 3; 3 INJECTION, SUSPENSION INTRA-ARTICULAR; INTRALESIONAL; INTRAMUSCULAR; SOFT TISSUE at 13:22

## 2021-06-16 NOTE — PROGRESS NOTES
Daily Note     Today's date: 6/15/2021  Patient name: Taty Santillan  : 1955  MRN: [id number]  Referring provider: Concepcion Herring MD  Dx:   Encounter Diagnosis     ICD-10-CM    1  Primary osteoarthritis of left knee  M17 12                   Subjective: Halie Bright reports she is doing really well, feels like she turned corner in positive direction       Objective: See treatment diary below      Assessment: Tolerated treatment well  Patient exhibited good technique with therapeutic exercises and would benefit from continued PT;  Stair climbing and descending getting much easier       Plan: Continue per plan of care        Precautions: None      Manuals 4/6 6/15           PROM  AF                                                  Neuro Re-Ed                                                                                                        Ther Ex             HEP issued             Heel slides  30 sec  X 5           Quad set  30           Hamstring stretch  30 sec   3            Bike  5 min            Step up  20            Leg press  Single leg 70#  3X10            Gastroc stretch  30 sec X 3            TKE aleshia  5#  3X10           Prone TKE  30           Ther Activity                                       Gait Training                                       Modalities             MHP flexion on wall  10 min

## 2021-06-18 ENCOUNTER — OFFICE VISIT (OUTPATIENT)
Dept: PHYSICAL THERAPY | Facility: MEDICAL CENTER | Age: 66
End: 2021-06-18
Payer: MEDICARE

## 2021-06-18 DIAGNOSIS — M17.12 PRIMARY OSTEOARTHRITIS OF LEFT KNEE: Primary | ICD-10-CM

## 2021-06-18 PROCEDURE — 97110 THERAPEUTIC EXERCISES: CPT | Performed by: PHYSICAL THERAPIST

## 2021-06-18 NOTE — PROGRESS NOTES
Daily Note     Today's date: 2021  Patient name: Davida Sams  : 1955  MRN: [id number]  Referring provider: Tiffanie Heredia MD  Dx:   Encounter Diagnosis     ICD-10-CM    1  Primary osteoarthritis of left knee  M17 12                   Subjective: Zackery Dockery reports that she is doing well, feels she is ready to continue with HEP       Objective: See treatment diary below      Assessment: Tolerated treatment well  Patient exhibited good technique with therapeutic exercises and and will continue independently at this time       Plan: Continue per plan of care        Precautions: None      Manuals            PROM  AF                                                  Neuro Re-Ed                                                                                                        Ther Ex             HEP issued             Heel slides  30 sec  X 5           Quad set  30           Hamstring stretch  30 sec   3            Bike  5 min            Step up  20            Leg press  Single leg 70#  3X10            Gastroc stretch  30 sec X 3            TKE aleshia  5#  3X10           Prone TKE  30           Ther Activity                                       Gait Training                                       Modalities             MHP flexion on wall  10 min

## 2021-06-21 ENCOUNTER — OFFICE VISIT (OUTPATIENT)
Dept: URGENT CARE | Facility: MEDICAL CENTER | Age: 66
End: 2021-06-21
Payer: MEDICARE

## 2021-06-21 VITALS
RESPIRATION RATE: 16 BRPM | WEIGHT: 138 LBS | SYSTOLIC BLOOD PRESSURE: 140 MMHG | HEART RATE: 72 BPM | HEIGHT: 61 IN | BODY MASS INDEX: 26.06 KG/M2 | TEMPERATURE: 98.8 F | DIASTOLIC BLOOD PRESSURE: 71 MMHG | OXYGEN SATURATION: 98 %

## 2021-06-21 DIAGNOSIS — H61.22 IMPACTED CERUMEN OF LEFT EAR: Primary | ICD-10-CM

## 2021-06-21 DIAGNOSIS — H60.92 OTITIS EXTERNA OF LEFT EAR, UNSPECIFIED CHRONICITY, UNSPECIFIED TYPE: ICD-10-CM

## 2021-06-21 PROCEDURE — 69210 REMOVE IMPACTED EAR WAX UNI: CPT | Performed by: FAMILY MEDICINE

## 2021-06-21 PROCEDURE — 99213 OFFICE O/P EST LOW 20 MIN: CPT | Performed by: FAMILY MEDICINE

## 2021-06-21 PROCEDURE — G0463 HOSPITAL OUTPT CLINIC VISIT: HCPCS | Performed by: FAMILY MEDICINE

## 2021-06-21 RX ORDER — CAYENNE 450 MG
CAPSULE ORAL
COMMUNITY
End: 2021-07-27 | Stop reason: HOSPADM

## 2021-06-21 RX ORDER — NEOMYCIN SULFATE, POLYMYXIN B SULFATE AND HYDROCORTISONE 10; 3.5; 1 MG/ML; MG/ML; [USP'U]/ML
4 SUSPENSION/ DROPS AURICULAR (OTIC) 4 TIMES DAILY
Qty: 10 ML | Refills: 0 | Status: SHIPPED | OUTPATIENT
Start: 2021-06-21 | End: 2021-10-18

## 2021-06-21 NOTE — PROGRESS NOTES
[facility]        NAME: Amalia Oakley is a 77 y o  female  : 1955    MRN: [id number]  DATE: 2021  TIME: 9:29 AM    Assessment and Plan   Impacted cerumen of left ear [H61 22]  1  Impacted cerumen of left ear  neomycin-polymyxin-hydrocortisone (CORTISPORIN) 0 35%-10,000 units/mL-1% otic suspension   2  Otitis externa of left ear, unspecified chronicity, unspecified type  neomycin-polymyxin-hydrocortisone (CORTISPORIN) 0 35%-10,000 units/mL-1% otic suspension         Patient Instructions       Follow up with PCP in 3-5 days  Proceed to  ER if symptoms worsen  Chief Complaint     Chief Complaint   Patient presents with    Earache     5/10 pain in left ear x 2 days  History of Present Illness       79-year-old female here today with left earache for the last 2 days  Describes is dull to moderate in nature  Pain seems controlled below 3 year  Denies any fever purulent drainage  Denies any significant loss of hearing  She tried some over-the-counter ear drops with no success  She tends to get this problem for often at least once year      Review of Systems   Review of Systems   Constitutional: Negative  HENT: Positive for ear pain  Neurological: Negative            Current Medications       Current Outpatient Medications:     Biotin 97234 MCG TABS, 2 (two) times a day , Disp: , Rfl:     BLACK ELDERBERRY PO, Take by mouth daily, Disp: , Rfl:     Calcium-Vitamin D-Vitamin K (VIACTIV PO), Take 1 tablet by mouth daily, Disp: , Rfl:     Cholecalciferol 50 MCG (2000 UT) TABS, Take by mouth, Disp: , Rfl:     DiphenhydrAMINE HCl (BENADRYL PO), Take by mouth daily at bedtime as needed , Disp: , Rfl:     HYDROcodone-acetaminophen (NORCO) 5-325 mg per tablet, Take 1 tablet by mouth every 6 (six) hours as needed for pain for up to 30 dosesMax Daily Amount: 4 tablets, Disp: 30 tablet, Rfl: 0    lactobacillus acidophilus, Take by mouth, Disp: , Rfl:     levothyroxine 112 mcg tablet, Take 112 mcg by mouth daily, Disp: , Rfl: 1    LUMIGAN 0 01 % ophthalmic drops, INSTILL 1 DROP IN EACH EYE EVERY DAY AT BEDTIME, Disp: , Rfl: 1    meclizine (ANTIVERT) 12 5 MG tablet, Take by mouth as needed , Disp: , Rfl:     naproxen (NAPROSYN) 500 mg tablet, Take 1 tablet (500 mg total) by mouth 2 (two) times a day with meals (Patient taking differently: Take 500 mg by mouth daily at bedtime ), Disp: 180 tablet, Rfl: 3    neomycin-polymyxin-hydrocortisone (CORTISPORIN) 0 35%-10,000 units/mL-1% otic suspension, Administer 4 drops into the left ear 4 (four) times a day for 5 days, Disp: 10 mL, Rfl: 0    Probiotic Product (Acidophilus) CHEW, Chew, Disp: , Rfl:     Probiotic Product (PROBIOTIC DAILY PO), Take by mouth, Disp: , Rfl:     Tetrahydrozoline HCl (EYE DROPS REGULAR OP), Apply to eye as needed , Disp: , Rfl:     Current Allergies     Allergies as of 2021 - Reviewed 2021   Allergen Reaction Noted    Codeine GI Intolerance 01/15/2018    Morphine Itching 01/15/2018            The following portions of the patient's history were reviewed and updated as appropriate: allergies, current medications, past family history, past medical history, past social history, past surgical history and problem list      Past Medical History:   Diagnosis Date    BPPV (benign paroxysmal positional vertigo)     Bronchitis     Disease of thyroid gland     HYPO    Diverticulitis     GERD (gastroesophageal reflux disease)     Glaucoma (increased eye pressure)      2 para 2     IBS (irritable bowel syndrome)     Insomnia     PONV (postoperative nausea and vomiting)        Past Surgical History:   Procedure Laterality Date    CHOLECYSTECTOMY      COLON SURGERY      COLONOSCOPY  2019    COLONOSCOPY      DILATION AND CURETTAGE OF UTERUS      FL GUIDED NEEDLE PLAC BX/ASP/INJ  2018    FL GUIDED NEEDLE PLAC BX/ASP/INJ  2019    FL GUIDED NEEDLE PLAC BX/ASP/INJ  2020  FL GUIDED NEEDLE PLAC BX/ASP/INJ  10/8/2020    HYSTERECTOMY  2006    JOINT REPLACEMENT      LAPAROSCOPIC COLON RESECTION  11/12/2019    MAMMO (HISTORICAL) Bilateral 12/2018    PARTIAL HYSTERECTOMY      FL REVISE KNEE JOINT REPLACE,ALL PARTS Right 1/22/2018    Procedure: ARTHROPLASTY KNEE TOTAL REVISION;  Surgeon: Veronique Lazo MD;  [facility];  Service: Orthopedics    FL TOTAL KNEE ARTHROPLASTY Left 4/26/2021    Procedure: ARTHROPLASTY KNEE TOTAL;  Surgeon: Veronique Lazo MD;  [facility];  Service: Orthopedics    TONSILLECTOMY      TRABECULOPLASTY, LASER SELECTIVE Left 05/14/2019    VARICOSE VEIN SURGERY         Family History   Problem Relation Age of Onset    Pancreatic cancer Mother 72    Diabetes type II Mother     Heart disease Father     No Known Problems Sister     Breast cancer Paternal Grandmother 80    Lung disease Paternal Grandfather     No Known Problems Daughter     No Known Problems Maternal Grandmother     No Known Problems Daughter     No Known Problems Maternal Aunt     No Known Problems Maternal Aunt     No Known Problems Maternal Aunt     No Known Problems Maternal Aunt          Medications have been verified  Objective   /71   Pulse 72   Temp 98 8 °F (37 1 °C)   Resp 16   Ht 5' 1" (1 549 m)   Wt 62 6 kg (138 lb)   LMP  (LMP Unknown)   SpO2 98%   BMI 26 07 kg/m²   No LMP recorded (lmp unknown)  Patient has had a hysterectomy  Physical Exam     Physical Exam  Vitals and nursing note reviewed  Constitutional:       Appearance: Normal appearance  HENT:      Right Ear: Tympanic membrane and ear canal normal       Ears:      Comments: Left ear canal reveals impacted cerumen  Slightly swollen external canal   Neurological:      Mental Status: She is alert         Ear cerumen removal    Date/Time: 6/21/2021 9:28 AM  Performed by: Ute Stock MD  Authorized by: Ute Stock MD   Universal Protocol:  Consent given by: patient  Patient identity confirmed: verbally with patient      Procedure details:     Local anesthetic:  None    Location:  L ear    Procedure type: irrigation with instrumentation      Instrumentation: curette      Approach:  External    Visualization (free text):  Otoscope  Post-procedure details:     Complication:  None    Hearing quality:  Improved    Patient tolerance of procedure:   Tolerated well, no immediate complications

## 2021-06-21 NOTE — PATIENT INSTRUCTIONS
After multiple 10, successfully remove ear wax from left ear canal   Ear canal is erythematous  Patient refers improve hearing  I prescribed Cortisporin ear drops 4 drops into left ear 4 times a day for 2 7 days  Otitis Externa   WHAT YOU NEED TO KNOW:   Otitis externa, or swimmer's ear, is an infection in the outer ear canal  This canal goes from the outside of the ear to the eardrum  DISCHARGE INSTRUCTIONS:   Return to the emergency department if:   · You have severe ear pain  · You are suddenly unable to hear at all  · You have new swelling in your face, behind your ears, or in your neck  · You suddenly cannot move part of your face  · Your face suddenly feels numb  Contact your healthcare provider if:   · You have a fever  · Your signs and symptoms do not get better after 2 days of treatment  · Your signs and symptoms go away for a time, but then come back  · You have questions or concerns about your condition or care  Medicines:   · NSAIDs , such as ibuprofen, help decrease swelling, pain, and fever  This medicine is available with or without a doctor's order  NSAIDs can cause stomach bleeding or kidney problems in certain people  If you take blood thinner medicine, always ask if NSAIDs are safe for you  Always read the medicine label and follow directions  Do not give these medicines to children under 10months of age without direction from your child's healthcare provider  · Acetaminophen  decreases pain and fever  It is available without a doctor's order  Ask how much to take and how often to take it  Follow directions  Acetaminophen can cause liver damage if not taken correctly  · Ear drops  that contain an antibiotic may be given  The antibiotic helps treat a bacterial infection  You may also be given steroid medicine  The steroid helps decrease redness, swelling, and pain  · Take your medicine as directed    Contact your healthcare provider if you think your medicine is not helping or if you have side effects  Tell him or her if you are allergic to any medicine  Keep a list of the medicines, vitamins, and herbs you take  Include the amounts, and when and why you take them  Bring the list or the pill bottles to follow-up visits  Carry your medicine list with you in case of an emergency  Follow up with your healthcare provider as directed:  Write down your questions so you remember to ask them during your visits  How to use eardrops:   · Lie down on your side with your infected ear facing up  · Carefully drip the correct number of eardrops into your ear  Have another person help you if possible  · Gently move the outside part of your ear back and forth to help the medicine reach your ear canal      · Stay lying down in the same position (with your ear facing up) for 3 to 5 minutes  Prevent otitis externa:   · Do not put cotton swabs or foreign objects in your ears  · Wrap a clean moist washcloth around your finger, and use it to clean your outer ear and remove extra ear wax  · Use ear plugs when you swim  Dry your outer ears completely after you swim or bathe  © Copyright [address] [place] [address] Drive Information is for End User's use only and may not be sold, redistributed or otherwise used for commercial purposes  All illustrations and images included in CareNotes® are the copyrighted property of [employer]  or Emiliano Duarte  The above information is an  only  It is not intended as medical advice for individual conditions or treatments  Talk to your doctor, nurse or pharmacist before following any medical regimen to see if it is safe and effective for you

## 2021-06-22 ENCOUNTER — APPOINTMENT (OUTPATIENT)
Dept: PHYSICAL THERAPY | Facility: MEDICAL CENTER | Age: 66
End: 2021-06-22
Payer: MEDICARE

## 2021-06-22 ENCOUNTER — HOSPITAL ENCOUNTER (OUTPATIENT)
Dept: RADIOLOGY | Facility: HOSPITAL | Age: 66
Discharge: HOME | End: 2021-06-22
Admitting: RADIOLOGY
Payer: MEDICARE

## 2021-06-22 ENCOUNTER — TELEPHONE (OUTPATIENT)
Dept: OBGYN CLINIC | Facility: HOSPITAL | Age: 66
End: 2021-06-22

## 2021-06-22 DIAGNOSIS — M79.672 PAIN IN LEFT FOOT: ICD-10-CM

## 2021-06-22 PROCEDURE — 77002 NEEDLE LOCALIZATION BY XRAY: CPT

## 2021-06-22 PROCEDURE — 20605 DRAIN/INJ JOINT/BURSA W/O US: CPT

## 2021-06-22 RX ORDER — METHYLPREDNISOLONE ACETATE 80 MG/ML
80 INJECTION, SUSPENSION INTRA-ARTICULAR; INTRALESIONAL; INTRAMUSCULAR; SOFT TISSUE
Status: COMPLETED | OUTPATIENT
Start: 2021-06-22 | End: 2021-06-22

## 2021-06-22 RX ORDER — LIDOCAINE HYDROCHLORIDE 10 MG/ML
5 INJECTION, SOLUTION EPIDURAL; INFILTRATION; INTRACAUDAL; PERINEURAL
Status: COMPLETED | OUTPATIENT
Start: 2021-06-22 | End: 2021-06-22

## 2021-06-22 RX ORDER — BUPIVACAINE HYDROCHLORIDE 2.5 MG/ML
30 INJECTION, SOLUTION EPIDURAL; INFILTRATION; INTRACAUDAL
Status: COMPLETED | OUTPATIENT
Start: 2021-06-22 | End: 2021-06-22

## 2021-06-22 RX ADMIN — LIDOCAINE HYDROCHLORIDE 5 ML: 10 INJECTION, SOLUTION EPIDURAL; INFILTRATION; INTRACAUDAL; PERINEURAL at 15:23

## 2021-06-22 RX ADMIN — BUPIVACAINE HYDROCHLORIDE 2 ML: 2.5 INJECTION, SOLUTION EPIDURAL; INFILTRATION; INTRACAUDAL at 15:23

## 2021-06-22 RX ADMIN — METHYLPREDNISOLONE ACETATE 80 MG: 80 INJECTION, SUSPENSION INTRA-ARTICULAR; INTRALESIONAL; INTRAMUSCULAR; SOFT TISSUE at 15:23

## 2021-06-22 RX ADMIN — IOHEXOL 3 ML: 300 INJECTION, SOLUTION INTRAVENOUS at 15:49

## 2021-06-22 NOTE — TELEPHONE ENCOUNTER
Allie Boyd is calling from Radiology in reference to the order for the [place] guided injections in the left foot  Allie Boyd states the order is incomplete  Allie Boyd needs to know the adjacent joint for the injections (navicular?)    Please advise via tiger text/update order      Amanda Ramsey

## 2021-06-22 NOTE — TELEPHONE ENCOUNTER
Please inform individual that the joint to be injected would be the joint between the tarsal navicular and the medial cuneiform  PJB

## 2021-06-25 ENCOUNTER — APPOINTMENT (OUTPATIENT)
Dept: PHYSICAL THERAPY | Facility: MEDICAL CENTER | Age: 66
End: 2021-06-25
Payer: MEDICARE

## 2021-07-26 ENCOUNTER — EPISODE CHANGES (OUTPATIENT)
Dept: CASE MANAGEMENT | Facility: OTHER | Age: 66
End: 2021-07-26

## 2021-07-27 ENCOUNTER — OFFICE VISIT (OUTPATIENT)
Dept: OBGYN CLINIC | Facility: HOSPITAL | Age: 66
End: 2021-07-27
Payer: MEDICARE

## 2021-07-27 ENCOUNTER — HOSPITAL ENCOUNTER (OUTPATIENT)
Dept: RADIOLOGY | Facility: HOSPITAL | Age: 66
Discharge: HOME | End: 2021-07-27
Attending: ORTHOPAEDIC SURGERY
Payer: MEDICARE

## 2021-07-27 VITALS
WEIGHT: 138 LBS | HEART RATE: 65 BPM | HEIGHT: 61 IN | SYSTOLIC BLOOD PRESSURE: 119 MMHG | BODY MASS INDEX: 26.06 KG/M2 | DIASTOLIC BLOOD PRESSURE: 80 MMHG

## 2021-07-27 DIAGNOSIS — M25.552 LATERAL PAIN OF LEFT HIP: ICD-10-CM

## 2021-07-27 DIAGNOSIS — Z47.1 AFTERCARE FOLLOWING LEFT KNEE JOINT REPLACEMENT SURGERY: Primary | ICD-10-CM

## 2021-07-27 DIAGNOSIS — M70.62 TROCHANTERIC BURSITIS OF LEFT HIP: ICD-10-CM

## 2021-07-27 DIAGNOSIS — Z47.1 AFTERCARE FOLLOWING LEFT KNEE JOINT REPLACEMENT SURGERY: ICD-10-CM

## 2021-07-27 DIAGNOSIS — Z96.652 AFTERCARE FOLLOWING LEFT KNEE JOINT REPLACEMENT SURGERY: Primary | ICD-10-CM

## 2021-07-27 DIAGNOSIS — Z96.652 AFTERCARE FOLLOWING LEFT KNEE JOINT REPLACEMENT SURGERY: ICD-10-CM

## 2021-07-27 PROCEDURE — 1124F ACP DISCUSS-NO DSCNMKR DOCD: CPT | Performed by: ORTHOPAEDIC SURGERY

## 2021-07-27 PROCEDURE — 73560 X-RAY EXAM OF KNEE 1 OR 2: CPT

## 2021-07-27 PROCEDURE — 99212 OFFICE O/P EST SF 10 MIN: CPT | Performed by: ORTHOPAEDIC SURGERY

## 2021-07-27 PROCEDURE — 20610 DRAIN/INJ JOINT/BURSA W/O US: CPT | Performed by: ORTHOPAEDIC SURGERY

## 2021-07-27 RX ORDER — BUPIVACAINE HYDROCHLORIDE 2.5 MG/ML
2 INJECTION, SOLUTION INFILTRATION; PERINEURAL
Status: COMPLETED | OUTPATIENT
Start: 2021-07-27 | End: 2021-07-27

## 2021-07-27 RX ORDER — LIDOCAINE HYDROCHLORIDE 10 MG/ML
2 INJECTION, SOLUTION INFILTRATION; PERINEURAL
Status: COMPLETED | OUTPATIENT
Start: 2021-07-27 | End: 2021-07-27

## 2021-07-27 RX ORDER — BETAMETHASONE SODIUM PHOSPHATE AND BETAMETHASONE ACETATE 3; 3 MG/ML; MG/ML
12 INJECTION, SUSPENSION INTRA-ARTICULAR; INTRALESIONAL; INTRAMUSCULAR; SOFT TISSUE
Status: COMPLETED | OUTPATIENT
Start: 2021-07-27 | End: 2021-07-27

## 2021-07-27 RX ADMIN — LIDOCAINE HYDROCHLORIDE 2 ML: 10 INJECTION, SOLUTION INFILTRATION; PERINEURAL at 08:03

## 2021-07-27 RX ADMIN — BUPIVACAINE HYDROCHLORIDE 2 ML: 2.5 INJECTION, SOLUTION INFILTRATION; PERINEURAL at 08:03

## 2021-07-27 RX ADMIN — BETAMETHASONE SODIUM PHOSPHATE AND BETAMETHASONE ACETATE 12 MG: 3; 3 INJECTION, SUSPENSION INTRA-ARTICULAR; INTRALESIONAL; INTRAMUSCULAR; SOFT TISSUE at 08:03

## 2021-07-27 NOTE — PROGRESS NOTES
Assessment:   Diagnosis ICD-10-CM Associated Orders   1  Aftercare following left knee joint replacement surgery  Z47 1 XR knee 1 or 2 vw left    Z96 652    2  Trochanteric bursitis of left hip  M70 62 Large joint arthrocentesis: L greater trochanteric bursa   3  Lateral pain of left hip  M25 552 Large joint arthrocentesis: L greater trochanteric bursa       Plan:    X-rays taken and reviewed, physical exam performed  Patient is doing very well 3 months status post left total knee arthroplasty  In the presence of her snapping left hip upon exam she has tenderness over trochanteric bursa area in which she was offered, accepted, performed injection of cortisone to her left trochanteric bursal area for symptomatic relief  Patient tolerated the procedure well  Ice and post injection protocol advised  She will continue with total knee precautions with antibiotics as discussed for the next nearly 2 years  Maintain home exercise program of strengthening of her left knee as well as strengthening her left hip abductors as demonstrated in the office today  Weightbearing activities as tolerated  To do next visit:  Return in about 3 months (around 10/27/2021) for re-check left hip and left knee, x-rays of left knee upon arrival      The above stated was discussed in layman's terms and the patient expressed understanding  All questions were answered to the patient's satisfaction  Scribe Attestation    I,:  Sherrill Flores am acting as a scribe while in the presence of the attending physician :       I,:  Clayton Burgess MD personally performed the services described in this documentation    as scribed in my presence :             Subjective:   Jensen Cobian is a 77 y o  female who presents repeat evaluation of her left knee 3 months s/p from 4/26/2021  She is doing very well and has no complaints at her left knee  Since her last visit she has completed formal physical therapy    She presents today unaccompanied without an assistive device nor requirement for narcotic medication  She notes clicking at her left hip in which she cannot lay comfortably for prolonged periods of time on her left side at night  She denies any back pain  Denies any groin pain  Denies any rotatory symptoms such as turning in bed, getting out of her vehicle, or putting on her off her socks or shoes        Review of systems negative unless otherwise specified in HPI  Review of Systems    Past Medical History:   Diagnosis Date    BPPV (benign paroxysmal positional vertigo)     Bronchitis     Disease of thyroid gland     HYPO    Diverticulitis     GERD (gastroesophageal reflux disease)     Glaucoma (increased eye pressure)      2 para 2     IBS (irritable bowel syndrome)     Insomnia     PONV (postoperative nausea and vomiting)        Past Surgical History:   Procedure Laterality Date    CHOLECYSTECTOMY      COLON SURGERY      COLONOSCOPY  2019    COLONOSCOPY      DILATION AND CURETTAGE OF UTERUS      FL GUIDED NEEDLE PLAC BX/ASP/INJ  2018    FL GUIDED NEEDLE PLAC BX/ASP/INJ  2019    FL GUIDED NEEDLE PLAC BX/ASP/INJ  2020    FL GUIDED NEEDLE PLAC BX/ASP/INJ  10/8/2020    FL GUIDED NEEDLE PLAC BX/ASP/INJ  2021    HYSTERECTOMY  2006    JOINT REPLACEMENT      LAPAROSCOPIC COLON RESECTION  2019    MAMMO (HISTORICAL) Bilateral 2018    PARTIAL HYSTERECTOMY      NH REVISE KNEE JOINT REPLACE,ALL PARTS Right 2018    Procedure: ARTHROPLASTY KNEE TOTAL REVISION;  Surgeon: Gigi Reyes MD;  [facility];  Service: Orthopedics    NH TOTAL KNEE ARTHROPLASTY Left 2021    Procedure: ARTHROPLASTY KNEE TOTAL;  Surgeon: Gigi Reyes MD;  [facility];  Service: Orthopedics    TONSILLECTOMY      TRABECULOPLASTY, LASER SELECTIVE Left 2019    VARICOSE VEIN SURGERY         Family History   Problem Relation Age of Onset    Pancreatic cancer Mother 72  Diabetes type II Mother     Heart disease Father     No Known Problems Sister     Breast cancer Paternal Grandmother 80    Lung disease Paternal Grandfather     No Known Problems Daughter     No Known Problems Maternal Grandmother     No Known Problems Daughter     No Known Problems Maternal Aunt     No Known Problems Maternal Aunt     No Known Problems Maternal Aunt     No Known Problems Maternal Aunt        Social History     Occupational History    Not on file   Tobacco Use    Smoking status: Never Smoker    Smokeless tobacco: Never Used   Vaping Use    Vaping Use: Never used   Substance and Sexual Activity    Alcohol use: Yes     Comment: social     Drug use: No    Sexual activity: Yes     Partners: Male     Birth control/protection: Surgical         Current Outpatient Medications:     Biotin 71829 MCG TABS, 2 (two) times a day , Disp: , Rfl:     BLACK ELDERBERRY PO, Take by mouth daily, Disp: , Rfl:     DiphenhydrAMINE HCl (BENADRYL PO), Take by mouth daily at bedtime as needed , Disp: , Rfl:     levothyroxine 112 mcg tablet, Take 112 mcg by mouth daily, Disp: , Rfl: 1    LUMIGAN 0 01 % ophthalmic drops, INSTILL 1 DROP IN EACH EYE EVERY DAY AT BEDTIME, Disp: , Rfl: 1    meclizine (ANTIVERT) 12 5 MG tablet, Take by mouth as needed , Disp: , Rfl:     naproxen (NAPROSYN) 500 mg tablet, Take 1 tablet (500 mg total) by mouth 2 (two) times a day with meals (Patient taking differently: Take 500 mg by mouth daily at bedtime ), Disp: 180 tablet, Rfl: 3    neomycin-polymyxin-hydrocortisone (CORTISPORIN) 0 35%-10,000 units/mL-1% otic suspension, Administer 4 drops into the left ear 4 (four) times a day for 5 days, Disp: 10 mL, Rfl: 0    Probiotic Product (PROBIOTIC DAILY PO), Take by mouth, Disp: , Rfl:     Tetrahydrozoline HCl (EYE DROPS REGULAR OP), Apply to eye as needed , Disp: , Rfl:     Allergies   Allergen Reactions    Codeine GI Intolerance    Morphine Itching            Vitals: 07/27/21 0738   BP: 119/80   Pulse: 65       Objective:                    Left Knee Exam     Muscle Strength   The patient has normal left knee strength  Tenderness   The patient is experiencing no tenderness  Range of Motion   The patient has normal left knee ROM  Left knee flexion: patella tracks well  Tests   Varus: negative Valgus: negative    Other   Erythema: absent  Sensation: normal  Swelling: mild  Effusion: no effusion present    Comments:    Appropriate amount of warmth  Healed anterior incision  No signs of infection or DVT      Left Hip Exam     Tenderness   The patient is experiencing tenderness in the greater trochanter  Range of Motion   The patient has normal left hip ROM  Muscle Strength   The patient has normal left hip strength  Other   Erythema: absent  Sensation: normal            Diagnostics, reviewed and taken today if performed as documented: The attending physician has personally reviewed the pertinent films in PACS and interpretation is as follows:      Procedures, if performed today:    Large joint arthrocentesis: L greater trochanteric bursa  Universal Protocol:  Consent: Verbal consent obtained  Risks and benefits: risks, benefits and alternatives were discussed  Consent given by: patient  Time out: Immediately prior to procedure a "time out" was called to verify the correct patient, procedure, equipment, support staff and site/side marked as required    Timeout called at: 7/27/2021 8:00 AM   Patient understanding: patient states understanding of the procedure being performed  Site marked: the operative site was marked  Patient identity confirmed: verbally with patient    Supporting Documentation  Indications: pain and diagnostic evaluation   Procedure Details  Location: hip - L greater trochanteric bursa  Preparation: Patient was prepped and draped in the usual sterile fashion  Needle size: 22 G  Ultrasound guidance: no  Approach: lateral  Medications administered: 2 mL lidocaine 1 %; 2 mL bupivacaine 0 25 %; 12 mg betamethasone acetate-betamethasone sodium phosphate 6 (3-3) mg/mL    Patient tolerance: patient tolerated the procedure well with no immediate complications  Dressing:  Sterile dressing applied             Portions of the record may have been created with voice recognition software  Occasional wrong word or "sound a like" substitutions may have occurred due to the inherent limitations of voice recognition software  Read the chart carefully and recognize, using context, where substitutions have occurred

## 2021-09-01 ENCOUNTER — OFFICE VISIT (OUTPATIENT)
Dept: PHYSICAL THERAPY | Facility: MEDICAL CENTER | Age: 66
End: 2021-09-01
Payer: MEDICARE

## 2021-09-01 DIAGNOSIS — R29.4 CLICKING OF LEFT HIP: Primary | ICD-10-CM

## 2021-09-01 PROCEDURE — 97161 PT EVAL LOW COMPLEX 20 MIN: CPT | Performed by: PHYSICAL THERAPIST

## 2021-09-01 NOTE — PROGRESS NOTES
PT Evaluation     Today's date: 2021  Patient name: Lizbeth Heaton  : 1955  MRN: [id number]  Referring provider: Saintclair Collier, PT  Dx:   Encounter Diagnosis     ICD-10-CM    1  Clicking of left hip  R29 4                   Assessment  Assessment details: Lizbeth Heaton is a 77 y o  female was evaluated on 1710  for Clicking of left hip  (primary encounter diagnosis)  Lizbeth Heaton has the above listed impairments resulting in functional deficits and negative impact to quality of life  Patient is appropriate for skilled PT intervention to promote maximal return to function and patient specific goals  Patient agrees with outlined treatment plan and all questions were answered to their satisfaction  Impairments: abnormal muscle firing, abnormal muscle tone, abnormal or restricted ROM, impaired physical strength, lacks appropriate home exercise program and pain with function  Understanding of Dx/Px/POC: good   Prognosis: good    Goals  Patient will successfully transition to home exercise program   Patient will be able to manage symptoms independently  Ang Dela Cruz will report no remaining clicking in her hip when walking     Plan  Patient would benefit from: skilled PT  Referral necessary: No  Planned modality interventions: thermotherapy: hydrocollator packs  Planned therapy interventions: home exercise program, manual therapy, neuromuscular re-education, patient education, functional ROM exercises, strengthening, stretching, joint mobilization, graded activity, graded exercise, therapeutic exercise, body mechanics training, motor coordination training and activity modification  Frequency: 1x week  Duration in weeks: 12  Treatment plan discussed with: patient        Subjective Evaluation    History of Present Illness  Mechanism of injury: Lizbeth Heaton is a 77 y o  female presenting to therapy with complaints of clicking in her left hip   She has a history of bilateral TKA, L most recently  Hip clicking began 2-3 months after her TKA  No pain associated with clicking but happens during walking  Injection performed in orthopedic office with limited effect thus far  Pain  Current pain ratin  At best pain ratin  At worst pain ratin  Quality: pressure    Patient Goals  Patient goal: Resolve clicking in hip while walking         Objective     Passive Range of Motion   Left Hip   Normal passive range of motion    Right Hip   Normal passive range of motion    Additional Passive Range of Motion Details  Localized glute med/piriformis tenderness     Joint Play   Left Hip   Hypomobile in the posterior hip capsule and lateral hip capsule      Strength/Myotome Testing     Left Hip   Planes of Motion   Flexion: 4  Extension: 4  Abduction: 4  Adduction: 4  External rotation: 4  Internal rotation: 4    Right Hip   Planes of Motion   Flexion: 4  Extension: 4  Abduction: 4  Adduction: 4  External rotation: 4  Internal rotation: 4    General Comments:      Hip Comments   Hip clicking did resolve during walking post hip mobilizations                  Precautions: None      Manuals             Hip posterolateral mobilization AF            Hip lateral distraction AF             MFR to glute med AF                          Neuro Re-Ed                                                                                                        Ther Ex             Piriformis stretch 30 sec  3                                                                                                       Ther Activity                                       Gait Training                                       Modalities

## 2021-09-08 ENCOUNTER — OFFICE VISIT (OUTPATIENT)
Dept: PHYSICAL THERAPY | Facility: MEDICAL CENTER | Age: 66
End: 2021-09-08
Payer: MEDICARE

## 2021-09-08 DIAGNOSIS — R29.4 CLICKING OF LEFT HIP: Primary | ICD-10-CM

## 2021-09-08 PROCEDURE — 97110 THERAPEUTIC EXERCISES: CPT | Performed by: PHYSICAL THERAPIST

## 2021-09-08 PROCEDURE — 97140 MANUAL THERAPY 1/> REGIONS: CPT | Performed by: PHYSICAL THERAPIST

## 2021-09-10 NOTE — PROGRESS NOTES
Daily Note     Today's date: 9/10/2021  Patient name: Maria Elena Ontiveros  : 1955  MRN: [id number]  Referring provider: Meera Segovia PT  Dx:   Encounter Diagnosis     ICD-10-CM    1  Clicking of left hip  R29 4                   Subjective: Brittnee Cole reports that her hip was clicking again the day of evaluation  Objective: See treatment diary below      Assessment: Tolerated treatment well  Patient does have resolution of clicking post mobilizations and soft tissue work  Will continue to stretch for home to provide longer term relief      Plan: Continue per plan of care        Precautions: None      Manuals             Hip posterolateral mobilization AF            Hip lateral distraction AF             MFR to glute med AF                          Neuro Re-Ed                                                                                                        Ther Ex             Piriformis stretch 30 sec  3                                                                                                       Ther Activity                                       Gait Training                                       Modalities

## 2021-09-14 ENCOUNTER — OFFICE VISIT (OUTPATIENT)
Dept: PHYSICAL THERAPY | Facility: MEDICAL CENTER | Age: 66
End: 2021-09-14
Payer: MEDICARE

## 2021-09-14 DIAGNOSIS — R29.4 CLICKING OF LEFT HIP: Primary | ICD-10-CM

## 2021-09-14 PROCEDURE — 97110 THERAPEUTIC EXERCISES: CPT | Performed by: PHYSICAL THERAPIST

## 2021-09-14 PROCEDURE — 97140 MANUAL THERAPY 1/> REGIONS: CPT | Performed by: PHYSICAL THERAPIST

## 2021-09-15 NOTE — PROGRESS NOTES
Daily Note     Today's date: 9/15/2021  Patient name: Brion Callas  : 1955  MRN: [id number]  Referring provider: Angelique Banda PT  Dx:   Encounter Diagnosis     ICD-10-CM    1  Clicking of left hip  R29 4                   Subjective: Meryle Johnson reports that she did have a period of no clicking again, but then it did begin again  Objective: See treatment diary below      Assessment: Tolerated treatment well  Patient continues to get periods of no clicking post mobilizations  Will continue with home stretching with addition of more focus on lateral hip mobility and check in once again next week       Plan: Continue per plan of care        Precautions: None      Manuals             Hip posterolateral mobilization AF            Hip lateral distraction AF             MFR to glute med AF                          Neuro Re-Ed                                                                                                        Ther Ex             Piriformis stretch 30 sec  3                                                                                                       Ther Activity                                       Gait Training                                       Modalities

## 2021-09-21 ENCOUNTER — OFFICE VISIT (OUTPATIENT)
Dept: PHYSICAL THERAPY | Facility: MEDICAL CENTER | Age: 66
End: 2021-09-21
Payer: MEDICARE

## 2021-09-21 DIAGNOSIS — R29.4 CLICKING OF LEFT HIP: Primary | ICD-10-CM

## 2021-09-21 PROCEDURE — 97140 MANUAL THERAPY 1/> REGIONS: CPT | Performed by: PHYSICAL THERAPIST

## 2021-09-21 NOTE — PROGRESS NOTES
Daily Note     Today's date: 2021  Patient name: Calvin Jean  : 1955  MRN: [id number]  Referring provider: Jesus Griffin, PT  Dx:   Encounter Diagnosis     ICD-10-CM    1  Clicking of left hip  R29 4                   Subjective: Gisele Tejeda reports that she continues to get a hour or so of relief from clicking post mobilizations  Clicking then returns, no pain associated with it       Objective: See treatment diary below      Assessment: Tolerated treatment well  Patient will continue with her stretching at home as it may take more length time before mobility increases to point of  full cllicking resolution    SHe will return if symptoms worsen       Plan: Transition to HEP      Precautions: None      Manuals             Hip posterolateral mobilization AF            Hip lateral distraction AF             MFR to glute med AF                          Neuro Re-Ed                                                                                                        Ther Ex             Piriformis stretch 30 sec  3                                                                                                       Ther Activity                                       Gait Training                                       Modalities

## 2021-10-18 RX ORDER — LEVOTHYROXINE SODIUM 112 UG/1
TABLET ORAL
COMMUNITY
Start: 2021-08-15 | End: 2021-10-19 | Stop reason: SDUPTHER

## 2021-10-19 ENCOUNTER — HOSPITAL ENCOUNTER (OUTPATIENT)
Dept: RADIOLOGY | Facility: HOSPITAL | Age: 66
Discharge: HOME | End: 2021-10-19
Attending: ORTHOPAEDIC SURGERY
Payer: MEDICARE

## 2021-10-19 ENCOUNTER — OFFICE VISIT (OUTPATIENT)
Dept: OBGYN CLINIC | Facility: HOSPITAL | Age: 66
End: 2021-10-19
Payer: MEDICARE

## 2021-10-19 ENCOUNTER — OFFICE VISIT (OUTPATIENT)
Dept: INTERNAL MEDICINE CLINIC | Facility: CLINIC | Age: 66
End: 2021-10-19
Payer: MEDICARE

## 2021-10-19 VITALS
RESPIRATION RATE: 16 BRPM | OXYGEN SATURATION: 100 % | HEIGHT: 61 IN | HEART RATE: 78 BPM | BODY MASS INDEX: 26.81 KG/M2 | WEIGHT: 142 LBS | SYSTOLIC BLOOD PRESSURE: 140 MMHG | DIASTOLIC BLOOD PRESSURE: 78 MMHG | TEMPERATURE: 73 F

## 2021-10-19 VITALS
BODY MASS INDEX: 26.07 KG/M2 | HEIGHT: 61 IN | HEART RATE: 70 BPM | DIASTOLIC BLOOD PRESSURE: 83 MMHG | SYSTOLIC BLOOD PRESSURE: 136 MMHG

## 2021-10-19 DIAGNOSIS — Z96.652 AFTERCARE FOLLOWING LEFT KNEE JOINT REPLACEMENT SURGERY: ICD-10-CM

## 2021-10-19 DIAGNOSIS — E03.9 HYPOTHYROIDISM, UNSPECIFIED TYPE: ICD-10-CM

## 2021-10-19 DIAGNOSIS — M19.041 PRIMARY OSTEOARTHRITIS OF BOTH HANDS: ICD-10-CM

## 2021-10-19 DIAGNOSIS — Z96.652 AFTERCARE FOLLOWING LEFT KNEE JOINT REPLACEMENT SURGERY: Primary | ICD-10-CM

## 2021-10-19 DIAGNOSIS — R42 VERTIGO: Primary | ICD-10-CM

## 2021-10-19 DIAGNOSIS — Z47.1 AFTERCARE FOLLOWING LEFT KNEE JOINT REPLACEMENT SURGERY: ICD-10-CM

## 2021-10-19 DIAGNOSIS — E78.5 HYPERLIPIDEMIA, UNSPECIFIED HYPERLIPIDEMIA TYPE: ICD-10-CM

## 2021-10-19 DIAGNOSIS — Z96.653 HISTORY OF TOTAL BILATERAL KNEE REPLACEMENT: ICD-10-CM

## 2021-10-19 DIAGNOSIS — M19.042 PRIMARY OSTEOARTHRITIS OF BOTH HANDS: ICD-10-CM

## 2021-10-19 DIAGNOSIS — Z47.1 AFTERCARE FOLLOWING LEFT KNEE JOINT REPLACEMENT SURGERY: Primary | ICD-10-CM

## 2021-10-19 DIAGNOSIS — E55.9 VITAMIN D DEFICIENCY: ICD-10-CM

## 2021-10-19 PROBLEM — M05.742: Status: ACTIVE | Noted: 2021-10-19

## 2021-10-19 PROCEDURE — 73560 X-RAY EXAM OF KNEE 1 OR 2: CPT

## 2021-10-19 PROCEDURE — 99213 OFFICE O/P EST LOW 20 MIN: CPT | Performed by: ORTHOPAEDIC SURGERY

## 2021-10-19 PROCEDURE — 99204 OFFICE O/P NEW MOD 45 MIN: CPT | Performed by: INTERNAL MEDICINE

## 2021-10-19 RX ORDER — LEVOTHYROXINE SODIUM 112 UG/1
112 TABLET ORAL DAILY
Qty: 90 TABLET | Refills: 1 | Status: SHIPPED | OUTPATIENT
Start: 2021-10-19 | End: 2021-10-19

## 2021-10-19 RX ORDER — MECLIZINE HCL 12.5 MG/1
12.5 TABLET ORAL AS NEEDED
Qty: 30 TABLET | Refills: 0 | Status: SHIPPED | OUTPATIENT
Start: 2021-10-19

## 2021-10-19 RX ORDER — LEVOTHYROXINE SODIUM 112 UG/1
112 TABLET ORAL DAILY
Qty: 90 TABLET | Refills: 1 | Status: SHIPPED | OUTPATIENT
Start: 2021-10-19 | End: 2022-03-28

## 2021-11-11 ENCOUNTER — TELEPHONE (OUTPATIENT)
Dept: OBGYN CLINIC | Facility: HOSPITAL | Age: 66
End: 2021-11-11

## 2021-11-11 ENCOUNTER — ESTABLISHED COMPREHENSIVE EXAM (OUTPATIENT)
Dept: URBAN - METROPOLITAN AREA CLINIC 6 | Facility: CLINIC | Age: 66
End: 2021-11-11

## 2021-11-11 DIAGNOSIS — H04.123: ICD-10-CM

## 2021-11-11 DIAGNOSIS — H40.1131: ICD-10-CM

## 2021-11-11 DIAGNOSIS — H25.813: ICD-10-CM

## 2021-11-11 PROCEDURE — 92133 CPTRZD OPH DX IMG PST SGM ON: CPT

## 2021-11-11 PROCEDURE — 92014 COMPRE OPH EXAM EST PT 1/>: CPT

## 2021-11-11 PROCEDURE — 1036F TOBACCO NON-USER: CPT

## 2021-11-11 PROCEDURE — G8427 DOCREV CUR MEDS BY ELIG CLIN: HCPCS

## 2021-11-11 ASSESSMENT — VISUAL ACUITY
OD_CC: J1+
OD_CC: 20/20-2
OS_CC: 20/20
OS_CC: J1+

## 2021-11-11 ASSESSMENT — TONOMETRY
OS_IOP_MMHG: 14
OD_IOP_MMHG: 15

## 2021-11-15 ENCOUNTER — OFFICE VISIT (OUTPATIENT)
Dept: OBGYN CLINIC | Facility: HOSPITAL | Age: 66
End: 2021-11-15
Payer: MEDICARE

## 2021-11-15 VITALS
HEIGHT: 61 IN | BODY MASS INDEX: 27.6 KG/M2 | WEIGHT: 146.2 LBS | HEART RATE: 62 BPM | DIASTOLIC BLOOD PRESSURE: 83 MMHG | SYSTOLIC BLOOD PRESSURE: 133 MMHG

## 2021-11-15 DIAGNOSIS — M18.0 ARTHRITIS OF CARPOMETACARPAL (CMC) JOINTS OF BOTH THUMBS: Primary | ICD-10-CM

## 2021-11-15 PROCEDURE — 99213 OFFICE O/P EST LOW 20 MIN: CPT | Performed by: PHYSICIAN ASSISTANT

## 2021-11-15 RX ORDER — MELOXICAM 15 MG/1
15 TABLET ORAL DAILY
Qty: 14 TABLET | Refills: 0 | Status: SHIPPED | OUTPATIENT
Start: 2021-11-15 | End: 2022-05-10

## 2021-12-21 ENCOUNTER — OFFICE VISIT (OUTPATIENT)
Dept: OBGYN CLINIC | Facility: OTHER | Age: 66
End: 2021-12-21
Payer: MEDICARE

## 2021-12-21 ENCOUNTER — TELEPHONE (OUTPATIENT)
Dept: OBGYN CLINIC | Facility: HOSPITAL | Age: 66
End: 2021-12-21

## 2021-12-21 VITALS
BODY MASS INDEX: 27.72 KG/M2 | HEART RATE: 64 BPM | HEIGHT: 61 IN | SYSTOLIC BLOOD PRESSURE: 125 MMHG | DIASTOLIC BLOOD PRESSURE: 75 MMHG | WEIGHT: 146.8 LBS

## 2021-12-21 DIAGNOSIS — M79.642 BILATERAL HAND PAIN: ICD-10-CM

## 2021-12-21 DIAGNOSIS — M79.641 BILATERAL HAND PAIN: ICD-10-CM

## 2021-12-21 DIAGNOSIS — M18.11 PRIMARY OSTEOARTHRITIS OF FIRST CARPOMETACARPAL JOINT OF RIGHT HAND: ICD-10-CM

## 2021-12-21 DIAGNOSIS — M18.12 PRIMARY OSTEOARTHRITIS OF FIRST CARPOMETACARPAL JOINT OF LEFT HAND: ICD-10-CM

## 2021-12-21 DIAGNOSIS — M18.0 ARTHRITIS OF CARPOMETACARPAL (CMC) JOINTS OF BOTH THUMBS: Primary | ICD-10-CM

## 2021-12-21 DIAGNOSIS — M05.742 RHEUMATOID ARTHRITIS WITH RHEUMATOID FACTOR OF LEFT HAND WITHOUT ORGAN OR SYSTEMS INVOLVEMENT (HCC): ICD-10-CM

## 2021-12-21 PROCEDURE — 99213 OFFICE O/P EST LOW 20 MIN: CPT | Performed by: FAMILY MEDICINE

## 2021-12-21 PROCEDURE — 20604 DRAIN/INJ JOINT/BURSA W/US: CPT | Performed by: FAMILY MEDICINE

## 2021-12-21 RX ORDER — NAPROXEN 500 MG/1
500 TABLET ORAL 2 TIMES DAILY WITH MEALS
COMMUNITY

## 2021-12-21 RX ADMIN — TRIAMCINOLONE ACETONIDE 40 MG: 40 INJECTION, SUSPENSION INTRA-ARTICULAR; INTRAMUSCULAR at 11:17

## 2021-12-21 RX ADMIN — LIDOCAINE HYDROCHLORIDE 1 ML: 10 INJECTION, SOLUTION INFILTRATION; PERINEURAL at 11:17

## 2021-12-21 RX ADMIN — LIDOCAINE HYDROCHLORIDE 2 ML: 10 INJECTION, SOLUTION INFILTRATION; PERINEURAL at 11:17

## 2021-12-22 RX ORDER — TRIAMCINOLONE ACETONIDE 40 MG/ML
40 INJECTION, SUSPENSION INTRA-ARTICULAR; INTRAMUSCULAR
Status: COMPLETED | OUTPATIENT
Start: 2021-12-21 | End: 2021-12-21

## 2021-12-22 RX ORDER — LIDOCAINE HYDROCHLORIDE 10 MG/ML
1 INJECTION, SOLUTION INFILTRATION; PERINEURAL
Status: COMPLETED | OUTPATIENT
Start: 2021-12-21 | End: 2021-12-21

## 2021-12-22 RX ORDER — LIDOCAINE HYDROCHLORIDE 10 MG/ML
2 INJECTION, SOLUTION INFILTRATION; PERINEURAL
Status: COMPLETED | OUTPATIENT
Start: 2021-12-21 | End: 2021-12-21

## 2022-01-11 NOTE — NURSING NOTE
Call placed to patient to discuss upcoming appointment at [facility] radiology department and complete consultation with patient  Patient is having left foot CSI utilizing fluoroscopy guidance  Reviewed  patient's allergies , no current anticoagulant medication present, patient has had procedure in the past, no questions when asked if any questions or concerns  Reminded patient of location and time expected for procedure, Patient expressed understanding by verbalizing and repeating instructions

## 2022-01-14 ENCOUNTER — HOSPITAL ENCOUNTER (OUTPATIENT)
Dept: RADIOLOGY | Facility: HOSPITAL | Age: 67
Discharge: HOME | End: 2022-01-14
Admitting: RADIOLOGY
Payer: MEDICARE

## 2022-01-14 DIAGNOSIS — M79.672 PAIN IN LEFT FOOT: ICD-10-CM

## 2022-01-14 DIAGNOSIS — M25.572 PAIN, JOINT, ANKLE AND FOOT, LEFT: ICD-10-CM

## 2022-01-14 PROCEDURE — 77002 NEEDLE LOCALIZATION BY XRAY: CPT

## 2022-01-14 PROCEDURE — 20605 DRAIN/INJ JOINT/BURSA W/O US: CPT

## 2022-01-14 RX ORDER — BETAMETHASONE SODIUM PHOSPHATE AND BETAMETHASONE ACETATE 3; 3 MG/ML; MG/ML
6 INJECTION, SUSPENSION INTRA-ARTICULAR; INTRALESIONAL; INTRAMUSCULAR; SOFT TISSUE ONCE
Status: COMPLETED | OUTPATIENT
Start: 2022-01-14 | End: 2022-01-14

## 2022-01-14 RX ORDER — LIDOCAINE HYDROCHLORIDE 10 MG/ML
5 INJECTION, SOLUTION EPIDURAL; INFILTRATION; INTRACAUDAL; PERINEURAL
Status: COMPLETED | OUTPATIENT
Start: 2022-01-14 | End: 2022-01-14

## 2022-01-14 RX ORDER — METHYLPREDNISOLONE ACETATE 80 MG/ML
80 INJECTION, SUSPENSION INTRA-ARTICULAR; INTRALESIONAL; INTRAMUSCULAR; SOFT TISSUE
Status: DISCONTINUED | OUTPATIENT
Start: 2022-01-14 | End: 2022-01-14

## 2022-01-14 RX ORDER — BUPIVACAINE HYDROCHLORIDE 2.5 MG/ML
10 INJECTION, SOLUTION EPIDURAL; INFILTRATION; INTRACAUDAL
Status: COMPLETED | OUTPATIENT
Start: 2022-01-14 | End: 2022-01-14

## 2022-01-14 RX ADMIN — LIDOCAINE HYDROCHLORIDE 3 ML: 10 INJECTION, SOLUTION EPIDURAL; INFILTRATION; INTRACAUDAL; PERINEURAL at 12:35

## 2022-01-14 RX ADMIN — IOHEXOL 1 ML: 300 INJECTION, SOLUTION INTRAVENOUS at 12:35

## 2022-01-14 RX ADMIN — BETAMETHASONE SODIUM PHOSPHATE AND BETAMETHASONE ACETATE 6 MG: 3; 3 INJECTION, SUSPENSION INTRA-ARTICULAR; INTRALESIONAL; INTRAMUSCULAR at 12:15

## 2022-01-14 RX ADMIN — BUPIVACAINE HYDROCHLORIDE 3 ML: 2.5 INJECTION, SOLUTION EPIDURAL; INFILTRATION; INTRACAUDAL; PERINEURAL at 12:35

## 2022-02-17 ENCOUNTER — OFFICE VISIT (OUTPATIENT)
Dept: URGENT CARE | Facility: MEDICAL CENTER | Age: 67
End: 2022-02-17

## 2022-02-17 VITALS
OXYGEN SATURATION: 98 % | BODY MASS INDEX: 27.38 KG/M2 | SYSTOLIC BLOOD PRESSURE: 136 MMHG | HEIGHT: 61 IN | HEART RATE: 68 BPM | RESPIRATION RATE: 16 BRPM | WEIGHT: 145 LBS | DIASTOLIC BLOOD PRESSURE: 70 MMHG | TEMPERATURE: 98.6 F

## 2022-02-17 DIAGNOSIS — H69.81 DYSFUNCTION OF RIGHT EUSTACHIAN TUBE: Primary | ICD-10-CM

## 2022-02-17 NOTE — PROGRESS NOTES
[facility]        NAME: Sasha Serrano is a 79 y o  female  : 1955    MRN: [id number]  DATE: 2022  TIME: 2:55 PM    Assessment and Plan   Dysfunction of right eustachian tube [H69 81]  1  Dysfunction of right eustachian tube  Ambulatory Referral to Otolaryngology         Patient Instructions       Follow up with PCP in 3-5 days  Proceed to  ER if symptoms worsen  Chief Complaint     Chief Complaint   Patient presents with    Earache     bilateral ear pain x 4 days  History of Present Illness       57-year-old female here today with bilateral ear pain and pressure for the last 4 days  She claims that her right ear is worse than the left  Denies any hearing loss  Patient describes a fullness pressure in both ears  Denies any purulent discharge  Denies fever  She does have some mild dizziness  She questions whether she might have earwax lodged  He is taking no pain medication at the present time  Denies any recent URI symptoms  Denies nasal congestion  Earache         Review of Systems   Review of Systems   HENT: Positive for ear pain  Neurological: Positive for dizziness           Current Medications       Current Outpatient Medications:     Acetaminophen (TYLENOL 8 HOUR PO), Take 200 mg by mouth as needed, Disp: , Rfl:     Biotin 06989 MCG TABS, 2 (two) times a day , Disp: , Rfl:     BLACK ELDERBERRY PO, Take by mouth daily, Disp: , Rfl:     DiphenhydrAMINE HCl (BENADRYL PO), Take by mouth daily at bedtime as needed , Disp: , Rfl:     levothyroxine (Synthroid) 112 mcg tablet, Take 1 tablet (112 mcg total) by mouth daily, Disp: 90 tablet, Rfl: 1    LUMIGAN 0 01 % ophthalmic drops, INSTILL 1 DROP IN EACH EYE EVERY DAY AT BEDTIME, Disp: , Rfl: 1    meclizine (ANTIVERT) 12 5 MG tablet, Take 1 tablet (12 5 mg total) by mouth as needed for dizziness, Disp: 30 tablet, Rfl: 0    naproxen (NAPROSYN) 500 mg tablet, Take 500 mg by mouth 2 (two) times a day with meals, Disp: , Rfl:     Probiotic Product (PROBIOTIC DAILY PO), Take by mouth, Disp: , Rfl:     Tetrahydrozoline HCl (EYE DROPS REGULAR OP), Apply to eye as needed , Disp: , Rfl:     meloxicam (Mobic) 15 mg tablet, Take 1 tablet (15 mg total) by mouth daily (Patient not taking: Reported on 2021 ), Disp: 14 tablet, Rfl: 0    Current Allergies     Allergies as of 2022 - Reviewed 2022   Allergen Reaction Noted    Codeine GI Intolerance 01/15/2018    Morphine Itching 01/15/2018            The following portions of the patient's history were reviewed and updated as appropriate: allergies, current medications, past family history, past medical history, past social history, past surgical history and problem list      Past Medical History:   Diagnosis Date    BPPV (benign paroxysmal positional vertigo)     Bronchitis     Disease of thyroid gland     HYPO    Diverticulitis     GERD (gastroesophageal reflux disease)     Glaucoma (increased eye pressure)      2 para 2     IBS (irritable bowel syndrome)     Insomnia     PONV (postoperative nausea and vomiting)        Past Surgical History:   Procedure Laterality Date    CHOLECYSTECTOMY      COLON SURGERY      COLONOSCOPY  2019    COLONOSCOPY      DILATION AND CURETTAGE OF UTERUS      FL GUIDED NEEDLE PLAC BX/ASP/INJ  2018    FL GUIDED NEEDLE PLAC BX/ASP/INJ  2019    FL GUIDED NEEDLE PLAC BX/ASP/INJ  2020    FL GUIDED NEEDLE PLAC BX/ASP/INJ  10/8/2020    FL GUIDED NEEDLE PLAC BX/ASP/INJ  2021    FL GUIDED NEEDLE PLAC BX/ASP/INJ  2022    HYSTERECTOMY  2006    JOINT REPLACEMENT      LAPAROSCOPIC COLON RESECTION  2019    MAMMO (HISTORICAL) Bilateral 2018    PARTIAL HYSTERECTOMY      SD REVISE KNEE JOINT REPLACE,ALL PARTS Right 2018    Procedure: ARTHROPLASTY KNEE TOTAL REVISION;  Surgeon: Teresa Encinas MD;  [facility]: [facility];  Service: Orthopedics    SD TOTAL KNEE ARTHROPLASTY Left 4/26/2021    Procedure: ARTHROPLASTY KNEE TOTAL;  Surgeon: Gustavo Guerrero MD;  Location[facility];  Service: Orthopedics    TONSILLECTOMY      TRABECULOPLASTY, LASER SELECTIVE Left 05/14/2019    VARICOSE VEIN SURGERY         Family History   Problem Relation Age of Onset    Pancreatic cancer Mother 72    Diabetes type II Mother     Heart disease Father     No Known Problems Sister     Breast cancer Paternal Grandmother 80    Lung disease Paternal Grandfather     No Known Problems Daughter     No Known Problems Maternal Grandmother     No Known Problems Daughter     No Known Problems Maternal Aunt     No Known Problems Maternal Aunt     No Known Problems Maternal Aunt     No Known Problems Maternal Aunt          Medications have been verified  Objective   /70   Pulse 68   Temp 98 6 °F (37 °C)   Resp 16   Ht 5' 1" (1 549 m)   Wt 65 8 kg (145 lb)   LMP  (LMP Unknown)   SpO2 98%   BMI 27 40 kg/m²   No LMP recorded (lmp unknown)  Patient has had a hysterectomy  Physical Exam     Physical Exam  Vitals and nursing note reviewed  HENT:      Right Ear: Tympanic membrane and ear canal normal       Left Ear: Tympanic membrane and ear canal normal       Nose:      Comments: Hypertrophic turbinates right  Mouth/Throat:      Mouth: Mucous membranes are moist    Musculoskeletal:      Cervical back: Normal range of motion and neck supple  Neurological:      General: No focal deficit present  Mental Status: She is alert and oriented to person, place, and time

## 2022-02-17 NOTE — PATIENT INSTRUCTIONS
Patient is afebrile  I recommend she start over-the-counter Flonase nasal spray-2 sprays in each nostril once daily if fullness pressure in right ear does not improve or persist in next 5-7 days, she is to consult with Ear Nose and Throat specialist   She was given outpatient ENT referral     Eustachian Tube Dysfunction   WHAT YOU NEED TO KNOW:   What is eustachian tube dysfunction (ETD)? ETD is a condition that prevents your eustachian tubes from opening properly  It can also cause them to become blocked  Eustachian tubes connect your middle ear to the back of your nose and throat  These tubes open and allow air to flow in and out when you sneeze, swallow, or yawn  What causes or increases my risk for ETD? ETD may be caused by swelling or buildup of mucus in your eustachian tubes  Pressure can build if you travel in an airplane or go scuba diving  Allergies, a cold, or a sinus infection can cause mucus to build up  The following can also increase your risk:  · Smoking cigarettes    · GERD, chronic sinus inflammation, or a tumor in your nose or throat    · An immune system disorder    · Sleeping on your stomach    · In children, long-term use of a bottle, going to , or a condition such as a cleft palate    What are the signs and symptoms of ETD? · Fullness or pressure in your ears    · Muffled hearing, or a feeling you are hearing under water or have clogged ears    · Pain in one or both ears    · Ringing in your ears    · Popping, crackling, or clicking feeling in your ears    · Trouble keeping your balance    How is ETD diagnosed? ETD is most common in children younger than 5 years  Adults with ETD may have had it since childhood  ETD can sometimes begin in adulthood, usually because of certain medical conditions that have developed  Your healthcare provider will ask about your symptoms and when they began  He or she will examine your ears, your nose, and the back of your throat   He or she may also do a hearing test   How is ETD treated? ETD may get better on its own without any treatment  If it continues, you may need any of the following:  · Swallow, yawn, or chew gum  to help open your eustachian tubes  Your healthcare provider may also recommend you blow with your mouth shut and your nostrils pinched closed  · Air pressure devices  push air into your nose and eustachian tubes to help relieve air pressure in your ear  · Treatment for allergies  such as decongestants, antihistamines, and nasal steroids may improve ETD  They may help decrease swelling of the eustachian tubes  · A myringotomy  is surgery to make a hole in your eardrum  The hole relieves pressure and lets fluid drain from your ear  A pressure equalizing (PE) tube may be used to keep the hole open and to help drain fluid  · Tuboplasty  is a procedure to widen your eustachian tubes  When should I call my doctor? · Your symptoms do not improve or get worse  · You have a fever  · You have any hearing loss  · You have questions or concerns about your condition or care  CARE AGREEMENT:   You have the right to help plan your care  Learn about your health condition and how it may be treated  Discuss treatment options with your healthcare providers to decide what care you want to receive  You always have the right to refuse treatment  The above information is an  only  It is not intended as medical advice for individual conditions or treatments  Talk to your doctor, nurse or pharmacist before following any medical regimen to see if it is safe and effective for you  © Copyright [employer] 2021 Information is for End User's use only and may not be sold, redistributed or otherwise used for commercial purposes   All illustrations and images included in CareNotes® are the copyrighted property of A D A M [employer] Inc  or [facility] [employer]

## 2022-03-26 DIAGNOSIS — E03.9 HYPOTHYROIDISM, UNSPECIFIED TYPE: ICD-10-CM

## 2022-03-28 RX ORDER — LEVOTHYROXINE SODIUM 112 UG/1
TABLET ORAL
Qty: 90 TABLET | Refills: 1 | Status: SHIPPED | OUTPATIENT
Start: 2022-03-28 | End: 2022-05-10 | Stop reason: SDUPTHER

## 2022-04-14 ENCOUNTER — TELEPHONE (OUTPATIENT)
Dept: OBGYN CLINIC | Facility: OTHER | Age: 67
End: 2022-04-14

## 2022-04-14 NOTE — TELEPHONE ENCOUNTER
Patient stated she was just speaking with someone about scheduling injections   While I was pulling up the patients chart she stated someone was calling her back and she was going to take the call

## 2022-04-20 ENCOUNTER — RA CDI HCC (OUTPATIENT)
Dept: OTHER | Facility: HOSPITAL | Age: 67
End: 2022-04-20

## 2022-04-20 NOTE — PROGRESS NOTES
[address]  coding opportunities       Chart reviewed, no opportunity found: CHART REVIEWED, NO OPPORTUNITY FOUND        Patients Insurance     Medicare Insurance: Medicare

## 2022-04-27 ENCOUNTER — APPOINTMENT (OUTPATIENT)
Dept: LAB | Facility: MEDICAL CENTER | Age: 67
End: 2022-04-27
Payer: MEDICARE

## 2022-04-27 DIAGNOSIS — E55.9 VITAMIN D DEFICIENCY: ICD-10-CM

## 2022-04-27 DIAGNOSIS — E03.9 HYPOTHYROIDISM, UNSPECIFIED TYPE: ICD-10-CM

## 2022-04-27 LAB
25(OH)D3 SERPL-MCNC: 35.4 NG/ML (ref 30–100)
ANION GAP SERPL CALCULATED.3IONS-SCNC: 3 MMOL/L (ref 4–13)
BASOPHILS # BLD AUTO: 0.02 THOUSANDS/ΜL (ref 0–0.1)
BASOPHILS NFR BLD AUTO: 0 % (ref 0–1)
BUN SERPL-MCNC: 24 MG/DL (ref 5–25)
CALCIUM SERPL-MCNC: 9.9 MG/DL (ref 8.3–10.1)
CHLORIDE SERPL-SCNC: 108 MMOL/L (ref 100–108)
CHOLEST SERPL-MCNC: 159 MG/DL
CO2 SERPL-SCNC: 28 MMOL/L (ref 21–32)
CREAT SERPL-MCNC: 0.8 MG/DL (ref 0.6–1.3)
EOSINOPHIL # BLD AUTO: 0.09 THOUSAND/ΜL (ref 0–0.61)
EOSINOPHIL NFR BLD AUTO: 2 % (ref 0–6)
ERYTHROCYTE [DISTWIDTH] IN BLOOD BY AUTOMATED COUNT: 13.1 % (ref 11.6–15.1)
GFR SERPL CREATININE-BSD FRML MDRD: 76 ML/MIN/1.73SQ M
GLUCOSE P FAST SERPL-MCNC: 99 MG/DL (ref 65–99)
HCT VFR BLD AUTO: 44.3 % (ref 34.8–46.1)
HDLC SERPL-MCNC: 58 MG/DL
HGB BLD-MCNC: 14.3 G/DL (ref 11.5–15.4)
IMM GRANULOCYTES # BLD AUTO: 0.02 THOUSAND/UL (ref 0–0.2)
IMM GRANULOCYTES NFR BLD AUTO: 0 % (ref 0–2)
LDLC SERPL CALC-MCNC: 82 MG/DL (ref 0–100)
LYMPHOCYTES # BLD AUTO: 1.38 THOUSANDS/ΜL (ref 0.6–4.47)
LYMPHOCYTES NFR BLD AUTO: 27 % (ref 14–44)
MCH RBC QN AUTO: 29.1 PG (ref 26.8–34.3)
MCHC RBC AUTO-ENTMCNC: 32.3 G/DL (ref 31.4–37.4)
MCV RBC AUTO: 90 FL (ref 82–98)
MONOCYTES # BLD AUTO: 0.54 THOUSAND/ΜL (ref 0.17–1.22)
MONOCYTES NFR BLD AUTO: 11 % (ref 4–12)
NEUTROPHILS # BLD AUTO: 3 THOUSANDS/ΜL (ref 1.85–7.62)
NEUTS SEG NFR BLD AUTO: 60 % (ref 43–75)
NONHDLC SERPL-MCNC: 101 MG/DL
NRBC BLD AUTO-RTO: 0 /100 WBCS
PLATELET # BLD AUTO: 269 THOUSANDS/UL (ref 149–390)
PMV BLD AUTO: 10.8 FL (ref 8.9–12.7)
POTASSIUM SERPL-SCNC: 4.5 MMOL/L (ref 3.5–5.3)
RBC # BLD AUTO: 4.91 MILLION/UL (ref 3.81–5.12)
SODIUM SERPL-SCNC: 139 MMOL/L (ref 136–145)
TRIGL SERPL-MCNC: 97 MG/DL
TSH SERPL DL<=0.05 MIU/L-ACNC: 1.11 UIU/ML (ref 0.45–4.5)
WBC # BLD AUTO: 5.05 THOUSAND/UL (ref 4.31–10.16)

## 2022-04-27 PROCEDURE — 84443 ASSAY THYROID STIM HORMONE: CPT

## 2022-04-27 PROCEDURE — 82306 VITAMIN D 25 HYDROXY: CPT

## 2022-04-27 PROCEDURE — 85025 COMPLETE CBC W/AUTO DIFF WBC: CPT

## 2022-04-27 PROCEDURE — 36415 COLL VENOUS BLD VENIPUNCTURE: CPT

## 2022-04-27 PROCEDURE — 80048 BASIC METABOLIC PNL TOTAL CA: CPT

## 2022-04-27 PROCEDURE — 80061 LIPID PANEL: CPT

## 2022-05-04 ENCOUNTER — ANNUAL EXAM (OUTPATIENT)
Dept: OBGYN CLINIC | Facility: CLINIC | Age: 67
End: 2022-05-04
Payer: MEDICARE

## 2022-05-04 VITALS
BODY MASS INDEX: 26.81 KG/M2 | DIASTOLIC BLOOD PRESSURE: 70 MMHG | SYSTOLIC BLOOD PRESSURE: 110 MMHG | HEIGHT: 61 IN | WEIGHT: 142 LBS

## 2022-05-04 DIAGNOSIS — Z12.39 ENCOUNTER FOR SCREENING BREAST EXAMINATION: ICD-10-CM

## 2022-05-04 DIAGNOSIS — Z01.419 ENCOUNTER FOR WELL WOMAN EXAM: Primary | ICD-10-CM

## 2022-05-04 PROCEDURE — G0101 CA SCREEN;PELVIC/BREAST EXAM: HCPCS | Performed by: PHYSICIAN ASSISTANT

## 2022-05-04 NOTE — PROGRESS NOTES
Assessment/Plan:    No problem-specific Assessment & Plan notes found for this encounter  Diagnoses and all orders for this visit:    Encounter for well woman exam    Encounter for screening breast examination          Subjective:      Patient ID: Jay Arreaga is a 79 y o  female  Pt presents for her annual exam today--  She has no complaints  Doing well, feeling good  She has no bleeding or pelvic pain--hyster  Bowel and bladder are regular, some usi  Colonoscopy--2019  No breast concerns today  Last mammo--2021  dexa 2021      No pap today  Has rx mammo  Daily ca, d      The following portions of the patient's history were reviewed and updated as appropriate: allergies, current medications, past family history, past medical history, past social history, past surgical history and problem list     Review of Systems   Constitutional: Negative for chills, fever and unexpected weight change  Gastrointestinal: Negative for abdominal pain, blood in stool, constipation and diarrhea  Genitourinary: Negative  Objective:      /70   Ht 5' 1" (1 549 m)   Wt 64 4 kg (142 lb)   LMP  (LMP Unknown)   BMI 26 83 kg/m²          Physical Exam  Vitals and nursing note reviewed  Constitutional:       Appearance: She is well-developed  HENT:      Head: Normocephalic and atraumatic  Chest:   Breasts:      Right: No inverted nipple, mass, nipple discharge or skin change  Left: No inverted nipple, mass, nipple discharge or skin change  Abdominal:      Palpations: Abdomen is soft  Genitourinary:     Exam position: Supine  Labia:         Right: No rash, tenderness or lesion  Left: No rash, tenderness or lesion  Vagina: Normal       Cervix: No cervical motion tenderness, discharge or friability  Uterus: Absent  Adnexa:         Right: No mass, tenderness or fullness  Left: No mass, tenderness or fullness       Musculoskeletal:      Cervical back: Normal range of motion  Lymphadenopathy:      Lower Body: No right inguinal adenopathy  No left inguinal adenopathy

## 2022-05-04 NOTE — PROGRESS NOTES
The patient is here for a yearly  She had a hysterectomy  No bleeding or cramping  The patient has urinary urgency  She has mild urinary incontinence when laughing or sneezing  She has had these issues since the colon resection because they had put in urinary stents  No vaginal, bowel or breast problems

## 2022-05-10 ENCOUNTER — OFFICE VISIT (OUTPATIENT)
Dept: INTERNAL MEDICINE CLINIC | Facility: CLINIC | Age: 67
End: 2022-05-10
Payer: MEDICARE

## 2022-05-10 VITALS
WEIGHT: 143.6 LBS | SYSTOLIC BLOOD PRESSURE: 124 MMHG | BODY MASS INDEX: 27.11 KG/M2 | OXYGEN SATURATION: 97 % | RESPIRATION RATE: 14 BRPM | HEART RATE: 73 BPM | TEMPERATURE: 97.8 F | HEIGHT: 61 IN | DIASTOLIC BLOOD PRESSURE: 68 MMHG

## 2022-05-10 DIAGNOSIS — M15.9 PRIMARY OSTEOARTHRITIS INVOLVING MULTIPLE JOINTS: Primary | ICD-10-CM

## 2022-05-10 DIAGNOSIS — E03.9 HYPOTHYROIDISM, UNSPECIFIED TYPE: ICD-10-CM

## 2022-05-10 DIAGNOSIS — Z12.11 SCREENING FOR COLON CANCER: ICD-10-CM

## 2022-05-10 DIAGNOSIS — L29.9 EAR ITCH: ICD-10-CM

## 2022-05-10 DIAGNOSIS — E78.5 HYPERLIPIDEMIA, UNSPECIFIED HYPERLIPIDEMIA TYPE: ICD-10-CM

## 2022-05-10 PROBLEM — M19.042 PRIMARY OSTEOARTHRITIS OF BOTH HANDS: Status: ACTIVE | Noted: 2022-05-10

## 2022-05-10 PROBLEM — R42 VERTIGO: Status: ACTIVE | Noted: 2022-05-10

## 2022-05-10 PROBLEM — M19.041 PRIMARY OSTEOARTHRITIS OF BOTH HANDS: Status: ACTIVE | Noted: 2022-05-10

## 2022-05-10 PROCEDURE — G0438 PPPS, INITIAL VISIT: HCPCS | Performed by: INTERNAL MEDICINE

## 2022-05-10 PROCEDURE — 99214 OFFICE O/P EST MOD 30 MIN: CPT | Performed by: INTERNAL MEDICINE

## 2022-05-10 RX ORDER — HYDROCORTISONE AND ACETIC ACID 20.75; 10.375 MG/ML; MG/ML
3 SOLUTION AURICULAR (OTIC) EVERY 6 HOURS SCHEDULED
Qty: 10 ML | Refills: 0 | Status: SHIPPED | OUTPATIENT
Start: 2022-05-10

## 2022-05-10 RX ORDER — LEVOTHYROXINE SODIUM 112 UG/1
112 TABLET ORAL DAILY
Qty: 90 TABLET | Refills: 1 | Status: SHIPPED | OUTPATIENT
Start: 2022-05-10

## 2022-05-10 NOTE — PROGRESS NOTES
INTERNAL MEDICINE OFFICE VISIT  [facility] Internal Medicine- [place]    NAME: Mitali Elliott  AGE: 79 y o  SEX: female    DATE OF ENCOUNTER: 5/10/2022    Assessment and Plan     1  Primary osteoarthritis involving multiple joints  Assessment & Plan:  -she is seeing Dr Zander Fraga at the beginning of June for repeat injection of the hand  She thinks she may eventually need joint replacement of her right wrist  She also has arthritis issues of the left foot  She gets injections of her instep under ultrasound guidance with Dr Beni Barrera      2  Hyperlipidemia, unspecified hyperlipidemia type  Assessment & Plan:  -well controlled  Not on any statin therapy  Continue with healthy dietary and lifestyle choices      3  Ear itch  -has bilateral itch of the inner ear canal   Dry skin noted bilaterally, some cerumen of left ear  She does have tinnitus  Denies any hair loss  -recommend use of vosol-hydrocortisone ear drops as needed for symptomatic relief  -     hydrocortisone-acetic acid (VOSOL-HC) otic solution; Administer 3 drops into both ears every 6 (six) hours    4  Hypothyroidism, unspecified type  Assessment & Plan:  -TSH remains within normal limits  -continue levothyroxine 112 mcg daily    Orders:  -     TSH, 3rd generation; Future; Expected date: 11/10/2022  -     T4, free; Future; Expected date: 11/10/2022  -     levothyroxine 112 mcg tablet; Take 1 tablet (112 mcg total) by mouth daily    5  Screening for colon cancer  Assessment & Plan:  Colonoscopy completed 2019   She was told she is due for repeat colonoscopy in 10 years          Mammogram scheduled for 06/03/2022    She continues to follow with gynecology    Got her 2nd Esteban booster on 04/27          Orders Placed This Encounter   Procedures    TSH, 3rd generation    T4, free       Chief Complaint     Chief Complaint   Patient presents with    Medicare Wellness Visit     Subsequent    Follow-up    Hypothyroidism    Dizziness       History of Present Illness     Tala Villegas comes in today for follow-up    Past medical history of hypothyroidism, bilateral total knee replacement, osteoarthritis, colovaginal fistula status post colon resection in 2019, vertigo    No major concerns today  She recently spent 9 weeks in [place]  She has plans to go to [place] in [place] later in the year    The following portions of the patient's history were reviewed and updated as appropriate: allergies, current medications, past family history, past medical history, past social history, past surgical history and problem list     Review of Systems     10 point ROS negative except per HPI    Active Problem List     Patient Active Problem List   Diagnosis    History of total knee arthroplasty    Orthopedic aftercare    Aftercare following left knee joint replacement surgery    Arthritis of foot, right    Chronic pain of left knee    Pes anserine bursitis    Primary osteoarthritis of left knee    Hypothyroidism    Hyperlipidemia    Elevated hemoglobin A1c    Vitamin D deficiency    Arthritis of left knee    Prediabetes    Breast lump    PONV (postoperative nausea and vomiting)    S/P total knee arthroplasty, left    Rheumatoid arthritis with rheumatoid factor of left hand without organ or systems involvement ([facility] [address] )    Arthritis of carpometacarpal (CMC) joints of both thumbs    Primary osteoarthritis of both hands    Vertigo    Screening for colon cancer       Objective     /68 (BP Location: Left arm, Patient Position: Sitting, Cuff Size: Standard)   Pulse 73   Temp 97 8 °F (36 6 °C) (Temporal)   Resp 14   Ht 5' 1" (1 549 m)   Wt 65 1 kg (143 lb 9 6 oz)   LMP  (LMP Unknown)   SpO2 97%   BMI 27 13 kg/m²     Physical Exam  Constitutional:       Appearance: Normal appearance  She is not ill-appearing  HENT:      Head: Normocephalic and atraumatic  Eyes:      General: No scleral icterus  Right eye: No discharge           Left eye: No discharge  Cardiovascular:      Rate and Rhythm: Normal rate and regular rhythm  Heart sounds: No murmur heard  No friction rub  Pulmonary:      Effort: Pulmonary effort is normal       Breath sounds: Normal breath sounds  No wheezing or rales  Abdominal:      General: Abdomen is flat  There is no distension  Palpations: Abdomen is soft  Tenderness: There is no abdominal tenderness  Musculoskeletal:         General: No swelling or tenderness  Skin:     General: Skin is warm and dry  Findings: No erythema  Neurological:      Mental Status: She is alert  Mental status is at baseline  Motor: No weakness  Psychiatric:         Mood and Affect: Mood normal          Behavior: Behavior normal          Pertinent Laboratory/Diagnostic Studies:  FL guided needle plac bx/asp/inj    Result Date: 1/14/2022  Impression: Technically successful left navicular medial cuneiform joint anesthetic and steroid (Celestone) injection  Prior to the procedure the patient complained of 4 out of 10 left foot pain  Postinjection the patient's pain had completely resolved  I reviewed the above findings and procedure with Dr Enma Metcalf  PERFORMED BY: [facility] Tre[facility], Under the direct supervision of Dr Enma Metcalf   DICTATED AND SIGNED BY: Carolina Trejo PA-C Workstation performed: [id number]       Images and diagnostics reviewed     Current Medications     Current Outpatient Medications:     Acetaminophen (TYLENOL 8 HOUR PO), Take 200 mg by mouth as needed, Disp: , Rfl:     BLACK ELDERBERRY PO, Take by mouth daily, Disp: , Rfl:     DiphenhydrAMINE HCl (BENADRYL PO), Take by mouth daily at bedtime as needed , Disp: , Rfl:     levothyroxine 112 mcg tablet, Take 1 tablet (112 mcg total) by mouth daily, Disp: 90 tablet, Rfl: 1    LUMIGAN 0 01 % ophthalmic drops, INSTILL 1 DROP IN EACH EYE EVERY DAY AT BEDTIME, Disp: , Rfl: 1    meclizine (ANTIVERT) 12 5 MG tablet, Take 1 tablet (12 5 mg total) by mouth as needed for dizziness, Disp: 30 tablet, Rfl: 0    naproxen (NAPROSYN) 500 mg tablet, Take 500 mg by mouth 2 (two) times a day with meals, Disp: , Rfl:     Probiotic Product (PROBIOTIC DAILY PO), Take by mouth, Disp: , Rfl:     Tetrahydrozoline HCl (EYE DROPS REGULAR OP), Apply to eye as needed , Disp: , Rfl:     Biotin 33932 MCG TABS, 2 (two) times a day  (Patient not taking: Reported on 5/10/2022 ), Disp: , Rfl:     hydrocortisone-acetic acid (VOSOL-HC) otic solution, Administer 3 drops into both ears every 6 (six) hours, Disp: 10 mL, Rfl: 0    Health Maintenance     Health Maintenance   Topic Date Due    Colorectal Cancer Screening  Never done    Pneumococcal Vaccine: 65+ Years (2 of 2 - PPSV23) 03/13/2021    COVID-19 Vaccine (2 - Pfizer 3-dose series) 11/02/2021    Breast Cancer Screening: Mammogram  05/24/2022    Depression Screening  10/19/2022    BMI: Followup Plan  10/19/2022    Fall Risk  05/10/2023    Medicare Annual Wellness Visit (AWV)  05/10/2023    BMI: Adult  05/10/2023    DTaP,Tdap,and Td Vaccines (3 - Td or Tdap) 12/19/2027    Hepatitis C Screening  Completed    Osteoporosis Screening  Completed    Influenza Vaccine  Completed    HIB Vaccine  Aged Out    Hepatitis B Vaccine  Aged Out    IPV Vaccine  Aged Out    Hepatitis A Vaccine  Aged Out    Meningococcal ACWY Vaccine  Aged Out    HPV Vaccine  Aged Out     Immunization History   Administered Date(s) Administered    COVID-19 PFIZER VACCINE 0 3 ML IM 10/12/2021    INFLUENZA 10/02/2013, 10/16/2014, 10/19/2015, 10/25/2016, 11/14/2017, 10/19/2018, 10/08/2019, 10/08/2019, 10/01/2020, 10/12/2021    Influenza Split 10/11/2010, 09/28/2011, 10/08/2012    Influenza, seasonal, injectable 12/10/2008, 11/17/2009    Pneumococcal 09/24/2021    Pneumococcal Conjugate 13-Valent 03/13/2020    TD (adult) Preservative Free 12/19/2017    Tdap 10/12/2007    Tuberculin Skin Test-PPD Intradermal 07/19/2005    Zoster Vaccine Recombinant 02/17/2020, 06/08/2020    influenza, injectable, quadrivalent 10/01/2020       Chet Hennessy Cons, D O  [facility]  Internal Medicine - [place]  [address], [facility] SPECIALTY [facility] [address]  [place], [address] [facility]  Office: [phone]      Answers for HPI/ROS submitted by the patient on 5/3/2022  How would you rate your overall health?: excellent  Compared to last year, how is your physical health?: slightly better  In general, how satisfied are you with your life?: very satisfied  Compared to last year, how is your eyesight?: same  Compared to last year, how is your hearing?: same  Compared to last year, how is your emotional/mental health?: same  How often is anger a problem for you?: never, rarely  How often do you feel unusually tired/fatigued?: never, rarely  In the past 7 days, how much pain have you experienced?: some  If you answered "some" or "a lot", please rate the severity of your pain on a scale of 1 to 10 (1 being the least severe pain and 10 being the most intense pain)  : 2/10  In the past 6 months, have you lost or gained 10 pounds without trying?: No  One or more falls in the last year: No  In the past 6 months, have you accidentally leaked urine?: Yes  Additional Comments: Following colon resection When stents were placed in my urethra I notice that When I need to urinate there can be no delay  Do you have trouble with the stairs inside or outside your home?: Yes  Does your home have working smoke alarms?: Yes  Does your home have a carbon monoxide monitor?: Yes  Which safety hazards (if any) have you experienced in your home? Please select all that apply : none  How would you describe your current diet?  Please select all that apply : Regular, Limited junk food  In addition to prescription medications, are you taking any over-the-counter supplements?: Yes  If yes, what supplements are you taking?: See medication list  Can you manage your medications?: Yes  Are you currently taking any opioid medications?: No  Can you walk and transfer into and out of your bed and chair?: Yes  Can you dress and groom yourself?: Yes  Can you bathe or shower yourself?: Yes  Can you feed yourself?: Yes  Can you do your laundry/ housekeeping?: Yes  Can you manage your money, pay your bills, and track your expenses?: Yes  Can you make your own meals?: Yes  Can you do your own shopping?: Yes  Within the last 12 months, have you had any hospitalizations or Emergency Department visits?: No  Do you have a living will?: Yes  Do you have a Durable POA (Power of ) for healthcare decisions?: Yes  Do you have an Advanced Directive for end of life decisions?: Yes  How often have you used an illegal drug (including marijuana) or a prescription medication for non-medical reasons in the past year?: less than monthly  Have you used drugs other than those required for medical reasons?: No  Do you abuse more than one drug at a time?: No  Are you always able to stop using drugs when you want to?: Yes  Have you had "blackouts" or "flashbacks" as a result of drug use?: No  Do you ever feel bad or guilty about your drug use?: No  Does your spouse (or parents) ever complain about your involvement with drugs?: No  Have you neglected your family because of your use of drugs?: No  Have you engaged in illegal activities in order to obtain drugs?: No  Have you ever experienced withdrawal symptoms (felt sick) when you stopped taking drugs?: No  Have you had medical problems as a result of your drug use (e g , memory loss, hepatitis, convulsions, bleeding, etc )?: No  What is the typical number of drinks you consume in a day?: 0  What is the typical number of drinks you consume in a week?: 2  How often did you have a drink containing alcohol in the past year?: 2 to 4 times a month  How many drinks did you have on a typical day  when you were drinking in the past year?: 1 to 2  How often did you have 6 or more drinks on one occasion in the past year?: never

## 2022-05-10 NOTE — ASSESSMENT & PLAN NOTE
-she is seeing Dr Annmarie James at the beginning of June for repeat injection of the hand  She thinks she may eventually need joint replacement of her right wrist  She also has arthritis issues of the left foot    She gets injections of her instep under ultrasound guidance with Dr Christen Velasco

## 2022-05-10 NOTE — ASSESSMENT & PLAN NOTE
- history of right total knee replacement in 2012 status post revision    She had left TKA done in April of this year and has been doing "great" from a recovery standpoint  - reviewed orthopedics note from today 10/19, she is to follow-up in 6 months

## 2022-05-10 NOTE — PROGRESS NOTES
Assessment and Plan:     Problem List Items Addressed This Visit        Endocrine    Hypothyroidism     -TSH remains within normal limits  -continue levothyroxine 112 mcg daily         Relevant Medications    levothyroxine 112 mcg tablet    Other Relevant Orders    TSH, 3rd generation    T4, free       Musculoskeletal and Integument    Primary osteoarthritis of both hands - Primary     -she is seeing Dr James Mallory at the beginning of June for repeat injection of the hand  She thinks she may eventually need joint replacement of her right wrist  She also has arthritis issues of the left foot  She gets injections of her instep under ultrasound guidance with Dr Mannie Brice            Other    Hyperlipidemia     -well controlled  Not on any statin therapy  Continue with healthy dietary and lifestyle choices         Screening for colon cancer     Colonoscopy completed 2019  She was told she is due for repeat colonoscopy in 10 years            Other Visit Diagnoses     Ear itch        Relevant Medications    hydrocortisone-acetic acid (VOSOL-HC) otic solution           Preventive health issues were discussed with patient, and age appropriate screening tests were ordered as noted in patient's After Visit Summary  Personalized health advice and appropriate referrals for health education or preventive services given if needed, as noted in patient's After Visit Summary       History of Present Illness:     Patient presents for Medicare Annual Wellness visit    Patient Care Team:  Chet Ortiz DO as PCP - General (Internal Medicine)  Alen Fontanez MD as PCP - Endocrinology (Endocrinology)  MD Radha Gonzalez MD (Obstetrics and Gynecology)     Problem List:     Patient Active Problem List   Diagnosis    History of total knee arthroplasty    Orthopedic aftercare    Aftercare following left knee joint replacement surgery    Arthritis of foot, right    Chronic pain of left knee    Pes anserine bursitis    Primary osteoarthritis of left knee    Hypothyroidism    Hyperlipidemia    Elevated hemoglobin A1c    Vitamin D deficiency    Arthritis of left knee    Prediabetes    Breast lump    PONV (postoperative nausea and vomiting)    S/P total knee arthroplasty, left    Rheumatoid arthritis with rheumatoid factor of left hand without organ or systems involvement (HCC)    Arthritis of carpometacarpal (CMC) joints of both thumbs    Primary osteoarthritis of both hands    Vertigo    Screening for colon cancer      Past Medical and Surgical History:     Past Medical History:   Diagnosis Date    BPPV (benign paroxysmal positional vertigo)     Bronchitis     Disease of thyroid gland     HYPO    Diverticulitis     GERD (gastroesophageal reflux disease)     Glaucoma (increased eye pressure)      2 para 2     IBS (irritable bowel syndrome)     Insomnia     PONV (postoperative nausea and vomiting)      Past Surgical History:   Procedure Laterality Date    CHOLECYSTECTOMY      COLON SURGERY  2019    resection    COLONOSCOPY  2019    COLONOSCOPY      DILATION AND CURETTAGE OF UTERUS      FL GUIDED NEEDLE PLAC BX/ASP/INJ  2018    FL GUIDED NEEDLE PLAC BX/ASP/INJ  2019    FL GUIDED NEEDLE PLAC BX/ASP/INJ  2020    FL GUIDED NEEDLE PLAC BX/ASP/INJ  10/8/2020    FL GUIDED NEEDLE PLAC BX/ASP/INJ  2021    FL GUIDED NEEDLE PLAC BX/ASP/INJ  2022    HYSTERECTOMY  2006    JOINT REPLACEMENT      LAPAROSCOPIC COLON RESECTION  2019    MAMMO (HISTORICAL) Bilateral 2018    PARTIAL HYSTERECTOMY      RI REVISE KNEE JOINT REPLACE,ALL PARTS Right 2018    Procedure: ARTHROPLASTY KNEE TOTAL REVISION;  Surgeon: Caren Hull MD;  [facility];  Service: Orthopedics    RI TOTAL KNEE ARTHROPLASTY Left 2021    Procedure: ARTHROPLASTY KNEE TOTAL;  Surgeon: Caren Hull MD;  [facility];  Service: Orthopedics    TONSILLECTOMY      TRABECULOPLASTY, LASER SELECTIVE Left 05/14/2019    VARICOSE VEIN SURGERY        Family History:     Family History   Problem Relation Age of Onset    Pancreatic cancer Mother 72    Diabetes type II Mother     Heart disease Father     No Known Problems Sister     Breast cancer Paternal Grandmother 80    Lung disease Paternal Grandfather     No Known Problems Daughter     No Known Problems Maternal Grandmother     No Known Problems Daughter     No Known Problems Maternal Aunt     No Known Problems Maternal Aunt     No Known Problems Maternal Aunt     No Known Problems Maternal Aunt       Social History:     Social History     Socioeconomic History    Marital status:      Spouse name: None    Number of children: None    Years of education: None    Highest education level: None   Occupational History    None   Tobacco Use    Smoking status: Never Smoker    Smokeless tobacco: Never Used   Vaping Use    Vaping Use: Never used   Substance and Sexual Activity    Alcohol use: Yes     Comment: social     Drug use: No    Sexual activity: Yes     Partners: Male     Birth control/protection: Surgical   Other Topics Concern    None   Social History Narrative    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       Social Determinants of Health     Financial Resource Strain: Not on file   Food Insecurity: Not on file   Transportation Needs: Not on file   Physical Activity: Not on file   Stress: Not on file   Social Connections: Not on file   Intimate Partner Violence: Not on file   Housing Stability: Not on file      Medications and Allergies:     Current Outpatient Medications   Medication Sig Dispense Refill    Acetaminophen (TYLENOL 8 HOUR PO) Take 200 mg by mouth as needed      BLACK ELDERBERRY PO Take by mouth daily      DiphenhydrAMINE HCl (BENADRYL PO) Take by mouth daily at bedtime as needed       levothyroxine 112 mcg tablet Take 1 tablet (112 mcg total) by mouth daily 90 tablet 1  LUMIGAN 0 01 % ophthalmic drops INSTILL 1 DROP IN EACH EYE EVERY DAY AT BEDTIME  1    meclizine (ANTIVERT) 12 5 MG tablet Take 1 tablet (12 5 mg total) by mouth as needed for dizziness 30 tablet 0    naproxen (NAPROSYN) 500 mg tablet Take 500 mg by mouth 2 (two) times a day with meals      Probiotic Product (PROBIOTIC DAILY PO) Take by mouth      Tetrahydrozoline HCl (EYE DROPS REGULAR OP) Apply to eye as needed       Biotin 63195 MCG TABS 2 (two) times a day  (Patient not taking: Reported on 5/10/2022 )      hydrocortisone-acetic acid (VOSOL-HC) otic solution Administer 3 drops into both ears every 6 (six) hours 10 mL 0     No current facility-administered medications for this visit       Allergies   Allergen Reactions    Codeine GI Intolerance    Morphine Itching      Immunizations:     Immunization History   Administered Date(s) Administered    COVID-19 PFIZER VACCINE 0 3 ML IM 10/12/2021    INFLUENZA 10/02/2013, 10/16/2014, 10/19/2015, 10/25/2016, 11/14/2017, 10/19/2018, 10/08/2019, 10/08/2019, 10/01/2020, 10/12/2021    Influenza Split 10/11/2010, 09/28/2011, 10/08/2012    Influenza, seasonal, injectable 12/10/2008, 11/17/2009    Pneumococcal 09/24/2021    Pneumococcal Conjugate 13-Valent 03/13/2020    TD (adult) Preservative Free 12/19/2017    Tdap 10/12/2007    Tuberculin Skin Test-PPD Intradermal 07/19/2005    Zoster Vaccine Recombinant 02/17/2020, 06/08/2020    influenza, injectable, quadrivalent 10/01/2020      Health Maintenance:         Topic Date Due    Colorectal Cancer Screening  Never done    Breast Cancer Screening: Mammogram  05/24/2022    Hepatitis C Screening  Completed         Topic Date Due    Pneumococcal Vaccine: 65+ Years (2 of 2 - PPSV23) 03/13/2021    COVID-19 Vaccine (2 - Pfizer 3-dose series) 11/02/2021      Medicare Health Risk Assessment:     /68 (BP Location: Left arm, Patient Position: Sitting, Cuff Size: Standard)   Pulse 73   Temp 97 8 °F (36 6 °C) (Temporal)   Resp 14   Ht 5' 1" (1 549 m)   Wt 65 1 kg (143 lb 9 6 oz)   LMP  (LMP Unknown)   SpO2 97%   BMI 27 13 kg/m²      Esther Yadav is here for her Subsequent Wellness visit  Health Risk Assessment:   Patient rates overall health as excellent  Patient feels that their physical health rating is slightly better  Patient is very satisfied with their life  Eyesight was rated as same  Hearing was rated as same  Patient feels that their emotional and mental health rating is same  Patients states they are never, rarely angry  Patient states they are never, rarely unusually tired/fatigued  Pain experienced in the last 7 days has been some  Patient's pain rating has been 2/10  Patient states that she has experienced no weight loss or gain in last 6 months  Fall Risk Screening: In the past year, patient has experienced: no history of falling in past year      Urinary Incontinence Screening:   Patient has leaked urine accidently in the last six months  Following colon resection When stents were placed in my urethra I notice that When I need to urinate there can be no delay    Home Safety:  Patient has trouble with stairs inside or outside of their home  Patient has working smoke alarms and has working carbon monoxide detector  Home safety hazards include: none  Nutrition:   Current diet is Regular and Limited junk food  Medications:   Patient is currently taking over-the-counter supplements  OTC medications include: See medication list  Patient is able to manage medications  Activities of Daily Living (ADLs)/Instrumental Activities of Daily Living (IADLs):   Walk and transfer into and out of bed and chair?: Yes  Dress and groom yourself?: Yes    Bathe or shower yourself?: Yes    Feed yourself?  Yes  Do your laundry/housekeeping?: Yes  Manage your money, pay your bills and track your expenses?: Yes  Make your own meals?: Yes    Do your own shopping?: Yes    Previous Hospitalizations:   Any hospitalizations or ED visits within the last 12 months?: No      Advance Care Planning:   Living will: Yes    Durable POA for healthcare: Yes    Advanced directive: Yes      PREVENTIVE SCREENINGS      Cardiovascular Screening:    General: Screening Not Indicated and History Lipid Disorder      Diabetes Screening:     General: Screening Current      Breast Cancer Screening:     General: Screening Current      Cervical Cancer Screening:    General: Screening Not Indicated      Osteoporosis Screening:    General: Screening Not Indicated and History Osteoporosis      Lung Cancer Screening:     General: Screening Not Indicated      Hepatitis C Screening:    General: Screening Current    Screening, Brief Intervention, and Referral to Treatment (SBIRT)    Screening  Typical number of drinks in a day: 0  Typical number of drinks in a week: 2  Interpretation: Low risk drinking behavior  AUDIT-C Screenin) How often did you have a drink containing alcohol in the past year? never  2) How many drinks did you have on a typical day when you were drinking in the past year? 0  3) How often did you have 6 or more drinks on one occasion in the past year? never    AUDIT-C Score: 0  Interpretation: Score 0-2 (female): Negative screen for alcohol misuse    Single Item Drug Screening:  How often have you used an illegal drug (including marijuana) or a prescription medication for non-medical reasons in the past year? less than monthly    Single Item Drug Screen Score: 1  Interpretation: POSITIVE screen for possible drug use disorder    Drug Abuse Screening Test (DAST-10):  1) Have you used drugs other than those required for medical reasons? No  2) Do you abuse more than one drug at a time? No  3) Are you always able to stop using drugs when you want to? Yes  4) Have you had "blackouts" or "flashbacks" as a result of drug use? No  5) Do you ever feel bad or guilty about your drug use?  No  6) Does your spouse (or parents) ever complain about your involvement with drugs? No  7) Have you neglected your family because of your use of drugs? No  8) Have you engaged in illegal activities in order to obtain drugs? No  9) Have you ever experienced withdrawal symptoms (felt sick) when you stopped taking drugs? No  10) Have you had medical problems as a result of your drug use (e g , memory loss, hepatitis, convulsions, bleeding, etc )?  No    DAST-10 Score: 0  Interpretation: No problems reported      Chet Rao, DO

## 2022-05-10 NOTE — ASSESSMENT & PLAN NOTE
- has intermittent vertigo  Most likely of benign etiology, BPPV  Symptoms never last more than 1 day   Responds very well to meclizine

## 2022-05-12 ENCOUNTER — 6 MONTH FOLLOW UP (OUTPATIENT)
Dept: URBAN - METROPOLITAN AREA CLINIC 6 | Facility: CLINIC | Age: 67
End: 2022-05-12

## 2022-05-12 DIAGNOSIS — H25.813: ICD-10-CM

## 2022-05-12 DIAGNOSIS — H40.1131: ICD-10-CM

## 2022-05-12 DIAGNOSIS — H04.123: ICD-10-CM

## 2022-05-12 PROCEDURE — 92250 FUNDUS PHOTOGRAPHY W/I&R: CPT

## 2022-05-12 PROCEDURE — 92012 INTRM OPH EXAM EST PATIENT: CPT

## 2022-05-12 PROCEDURE — 92083 EXTENDED VISUAL FIELD XM: CPT

## 2022-05-12 ASSESSMENT — VISUAL ACUITY
OS_CC: 20/20
OD_CC: 20/20-2
OU_CC: J1+

## 2022-05-12 ASSESSMENT — TONOMETRY
OS_IOP_MMHG: 16
OD_IOP_MMHG: 17

## 2022-05-17 ENCOUNTER — OFFICE VISIT (OUTPATIENT)
Dept: OBGYN CLINIC | Facility: HOSPITAL | Age: 67
End: 2022-05-17
Payer: MEDICARE

## 2022-05-17 ENCOUNTER — HOSPITAL ENCOUNTER (OUTPATIENT)
Dept: RADIOLOGY | Facility: HOSPITAL | Age: 67
Discharge: HOME | End: 2022-05-17
Attending: ORTHOPAEDIC SURGERY
Payer: MEDICARE

## 2022-05-17 VITALS
HEART RATE: 72 BPM | SYSTOLIC BLOOD PRESSURE: 133 MMHG | BODY MASS INDEX: 27.13 KG/M2 | DIASTOLIC BLOOD PRESSURE: 89 MMHG | HEIGHT: 61 IN

## 2022-05-17 DIAGNOSIS — Z47.1 AFTERCARE FOLLOWING LEFT KNEE JOINT REPLACEMENT SURGERY: ICD-10-CM

## 2022-05-17 DIAGNOSIS — M19.072 ARTHRITIS OF LEFT FOOT: Primary | ICD-10-CM

## 2022-05-17 DIAGNOSIS — Z96.652 AFTERCARE FOLLOWING LEFT KNEE JOINT REPLACEMENT SURGERY: ICD-10-CM

## 2022-05-17 DIAGNOSIS — M79.672 PAIN IN LEFT FOOT: ICD-10-CM

## 2022-05-17 DIAGNOSIS — Z96.652 S/P TOTAL KNEE ARTHROPLASTY, LEFT: ICD-10-CM

## 2022-05-17 PROCEDURE — 99213 OFFICE O/P EST LOW 20 MIN: CPT | Performed by: ORTHOPAEDIC SURGERY

## 2022-05-17 PROCEDURE — 73560 X-RAY EXAM OF KNEE 1 OR 2: CPT

## 2022-05-17 RX ORDER — CYCLOSPORINE 0.5 MG/ML
EMULSION OPHTHALMIC
COMMUNITY
Start: 2022-05-16

## 2022-05-17 NOTE — PROGRESS NOTES
79 y o female presents 12 months following left total knee replacement  She describes very little if any ongoing pain in left knee  She admits this is a difficult reduction of weight-bearing pain in left knee, and a corresponding improvement performance    She does admit to return to weight-bearing pain in left foot, and desires a image guided injection    Review of Systems  Review of systems negative unless otherwise specified in HPI    Past Medical History  Past Medical History:   Diagnosis Date    BPPV (benign paroxysmal positional vertigo)     Bronchitis     Disease of thyroid gland     HYPO    Diverticulitis     GERD (gastroesophageal reflux disease)     Glaucoma (increased eye pressure)      2 para 2     IBS (irritable bowel syndrome)     Insomnia     PONV (postoperative nausea and vomiting)        Past Surgical History  Past Surgical History:   Procedure Laterality Date    CHOLECYSTECTOMY      COLON SURGERY  2019    resection    COLONOSCOPY  2019    COLONOSCOPY      DILATION AND CURETTAGE OF UTERUS      FL GUIDED NEEDLE PLAC BX/ASP/INJ  2018    FL GUIDED NEEDLE PLAC BX/ASP/INJ  2019    FL GUIDED NEEDLE PLAC BX/ASP/INJ  2020    FL GUIDED NEEDLE PLAC BX/ASP/INJ  10/8/2020    FL GUIDED NEEDLE PLAC BX/ASP/INJ  2021    FL GUIDED NEEDLE PLAC BX/ASP/INJ  2022    HYSTERECTOMY  2006    JOINT REPLACEMENT      LAPAROSCOPIC COLON RESECTION  2019    MAMMO (HISTORICAL) Bilateral 2018    PARTIAL HYSTERECTOMY      ME REVISE KNEE JOINT REPLACE,ALL PARTS Right 2018    Procedure: ARTHROPLASTY KNEE TOTAL REVISION;  Surgeon: Birdie Isidro MD;  [facility];  Service: Orthopedics    ME TOTAL KNEE ARTHROPLASTY Left 2021    Procedure: ARTHROPLASTY KNEE TOTAL;  Surgeon: Birdie Isidro MD;  [facility];  Service: Orthopedics    TONSILLECTOMY      TRABECULOPLASTY, LASER SELECTIVE Left 2019    VARICOSE VEIN SURGERY Current Medications  Current Outpatient Medications on File Prior to Visit   Medication Sig Dispense Refill    Acetaminophen (TYLENOL 8 HOUR PO) Take 200 mg by mouth as needed      Biotin 34365 MCG TABS 2 (two) times a day  (Patient not taking: Reported on 5/10/2022 )      BLACK ELDERBERRY PO Take by mouth daily      cycloSPORINE (RESTASIS) 0 05 % ophthalmic emulsion       DiphenhydrAMINE HCl (BENADRYL PO) Take by mouth daily at bedtime as needed       hydrocortisone-acetic acid (VOSOL-HC) otic solution Administer 3 drops into both ears every 6 (six) hours 10 mL 0    levothyroxine 112 mcg tablet Take 1 tablet (112 mcg total) by mouth daily 90 tablet 1    LUMIGAN 0 01 % ophthalmic drops INSTILL 1 DROP IN EACH EYE EVERY DAY AT BEDTIME  1    meclizine (ANTIVERT) 12 5 MG tablet Take 1 tablet (12 5 mg total) by mouth as needed for dizziness 30 tablet 0    naproxen (NAPROSYN) 500 mg tablet Take 500 mg by mouth 2 (two) times a day with meals      Probiotic Product (PROBIOTIC DAILY PO) Take by mouth      Tetrahydrozoline HCl (EYE DROPS REGULAR OP) Apply to eye as needed        No current facility-administered medications on file prior to visit  Recent Labs [facility])  0   Lab Value Date/Time    HCT 44 3 04/27/2022 0725    HCT 41 8 10/20/2015 0958    HGB 14 3 04/27/2022 0725    HGB 14 1 10/20/2015 0958    WBC 5 05 04/27/2022 0725    WBC 5 89 10/20/2015 0958    INR 0 92 04/02/2021 0748    ESR 7 03/11/2020 1501    CRP <3 0 04/02/2021 0748    GLUCOSE 89 10/20/2015 0958    HGBA1C 5 4 04/02/2021 0748         Physical exam  · General: Awake, Alert, Oriented  · Eyes: Pupils equal, round and reactive to light  · Heart: regular rate and rhythm  · Lungs: No audible wheezing  · Abdomen: soft  Examination finds a left knee with a healed anterior scar  Extension is full flexion is excellent  There is no mid flexion valgus instability  His scars clean dry healed and mature    There is no tenderness left calf  The foot has tenderness along the midfoot    Imaging  I personally reviewed x-rays left knee my interpretation is as follows: Two views left knee show a total knee replacement in satisfactory position    Procedure  None indicated or performed today    Assessment/Plan:   79 y  o female 12 months following left total knee replacement with well clinical and radiographic appearance  Focusing on her left foot, she is considered a candidate for a image guided injection to the small joints of the left foot  Focusing on her knee, she understands antibiotic prophylaxis indicated prior invasive procedures next 1 year    Office follow up on an as-needed basis is my recommendation

## 2022-05-17 NOTE — NURSING NOTE
Call placed to patient to discuss upcoming appointment at [facility] radiology department and complete consultation with patient  Patient is having left ankle CSI utilizing fluoroscopy  guidance  Reviewed  patient's allergies , no current anticoagulant medication present , patient has had procedure in the past, no questions when asked if any questions or concerns  Reminded patient of location and time expected for procedure, Patient expressed understanding by verbalizing and repeating instructions

## 2022-05-23 ENCOUNTER — HOSPITAL ENCOUNTER (OUTPATIENT)
Dept: RADIOLOGY | Facility: HOSPITAL | Age: 67
Discharge: HOME | End: 2022-05-23
Admitting: RADIOLOGY
Payer: MEDICARE

## 2022-05-23 DIAGNOSIS — M19.072 ARTHRITIS OF LEFT FOOT: ICD-10-CM

## 2022-05-23 DIAGNOSIS — M79.672 PAIN IN LEFT FOOT: ICD-10-CM

## 2022-05-23 PROCEDURE — 20605 DRAIN/INJ JOINT/BURSA W/O US: CPT

## 2022-05-23 PROCEDURE — 77002 NEEDLE LOCALIZATION BY XRAY: CPT

## 2022-05-23 RX ORDER — BUPIVACAINE HYDROCHLORIDE 2.5 MG/ML
10 INJECTION, SOLUTION EPIDURAL; INFILTRATION; INTRACAUDAL
Status: COMPLETED | OUTPATIENT
Start: 2022-05-23 | End: 2022-05-23

## 2022-05-23 RX ORDER — BETAMETHASONE SODIUM PHOSPHATE AND BETAMETHASONE ACETATE 3; 3 MG/ML; MG/ML
6 INJECTION, SUSPENSION INTRA-ARTICULAR; INTRALESIONAL; INTRAMUSCULAR; SOFT TISSUE ONCE
Status: COMPLETED | OUTPATIENT
Start: 2022-05-23 | End: 2022-05-23

## 2022-05-23 RX ORDER — LIDOCAINE HYDROCHLORIDE 10 MG/ML
5 INJECTION, SOLUTION EPIDURAL; INFILTRATION; INTRACAUDAL; PERINEURAL
Status: COMPLETED | OUTPATIENT
Start: 2022-05-23 | End: 2022-05-23

## 2022-05-23 RX ADMIN — BUPIVACAINE HYDROCHLORIDE 3 ML: 2.5 INJECTION, SOLUTION EPIDURAL; INFILTRATION; INTRACAUDAL; PERINEURAL at 16:03

## 2022-05-23 RX ADMIN — BETAMETHASONE SODIUM PHOSPHATE AND BETAMETHASONE ACETATE 6 MG: 3; 3 INJECTION, SUSPENSION INTRA-ARTICULAR; INTRALESIONAL; INTRAMUSCULAR at 15:58

## 2022-05-23 RX ADMIN — LIDOCAINE HYDROCHLORIDE 3 ML: 10 INJECTION, SOLUTION EPIDURAL; INFILTRATION; INTRACAUDAL; PERINEURAL at 16:00

## 2022-05-23 RX ADMIN — IOHEXOL 1 ML: 350 INJECTION, SOLUTION INTRAVENOUS at 15:58

## 2022-06-02 ENCOUNTER — OFFICE VISIT (OUTPATIENT)
Dept: OBGYN CLINIC | Facility: OTHER | Age: 67
End: 2022-06-02
Payer: MEDICARE

## 2022-06-02 VITALS
DIASTOLIC BLOOD PRESSURE: 75 MMHG | WEIGHT: 145.6 LBS | HEIGHT: 61 IN | HEART RATE: 57 BPM | SYSTOLIC BLOOD PRESSURE: 119 MMHG | BODY MASS INDEX: 27.49 KG/M2

## 2022-06-02 DIAGNOSIS — M18.11 PRIMARY OSTEOARTHRITIS OF FIRST CARPOMETACARPAL JOINT OF RIGHT HAND: Primary | ICD-10-CM

## 2022-06-02 DIAGNOSIS — M18.12 PRIMARY OSTEOARTHRITIS OF FIRST CARPOMETACARPAL JOINT OF LEFT HAND: ICD-10-CM

## 2022-06-02 PROCEDURE — 20604 DRAIN/INJ JOINT/BURSA W/US: CPT | Performed by: FAMILY MEDICINE

## 2022-06-02 PROCEDURE — 99214 OFFICE O/P EST MOD 30 MIN: CPT | Performed by: FAMILY MEDICINE

## 2022-06-02 RX ADMIN — LIDOCAINE HYDROCHLORIDE 1 ML: 10 INJECTION, SOLUTION INFILTRATION; PERINEURAL at 11:35

## 2022-06-02 RX ADMIN — TRIAMCINOLONE ACETONIDE 40 MG: 40 INJECTION, SUSPENSION INTRA-ARTICULAR; INTRAMUSCULAR at 11:35

## 2022-06-02 NOTE — PROGRESS NOTES
1  Primary osteoarthritis of first carpometacarpal joint of right hand  Small joint arthrocentesis: R thumb CMC   2  Primary osteoarthritis of first carpometacarpal joint of left hand  Small joint arthrocentesis: L thumb CMC     Orders Placed This Encounter   Procedures    Small joint arthrocentesis: L thumb CMC    Small joint arthrocentesis: R thumb CMC      Imaging Studies (I personally reviewed images in PACS and report):  Left hand xray 03/11/2020: Moderately advanced osteoarthritis of the 1st carpometacarpal joint  Mild osteoarthritis of the triscaphe articulation and 2nd through 5th DIP joints   No focal erosions  No lytic or blastic lesions are seen      Right hand xray 03/11/2020:   Severe osteoarthritis of the 1st carpometacarpal articulation, significantly progressed from previous study  Mild osteoarthritis of the triscaphe joint, 3rd MCP joint with minor involvement of the 1st interphalangeal and 2nd PIP joints   No focal erosions  No lytic or blastic lesions are seen      Interventions:   --Left 1st CMC joint ultrasound-guided corticosteroid injection-07/31/2020  --Right 1st CMC joint ultrasound-guided corticosteroid injection - 06/12/2020  --Bilateral 1st CMC joint ultrasound-guided CSI injection 06/15/2021   --Bilateral 1st CMC USG CSI- 12/21/22  --Bilateral 1st CMC USG CSI- 6/2/22     IMPRESSION:  Bilateral 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  Presents today for ultrasound guided bilateral 1 CMC CS injection        Repeat X-ray next visit: None    Return if symptoms worsen or fail to improve  Patient Instructions   Repeat trial bilateral CMC OA CSI INJ  Discuss with patient referral to hand surgeon  She has appointment later this month at [facility] for a second opinion after seeing Dr Guillermina van who offered bracing        __________________________________________________________________________    HISTORY OF PRESENT ILLNESS:  F/u bilateral 1st CMA OA- uncontrolled   No new injury          Review of Systems      Following history reviewed and update:    Past Medical History:   Diagnosis Date    BPPV (benign paroxysmal positional vertigo)     Bronchitis     Disease of thyroid gland     HYPO    Diverticulitis     GERD (gastroesophageal reflux disease)     Glaucoma (increased eye pressure)      2 para 2     IBS (irritable bowel syndrome)     Insomnia     PONV (postoperative nausea and vomiting)      Past Surgical History:   Procedure Laterality Date    CHOLECYSTECTOMY      COLON SURGERY  2019    resection    COLONOSCOPY  2019    COLONOSCOPY      DILATION AND CURETTAGE OF UTERUS      FL GUIDED NEEDLE PLAC BX/ASP/INJ  2018    FL GUIDED NEEDLE PLAC BX/ASP/INJ  2019    FL GUIDED NEEDLE PLAC BX/ASP/INJ  2020    FL GUIDED NEEDLE PLAC BX/ASP/INJ  10/8/2020    FL GUIDED NEEDLE PLAC BX/ASP/INJ  2021    FL GUIDED NEEDLE PLAC BX/ASP/INJ  2022    FL GUIDED NEEDLE PLAC BX/ASP/INJ  2022    HYSTERECTOMY  2006    JOINT REPLACEMENT      LAPAROSCOPIC COLON RESECTION  2019    MAMMO (HISTORICAL) Bilateral 2018    PARTIAL HYSTERECTOMY      MI REVISE KNEE JOINT REPLACE,ALL PARTS Right 2018    Procedure: ARTHROPLASTY KNEE TOTAL REVISION;  Surgeon: Leonor Bright MD;  [facility]: [facility];  Service: Orthopedics    MI TOTAL KNEE ARTHROPLASTY Left 2021    Procedure: ARTHROPLASTY KNEE TOTAL;  Surgeon: Leonor Bright MD;  [facility];  Service: Orthopedics    TONSILLECTOMY      TRABECULOPLASTY, LASER SELECTIVE Left 2019    VARICOSE VEIN SURGERY       Social History   Social History     Substance and Sexual Activity   Alcohol Use Yes    Comment: social      Social History     Substance and Sexual Activity   Drug Use No     Social History     Tobacco Use   Smoking Status Never Smoker   Smokeless Tobacco Never Used     Family History   Problem Relation Age of Onset    Pancreatic cancer Mother 72    Diabetes type II Mother     Heart disease Father     No Known Problems Sister     No Known Problems Daughter     No Known Problems Daughter     No Known Problems Maternal Grandmother     No Known Problems Maternal Grandfather     Breast cancer Paternal Grandmother 80    Lung disease Paternal Grandfather     No Known Problems Maternal Aunt     No Known Problems Maternal Aunt     No Known Problems Maternal Aunt     No Known Problems Maternal Aunt      Allergies   Allergen Reactions    Codeine GI Intolerance    Morphine Itching          Physical Exam  /75 (BP Location: Right arm, Patient Position: Sitting, Cuff Size: Adult)   Pulse 57   Ht 5' 1" (1 549 m)   Wt 66 kg (145 lb 9 6 oz)   LMP  (LMP Unknown)   BMI 27 51 kg/m²     Constitutional:  see vital signs  Gen: well-developed, normocephalic/atraumatic, well-groomed  Eyes: No inflammation or discharge of conjunctiva or lids; sclera clear   Pharynx: no inflammation, lesion, or mass of lips  Neck: supple, no masses, non-distended  MSK: no inflammation, lesion, mass, or clubbing of nails and digits except for other than mentioned below  SKIN: no visible rashes or skin lesions  Pulmonary/Chest: Effort normal  No respiratory distress  NEURO: cranial nerves grossly intact  PSYCH:  Alert and oriented to person, place, and time; recent and remote memory intact; mood normal, no depression, anxiety, or agitation, judgment and insight good and intact     Ortho Exam     Right thumb  No swelling erythema or increased warmth  +tender 1st cmc    Left Thumb  No swelling erythema or increased warmth  +tender 1st cmc    __________________________________________________________________________  Small joint arthrocentesis: L thumb CMC  [place] Protocol:  Consent: Verbal consent obtained    Risks and benefits: risks, benefits and alternatives were discussed  Consent given by: patient  Time out: Immediately prior to procedure a "time out" was called to verify the correct patient, procedure, equipment, support staff and site/side marked as required  Patient understanding: patient states understanding of the procedure being performed  Site marked: the operative site was marked  Patient identity confirmed: verbally with patient    Supporting Documentation  Indications: diagnostic evaluation and pain   Procedure Details  Location: thumb - L thumb CMC  Preparation: Patient was prepped and draped in the usual sterile fashion (Prepped with Betadine if not allergic as well as alcohol  Ethyl Chloride cold spray used and alcohol swab after spray)  Needle size: 25 G  Ultrasound guidance: yes  Medications administered: 1 mL lidocaine 1 %; 40 mg triamcinolone acetonide 40 mg/mL    Patient tolerance: patient tolerated the procedure well with no immediate complications  Dressing:  Sterile dressing applied    Small joint arthrocentesis: bilateral thumb CMC  Universal Protocol:  Consent: Verbal consent obtained  Risks and benefits: risks, benefits and alternatives were discussed  Consent given by: patient  Time out: Immediately prior to procedure a "time out" was called to verify the correct patient, procedure, equipment, support staff and site/side marked as required  Patient understanding: patient states understanding of the procedure being performed  Site marked: the operative site was marked  Supporting Documentation  Indications: diagnostic evaluation and pain   Procedure Details  Location: thumb - bilateral thumb CMC  Preparation: Patient was prepped and draped in the usual sterile fashion (Prepped with Betadine if not allergic as well as alcohol    Ethyl Chloride cold spray used and alcohol swab after spray)  Needle size: 25 G  Ultrasound guidance: yes    Medications (Right): 1 mL lidocaine 1 %; 40 mg triamcinolone acetonide 40 mg/mLMedications (Left): 1 mL lidocaine 1 %; 40 mg triamcinolone acetonide 40 mg/mL   Patient tolerance: patient tolerated the procedure well with no immediate complications  Dressing: Sterile dressing applied

## 2022-06-02 NOTE — PATIENT INSTRUCTIONS
Repeat trial bilateral CMC OA CSI INJ  Discuss with patient referral to hand surgeon   She has appointment later this month at [facility] for a second opinion after seeing Dr Arben Butcher staff who offered bracing

## 2022-06-03 ENCOUNTER — HOSPITAL ENCOUNTER (OUTPATIENT)
Dept: MAMMOGRAPHY | Facility: MEDICAL CENTER | Age: 67
Discharge: HOME | End: 2022-06-03
Payer: MEDICARE

## 2022-06-03 VITALS — BODY MASS INDEX: 27.38 KG/M2 | WEIGHT: 145 LBS | HEIGHT: 61 IN

## 2022-06-03 DIAGNOSIS — Z12.31 SCREENING MAMMOGRAM FOR BREAST CANCER: ICD-10-CM

## 2022-06-03 PROCEDURE — 77063 BREAST TOMOSYNTHESIS BI: CPT

## 2022-06-03 PROCEDURE — 77067 SCR MAMMO BI INCL CAD: CPT

## 2022-06-07 RX ORDER — TRIAMCINOLONE ACETONIDE 40 MG/ML
40 INJECTION, SUSPENSION INTRA-ARTICULAR; INTRAMUSCULAR
Status: COMPLETED | OUTPATIENT
Start: 2022-06-07 | End: 2022-06-07

## 2022-06-07 RX ORDER — LIDOCAINE HYDROCHLORIDE 10 MG/ML
1 INJECTION, SOLUTION INFILTRATION; PERINEURAL
Status: COMPLETED | OUTPATIENT
Start: 2022-06-02 | End: 2022-06-02

## 2022-06-07 RX ORDER — LIDOCAINE HYDROCHLORIDE 10 MG/ML
1 INJECTION, SOLUTION INFILTRATION; PERINEURAL
Status: COMPLETED | OUTPATIENT
Start: 2022-06-07 | End: 2022-06-07

## 2022-06-07 RX ORDER — TRIAMCINOLONE ACETONIDE 40 MG/ML
40 INJECTION, SUSPENSION INTRA-ARTICULAR; INTRAMUSCULAR
Status: COMPLETED | OUTPATIENT
Start: 2022-06-02 | End: 2022-06-02

## 2022-06-07 RX ADMIN — LIDOCAINE HYDROCHLORIDE 1 ML: 10 INJECTION, SOLUTION INFILTRATION; PERINEURAL at 14:40

## 2022-06-07 RX ADMIN — TRIAMCINOLONE ACETONIDE 40 MG: 40 INJECTION, SUSPENSION INTRA-ARTICULAR; INTRAMUSCULAR at 14:40

## 2022-06-09 ENCOUNTER — TELEPHONE (OUTPATIENT)
Dept: OBGYN CLINIC | Facility: HOSPITAL | Age: 67
End: 2022-06-09

## 2022-06-09 NOTE — TELEPHONE ENCOUNTER
Patient called back with fax number  Please fax Office Notes from 11-      Appointment is for June 15th @ 13:30    Fax # [phone]  Attn : Dr Norberta Dandy C/indra # [phone]

## 2022-06-09 NOTE — TELEPHONE ENCOUNTER
Patient called asking to have OVN from 11/15/21 faxed over to Dr Maribel Shi at [facility] [place]  Patient will call back with fax number

## 2022-08-29 DIAGNOSIS — E03.9 HYPOTHYROIDISM, UNSPECIFIED TYPE: ICD-10-CM

## 2022-08-31 RX ORDER — LEVOTHYROXINE SODIUM 112 UG/1
112 TABLET ORAL DAILY
Qty: 90 TABLET | Refills: 1 | Status: SHIPPED | OUTPATIENT
Start: 2022-08-31

## 2022-09-20 NOTE — PROGRESS NOTES
Internal Medicine Progress Note  Patient: Cristiana Mcconnell  Age/sex: 77 y o  female  Medical Record #: [id number]      ASSESSMENT/PLAN: (Interval History)  Cristiana Mcconnell is seen and examined and management for following issues:    Status Post left Total KNEE ARTHROPLASTY   Pain controlled   Continue encourage incentive spirometry; monitor fever curve   DVT prophylaxis in place and reviewed  Results from last 7 days   Lab Units 04/27/21  0440   WBC Thousand/uL 11 14*   HEMOGLOBIN g/dL 11 0*   HEMATOCRIT % 33 0*   PLATELETS Thousands/uL 243          Operative acute blood loss   · Decrease in hgb levels from preop labs results; suspect due to post operation extravasation losses along with potential dilutional effects of fluids  · Initiate surgery optimization venofer protocol/transfusion  · Transfuse PRBC if hgb less then 8 0 (per ortho protocol)  or symptomatic  · Monitor follow up CBC post intervention to trend effect    Hypothyroid  · Continue supplementation     Irritable bowel  · Stable      Nausea  · Secondary to oxycodone  · Will try vicodin instead     PRE-OP HGB LEVEL: 13 9  The above assessment and plan was reviewed and updated as determined by my evaluation of the patient on 4/27/2021      Labs:   Results from last 7 days   Lab Units 04/27/21  0440 04/26/21  1710   WBC Thousand/uL 11 14*  --    HEMOGLOBIN g/dL 11 0*  --    HEMATOCRIT % 33 0*  --    PLATELETS Thousands/uL 243 238     Results from last 7 days   Lab Units 04/27/21  0440   SODIUM mmol/L 135*   POTASSIUM mmol/L 3 7   CHLORIDE mmol/L 101   CO2 mmol/L 25   BUN mg/dL 17   CREATININE mg/dL 0 71   CALCIUM mg/dL 9 5             Results from last 7 days   Lab Units 04/26/21  1356   POC GLUCOSE mg/dl 80       Review of Scheduled Meds:  Current Facility-Administered Medications   Medication Dose Route Frequency Provider Last Rate    acetaminophen  650 mg Oral Q6H PRN Emeka Gong MD      calcium carbonate  1,000 mg Oral Daily PRN Ulus Minor T Anant Ewing MD      diphenhydrAMINE  25 mg Intravenous Q6H PRN Carmen Morales MD      docusate sodium  100 mg Oral BID Carmen Morales MD      enoxaparin  40 mg Subcutaneous Daily Berhane Magallanes MD      hydrALAZINE  25 mg Oral Q8H PRN SCOTTIE August      HYDROmorphone  0 5 mg Intravenous Q3H PRN Berhane Magallanes MD      lactated ringers  1,000 mL Intravenous Once PRN Carmen Morales MD      And    lactated ringers  1,000 mL Intravenous Once PRN Carmen Morales MD      lactated ringers  100 mL/hr Intravenous Continuous Carmen Morales MD Stopped (04/27/21 0604)    levothyroxine  112 mcg Oral Early Morning Carmen Morales MD      melatonin  6 mg Oral HS PRN SCOTTIE August      ondansetron  4 mg Intravenous Q6H PRN Carmen Morales MD      oxyCODONE  10 mg Oral Q4H PRN Carmen Morales MD      oxyCODONE  5 mg Oral Q4H PRN Carmen Morales MD      senna  1 tablet Oral Daily Carmen Morales MD      sodium chloride  1,000 mL Intravenous Once PRN Carmen Morales MD      And    sodium chloride  1,000 mL Intravenous Once PRN Carmen Morales MD         Subjective/ HPI: Patient seen and examined  Patients overnight issues or events were reviewed with nursing or staff during rounds or morning huddle session  New or overnight issues include the following:     Pt without any overnight events or reported nursing issues; did not sleep well, requesting to stay another day      ROS:   A 10 point ROS was performed; negative except as noted above         Imaging:     No orders to display       *Labs /Radiology studies Reviewed  *Medications  reviewed and reconciled as needed  *Please refer to order section for additional ordered labs studies  *Case discussed with primary attending during morning huddle case rounds    Physical Examination:  Vitals:   Vitals:    04/26/21 2332 04/27/21 0319 04/27/21 0432 04/27/21 0710   BP: 132/78 121/72  120/70   Pulse: 100 82  83   Resp: 15 16  20   Temp: 98 2 °F (36 8 °C) 98 6 °F (37 °C)  100 °F (37 8 °C)   TempSrc:       SpO2: 95% 95%  97%   Weight:   70 kg (154 lb 5 2 oz)    Height:           General Appearance: no distress, conversive  HEENT: PERRLA, conjuctiva normal; oropharynx clear; mucous membranes moist;   Neck:  Supple, no lymphadenopathy or thyromegaly  Lungs: CTA, normal respiratory effort, no retractions, expiratory effort normal  CV: regular rate and rhythm , PMI normal   ABD: soft non tender, no masses , no hepatic or splenomegaly  EXT: DP pulses intact, no lymphadenopathy, no edema; left knee drain and surgical dressing in place  Skin: normal turgor, normal texture, no rash  Psych: affect normal, mood normal  Neuro: AAOx3    : winkler removed ; due to void    The above physical exam was reviewed and updated as determined by my evaluation of the patient on 4/27/2021  Invasive Devices     Peripheral Intravenous Line            Peripheral IV 04/26/21 Right Wrist less than 1 day          Drain            Closed/Suction Drain Left;Lateral Knee Accordion 10 Fr  less than 1 day                   VTE Pharmacologic Prophylaxis: Enoxaparin  Code Status: Level 1 - Full Code  Current Length of Stay: 0 day(s)      Total time spent:  30 minutes with more than 50% spent counseling/coordinating care  Counseling includes discussion with patient re: progress  and discussion with patient of his/her current medical state/information  Coordination of patient's care was performed in conjunction with primary service  Time invested included review of patient's labs, vitals, and management of their comorbidities with continued monitoring  In addition, this patient was discussed with medical team including physician and advanced extenders  The care of the patient was extensively discussed and appropriate treatment plan was formulated unique for this patient  ** Please Note:  voice to text software may have been used in the creation of this document   Although proof errors in transcription or interpretation are a potential of such software** No

## 2022-10-11 PROBLEM — Z12.11 SCREENING FOR COLON CANCER: Status: RESOLVED | Noted: 2022-05-10 | Resolved: 2022-10-11

## 2022-11-17 ENCOUNTER — 6 MONTH FOLLOW UP (OUTPATIENT)
Dept: URBAN - METROPOLITAN AREA CLINIC 6 | Facility: CLINIC | Age: 67
End: 2022-11-17

## 2022-11-17 DIAGNOSIS — H25.813: ICD-10-CM

## 2022-11-17 DIAGNOSIS — H04.123: ICD-10-CM

## 2022-11-17 DIAGNOSIS — H40.1131: ICD-10-CM

## 2022-11-17 PROCEDURE — 92014 COMPRE OPH EXAM EST PT 1/>: CPT

## 2022-11-17 PROCEDURE — 92133 CPTRZD OPH DX IMG PST SGM ON: CPT

## 2022-11-17 ASSESSMENT — TONOMETRY
OS_IOP_MMHG: 16
OD_IOP_MMHG: 16

## 2022-11-17 ASSESSMENT — VISUAL ACUITY
OU_CC: J1+
OS_CC: 20/25-1
OD_CC: 20/25

## 2022-12-27 ENCOUNTER — RA CDI HCC (OUTPATIENT)
Dept: OTHER | Facility: HOSPITAL | Age: 67
End: 2022-12-27

## 2023-01-03 DIAGNOSIS — E03.9 HYPOTHYROIDISM, UNSPECIFIED TYPE: ICD-10-CM

## 2023-01-03 RX ORDER — LEVOTHYROXINE SODIUM 112 UG/1
TABLET ORAL
Qty: 90 TABLET | Refills: 1 | Status: SHIPPED | OUTPATIENT
Start: 2023-01-03

## 2023-01-04 ENCOUNTER — CONSULT (OUTPATIENT)
Dept: INTERNAL MEDICINE CLINIC | Facility: CLINIC | Age: 68
End: 2023-01-04

## 2023-01-04 VITALS
WEIGHT: 147 LBS | TEMPERATURE: 97.5 F | OXYGEN SATURATION: 99 % | RESPIRATION RATE: 18 BRPM | HEART RATE: 98 BPM | SYSTOLIC BLOOD PRESSURE: 130 MMHG | DIASTOLIC BLOOD PRESSURE: 84 MMHG | BODY MASS INDEX: 27.75 KG/M2 | HEIGHT: 61 IN

## 2023-01-04 DIAGNOSIS — H25.9 AGE-RELATED CATARACT OF BOTH EYES, UNSPECIFIED AGE-RELATED CATARACT TYPE: ICD-10-CM

## 2023-01-04 DIAGNOSIS — Z01.818 PREOP EXAMINATION: Primary | ICD-10-CM

## 2023-01-04 DIAGNOSIS — E78.5 HYPERLIPIDEMIA, UNSPECIFIED HYPERLIPIDEMIA TYPE: ICD-10-CM

## 2023-01-04 DIAGNOSIS — E55.9 VITAMIN D DEFICIENCY: ICD-10-CM

## 2023-01-04 DIAGNOSIS — M15.9 PRIMARY OSTEOARTHRITIS INVOLVING MULTIPLE JOINTS: ICD-10-CM

## 2023-01-04 DIAGNOSIS — E03.9 HYPOTHYROIDISM, UNSPECIFIED TYPE: ICD-10-CM

## 2023-01-04 PROBLEM — M15.0 PRIMARY OSTEOARTHRITIS INVOLVING MULTIPLE JOINTS: Status: ACTIVE | Noted: 2023-01-04

## 2023-01-04 RX ORDER — IBUPROFEN 800 MG/1
800 TABLET ORAL EVERY 8 HOURS PRN
Qty: 90 TABLET | Refills: 0 | Status: SHIPPED | OUTPATIENT
Start: 2023-01-04

## 2023-01-04 RX ORDER — NAPROXEN 500 MG/1
500 TABLET ORAL 2 TIMES DAILY PRN
Qty: 90 TABLET | Refills: 0 | Status: SHIPPED | OUTPATIENT
Start: 2023-01-04

## 2023-01-04 NOTE — PATIENT INSTRUCTIONS
You can check out validate[employer] [email or web address] for blood pressure cuff recommendations     Would recommend daily calcium intake of around 1200 mg, vitamin D of 800-1000 IU

## 2023-01-04 NOTE — PROGRESS NOTES
INTERNAL MEDICINE OFFICE VISIT  [facility] [place]    NAME: Yosvany Elliott  AGE: 79 y o  SEX: female    DATE OF ENCOUNTER: 1/4/2023    Assessment and Plan/History of Present Illness     Here today for preop exam  Past medical history of hypothyroidism, bilateral total knee replacement, osteoarthritis, colovaginal fistula status post colon resection in 2019, vertigo    She is scheduled for cataract procedures With Dr Raymond Blanchard on 1/17/2023 and 1/31/2023    She does not have any major concerns today  She does not appear to have any history of ischemic heart disease, valvular heart disease, TIA/CVA, type 2 diabetes, chronic kidney disease  She is not on any blood thinners  Her functional status is adequate  She does not endorse any recent chest pain, palpitations, lightheadedness, peripheral edema    1  Preop examination  -Patient may proceed with surgery as scheduled  Her cardiovascular risk is acceptable  There is no need for further blood work or diagnostic testing from my standpoint  - No need for perioperative medication adjustments from my standpoint  Final adjustments to perioperative medication regimen may be made at discretion of surgical team    2  Age-related cataract of both eyes, unspecified age-related cataract type      3  Hypothyroidism, unspecified type  - CBC and differential; Future  - Comprehensive metabolic panel; Future  - TSH, 3rd generation with Free T4 reflex; Future  - Lipid panel; Future    4  Primary osteoarthritis involving multiple joints  -Has issues with pain related to arthritis of the hands and other joints  Typically takes naproxen 500 mg once at bedtime  She inquires if there is anything else she could try for pain relief  Suggested she could try substituting with prescription strength ibuprofen 800 mg as an alternative  Advised her not to combine naproxen and ibuprofen as they are both NSAIDs  - ibuprofen (MOTRIN) 800 mg tablet;  Take 1 tablet (800 mg total) by mouth every 8 (eight) hours as needed for moderate pain  Dispense: 90 tablet; Refill: 0  - naproxen (Naprosyn) 500 mg tablet; Take 1 tablet (500 mg total) by mouth 2 (two) times a day as needed for moderate pain  Dispense: 90 tablet; Refill: 0    5  Hyperlipidemia, unspecified hyperlipidemia type    6  Vitamin D deficiency  - Vitamin D 25 hydroxy; Future    BMI Counseling: Body mass index is 27 78 kg/m²  The BMI is above normal  Nutrition recommendations include decreasing portion sizes, encouraging healthy choices of fruits and vegetables, moderation in carbohydrate intake and increasing intake of lean protein  Exercise recommendations include moderate physical activity 150 minutes/week  Rationale for BMI follow-up plan is due to patient being overweight or obese  Depression Screening and Follow-up Plan: Patient was screened for depression during today's encounter  They screened negative with a PHQ-2 score of 0           Orders Placed This Encounter   Procedures   • CBC and differential   • Comprehensive metabolic panel   • TSH, 3rd generation with Free T4 reflex   • Vitamin D 25 hydroxy   • Lipid panel       Chief Complaint     Chief Complaint   Patient presents with   • Pre-op Exam     Pre-op cataract by Dr Edy Torres 1/17/23 and 1/31/23       Review of Systems     10 point ROS negative except per HPI    The following portions of the patient's history were reviewed and updated as appropriate: allergies, current medications, past family history, past medical history, past social history, past surgical history and problem list     Active Problem List     Patient Active Problem List   Diagnosis   • History of total knee arthroplasty   • Orthopedic aftercare   • Aftercare following left knee joint replacement surgery   • Arthritis of foot, right   • Chronic pain of left knee   • Pes anserine bursitis   • Primary osteoarthritis of left knee   • Hypothyroidism   • Hyperlipidemia   • Elevated hemoglobin A1c   • Vitamin D deficiency   • Arthritis of left knee   • Prediabetes   • Breast lump   • PONV (postoperative nausea and vomiting)   • S/P total knee arthroplasty, left   • Rheumatoid arthritis with rheumatoid factor of left hand without organ or systems involvement (HCC)   • Arthritis of carpometacarpal (CMC) joints of both thumbs   • Primary osteoarthritis of both hands   • Vertigo   • Arthritis of left foot   • Primary osteoarthritis involving multiple joints       Objective     /84 (BP Location: Left arm, Patient Position: Sitting, Cuff Size: Standard)   Pulse 98   Temp 97 5 °F (36 4 °C)   Resp 18   Ht 5' 1" (1 549 m)   Wt 66 7 kg (147 lb)   LMP  (LMP Unknown)   SpO2 99%   BMI 27 78 kg/m²     Physical Exam  Constitutional:       Appearance: Normal appearance  She is not ill-appearing  HENT:      Head: Normocephalic and atraumatic  Eyes:      General: No scleral icterus  Right eye: No discharge  Left eye: No discharge  Cardiovascular:      Rate and Rhythm: Normal rate and regular rhythm  Heart sounds: No murmur heard  No friction rub  Pulmonary:      Effort: Pulmonary effort is normal       Breath sounds: Normal breath sounds  No wheezing or rales  Abdominal:      General: Abdomen is flat  There is no distension  Palpations: Abdomen is soft  Tenderness: There is no abdominal tenderness  Musculoskeletal:         General: No swelling or tenderness  Skin:     General: Skin is warm and dry  Findings: No erythema  Neurological:      Mental Status: She is alert  Mental status is at baseline  Motor: No weakness  Psychiatric:         Mood and Affect: Mood normal          Behavior: Behavior normal          Pertinent Laboratory/Diagnostic Studies:  Mammo screening bilateral w 3d & cad    Result Date: 6/5/2022  Impression: No mammographic evidence of malignancy   ASSESSMENT/BI-RADS CATEGORY: Left: 1 - Negative Right: 1 - Negative Overall: 1 - Negative RECOMMENDATION:      - Routine screening mammogram in 1 year for both breasts   Workstation ID: [id number]       Images and diagnostics reviewed     Current Medications     Current Outpatient Medications:   •  BLACK ELDERBERRY PO, Take by mouth daily, Disp: , Rfl:   •  cycloSPORINE (RESTASIS) 0 05 % ophthalmic emulsion, , Disp: , Rfl:   •  DiphenhydrAMINE HCl (BENADRYL PO), Take by mouth daily at bedtime as needed , Disp: , Rfl:   •  ibuprofen (MOTRIN) 800 mg tablet, Take 1 tablet (800 mg total) by mouth every 8 (eight) hours as needed for moderate pain, Disp: 90 tablet, Rfl: 0  •  levothyroxine 112 mcg tablet, TAKE 1 TABLET BY MOUTH  DAILY, Disp: 90 tablet, Rfl: 1  •  LUMIGAN 0 01 % ophthalmic drops, INSTILL 1 DROP IN EACH EYE EVERY DAY AT BEDTIME, Disp: , Rfl: 1  •  meclizine (ANTIVERT) 12 5 MG tablet, Take 1 tablet (12 5 mg total) by mouth as needed for dizziness, Disp: 30 tablet, Rfl: 0  •  naproxen (NAPROSYN) 500 mg tablet, Take 500 mg by mouth 2 (two) times a day with meals, Disp: , Rfl:   •  naproxen (Naprosyn) 500 mg tablet, Take 1 tablet (500 mg total) by mouth 2 (two) times a day as needed for moderate pain, Disp: 90 tablet, Rfl: 0  •  Probiotic Product (PROBIOTIC DAILY PO), Take by mouth, Disp: , Rfl:   •  Acetaminophen (TYLENOL 8 HOUR PO), Take 200 mg by mouth as needed (Patient not taking: Reported on 1/4/2023), Disp: , Rfl:   •  Biotin 12208 MCG TABS, 2 (two) times a day (Patient not taking: Reported on 1/4/2023), Disp: , Rfl:   •  Tetrahydrozoline HCl (EYE DROPS REGULAR OP), Apply to eye as needed , Disp: , Rfl:   •  TURMERIC PO, Take by mouth (Patient not taking: Reported on 1/4/2023), Disp: , Rfl:     Health Maintenance     Health Maintenance   Topic Date Due   • Hepatitis B Vaccine (1 of 3 - 3-dose series) Never done   • Colorectal Cancer Screening  Never done   • Pneumococcal Vaccine: 65+ Years (2 - PPSV23 if available, else PCV20) 03/13/2021   • COVID-19 Vaccine (2 - Pfizer series) 11/02/2021   • Influenza Vaccine (1) 09/01/2022   • Fall Risk  05/10/2023   • Urinary Incontinence Screening  05/10/2023   • Medicare Annual Wellness Visit (AWV)  05/10/2023   • Breast Cancer Screening: Mammogram  06/03/2023   • Depression Screening  01/04/2024   • BMI: Followup Plan  01/04/2024   • BMI: Adult  01/04/2024   • Hepatitis C Screening  Completed   • Osteoporosis Screening  Completed   • HIB Vaccine  Aged Out   • IPV Vaccine  Aged Out   • Hepatitis A Vaccine  Aged Out   • Meningococcal ACWY Vaccine  Aged Out   • HPV Vaccine  Aged Out     Immunization History   Administered Date(s) Administered   • COVID-19 PFIZER VACCINE 0 3 ML IM 10/12/2021   • INFLUENZA 10/02/2013, 10/16/2014, 10/19/2015, 10/25/2016, 11/14/2017, 10/19/2018, 10/08/2019, 10/08/2019, 10/01/2020, 10/12/2021   • Influenza Split 10/11/2010, 09/28/2011, 10/08/2012   • Influenza, seasonal, injectable 12/10/2008, 11/17/2009   • Pneumococcal 09/24/2021   • Pneumococcal Conjugate 13-Valent 03/13/2020   • TD (adult) Preservative Free 12/19/2017   • Tdap 10/12/2007   • Tuberculin Skin Test-PPD Intradermal 07/19/2005   • Zoster Vaccine Recombinant 02/17/2020, 06/08/2020   • influenza, injectable, quadrivalent 10/01/2020       BETINA Mccormack  Internal Medicine [address], [address] [place] [address] [id number]  [facility], [address] [facility]  Office: [phone]  Fax: [phone]

## 2023-01-04 NOTE — ASSESSMENT & PLAN NOTE
Has issues with pain related to arthritis of the hands and other joints  Typically takes naproxen 500 mg once at bedtime  She inquires if there is anything else she could try for pain relief  Suggested she could try substituting with prescription strength ibuprofen 800 mg as an alternative    Advised her not to combine naproxen and ibuprofen as they are both NSAIDs

## 2023-01-05 ENCOUNTER — PRE-OP CATARACT MEASUREMENTS (OUTPATIENT)
Dept: URBAN - METROPOLITAN AREA CLINIC 6 | Facility: CLINIC | Age: 68
End: 2023-01-05

## 2023-01-05 DIAGNOSIS — H04.123: ICD-10-CM

## 2023-01-05 DIAGNOSIS — H25.813: ICD-10-CM

## 2023-01-05 DIAGNOSIS — H40.1131: ICD-10-CM

## 2023-01-05 PROCEDURE — 92136 OPHTHALMIC BIOMETRY: CPT

## 2023-01-05 PROCEDURE — 92014 COMPRE OPH EXAM EST PT 1/>: CPT

## 2023-01-05 ASSESSMENT — VISUAL ACUITY
OS_CC: 20/20
OD_CC: 20/25-1
OD_GLARE: 20/70
OS_GLARE: 20/70

## 2023-01-05 ASSESSMENT — KERATOMETRY
OD_K1POWER_DIOPTERS: 42.75
OS_K2POWER_DIOPTERS: 44.50
OS_AXISANGLE_DEGREES: 166
OD_K2POWER_DIOPTERS: 43.75
OS_K1POWER_DIOPTERS: 43.75
OS_AXISANGLE2_DEGREES: 76
OD_AXISANGLE_DEGREES: 1
OD_AXISANGLE2_DEGREES: 91

## 2023-01-05 ASSESSMENT — TONOMETRY
OS_IOP_MMHG: 20
OD_IOP_MMHG: 20

## 2023-01-17 ENCOUNTER — SURGERY/PROCEDURE (OUTPATIENT)
Dept: URBAN - METROPOLITAN AREA SURGICAL CENTER 6 | Facility: SURGICAL CENTER | Age: 68
End: 2023-01-17

## 2023-01-17 DIAGNOSIS — H40.1111: ICD-10-CM

## 2023-01-17 DIAGNOSIS — H25.811: ICD-10-CM

## 2023-01-17 PROCEDURE — 66174 TRLUML DIL AQ O/F CAN W/O ST: CPT | Mod: 59,RT

## 2023-01-17 PROCEDURE — 68841 INSJ RX ELUT IMPLT LAC CANAL: CPT

## 2023-01-17 PROCEDURE — 66984 XCAPSL CTRC RMVL W/O ECP: CPT

## 2023-01-18 ENCOUNTER — 1 DAY POST-OP (OUTPATIENT)
Dept: URBAN - METROPOLITAN AREA CLINIC 6 | Facility: CLINIC | Age: 68
End: 2023-01-18

## 2023-01-18 DIAGNOSIS — Z96.1: ICD-10-CM

## 2023-01-18 PROCEDURE — 99024 POSTOP FOLLOW-UP VISIT: CPT

## 2023-01-18 ASSESSMENT — KERATOMETRY
OS_AXISANGLE_DEGREES: 166
OD_K2POWER_DIOPTERS: 43.75
OS_AXISANGLE_DEGREES: 166
OD_AXISANGLE_DEGREES: 1
OD_K1POWER_DIOPTERS: 42.75
OD_K1POWER_DIOPTERS: 42.75
OS_K1POWER_DIOPTERS: 43.75
OS_AXISANGLE2_DEGREES: 76
OD_K2POWER_DIOPTERS: 43.75
OS_K2POWER_DIOPTERS: 44.50
OS_AXISANGLE2_DEGREES: 76
OS_K1POWER_DIOPTERS: 43.75
OD_AXISANGLE2_DEGREES: 91
OD_AXISANGLE_DEGREES: 1
OS_K2POWER_DIOPTERS: 44.50
OD_AXISANGLE2_DEGREES: 91

## 2023-01-18 ASSESSMENT — TONOMETRY
OD_IOP_MMHG: 13
OS_IOP_MMHG: 19

## 2023-01-18 ASSESSMENT — VISUAL ACUITY
OS_CC: 20/20
OD_SC: 20/30

## 2023-01-25 ENCOUNTER — 1 WEEK POST-OP (OUTPATIENT)
Dept: URBAN - METROPOLITAN AREA CLINIC 6 | Facility: CLINIC | Age: 68
End: 2023-01-25

## 2023-01-25 DIAGNOSIS — Z96.1: ICD-10-CM

## 2023-01-25 DIAGNOSIS — H25.812: ICD-10-CM

## 2023-01-25 PROCEDURE — 99024 POSTOP FOLLOW-UP VISIT: CPT

## 2023-01-25 PROCEDURE — 92136 OPHTHALMIC BIOMETRY: CPT | Mod: 26,LT

## 2023-01-25 ASSESSMENT — KERATOMETRY
OS_AXISANGLE_DEGREES: 166
OS_K1POWER_DIOPTERS: 43.75
OD_K1POWER_DIOPTERS: 42.75
OS_AXISANGLE2_DEGREES: 76
OD_AXISANGLE2_DEGREES: 91
OD_AXISANGLE_DEGREES: 1
OS_K2POWER_DIOPTERS: 44.50
OD_K2POWER_DIOPTERS: 43.75

## 2023-01-25 ASSESSMENT — VISUAL ACUITY
OS_PH: 20/40
OD_OTHER: 1 WK PO
OD_SC: 20/40
OS_SC: 20/100
OD_PH: 20/30+2

## 2023-01-25 ASSESSMENT — TONOMETRY
OD_IOP_MMHG: 14
OS_IOP_MMHG: 17

## 2023-01-31 ENCOUNTER — SURGERY/PROCEDURE (OUTPATIENT)
Dept: URBAN - METROPOLITAN AREA SURGICAL CENTER 6 | Facility: SURGICAL CENTER | Age: 68
End: 2023-01-31

## 2023-01-31 DIAGNOSIS — H25.812: ICD-10-CM

## 2023-01-31 DIAGNOSIS — H40.1121: ICD-10-CM

## 2023-01-31 PROCEDURE — 66984 XCAPSL CTRC RMVL W/O ECP: CPT | Mod: 79,LT

## 2023-01-31 PROCEDURE — 68841 INSJ RX ELUT IMPLT LAC CANAL: CPT | Mod: 79,LT

## 2023-01-31 PROCEDURE — 66174 TRLUML DIL AQ O/F CAN W/O ST: CPT | Mod: 59,LT,79,LT

## 2023-02-01 ENCOUNTER — 1 DAY POST-OP (OUTPATIENT)
Dept: URBAN - METROPOLITAN AREA CLINIC 6 | Facility: CLINIC | Age: 68
End: 2023-02-01

## 2023-02-01 DIAGNOSIS — Z96.1: ICD-10-CM

## 2023-02-01 PROCEDURE — 99024 POSTOP FOLLOW-UP VISIT: CPT

## 2023-02-01 ASSESSMENT — KERATOMETRY
OS_K2POWER_DIOPTERS: 44.50
OD_K2POWER_DIOPTERS: 43.75
OS_AXISANGLE_DEGREES: 166
OS_AXISANGLE_DEGREES: 166
OD_AXISANGLE2_DEGREES: 91
OD_K2POWER_DIOPTERS: 43.75
OS_AXISANGLE2_DEGREES: 76
OS_K1POWER_DIOPTERS: 43.75
OS_K2POWER_DIOPTERS: 44.50
OS_AXISANGLE2_DEGREES: 76
OD_AXISANGLE_DEGREES: 1
OD_K1POWER_DIOPTERS: 42.75
OD_K1POWER_DIOPTERS: 42.75
OD_AXISANGLE2_DEGREES: 91
OD_AXISANGLE_DEGREES: 1
OS_K1POWER_DIOPTERS: 43.75

## 2023-02-01 ASSESSMENT — VISUAL ACUITY
OD_SC: 20/40-1
OS_SC: 20/40+2
OD_PH: 20/30-2

## 2023-02-01 ASSESSMENT — TONOMETRY
OS_IOP_MMHG: 10
OS_IOP_MMHG: 9
OD_IOP_MMHG: 21
OD_IOP_MMHG: 17

## 2023-02-08 ENCOUNTER — 1 WEEK POST-OP (OUTPATIENT)
Dept: URBAN - METROPOLITAN AREA CLINIC 6 | Facility: CLINIC | Age: 68
End: 2023-02-08

## 2023-02-08 DIAGNOSIS — Z96.1: ICD-10-CM

## 2023-02-08 PROCEDURE — 99024 POSTOP FOLLOW-UP VISIT: CPT

## 2023-02-08 ASSESSMENT — KERATOMETRY
OD_AXISANGLE2_DEGREES: 91
OD_K2POWER_DIOPTERS: 43.75
OS_K2POWER_DIOPTERS: 44.50
OS_AXISANGLE2_DEGREES: 76
OS_K1POWER_DIOPTERS: 43.75
OS_AXISANGLE_DEGREES: 166
OD_AXISANGLE_DEGREES: 1
OD_K1POWER_DIOPTERS: 42.75

## 2023-02-08 ASSESSMENT — TONOMETRY
OS_IOP_MMHG: 13
OD_IOP_MMHG: 14

## 2023-02-08 ASSESSMENT — VISUAL ACUITY
OS_OTHER: 1 WK PO
OS_SC: 20/25
OD_SC: 20/25

## 2023-04-24 ENCOUNTER — FOLLOW UP (OUTPATIENT)
Dept: URBAN - METROPOLITAN AREA CLINIC 6 | Facility: CLINIC | Age: 68
End: 2023-04-24

## 2023-04-24 DIAGNOSIS — H04.123: ICD-10-CM

## 2023-04-24 DIAGNOSIS — H40.1131: ICD-10-CM

## 2023-04-24 DIAGNOSIS — Z96.1: ICD-10-CM

## 2023-04-24 PROCEDURE — 92012 INTRM OPH EXAM EST PATIENT: CPT | Mod: 24

## 2023-04-24 ASSESSMENT — VISUAL ACUITY
OD_SC: 20/20-1
OS_SC: 20/20

## 2023-04-24 ASSESSMENT — KERATOMETRY
OD_K1POWER_DIOPTERS: 42.75
OD_AXISANGLE2_DEGREES: 91
OD_AXISANGLE_DEGREES: 1
OS_AXISANGLE_DEGREES: 166
OS_K2POWER_DIOPTERS: 44.50
OS_AXISANGLE2_DEGREES: 76
OD_K2POWER_DIOPTERS: 43.75
OS_K1POWER_DIOPTERS: 43.75

## 2023-04-24 ASSESSMENT — TONOMETRY
OS_IOP_MMHG: 17
OD_IOP_MMHG: 18
OD_IOP_MMHG: 16
OS_IOP_MMHG: 15

## 2023-05-04 ENCOUNTER — RA CDI HCC (OUTPATIENT)
Dept: OTHER | Facility: HOSPITAL | Age: 68
End: 2023-05-04

## 2023-05-11 ENCOUNTER — OFFICE VISIT (OUTPATIENT)
Dept: INTERNAL MEDICINE CLINIC | Facility: CLINIC | Age: 68
End: 2023-05-11

## 2023-05-11 VITALS
SYSTOLIC BLOOD PRESSURE: 130 MMHG | WEIGHT: 151 LBS | RESPIRATION RATE: 13 BRPM | DIASTOLIC BLOOD PRESSURE: 88 MMHG | HEIGHT: 61 IN | TEMPERATURE: 97.9 F | BODY MASS INDEX: 28.51 KG/M2 | HEART RATE: 80 BPM

## 2023-05-11 DIAGNOSIS — Z78.0 POSTMENOPAUSAL: Primary | ICD-10-CM

## 2023-05-11 DIAGNOSIS — N39.41 URGENCY INCONTINENCE: ICD-10-CM

## 2023-05-11 DIAGNOSIS — Z12.31 ENCOUNTER FOR SCREENING MAMMOGRAM FOR BREAST CANCER: ICD-10-CM

## 2023-05-11 DIAGNOSIS — E03.9 ACQUIRED HYPOTHYROIDISM: ICD-10-CM

## 2023-05-11 DIAGNOSIS — Z00.00 MEDICARE ANNUAL WELLNESS VISIT, SUBSEQUENT: ICD-10-CM

## 2023-05-11 DIAGNOSIS — M05.742 RHEUMATOID ARTHRITIS WITH RHEUMATOID FACTOR OF LEFT HAND WITHOUT ORGAN OR SYSTEMS INVOLVEMENT (HCC): ICD-10-CM

## 2023-05-11 DIAGNOSIS — T75.3XXA MOTION SICKNESS, INITIAL ENCOUNTER: ICD-10-CM

## 2023-05-11 RX ORDER — SCOLOPAMINE TRANSDERMAL SYSTEM 1 MG/1
1 PATCH, EXTENDED RELEASE TRANSDERMAL
Qty: 10 PATCH | Refills: 0 | Status: SHIPPED | OUTPATIENT
Start: 2023-05-11

## 2023-05-11 NOTE — ASSESSMENT & PLAN NOTE
Will occasionally have issues with urinary urgency and leakage of urine prior to reaching the bathroom  Not having any dysuria, hematuria, no sensation of incomplete emptying  No associated significant constipation  She has history of partial hysterectomy in 2005  Has been trying Kegel exercises without much improvement    She is not interested in taking medications at this time  -We reviewed management strategies -consideration for referral to PFMT, avoiding bladder irritants, timed voiding

## 2023-05-11 NOTE — PROGRESS NOTES
Assessment and Plan:     Past medical history of hypothyroidism, bilateral total knee replacement, osteoarthritis/inflammatory arthritis, colovaginal fistula status post colon resection in 2019, vertigo, bilateral cataracts      Problem List Items Addressed This Visit        Endocrine    Acquired hypothyroidism     Euthyroid  Continue levothyroxine at current dose         Relevant Orders    CBC and differential    Comprehensive metabolic panel    T4, free    TSH, 3rd generation    Lipid panel       Musculoskeletal and Integument    Rheumatoid arthritis with rheumatoid factor of left hand without organ or systems involvement (HCC)     Previously had positive rheumatoid factor, had ultrasound of the hands which showed primarily osteoarthritis with some superimposed inflammatory arthritis  Previously evaluated by rheumatology  She is not currently on any DMARDs            Other    Urgency incontinence     Will occasionally have issues with urinary urgency and leakage of urine prior to reaching the bathroom  Not having any dysuria, hematuria, no sensation of incomplete emptying  No associated significant constipation  She has history of partial hysterectomy in 2005  Has been trying Kegel exercises without much improvement    She is not interested in taking medications at this time  -We reviewed management strategies -consideration for referral to PFMT, avoiding bladder irritants, timed voiding        Other Visit Diagnoses     Postmenopausal    -  Primary    Relevant Orders    DXA bone density spine hip and pelvis    Encounter for screening mammogram for breast cancer        Relevant Orders    Mammo screening bilateral w 3d & cad    Motion sickness, initial encounter        Relevant Medications    scopolamine (TRANSDERM-SCOP) 1 mg/3 days TD 72 hr patch    Medicare annual wellness visit, subsequent              Urinary Incontinence Plan of Care: counseling topics discussed: practice Kegel (pelvic floor strengthening) exercises, use restroom every 2 hours, limit alcohol, caffeine, spicy foods, and acidic foods, keeping a bladder diary and preventing constipation  Preventive health issues were discussed with patient, and age appropriate screening tests were ordered as noted in patient's After Visit Summary  Personalized health advice and appropriate referrals for health education or preventive services given if needed, as noted in patient's After Visit Summary       History of Present Illness:     Patient presents for a Medicare Wellness Visit    HPI   Patient Care Team:  Chet Paulino DO as PCP - General (Internal Medicine)  Bing Christine MD as PCP - Endocrinology (Endocrinology)  Hortensia Nair MD (Inactive)  Amalia Noel MD (Obstetrics and Gynecology)     Review of Systems:     Review of Systems     Problem List:     Patient Active Problem List   Diagnosis   • History of total knee arthroplasty   • Orthopedic aftercare   • Aftercare following left knee joint replacement surgery   • Arthritis of foot, right   • Chronic pain of left knee   • Pes anserine bursitis   • Primary osteoarthritis of left knee   • Acquired hypothyroidism   • Hyperlipidemia   • Elevated hemoglobin A1c   • Vitamin D deficiency   • Arthritis of left knee   • Prediabetes   • Breast lump   • PONV (postoperative nausea and vomiting)   • S/P total knee arthroplasty, left   • Rheumatoid arthritis with rheumatoid factor of left hand without organ or systems involvement (HCC)   • Arthritis of carpometacarpal (CMC) joints of both thumbs   • Primary osteoarthritis of both hands   • Vertigo   • Arthritis of left foot   • Primary osteoarthritis involving multiple joints   • Urgency incontinence      Past Medical and Surgical History:     Past Medical History:   Diagnosis Date   • BPPV (benign paroxysmal positional vertigo)    • Bronchitis    • Disease of thyroid gland     HYPO   • Diverticulitis    • GERD (gastroesophageal reflux disease) • Glaucoma (increased eye pressure)    •  2 para 2    • IBS (irritable bowel syndrome)    • Insomnia    • PONV (postoperative nausea and vomiting)      Past Surgical History:   Procedure Laterality Date   • CHOLECYSTECTOMY     • COLON SURGERY  2019    resection   • COLONOSCOPY  2019   • COLONOSCOPY     • DILATION AND CURETTAGE OF UTERUS     • FL GUIDED NEEDLE PLAC BX/ASP/INJ  2018   • FL GUIDED NEEDLE PLAC BX/ASP/INJ  2019   • FL GUIDED NEEDLE PLAC BX/ASP/INJ  2020   • FL GUIDED NEEDLE PLAC BX/ASP/INJ  10/8/2020   • FL GUIDED NEEDLE PLAC BX/ASP/INJ  2021   • FL GUIDED NEEDLE PLAC BX/ASP/INJ  2022   • FL GUIDED NEEDLE PLAC BX/ASP/INJ  2022   • HYSTERECTOMY  2006   • JOINT REPLACEMENT     • LAPAROSCOPIC COLON RESECTION  2019   • MAMMO (HISTORICAL) Bilateral 2018   • PARTIAL HYSTERECTOMY     • MS ARTHRP KNE CONDYLE&PLATU MEDIAL&LAT COMPARTMENTS Left 2021    Procedure: ARTHROPLASTY KNEE TOTAL;  Surgeon: Camille Rico MD;  Location: [facility];  Service: Orthopedics   • [address] Right 2018    Procedure: ARTHROPLASTY KNEE TOTAL REVISION;  Surgeon: Camille Rico MD;  Location: [facility];  Service: Orthopedics   • TONSILLECTOMY     • TRABECULOPLASTY, LASER SELECTIVE Left 2019   • VARICOSE VEIN SURGERY        Family History:     Family History   Problem Relation Age of Onset   • Pancreatic cancer Mother 72   • Diabetes type II Mother    • Heart disease Father    • No Known Problems Sister    • No Known Problems Daughter    • No Known Problems Daughter    • No Known Problems Maternal Grandmother    • No Known Problems Maternal Grandfather    • Breast cancer Paternal Grandmother 80   • Lung disease Paternal Grandfather    • No Known Problems Maternal Aunt    • No Known Problems Maternal Aunt    • No Known Problems Maternal Aunt    • No Known Problems Maternal Aunt       Social History:     Social History Socioeconomic History   • Marital status:      Spouse name: None   • Number of children: None   • Years of education: None   • Highest education level: None   Occupational History   • None   Tobacco Use   • Smoking status: Never   • Smokeless tobacco: Never   Vaping Use   • Vaping Use: Never used   Substance and Sexual Activity   • Alcohol use: Yes     Comment: social    • Drug use: No   • Sexual activity: Yes     Partners: Male     Birth control/protection: Surgical   Other Topics Concern   • None   Social History Narrative    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  Social Determinants of Health     Financial Resource Strain: Low Risk    • Difficulty of Paying Living Expenses: Not hard at all   Food Insecurity: Not on file   Transportation Needs: No Transportation Needs   • Lack of Transportation (Medical): No   • Lack of Transportation (Non-Medical):  No   Physical Activity: Not on file   Stress: Not on file   Social Connections: Not on file   Intimate Partner Violence: Not on file   Housing Stability: Not on file      Medications and Allergies:     Current Outpatient Medications   Medication Sig Dispense Refill   • Acetaminophen (TYLENOL 8 HOUR PO) Take 200 mg by mouth as needed     • BLACK ELDERBERRY PO Take by mouth daily     • cycloSPORINE (RESTASIS) 0 05 % ophthalmic emulsion      • DiphenhydrAMINE HCl (BENADRYL PO) Take by mouth daily at bedtime as needed      • ibuprofen (MOTRIN) 800 mg tablet Take 1 tablet (800 mg total) by mouth every 8 (eight) hours as needed for moderate pain 90 tablet 0   • levothyroxine 112 mcg tablet TAKE 1 TABLET BY MOUTH  DAILY 90 tablet 1   • LUMIGAN 0 01 % ophthalmic drops INSTILL 1 DROP IN EACH EYE EVERY DAY AT BEDTIME  1   • meclizine (ANTIVERT) 12 5 MG tablet Take 1 tablet (12 5 mg total) by mouth as needed for dizziness 30 tablet 0   • naproxen (Naprosyn) 500 mg tablet Take 1 tablet (500 mg total) by mouth 2 (two) times a day as needed for moderate pain 90 tablet 0   • Probiotic Product (PROBIOTIC DAILY PO) Take by mouth     • scopolamine (TRANSDERM-SCOP) 1 mg/3 days TD 72 hr patch Place 1 patch on the skin over 72 hours every third day 10 patch 0   • Tetrahydrozoline HCl (EYE DROPS REGULAR OP) Apply to eye as needed      • naproxen (NAPROSYN) 500 mg tablet Take 500 mg by mouth 2 (two) times a day with meals (Patient not taking: Reported on 5/11/2023)     • TURMERIC PO Take by mouth (Patient not taking: Reported on 1/4/2023)       No current facility-administered medications for this visit  Allergies   Allergen Reactions   • Codeine GI Intolerance   • Morphine Itching      Immunizations:     Immunization History   Administered Date(s) Administered   • COVID-19 PFIZER VACCINE 0 3 ML IM 10/12/2021   • INFLUENZA 10/02/2013, 10/16/2014, 10/19/2015, 10/25/2016, 11/14/2017, 10/19/2018, 10/08/2019, 10/08/2019, 10/01/2020, 10/12/2021   • Influenza Split 10/11/2010, 09/28/2011, 10/08/2012   • Influenza, seasonal, injectable 12/10/2008, 11/17/2009   • Pneumococcal 09/24/2021   • Pneumococcal Conjugate 13-Valent 03/13/2020   • TD (adult) Preservative Free 12/19/2017   • Tdap 10/12/2007   • Tuberculin Skin Test-PPD Intradermal 07/19/2005   • Zoster Vaccine Recombinant 02/17/2020, 06/08/2020   • influenza, injectable, quadrivalent 10/01/2020      Health Maintenance:         Topic Date Due   • Colorectal Cancer Screening  Never done   • Breast Cancer Screening: Mammogram  06/03/2023   • Hepatitis C Screening  Completed         Topic Date Due   • Pneumococcal Vaccine: 65+ Years (2 - PPSV23 if available, else PCV20) 03/13/2021   • COVID-19 Vaccine (2 - Pfizer series) 11/02/2021      Medicare Screening Tests and Risk Assessments:     Ulysses Hipp is here for her Subsequent Wellness visit  Health Risk Assessment:   Patient rates overall health as very good  Patient feels that their physical health rating is same  Patient is very satisfied with their life   Eyesight was rated as same  Hearing was rated as same  Patient feels that their emotional and mental health rating is same  Patients states they are never, rarely angry  Patient states they are never, rarely unusually tired/fatigued  Pain experienced in the last 7 days has been some  Patient's pain rating has been 3/10  Patient states that she has experienced no weight loss or gain in last 6 months  Fall Risk Screening: In the past year, patient has experienced: no history of falling in past year      Urinary Incontinence Screening:   Patient has leaked urine accidently in the last six months  Since stents were inserted for colon resection    Home Safety:  Patient does not have trouble with stairs inside or outside of their home  Patient has working smoke alarms and has working carbon monoxide detector  Home safety hazards include: none  Nutrition:   Current diet is Regular  Medications:   Patient is currently taking over-the-counter supplements  OTC medications include: See list of medications  Patient is able to manage medications  Activities of Daily Living (ADLs)/Instrumental Activities of Daily Living (IADLs):   Walk and transfer into and out of bed and chair?: Yes  Dress and groom yourself?: Yes    Bathe or shower yourself?: Yes    Feed yourself? Yes  Do your laundry/housekeeping?: Yes  Manage your money, pay your bills and track your expenses?: Yes  Make your own meals?: Yes    Do your own shopping?: Yes    Previous Hospitalizations:   Any hospitalizations or ED visits within the last 12 months?: No      Advance Care Planning:   Living will: Yes    Durable POA for healthcare:  Yes    Advanced directive: Yes      PREVENTIVE SCREENINGS      Cardiovascular Screening:    General: Screening Not Indicated and History Lipid Disorder      Diabetes Screening:     General: Screening Current      Breast Cancer Screening:     General: Screening Current      Cervical Cancer Screening:    General: Screening Not Indicated " Lung Cancer Screening:     General: Screening Not Indicated      Hepatitis C Screening:    General: Screening Current    Screening, Brief Intervention, and Referral to Treatment (SBIRT)    Screening  Typical number of drinks in a day: 4  Typical number of drinks in a week: 4  Interpretation: Low risk drinking behavior  AUDIT-C Screenin) How often did you have a drink containing alcohol in the past year? 2 to 4 times a month  2) How many drinks did you have on a typical day when you were drinking in the past year? 1 to 2  3) How often did you have 6 or more drinks on one occasion in the past year? never    AUDIT-C Score: 2  Interpretation: Score 0-2 (female): Negative screen for alcohol misuse    Single Item Drug Screening:  How often have you used an illegal drug (including marijuana) or a prescription medication for non-medical reasons in the past year? never    Single Item Drug Screen Score: 0  Interpretation: Negative screen for possible drug use disorder    No results found  Physical Exam:     /88 (BP Location: Left arm, Patient Position: Sitting, Cuff Size: Standard)   Pulse 80   Temp 97 9 °F (36 6 °C)   Resp 13   Ht 5' 1\" (1 549 m)   Wt 68 5 kg (151 lb)   LMP  (LMP Unknown)   BMI 28 53 kg/m²     Physical Exam  Constitutional:       Appearance: Normal appearance  She is not ill-appearing  HENT:      Head: Normocephalic and atraumatic  Eyes:      General: No scleral icterus  Right eye: No discharge  Left eye: No discharge  Cardiovascular:      Rate and Rhythm: Normal rate and regular rhythm  Heart sounds: No murmur heard  No friction rub  Pulmonary:      Effort: Pulmonary effort is normal       Breath sounds: Normal breath sounds  No wheezing or rales  Abdominal:      General: Abdomen is flat  There is no distension  Palpations: Abdomen is soft  Tenderness: There is no abdominal tenderness     Musculoskeletal:         General: No swelling, " tenderness or deformity  Skin:     General: Skin is warm and dry  Findings: No erythema  Neurological:      Mental Status: She is alert and oriented to person, place, and time  Mental status is at baseline  Motor: No weakness     Psychiatric:         Mood and Affect: Mood normal          Behavior: Behavior normal           Max Penelope Silence, DO

## 2023-05-11 NOTE — ASSESSMENT & PLAN NOTE
Previously had positive rheumatoid factor, had ultrasound of the hands which showed primarily osteoarthritis with some superimposed inflammatory arthritis  Previously evaluated by rheumatology    She is not currently on any DMARDs

## 2023-06-26 ENCOUNTER — HOSPITAL ENCOUNTER (OUTPATIENT)
Dept: MAMMOGRAPHY | Facility: MEDICAL CENTER | Age: 68
Discharge: HOME | End: 2023-06-26
Payer: MEDICARE

## 2023-06-26 VITALS — WEIGHT: 151 LBS | HEIGHT: 61 IN | BODY MASS INDEX: 28.51 KG/M2

## 2023-06-26 DIAGNOSIS — Z12.31 ENCOUNTER FOR SCREENING MAMMOGRAM FOR BREAST CANCER: ICD-10-CM

## 2023-06-26 PROCEDURE — 77063 BREAST TOMOSYNTHESIS BI: CPT

## 2023-06-26 PROCEDURE — 77067 SCR MAMMO BI INCL CAD: CPT

## 2023-07-26 ENCOUNTER — HOSPITAL ENCOUNTER (OUTPATIENT)
Dept: BONE DENSITY | Facility: MEDICAL CENTER | Age: 68
Discharge: HOME | End: 2023-07-26
Payer: MEDICARE

## 2023-07-26 DIAGNOSIS — Z78.0 POSTMENOPAUSAL: ICD-10-CM

## 2023-07-26 PROCEDURE — 77080 DXA BONE DENSITY AXIAL: CPT

## 2023-08-03 ENCOUNTER — TELEPHONE (OUTPATIENT)
Age: 68
End: 2023-08-03

## 2023-08-03 DIAGNOSIS — M15.9 PRIMARY OSTEOARTHRITIS INVOLVING MULTIPLE JOINTS: ICD-10-CM

## 2023-08-03 NOTE — TELEPHONE ENCOUNTER
Caller: Patient    Doctor: Dr. Harry Alejandre    Reason for call: Requesting a note verifying that antibiotics is required prior to dental work and is asking if it can be placed in her MyChart.     Call back#: [phone]

## 2023-08-03 NOTE — TELEPHONE ENCOUNTER
Patient called stating that she had her Dexa Scan done and wanted us to know that Dr. Elis Escalante referred her to Rheumatology. She is also wondering if we can do a handicap placard for her. She currently has one and it runs out in December. [facility] been handling it but they tell her that they only renew them for every 6 months. They suggested that she have her PCP give her one for longer. She wants to know if we would do that for her or if she needs an appointment.

## 2023-08-03 NOTE — TELEPHONE ENCOUNTER
Antibiotics not needed at this point as it's been longer than 2 years from surgery. Message sent to pt through [address] [place] [address].

## 2023-08-04 RX ORDER — IBUPROFEN 800 MG/1
800 TABLET ORAL EVERY 8 HOURS PRN
Qty: 90 TABLET | Refills: 0 | Status: SHIPPED | OUTPATIENT
Start: 2023-08-04

## 2023-08-10 ENCOUNTER — RA CDI HCC (OUTPATIENT)
Dept: OTHER | Facility: HOSPITAL | Age: 68
End: 2023-08-10

## 2023-08-17 ENCOUNTER — OFFICE VISIT (OUTPATIENT)
Dept: INTERNAL MEDICINE CLINIC | Facility: CLINIC | Age: 68
End: 2023-08-17
Payer: MEDICARE

## 2023-08-17 VITALS
HEIGHT: 61 IN | SYSTOLIC BLOOD PRESSURE: 130 MMHG | DIASTOLIC BLOOD PRESSURE: 80 MMHG | OXYGEN SATURATION: 97 % | BODY MASS INDEX: 28.51 KG/M2 | WEIGHT: 151 LBS | TEMPERATURE: 96.9 F | HEART RATE: 64 BPM

## 2023-08-17 DIAGNOSIS — M81.0 AGE-RELATED OSTEOPOROSIS WITHOUT CURRENT PATHOLOGICAL FRACTURE: Primary | ICD-10-CM

## 2023-08-17 PROCEDURE — 99214 OFFICE O/P EST MOD 30 MIN: CPT | Performed by: INTERNAL MEDICINE

## 2023-08-17 NOTE — ASSESSMENT & PLAN NOTE
Has issues with pain related to arthritis of the hands and other joints. Typically takes naproxen 500 mg once at bedtime. She inquires if there is anything else she could try for pain relief. Suggested she could try substituting with prescription strength ibuprofen 800 mg as an alternative.   Advised her not to combine naproxen and ibuprofen as they are both NSAIDs

## 2023-08-17 NOTE — PROGRESS NOTES
INTERNAL MEDICINE OFFICE VISIT  [address] [facility] Internal Medicine- [place]    NAME: Elida Elliott  AGE: 76 y.o. SEX: female    DATE OF ENCOUNTER: 8/17/2023    Assessment and Plan/History of Present Illness     Here today to review DEXA scan and discuss new diagnosis of osteoporosis  Past medical history of hypothyroidism, bilateral total knee replacement, osteoarthritis/inflammatory arthritis, colovaginal fistula status post colon resection in 2019, vertigo, bilateral cataracts      1. Age-related osteoporosis without current pathological fracture  Assessment & Plan: We reviewed her DEXA scan from July 2023. Findings of osteoporosis of the right forearm with T score of -3.3. We reviewed management options today including oral and IV bisphosphonates, Prolia. She seems to be most interested in Reclast which I think would be a good option. She has an appointment scheduled with rheumatology next month to discuss this in further detail  - We reviewed importance of adequate calcium and vitamin D intake, regular weightbearing exercise        Suggest she get RSV vaccine and updated COVID booster prior to her vacation in October    Handicap placard was filled out for the patient. She has various orthopedic issues which limit her functional status          No orders of the defined types were placed in this encounter.       Chief Complaint     Chief Complaint   Patient presents with   • Arthritis     Arthritis pain all over  Results for Dexa Scan        Review of Systems     10 point ROS negative except per HPI    The following portions of the patient's history were reviewed and updated as appropriate: allergies, current medications, past family history, past medical history, past social history, past surgical history and problem list.    Active Problem List     Patient Active Problem List   Diagnosis   • History of total knee arthroplasty   • Orthopedic aftercare   • Aftercare following left knee joint replacement surgery   • Arthritis of foot, right   • Chronic pain of left knee   • Pes anserine bursitis   • Primary osteoarthritis of left knee   • Acquired hypothyroidism   • Hyperlipidemia   • Elevated hemoglobin A1c   • Vitamin D deficiency   • Arthritis of left knee   • Prediabetes   • Breast lump   • PONV (postoperative nausea and vomiting)   • S/P total knee arthroplasty, left   • Rheumatoid arthritis with rheumatoid factor of left hand without organ or systems involvement (HCC)   • Arthritis of carpometacarpal (CMC) joints of both thumbs   • Primary osteoarthritis of both hands   • Vertigo   • Arthritis of left foot   • Primary osteoarthritis involving multiple joints   • Urgency incontinence   • Age-related osteoporosis without current pathological fracture       Objective     /80 (BP Location: Left arm, Patient Position: Sitting, Cuff Size: Standard)   Pulse 64   Temp (!) 96.9 °F (36.1 °C)   Ht 5' 1" (1.549 m)   Wt 68.5 kg (151 lb)   LMP  (LMP Unknown)   SpO2 97%   BMI 28.53 kg/m²     Physical Exam    Pertinent Laboratory/Diagnostic Studies:  DXA bone density spine hip and pelvis    Result Date: 8/2/2023  Impression: 1. Based on the [facility] classification, this study identifies a diagnosis of osteoporosis, notable at the forearm area and the patient is considered at  risk for fracture. 2. A daily intake of calcium of at least 1200 mg and vitamin D, 800-1000 IU, as well as weight bearing and muscle strengthening exercise, fall prevention and avoidance of tobacco and excessive alcohol intake as basic preventive measures are recommended. 3. Repeat DXA scan on the same equipment in 18-24 months as clinically indicated. The 10 year risk of hip fracture is 2.0%, with the 10 year risk of major osteoporotic fracture being 11%, as calculated by the [facility] fracture risk assessment tool (FRAX).   The current NOF guidelines recommend treating patients with FRAX 10 year risk score of >3% for hip fracture and >20% for major osteoporotic fracture. WHO CLASSIFICATION: Normal (a T-score of -1.0 or higher) Low bone mineral density (a T-score of less than -1.0 but higher than -2.5) Osteoporosis (a T-score of -2.5 or less) Severe osteoporosis (a T-score of -2.5 or less with a fragility fracture) Thank you for allowing us the opportunity to participate in your patient care. The expanded DEXA report will no longer be arriving in your mail. If you desire to view the full report please contact Abilio arredondo or access the PACS system.  Workstation performed: [id number]       Images and diagnostics reviewed     Current Medications     Current Outpatient Medications:   •  Acetaminophen (TYLENOL 8 HOUR PO), Take 200 mg by mouth as needed, Disp: , Rfl:   •  BLACK ELDERBERRY PO, Take by mouth daily, Disp: , Rfl:   •  cycloSPORINE (RESTASIS) 0.05 % ophthalmic emulsion, , Disp: , Rfl:   •  DiphenhydrAMINE HCl (BENADRYL PO), Take by mouth daily at bedtime as needed , Disp: , Rfl:   •  ibuprofen (MOTRIN) 800 mg tablet, Take 1 tablet (800 mg total) by mouth every 8 (eight) hours as needed for moderate pain, Disp: 90 tablet, Rfl: 0  •  levothyroxine 112 mcg tablet, TAKE 1 TABLET BY MOUTH DAILY, Disp: 90 tablet, Rfl: 2  •  LUMIGAN 0.01 % ophthalmic drops, INSTILL 1 DROP IN EACH EYE EVERY DAY AT BEDTIME, Disp: , Rfl: 1  •  meclizine (ANTIVERT) 12.5 MG tablet, Take 1 tablet (12.5 mg total) by mouth as needed for dizziness, Disp: 30 tablet, Rfl: 0  •  Probiotic Product (PROBIOTIC DAILY PO), Take by mouth, Disp: , Rfl:   •  scopolamine (TRANSDERM-SCOP) 1 mg/3 days TD 72 hr patch, Place 1 patch on the skin over 72 hours every third day, Disp: 10 patch, Rfl: 0  •  naproxen (NAPROSYN) 500 mg tablet, Take 500 mg by mouth 2 (two) times a day with meals (Patient not taking: Reported on 5/11/2023), Disp: , Rfl:   •  Tetrahydrozoline HCl (EYE DROPS REGULAR OP), Apply to eye as needed , Disp: , Rfl:   •  TURMERIC PO, Take by mouth (Patient not taking: Reported on 1/4/2023), Disp: , Rfl:     Health Maintenance     Health Maintenance   Topic Date Due   • Pneumococcal Vaccine: 65+ Years (2 - PPSV23 if available, else PCV20) 03/13/2021   • COVID-19 Vaccine (2 - Pfizer series) 12/07/2021   • Influenza Vaccine (1) 09/01/2023   • BMI: Followup Plan  01/04/2024   • Fall Risk  05/11/2024   • Urinary Incontinence Screening  05/11/2024   • Medicare Annual Wellness Visit (AWV)  05/11/2024   • BMI: Adult  06/26/2024   • Breast Cancer Screening: Mammogram  06/26/2024   • Colorectal Cancer Screening  07/31/2024   • Depression Screening  08/17/2024   • Hepatitis C Screening  Completed   • Osteoporosis Screening  Completed   • HIB Vaccine  Aged Out   • IPV Vaccine  Aged Out   • Hepatitis A Vaccine  Aged Out   • Meningococcal ACWY Vaccine  Aged Out   • HPV Vaccine  Aged Out     Immunization History   Administered Date(s) Administered   • COVID-19 PFIZER VACCINE 0.3 ML IM 10/12/2021   • INFLUENZA 10/02/2013, 10/16/2014, 10/19/2015, 10/25/2016, 11/14/2017, 10/19/2018, 10/08/2019, 10/08/2019, 10/01/2020, 10/12/2021   • Influenza Split 10/11/2010, 09/28/2011, 10/08/2012   • Influenza, seasonal, injectable 12/10/2008, 11/17/2009   • Pneumococcal 09/24/2021   • Pneumococcal Conjugate 13-Valent 03/13/2020   • TD (adult) Preservative Free 12/19/2017   • Tdap 10/12/2007   • Tuberculin Skin Test-PPD Intradermal 07/19/2005   • Zoster Vaccine Recombinant 02/17/2020, 06/08/2020   • influenza, injectable, quadrivalent 10/01/2020       Chet Roach D.O.   [facility] Internal Medicine [place] [address], [facility] [id number]  [facility] in [place], [address] [facility]  Office: [phone]  Fax: [phone]

## 2023-08-17 NOTE — ASSESSMENT & PLAN NOTE
Previously had positive rheumatoid factor, had ultrasound of the hands which showed primarily osteoarthritis with some superimposed inflammatory arthritis. Previously evaluated by rheumatology.   She is not currently on any DMARDs

## 2023-08-17 NOTE — ASSESSMENT & PLAN NOTE
We reviewed her DEXA scan from July 2023. Findings of osteoporosis of the right forearm with T score of -3.3. We reviewed management options today including oral and IV bisphosphonates, Prolia. She seems to be most interested in Reclast which I think would be a good option.   She has an appointment scheduled with rheumatology next month to discuss this in further detail  - We reviewed importance of adequate calcium and vitamin D intake, regular weightbearing exercise

## 2023-08-17 NOTE — PATIENT INSTRUCTIONS
Osteoporosis Education   Osteoporosis  is a long-term medical condition that causes your bones to become weak, brittle, and more likely to fracture. Osteoporosis occurs when your body absorbs more bone than it makes. It is also caused by a lack of calcium and estrogen (female hormone). Common symptoms include the following: You may not have any signs or symptoms. You may break a bone after a muscle strain, bump, or fall. A break usually occurs in the hip, spine, or wrist. A collapsed vertebra (bone in your spine) may cause severe back pain or loss of height from bent posture. Call your doctor if:   ·  You have severe pain. ·  You have increasing pain after a fall. ·  You have pain when you do your daily activities. ·  You have questions or concerns about your condition or care. Diagnosis of osteoporosis:   · Blood and urine tests  measure your calcium, vitamin D, and estrogen levels. · An x-ray or CT may show thinned bones or a fracture. You may be given contrast liquid to help the bones show up better in the pictures. Tell the healthcare provider if you have ever had an allergic reaction to contrast liquid. Do not enter the MRI room with anything metal. Metal can cause serious injury. Tell the healthcare provider if you have any metal in or on your body. · A bone density test  compares your bone thickness with what is expected for someone of your age, gender, and ethnicity. Treatment for osteoporosis may include medicines to prevent bone loss, build new bone, and increase estrogen. These medicines help prevent fractures and may be given as a pill or injection. Ask your healthcare provider for more information on these medicines. Prevent bone loss:  · Eat healthy foods that are high in calcium. This helps keep your bones strong. Good sources of calcium are milk, cheese, broccoli, tofu, almonds, and canned salmon and sardines. Recommended to get at least 1200mg daily of calcium.   · Increase your vitamin D intake. Vitamin D is in fish oils, some vegetables, and fortified milk, cereal, and bread. Vitamin D is also formed in the skin when it is exposed to the sun. Ask your healthcare provider how much sunlight is safe for you. You will require at least 800 units of vitamin D daily taken as a supplement. · Drink liquids as directed. Ask your healthcare provider how much liquid to drink each day and which liquids are best for you. Do not have alcohol or caffeine. They decrease bone mineral density, which can weaken your bones. · Exercise regularly. Ask your healthcare provider about the best exercise plan for you. Weight bearing exercise for 30 minutes, 3 times a week can help build and strengthen bone. · Do not smoke. Nicotine and other chemicals in cigarettes and cigars can cause lung damage. Ask your healthcare provider for information if you currently smoke and need help to quit. E-cigarettes or smokeless tobacco still contain nicotine. Talk to your healthcare provider before you use these products. · Go to physical therapy as directed. A physical therapist teaches you exercises to help improve movement and muscle strength. · Alcohol. It is recommended to avoid heavy alcohol use as increased consumption of alcohol is known to cause bone loss  © Copyright [employer] 2021 Information is for End User's use only and may not be sold, redistributed or otherwise used for commercial purposes. All illustrations and images included in CareNotes® are the copyrighted property of [employer] or [address] [place] [address]    Calcium and vitamin D for bone health (Beyond the Basics)    CALCIUM AND VITAMIN D OVERVIEW  Osteoporosis is a common bone disorder that causes a progressive loss in bone density and mass. As a result, bones become thin, weakened, and easily fractured.  It is estimated that more than 1.3 million osteoporosis-associated (or "osteoporotic") fractures occur every year in the [place], primarily of bone within the spine (the vertebrae), the hip, and the forearm near the wrist. =    A number of treatments can help to prevent loss of bone and treat low bone mass. However, the first step in preventing or treating osteoporosis is to consume foods and drinks that provide calcium, a mineral essential for bone strength, and vitamin D, which aids in calcium breakdown and absorption. =    CALCIUM AND VITAMIN D BENEFITS  Good nutrition is important at all ages to keep the bones healthy. · Taking calcium reduces bone loss and decreases the risk of fracturing the vertebrae (the bones that surround the spinal cord). · Consuming calcium during childhood (eg, in milk) can lead to higher bone mass in adulthood. This increase in bone density can reduce the risk of fractures later in life. · Calcium may also have benefits in other body systems by reducing blood pressure and cholesterol levels. · Calcium and vitamin D supplements may help prevent tooth loss in older adults. RECOMMENDATIONS FOR CALCIUM    General recommendations -- Premenopausal women and men should consume at least 1000 mg of calcium, while postmenopausal women should consume 1200 mg (total diet plus supplement). You should not consume more than 2000 mg of calcium per day (total diet plus supplement) due to the risk of side effects. Calcium in the diet -- The primary sources of calcium in the diet include milk and other dairy products, such as hard cheese, cottage cheese, or yogurt, as well as green vegetables, such as kale and broccoli (table 1). Some cereals, soy products, and fruit juices are fortified with calcium. Calcium supplements -- The body is able to absorb calcium contained in supplements as well as from dietary sources. If it is not possible to get enough calcium from dietary sources, consult a health care provider to determine the best type, dose, and timing of calcium supplements.      · Calcium carbonate is effective and is the least expensive form of calcium. It is best absorbed with a low-iron meal (such as breakfast). Calcium carbonate may not be absorbed well in people who also take a specific medication for gastroesophageal reflux (called a proton pump inhibitor or H2 blocker), which blocks stomach acid. · Many natural calcium carbonate preparations such as oyster shells or bone meal contain some lead. · Calcium citrate is well absorbed in the fasting state as well as with a meal.  · Calcium doses above 500 mg are not absorbed as well as smaller doses, so large doses of supplements should be taken in divided doses (eg, in the morning and evening). · Calcium supplements do not replace other osteoporosis treatments such as hormone replacement, bisphosphonates (eg, risedronate [sample brand name: Actonel] and alendronate [brand name: Fosamax]), and raloxifene (brand name: Evista). Calcium and vitamin D supplements alone are usually insufficient to prevent age-related bone loss, although they may be beneficial in some subgroups (older adults, those with very low intake). In most patients with or at risk for osteoporosis, the addition of medication or hormonal therapy is necessary in order to slow the breakdown and removal of bone (ie, resorption). Underlying gastrointestinal diseases -- Patients who do not adequately absorb nutrients from the gastrointestinal tract (due to malabsorption) may require more than 1000 to 1200 mg of calcium per day. In such cases, a health care provider can help to determine the optimal dose of calcium. Medications -- All medications should be discussed with a health care provider to ensure that possible interactions with calcium are identified. Certain medications change the amount of calcium that is absorbed and/or excreted. As an example, loop diuretics (eg, furosemide [sample brand name: Lasix]) increase the amount of calcium excreted in the urine.     On the other hand, thiazide diuretics (eg, hydrochlorothiazide) can reduce levels of calcium in the urine, potentially reducing the risk of bone loss and kidney stones. Side effects of calcium -- Calcium is usually easily tolerated when it is taken in divided doses several times per day. Some people experience side effects related to calcium, including constipation and indigestion. Calcium supplements interfere with the absorption of iron and thyroid hormone, and therefore, these medications should be taken at different times. Kidney stones -- There is no evidence that consuming large amounts of calcium (from foods and drinks) increases the risk of kidney stones, or that avoiding dietary calcium decreases the risk. In fact, avoiding dairy products is likely to increase the risk of kidney stones. However, use of calcium supplements may increase the risk of kidney stones in susceptible individuals by raising the level of calcium in the urine. This is particularly true if the supplement is taken between meals or at bedtime, and this is one of the reasons we prefer for patients to get as much of their calcium requirement through dietary means. IMPORTANCE OF VITAMIN D  Vitamin D decreases bone loss and lowers the risk of fracture, especially in older men and women. Along with calcium, vitamin D also helps to prevent and treat osteoporosis. To absorb calcium efficiently, an adequate amount of vitamin D must be present. Vitamin D is normally made in the skin after exposure to sunlight. Recommendations for vitamin D -- The current recommendation is that men over 70 years and postmenopausal women consume at least 800 international units (20 micrograms) of vitamin D per day. Lower levels of vitamin D are not as effective, while high doses can be toxic, especially if taken for long periods of time.  Although the optimal intake has not been clearly established in premenopausal women or in younger men with osteoporosis, 600 international units (15 micrograms) of vitamin D daily is generally suggested. Vitamin D is available as an individual supplement and is included in most multivitamins and some calcium supplements (table 2). Milk is a relatively good dietary source of vitamin D, with approximately 100 international units (2.5 micrograms) per cup (240 mL), and salmon has 800 to 1000 international units (20 to 25 micrograms) of vitamin D per serving. Most healthy people don't need to check with their health care provider before taking standard doses of vitamin D and don't need monitoring of vitamin D levels if they are not being treated for vitamin D deficiency. However, recommendations for vitamin D supplementation may be different in people with certain underlying medical conditions, such as sarcoidosis or lymphoma. In these situations, a provider will determine if supplements are needed; if so, the person's vitamin D and calcium levels should be monitored with regular tests. ©9851 [facility], [address] [facility] rights reserved. Bisphosphonate medication to treat Osteoporosis    Most people being treated for osteoporosis take these medicines first. If they do not work well enough or cause side effects that are too hard to handle, there are other options. Bisphosphonates come in a pill or a shot. Most people take one pill every week. If your doctor prescribes a bisphosphonate pill, you must take the medicine exactly as directed. If you don't, the medicine can irritate your throat or stomach. For most bisphosphonate pills, you must:    · Take the pill first thing in the morning, before you have anything to eat or drink. · Drink an 8-ounce glass of water with the pill, but not eat or drink anything else for 30 minutes or 1 hour (depending on which pill you take). · Avoid lying down for 30 minutes after taking the pill. You must sit or stand during that time.     There is one bisphosphonate pill, delayed release risedronate (brand name: Cindie Cellar), that is taken in a different way from the others. It is taken after breakfast with 4 ounces of water. Lifestyle measure recommendations:  Bisphosphonate medication will help prevent bone loss and there are lifestyle measures that should also be followed. Lifestyle measures include adequate calcium and vitamin D, exercise, smoking cessation, fall prevention (physical therapy may be recommended if you are at risk for falls), and avoidance of heavy alcohol use. These measures should be adopted universally to reduce bone loss in people with osteoporosis. Side effects:  · Common side effects of oral bisphosphonate therapy can include GI side effects such as acid reflux, esophagitis, esophageal ulcers  · Common side effects of intravenous (IV) bisphosphonate therapy can include flu-like symptoms. IV bisphosphonates are often associated with an acute-phase reaction within 24 to 72 hours of the infusion, characterized by low-grade fever, myalgias, and arthralgias. Treatment with antipyretic agents (ibuprofen or acetaminophen) generally improves the symptoms, and the recurrence of symptoms decreases with subsequent infusions. · Side effects shared by both oral and IV bisphosphonates include: hypocalcemia (low calcium levels in your blood), musculoskeletal pain, osteonecrosis of the jaw (rare/serious side effect), atypical femur fractures (rare)  - Osteonecrosis of the jaw (ONJ, or avascular necrosis of the jaw), is often associated with pain, swelling, exposed bone, local infection, and pathologic fracture of the jaw, is a rare complication of bisphosphonate therapy. When it occurs in a patient treated with bisphosphonates for osteoporosis, therapy should be discontinued. - Atypical femur fractures are a rare complication of chronic (median treatment seven years) bisphosphonate therapy.  Treatment of osteoporosis with bisphosphonates for up to five years is typically not associated with atypical fractures and is not a reason to defer bisphosphonate therapy in women who are at high risk for osteoporotic fracture. ©9714 [facility], [address] [facility] rights reserved. Medicines for Osteoporosis (The Basics)    What do osteoporosis medicines do? If you have osteoporosis or a high risk of breaking a bone, the medicines your doctor prescribes can:  · Reduce bone loss  · Increase bone density or keep it about the same  · Reduce the chances that you will break a bone    For the medicines to work, you must also take calcium and vitamin D supplements. Which medicines might I need? There are many different osteoporosis medicines. Your doctor will work with you to choose the best one for you. Bisphosphonates  Most people being treated for osteoporosis take these medicines first. If they do not work well enough or cause side effects that are too hard to handle, there are other options. Bisphosphonates come in a pill or a shot. Most people take one pill every week. If your doctor prescribes a bisphosphonate pill, you must take the medicine exactly as directed. If you don't, the medicine can irritate your throat or stomach. For most bisphosphonate pills, you must:    · Take the pill first thing in the morning, before you have anything to eat or drink. · Drink an 8-ounce glass of water with the pill, but not eat or drink anything else for 30 minutes or 1 hour (depending on which pill you take). · Avoid lying down for 30 minutes after taking the pill. You must sit or stand during that time. There is one bisphosphonate pill, delayed release risedronate (brand name: Saige Adeline), that is taken in a different way from the others. It is taken after breakfast with 4 ounces of water. "Estrogen-like" medicines  Medicines called selective estrogen receptor modifiers (or "SERMs") act like the hormone "estrogen." Estrogen helps prevent bone loss. After menopause, a woman's body has less estrogen.  (Menopause is the time when a woman stops having periods.) SERMs can act like estrogen to stop bone loss. Some of them also reduce the risk of breast cancer in women at high risk. SERMs are only for women who have gone through menopause. Hormone medicines  These medicines are sometimes called "hormone replacement therapy" or "menopausal hormone therapy" (MHT). After menopause, a woman's body has lower levels of certain hormones. Some women take MHT to replace these hormones. MHT can also protect against osteoporosis. Hormones are not used often to treat osteoporosis in women who have gone through menopause. This is because other medicines usually work much better. But MHT can help women who have bothersome symptoms related to menopause (such as hot flashes) and who have osteoporosis but cannot take other osteoporosis medicines. Women who have not gone through menopause might take hormones in birth control pills or a patch to prevent osteoporosis. PTH or PTHrP analog  Both of these are artificial forms of hormones the body makes naturally. PTH stands for "parathyroid hormone," and PTHrP stands for "parathyroid hormone-related protein." Both tell the body to make new bone. They are usually only for people with severe osteoporosis. Romosozumab (Evenity)  Romosozumab is a medicine that blocks a protein in the body. This protein usually stops new bone from being formed. Blocking the protein allows the body to make new bone. Romosozumab is usually only for people with severe osteoporosis. Denosumab (Prolia)  Denosumab blocks a different protein in the body. This protein usually causes bone to break down. By blocking the protein, denosumab reduces bone loss and the chance of breaking a bone. If other osteoporosis medicines cause bad side effects or do not help, your doctor might give you denosumab. It might also be a good choice for people with kidney problems. When you stop taking denosumab, your bone density goes down again very quickly.  Some people might be at higher risk for breaking a bone when this happens. If you stop denosumab, your doctor will prescribe a different osteoporosis medicine to prevent rapid bone loss. Calcitonin  Calcitonin is a hormone the body makes naturally. Doctors can give a man-made form to treat osteoporosis. It is not used as often as other medicines because it does not work as well. But it can help relieve pain from broken bones in the spine. When used for broken bones in the spine, calcitonin should only be used until the pain is better (and not longer than 6 months). If you are put on calcitonin, after 6 months you should switch to a medicine that is better at preventing fractures. How long do I need to take osteoporosis medicines? If you are at high risk for breaking a bone, you can safely take osteoporosis medicines for many years. If you are not at high risk for breaking a bone, you might be able to stop your medicine for a year or more. Your doctor will check your bone density to make sure you are not losing too much bone. If you do stop the medicine, you will probably need to start it again later. What else should I know about medicines for osteoporosis? Some people have heard that taking bisphosphonates for a long time can increase the risk of breaking certain bones. This is true, but it happens very rarely. Your chances of breaking a bone from osteoporosis are much higher than your chances of breaking one because you take bisphosphonates. If you take osteoporosis medicines, your doctor will do regular exams and tests to see how well the medicines are working. If they are not working well, you might need a different medicine. ©6662 [facility], [address] [facility] rights reserved.

## 2023-08-21 ENCOUNTER — FOLLOW UP (OUTPATIENT)
Dept: URBAN - METROPOLITAN AREA CLINIC 6 | Facility: CLINIC | Age: 68
End: 2023-08-21

## 2023-08-21 DIAGNOSIS — H04.123: ICD-10-CM

## 2023-08-21 DIAGNOSIS — H40.1131: ICD-10-CM

## 2023-08-21 PROCEDURE — 92083 EXTENDED VISUAL FIELD XM: CPT

## 2023-08-21 PROCEDURE — 92250 FUNDUS PHOTOGRAPHY W/I&R: CPT

## 2023-08-21 PROCEDURE — 92014 COMPRE OPH EXAM EST PT 1/>: CPT

## 2023-08-21 ASSESSMENT — KERATOMETRY
OD_AXISANGLE_DEGREES: 1
OS_AXISANGLE_DEGREES: 166
OS_AXISANGLE2_DEGREES: 76
OD_AXISANGLE2_DEGREES: 91
OD_K1POWER_DIOPTERS: 42.75
OS_K1POWER_DIOPTERS: 43.75
OD_K2POWER_DIOPTERS: 43.75
OS_K2POWER_DIOPTERS: 44.50

## 2023-08-21 ASSESSMENT — VISUAL ACUITY
OD_SC: 20/20
OS_SC: 20/20

## 2023-08-21 ASSESSMENT — TONOMETRY
OD_IOP_MMHG: 17
OS_IOP_MMHG: 18

## 2023-08-24 DIAGNOSIS — M15.9 PRIMARY OSTEOARTHRITIS INVOLVING MULTIPLE JOINTS: ICD-10-CM

## 2023-08-24 RX ORDER — IBUPROFEN 800 MG/1
TABLET ORAL
Qty: 90 TABLET | Refills: 0 | Status: SHIPPED | OUTPATIENT
Start: 2023-08-24

## 2023-09-25 DIAGNOSIS — M25.561 RIGHT KNEE PAIN, UNSPECIFIED CHRONICITY: Primary | ICD-10-CM

## 2023-09-27 DIAGNOSIS — M15.9 PRIMARY OSTEOARTHRITIS INVOLVING MULTIPLE JOINTS: ICD-10-CM

## 2023-09-27 RX ORDER — IBUPROFEN 800 MG/1
TABLET ORAL
Qty: 90 TABLET | Refills: 0 | Status: SHIPPED | OUTPATIENT
Start: 2023-09-27

## 2023-10-05 ENCOUNTER — HOSPITAL ENCOUNTER (OUTPATIENT)
Dept: RADIOLOGY | Facility: HOSPITAL | Age: 68
Discharge: HOME | End: 2023-10-05
Attending: ORTHOPAEDIC SURGERY
Payer: MEDICARE

## 2023-10-05 ENCOUNTER — OFFICE VISIT (OUTPATIENT)
Dept: OBGYN CLINIC | Facility: HOSPITAL | Age: 68
End: 2023-10-05
Payer: MEDICARE

## 2023-10-05 VITALS
SYSTOLIC BLOOD PRESSURE: 124 MMHG | HEART RATE: 85 BPM | HEIGHT: 61 IN | BODY MASS INDEX: 28.53 KG/M2 | DIASTOLIC BLOOD PRESSURE: 80 MMHG

## 2023-10-05 DIAGNOSIS — M76.31 ILIOTIBIAL BAND SYNDROME, RIGHT: Primary | ICD-10-CM

## 2023-10-05 DIAGNOSIS — M25.561 RIGHT KNEE PAIN, UNSPECIFIED CHRONICITY: ICD-10-CM

## 2023-10-05 DIAGNOSIS — Z96.651 S/P REVISION OF TOTAL KNEE, RIGHT: ICD-10-CM

## 2023-10-05 PROCEDURE — 20610 DRAIN/INJ JOINT/BURSA W/O US: CPT | Performed by: ORTHOPAEDIC SURGERY

## 2023-10-05 PROCEDURE — 73560 X-RAY EXAM OF KNEE 1 OR 2: CPT

## 2023-10-05 PROCEDURE — 99213 OFFICE O/P EST LOW 20 MIN: CPT | Performed by: ORTHOPAEDIC SURGERY

## 2023-10-05 RX ORDER — BETAMETHASONE SODIUM PHOSPHATE AND BETAMETHASONE ACETATE 3; 3 MG/ML; MG/ML
6 INJECTION, SUSPENSION INTRA-ARTICULAR; INTRALESIONAL; INTRAMUSCULAR; SOFT TISSUE
Status: COMPLETED | OUTPATIENT
Start: 2023-10-05 | End: 2023-10-05

## 2023-10-05 RX ORDER — GABAPENTIN 100 MG/1
100 CAPSULE ORAL 3 TIMES DAILY
COMMUNITY
Start: 2023-09-08

## 2023-10-05 RX ORDER — LIDOCAINE HYDROCHLORIDE 10 MG/ML
2 INJECTION, SOLUTION INFILTRATION; PERINEURAL
Status: COMPLETED | OUTPATIENT
Start: 2023-10-05 | End: 2023-10-05

## 2023-10-05 RX ORDER — BUPIVACAINE HYDROCHLORIDE 2.5 MG/ML
2 INJECTION, SOLUTION INFILTRATION; PERINEURAL
Status: COMPLETED | OUTPATIENT
Start: 2023-10-05 | End: 2023-10-05

## 2023-10-05 RX ADMIN — LIDOCAINE HYDROCHLORIDE 2 ML: 10 INJECTION, SOLUTION INFILTRATION; PERINEURAL at 14:00

## 2023-10-05 RX ADMIN — BUPIVACAINE HYDROCHLORIDE 2 ML: 2.5 INJECTION, SOLUTION INFILTRATION; PERINEURAL at 14:00

## 2023-10-05 RX ADMIN — BETAMETHASONE SODIUM PHOSPHATE AND BETAMETHASONE ACETATE 6 MG: 3; 3 INJECTION, SUSPENSION INTRA-ARTICULAR; INTRALESIONAL; INTRAMUSCULAR; SOFT TISSUE at 14:00

## 2023-10-05 NOTE — PROGRESS NOTES
Assessment:   Diagnosis ICD-10-CM Associated Orders   1. Iliotibial band syndrome, right  M76.31 Inj Trigger Point Single/Multiple      2. S/P revision of total knee, right  Z96.651           Plan:  · The pain over the lateral aspect of the knee is likely due to IT band tendinitis. The patient was offered and accepted CSI at today's visit for this issue. The procedure was tolerated well without any immediate complications. ·  Her imaging looks very good. No signs of hardware failure. To do next visit:  PRN. The above stated was discussed in layman's terms and the patient expressed understanding. All questions were answered to the patient's satisfaction. Scribe Attestation    I,:  Ayad Hurtado PA-C am acting as a scribe while in the presence of the attending physician.:       I,:  Jas Jasmine MD personally performed the services described in this documentation    as scribed in my presence.:           Subjective:   Lisa Edwards is a 76 y.o. female s/p right TKA revision on 1/28/2018 who presents to the office today for discussion of right knee pain and stiffness which has been ongoing for several months without any known inciting injury. She says that while she can perform her desired activities, such as golf and pickle ball, it is often difficult for her to do so. She takes gabapentin for other issues as prescribed by her rheumatologist, but she denies taking any other pain medication. She denies numbness or tingling in the leg. She does see a chiropractor for her back. She has an upcoming [employer] cruise which she is looking forward to and hoping her pain will not impede.       Review of systems negative unless otherwise specified in HPI    Past Medical History:   Diagnosis Date   • BPPV (benign paroxysmal positional vertigo)    • Bronchitis    • Disease of thyroid gland     HYPO   • Diverticulitis    • GERD (gastroesophageal reflux disease)    • Glaucoma (increased eye pressure)    •  2 para 2    • IBS (irritable bowel syndrome)    • Insomnia    • PONV (postoperative nausea and vomiting)        Past Surgical History:   Procedure Laterality Date   • CHOLECYSTECTOMY     • COLON SURGERY  2019    resection   • COLONOSCOPY  2019   • COLONOSCOPY     • DILATION AND CURETTAGE OF UTERUS     • FL GUIDED NEEDLE PLAC BX/ASP/INJ  2018   • FL GUIDED NEEDLE PLAC BX/ASP/INJ  2019   • FL GUIDED NEEDLE PLAC BX/ASP/INJ  2020   • FL GUIDED NEEDLE PLAC BX/ASP/INJ  10/8/2020   • FL GUIDED NEEDLE PLAC BX/ASP/INJ  2021   • FL GUIDED NEEDLE PLAC BX/ASP/INJ  2022   • FL GUIDED NEEDLE PLAC BX/ASP/INJ  2022   • HYSTERECTOMY  2006   • JOINT REPLACEMENT     • LAPAROSCOPIC COLON RESECTION  2019   • MAMMO (HISTORICAL) Bilateral 2018   • PARTIAL HYSTERECTOMY     • [place] ARTHRP KNE CONDYLE&PLATU MEDIAL&LAT COMPARTMENTS Left 2021    Procedure: ARTHROPLASTY KNEE TOTAL;  Surgeon: Viviane Ho MD;  [facility]: [facility];  Service: Orthopedics   • [address] Right 2018    Procedure: ARTHROPLASTY KNEE TOTAL REVISION;  Surgeon: Viviane Ho MD;  Location: [facility];  Service: Orthopedics   • TONSILLECTOMY     • TRABECULOPLASTY, LASER SELECTIVE Left 2019   • VARICOSE VEIN SURGERY         Family History   Problem Relation Age of Onset   • Pancreatic cancer Mother 72   • Diabetes type II Mother    • Heart disease Father    • No Known Problems Sister    • No Known Problems Daughter    • No Known Problems Daughter    • No Known Problems Maternal Grandmother    • No Known Problems Maternal Grandfather    • Breast cancer Paternal Grandmother 80   • Lung disease Paternal Grandfather    • No Known Problems Maternal Aunt    • No Known Problems Maternal Aunt    • No Known Problems Maternal Aunt    • No Known Problems Maternal Aunt        Social History     Occupational History   • Not on file   Tobacco Use   • Smoking status: Never   • Smokeless tobacco: Never   Vaping Use   • Vaping Use: Never used   Substance and Sexual Activity   • Alcohol use: Yes     Comment: social    • Drug use: No   • Sexual activity: Yes     Partners: Male     Birth control/protection: Surgical         Current Outpatient Medications:   •  Acetaminophen (TYLENOL 8 HOUR PO), Take 200 mg by mouth as needed, Disp: , Rfl:   •  BLACK ELDERBERRY PO, Take by mouth daily, Disp: , Rfl:   •  cycloSPORINE (RESTASIS) 0.05 % ophthalmic emulsion, , Disp: , Rfl:   •  DiphenhydrAMINE HCl (BENADRYL PO), Take by mouth daily at bedtime as needed , Disp: , Rfl:   •  gabapentin (NEURONTIN) 100 mg capsule, Take 100 mg by mouth 3 (three) times a day, Disp: , Rfl:   •  ibuprofen (MOTRIN) 800 mg tablet, TAKE 1 TABLET BY MOUTH EVERY 8  HOURS AS NEEDED FOR MODERATE  PAIN, Disp: 90 tablet, Rfl: 0  •  levothyroxine 112 mcg tablet, TAKE 1 TABLET BY MOUTH DAILY, Disp: 90 tablet, Rfl: 2  •  LUMIGAN 0.01 % ophthalmic drops, INSTILL 1 DROP IN EACH EYE EVERY DAY AT BEDTIME, Disp: , Rfl: 1  •  meclizine (ANTIVERT) 12.5 MG tablet, Take 1 tablet (12.5 mg total) by mouth as needed for dizziness, Disp: 30 tablet, Rfl: 0  •  Probiotic Product (PROBIOTIC DAILY PO), Take by mouth, Disp: , Rfl:   •  scopolamine (TRANSDERM-SCOP) 1 mg/3 days TD 72 hr patch, Place 1 patch on the skin over 72 hours every third day, Disp: 10 patch, Rfl: 0    Allergies   Allergen Reactions   • Codeine GI Intolerance   • Morphine Itching            Vitals:    10/05/23 1340   BP: 124/80   Pulse: 85       Objective:    General:  Patient is WDWN, alert and oriented, appears stated age, and is in no acute distress. Musculoskeletal:    Right Knee: Inspection:  Prior incision site is well-approximated and nicely scarred without erythema or purulent material indicative of infection. Range of Motion:  She is able to achieve full active knee extension and approximately 120 degrees of active flexion.     Palpation:  She is tender directly over the distal aspect of the IT band lateral to the knee. Strength:  5/5 strength hip flexion, knee extension, knee flexion, and ankle plantarflexion/dorsiflexion. Other:  Toes WWP. Diagnostics, reviewed and taken today if performed as documented: The attending physician has personally reviewed the pertinent films in PACS and interpretation is as follows:  Right Knee X-rays:  The patient's implants are held in stable alignment. There is no evidence of hardware loosening or failure. Procedures, if performed today:     Universal Protocol:  Consent: Verbal consent obtained. Consent given by: patient  Time out: Immediately prior to procedure a "time out" was called to verify the correct patient, procedure, equipment, support staff and site/side marked as required. Patient understanding: patient states understanding of the procedure being performed  Patient consent: the patient's understanding of the procedure matches consent given  Procedure consent: procedure consent matches procedure scheduled  Patient identity confirmed: verbally with patient    Supporting Documentation  Indications: pain    Injection site identified by: palpation    Procedure Details  Location(s):  Needle size: 22 G  Medications: 2 mL bupivacaine 0.25 %; 2 mL lidocaine 1 %; 6 mg betamethasone acetate-betamethasone sodium phosphate 6 (3-3) mg/mL  Patient tolerance: patient tolerated the procedure well with no immediate complications        Portions of the record may have been created with voice recognition software. Occasional wrong word or "sound a like" substitutions may have occurred due to the inherent limitations of voice recognition software. Read the chart carefully and recognize, using context, where substitutions have occurred.

## 2023-11-06 ENCOUNTER — OFFICE VISIT (OUTPATIENT)
Dept: OBGYN CLINIC | Facility: CLINIC | Age: 68
End: 2023-11-06
Payer: MEDICARE

## 2023-11-06 VITALS
WEIGHT: 152 LBS | DIASTOLIC BLOOD PRESSURE: 70 MMHG | SYSTOLIC BLOOD PRESSURE: 100 MMHG | BODY MASS INDEX: 28.7 KG/M2 | HEIGHT: 61 IN

## 2023-11-06 DIAGNOSIS — M70.62 GREATER TROCHANTERIC BURSITIS OF LEFT HIP: Primary | ICD-10-CM

## 2023-11-06 PROCEDURE — 20610 DRAIN/INJ JOINT/BURSA W/O US: CPT | Performed by: PHYSICIAN ASSISTANT

## 2023-11-06 PROCEDURE — 99214 OFFICE O/P EST MOD 30 MIN: CPT | Performed by: PHYSICIAN ASSISTANT

## 2023-11-06 RX ORDER — LIDOCAINE HYDROCHLORIDE 10 MG/ML
2 INJECTION, SOLUTION INFILTRATION; PERINEURAL
Status: COMPLETED | OUTPATIENT
Start: 2023-11-06 | End: 2023-11-06

## 2023-11-06 RX ORDER — BUPIVACAINE HYDROCHLORIDE 2.5 MG/ML
2 INJECTION, SOLUTION INFILTRATION; PERINEURAL
Status: COMPLETED | OUTPATIENT
Start: 2023-11-06 | End: 2023-11-06

## 2023-11-06 RX ORDER — BETAMETHASONE SODIUM PHOSPHATE AND BETAMETHASONE ACETATE 3; 3 MG/ML; MG/ML
6 INJECTION, SUSPENSION INTRA-ARTICULAR; INTRALESIONAL; INTRAMUSCULAR; SOFT TISSUE
Status: COMPLETED | OUTPATIENT
Start: 2023-11-06 | End: 2023-11-06

## 2023-11-06 RX ADMIN — BUPIVACAINE HYDROCHLORIDE 2 ML: 2.5 INJECTION, SOLUTION INFILTRATION; PERINEURAL at 13:30

## 2023-11-06 RX ADMIN — BETAMETHASONE SODIUM PHOSPHATE AND BETAMETHASONE ACETATE 6 MG: 3; 3 INJECTION, SUSPENSION INTRA-ARTICULAR; INTRALESIONAL; INTRAMUSCULAR; SOFT TISSUE at 13:30

## 2023-11-06 RX ADMIN — LIDOCAINE HYDROCHLORIDE 2 ML: 10 INJECTION, SOLUTION INFILTRATION; PERINEURAL at 13:30

## 2023-11-06 NOTE — PROGRESS NOTES
Patient Name:  Bianca García  MRN:  [id number]    Assessment & Plan     Left hip greater trochanteric bursitis. Corticosteroid injection performed today into the left hip greater trochanteric bursa. Continue anti-inflammatories as needed. Activities as tolerated with modification to avoid pain. Follow-up as needed. Discussed referral to physical therapy if symptoms persist.    Chief Complaint     Left hip pain    History of the Present Illness     Bianca García is a 76 y.o. female who reports to the office today for evaluation of her left hip. Patient notes an onset of left hip pain a few days ago. She denies any injury or trauma. Patient was recently vacationing in [place] and did a lot of walking which she believes led to her pain. She notes pain primarily localized to the lateral aspect of the hip with intermittent radiation along the lateral thigh to the knee. She denies any radiation into the groin. Pain is worse with direct pressure as well as lying on her left side. She also notes increased pain with prolonged standing or walking. She denies any weakness or instability. She has been taking ibuprofen with some improvement. No numbness or tingling. No weakness or instability. No fevers or chills. Physical Exam     /70   Ht 5' 1" (1.549 m)   Wt 68.9 kg (152 lb)   LMP  (LMP Unknown)   BMI 28.72 kg/m²     Left hip: No gross deformity. Skin intact. No erythema ecchymosis or swelling. Significant tenderness over the greater trochanter. No tenderness anterior hip. Hip range of motion is intact and full without significant pain. Pain noted laterally with JENI test.  Negative FADIR test.  Logroll test.  Negative straight leg raise and resisted straight leg raise test.  Positive Sanjeev test.    Eyes: Anicteric sclerae. ENT: Trachea midline. Lungs: Normal respiratory effort. CV: Capillary refill is less than 2 seconds. Skin: Intact without erythema.   Lymph: No palpable lymphadenopathy. Neuro: Sensation is grossly intact to light touch. Psych: Mood and affect are appropriate. Data Review     I have personally reviewed pertinent films in PACS, and my interpretation follows:    X-rays left hip 2023: No acute osseous abnormality. No fracture or dislocation. No significant degenerative changes.     Past Medical History:   Diagnosis Date    BPPV (benign paroxysmal positional vertigo)     Bronchitis     Disease of thyroid gland     HYPO    Diverticulitis     GERD (gastroesophageal reflux disease)     Glaucoma (increased eye pressure)      2 para 2     IBS (irritable bowel syndrome)     Insomnia     PONV (postoperative nausea and vomiting)        Past Surgical History:   Procedure Laterality Date    CHOLECYSTECTOMY      COLON SURGERY  2019    resection    COLONOSCOPY  2019    COLONOSCOPY      DILATION AND CURETTAGE OF UTERUS      FL GUIDED NEEDLE PLAC BX/ASP/INJ  2018    FL GUIDED NEEDLE PLAC BX/ASP/INJ  2019    FL GUIDED NEEDLE PLAC BX/ASP/INJ  2020    FL GUIDED NEEDLE PLAC BX/ASP/INJ  10/8/2020    FL GUIDED NEEDLE PLAC BX/ASP/INJ  2021    FL GUIDED NEEDLE PLAC BX/ASP/INJ  2022    FL GUIDED NEEDLE PLAC BX/ASP/INJ  2022    HYSTERECTOMY  2006    JOINT REPLACEMENT      LAPAROSCOPIC COLON RESECTION  2019    MAMMO (HISTORICAL) Bilateral 2018    PARTIAL HYSTERECTOMY      [place] ARTHRP KNE CONDYLE&PLATU MEDIAL&LAT COMPARTMENTS Left 2021    Procedure: ARTHROPLASTY KNEE TOTAL;  Surgeon: Jaqueline Scanlon MD;  [facility];  Service: Orthopedics    [place] [address] Right 2018    Procedure: ARTHROPLASTY KNEE TOTAL REVISION;  Surgeon: Jaqueline Scanlon MD;  [facility];  Service: Orthopedics    TONSILLECTOMY      TRABECULOPLASTY, LASER SELECTIVE Left 2019    VARICOSE VEIN SURGERY         Allergies   Allergen Reactions    Codeine GI Intolerance    Morphine Itching       Current Outpatient Medications on File Prior to Visit   Medication Sig Dispense Refill    BLACK ELDERBERRY PO Take by mouth daily      cycloSPORINE (RESTASIS) 0.05 % ophthalmic emulsion       DiphenhydrAMINE HCl (BENADRYL PO) Take by mouth daily at bedtime as needed       gabapentin (NEURONTIN) 100 mg capsule Take 100 mg by mouth 3 (three) times a day      ibuprofen (MOTRIN) 800 mg tablet TAKE 1 TABLET BY MOUTH EVERY 8  HOURS AS NEEDED FOR MODERATE  PAIN 90 tablet 0    levothyroxine 112 mcg tablet TAKE 1 TABLET BY MOUTH DAILY 90 tablet 2    LUMIGAN 0.01 % ophthalmic drops INSTILL 1 DROP IN EACH EYE EVERY DAY AT BEDTIME  1    meclizine (ANTIVERT) 12.5 MG tablet Take 1 tablet (12.5 mg total) by mouth as needed for dizziness 30 tablet 0    Probiotic Product (PROBIOTIC DAILY PO) Take by mouth      scopolamine (TRANSDERM-SCOP) 1 mg/3 days TD 72 hr patch Place 1 patch on the skin over 72 hours every third day 10 patch 0    Acetaminophen (TYLENOL 8 HOUR PO) Take 200 mg by mouth as needed (Patient not taking: Reported on 11/6/2023)       No current facility-administered medications on file prior to visit. Social History     Tobacco Use    Smoking status: Never    Smokeless tobacco: Never   Vaping Use    Vaping Use: Never used   Substance Use Topics    Alcohol use: Yes     Comment: social     Drug use: No       Family History   Problem Relation Age of Onset    Pancreatic cancer Mother 72    Diabetes type II Mother     Heart disease Father     No Known Problems Sister     No Known Problems Daughter     No Known Problems Daughter     No Known Problems Maternal Grandmother     No Known Problems Maternal Grandfather     Breast cancer Paternal Grandmother 80    Lung disease Paternal Grandfather     No Known Problems Maternal Aunt     No Known Problems Maternal Aunt     No Known Problems Maternal Aunt     No Known Problems Maternal Aunt        Review of Systems     As stated in the HPI.  All other systems reviewed and are negative.       Large joint arthrocentesis: L greater trochanteric bursa  Procedure Details  Location: hip - L greater trochanteric bursa  Needle size: 22 G  Ultrasound guidance: no  Approach: lateral  Medications administered: 2 mL bupivacaine 0.25 %; 2 mL lidocaine 1 %; 6 mg betamethasone acetate-betamethasone sodium phosphate 6 (3-3) mg/mL    Patient tolerance: patient tolerated the procedure well with no immediate complications  Dressing:  Sterile dressing applied

## 2023-11-23 ENCOUNTER — HOSPITAL ENCOUNTER (EMERGENCY)
Facility: HOSPITAL | Age: 68
Discharge: HOME | End: 2023-11-23
Attending: EMERGENCY MEDICINE
Payer: MEDICARE

## 2023-11-23 VITALS
RESPIRATION RATE: 16 BRPM | TEMPERATURE: 98.4 F | HEART RATE: 95 BPM | DIASTOLIC BLOOD PRESSURE: 74 MMHG | SYSTOLIC BLOOD PRESSURE: 169 MMHG

## 2023-11-23 DIAGNOSIS — S61.012A LACERATION OF THUMB, LEFT, INITIAL ENCOUNTER: Primary | ICD-10-CM

## 2023-11-23 PROCEDURE — 99284 EMERGENCY DEPT VISIT MOD MDM: CPT

## 2023-11-23 PROCEDURE — 99282 EMERGENCY DEPT VISIT SF MDM: CPT

## 2023-11-23 RX ORDER — LIDOCAINE HYDROCHLORIDE 10 MG/ML
10 INJECTION, SOLUTION EPIDURAL; INFILTRATION; INTRACAUDAL; PERINEURAL ONCE
Status: COMPLETED | OUTPATIENT
Start: 2023-11-23 | End: 2023-11-23

## 2023-11-23 RX ADMIN — LIDOCAINE HYDROCHLORIDE 10 ML: 10 INJECTION, SOLUTION EPIDURAL; INFILTRATION; INTRACAUDAL at 19:44

## 2023-11-24 NOTE — DISCHARGE INSTRUCTIONS
Please return to the emergency department for any concerns as outlined in the after visit summary or for any other concerns. Please follow-up with your primary care provider in 2 days for re-evaluation and further management. Continue to keep the wound clean and dry. Change the dressing at least 2 times a day unless the dressing becomes dirty then change more often. The foam lying directly over the laceration should stay in place until it is absorbed and/or falls off. Once the foam is completely absorbed or falls off you can consider over-the-counter bacitracin, triple antibiotic or similar over the wound until it is healed. Continue ibuprofen or acetaminophen as needed for pain control.

## 2023-11-24 NOTE — ED PROVIDER NOTES
History  Chief Complaint   Patient presents with    Laceration     Pt with laceration to L thumb. Happened yesterday when cutting potatoes     69-year-old female presents ED with complaint of a laceration over the left thumb. Patient reports she cut it on a mandolin about 36 hours ago. Reports she was able to get the bleeding controlled last night however today it started to bleed again. Patient denies any significant pain unless the wound is touched or irrigated. Reports she did rinse out the wound multiple times. Is not a laceration but appears to be an avulsion. Reports prior to this incident she has otherwise been feeling well. No associated paresthesias, anesthesias or weakness. No other concerns for injury. No pain medications PTA. Denies aspirin or blood thinner use. No other complaints today. Believes she is up-to-date on her tetanus-6 years ago per chart review. Prior to Admission Medications   Prescriptions Last Dose Informant Patient Reported? Taking?    Acetaminophen (TYLENOL 8 HOUR PO)   Yes No   Sig: Take 200 mg by mouth as needed   Patient not taking: Reported on 11/6/2023   BLACK ELDERBERRY PO   Yes No   Sig: Take by mouth daily   DiphenhydrAMINE HCl (BENADRYL PO)  Self Yes No   Sig: Take by mouth daily at bedtime as needed    LUMIGAN 0.01 % ophthalmic drops  Self Yes No   Sig: INSTILL 1 DROP IN EACH EYE EVERY DAY AT BEDTIME   Probiotic Product (PROBIOTIC DAILY PO)  Self Yes No   Sig: Take by mouth   cycloSPORINE (RESTASIS) 0.05 % ophthalmic emulsion   Yes No   gabapentin (NEURONTIN) 100 mg capsule   Yes No   Sig: Take 100 mg by mouth 3 (three) times a day   ibuprofen (MOTRIN) 800 mg tablet   No No   Sig: TAKE 1 TABLET BY MOUTH EVERY 8  HOURS AS NEEDED FOR MODERATE  PAIN   levothyroxine 112 mcg tablet   No No   Sig: TAKE 1 TABLET BY MOUTH DAILY   meclizine (ANTIVERT) 12.5 MG tablet   No No   Sig: Take 1 tablet (12.5 mg total) by mouth as needed for dizziness   scopolamine (TRANSDERM-SCOP) 1 mg/3 days TD 72 hr patch   No No   Sig: Place 1 patch on the skin over 72 hours every third day      Facility-Administered Medications: None       Past Medical History:   Diagnosis Date    BPPV (benign paroxysmal positional vertigo)     Bronchitis     Disease of thyroid gland     HYPO    Diverticulitis     GERD (gastroesophageal reflux disease)     Glaucoma (increased eye pressure)      2 para 2     IBS (irritable bowel syndrome)     Insomnia     PONV (postoperative nausea and vomiting)        Past Surgical History:   Procedure Laterality Date    CHOLECYSTECTOMY      COLON SURGERY  2019    resection    COLONOSCOPY  2019    COLONOSCOPY      DILATION AND CURETTAGE OF UTERUS      FL GUIDED NEEDLE PLAC BX/ASP/INJ  2018    FL GUIDED NEEDLE PLAC BX/ASP/INJ  2019    FL GUIDED NEEDLE PLAC BX/ASP/INJ  2020    FL GUIDED NEEDLE PLAC BX/ASP/INJ  10/8/2020    FL GUIDED NEEDLE PLAC BX/ASP/INJ  2021    FL GUIDED NEEDLE PLAC BX/ASP/INJ  2022    FL GUIDED NEEDLE PLAC BX/ASP/INJ  2022    HYSTERECTOMY  2006    JOINT REPLACEMENT      LAPAROSCOPIC COLON RESECTION  2019    MAMMO (HISTORICAL) Bilateral 2018    PARTIAL HYSTERECTOMY      IL ARTHRP KNE CONDYLE&PLATU MEDIAL&LAT COMPARTMENTS Left 2021    Procedure: ARTHROPLASTY KNEE TOTAL;  Surgeon: Hilario Bearden MD;  [facility];  Service: Orthopedics    [place] REVJ TOT KNEE ARTHRP [facility] Right 2018    Procedure: ARTHROPLASTY KNEE TOTAL REVISION;  Surgeon: Hilario Bearden MD;  [facility];  Service: Orthopedics    TONSILLECTOMY      TRABECULOPLASTY, LASER SELECTIVE Left 2019    VARICOSE VEIN SURGERY         Family History   Problem Relation Age of Onset    Pancreatic cancer Mother 72    Diabetes type II Mother     Heart disease Father     No Known Problems Sister     No Known Problems Daughter     No Known Problems Daughter     No Known Problems Maternal Grandmother     No Known Problems Maternal Grandfather     Breast cancer Paternal Grandmother 80    Lung disease Paternal Grandfather     No Known Problems Maternal Aunt     No Known Problems Maternal Aunt     No Known Problems Maternal Aunt     No Known Problems Maternal Aunt      I have reviewed and agree with the history as documented. E-Cigarette/Vaping    E-Cigarette Use Never User      E-Cigarette/Vaping Substances    Nicotine No     THC No     CBD No     Flavoring No     Other No     Unknown No      Social History     Tobacco Use    Smoking status: Never    Smokeless tobacco: Never   Vaping Use    Vaping Use: Never used   Substance Use Topics    Alcohol use: Yes     Comment: social     Drug use: No       Review of Systems   Skin:  Positive for wound (Left thumb). All other systems reviewed and are negative. Physical Exam  Physical Exam  Vitals and nursing note reviewed. Constitutional:       General: She is not in acute distress. Appearance: She is well-developed. HENT:      Head: Normocephalic and atraumatic. Eyes:      Conjunctiva/sclera: Conjunctivae normal.   Cardiovascular:      Rate and Rhythm: Normal rate and regular rhythm. Heart sounds: No murmur heard. Pulmonary:      Effort: Pulmonary effort is normal. No respiratory distress. Breath sounds: Normal breath sounds. Abdominal:      Palpations: Abdomen is soft. Tenderness: There is no abdominal tenderness. Musculoskeletal:         General: No swelling. Cervical back: Neck supple. Skin:     General: Skin is warm and dry. Capillary Refill: Capillary refill takes less than 2 seconds. Comments: ~1 cm avulsion laceration over the dorsal aspect left thumb. No active bleeding but there is a blood clot in place. No other signs of injury or trauma. No laceration requiring repair  Distal cap refill of the left thumb is <2 secs. Sensation and motor intact, strength 5 out of 5.   No signs of ligamentous injury or nail involvement. No FB. Neurological:      Mental Status: She is alert. Psychiatric:         Mood and Affect: Mood normal.             Vital Signs  ED Triage Vitals [11/23/23 1852]   Temperature Pulse Respirations Blood Pressure SpO2   98.4 °F (36.9 °C) 95 16 169/74 --      Temp Source Heart Rate Source Patient Position - Orthostatic VS BP Location FiO2 (%)   Oral -- -- -- --      Pain Score       No Pain           Vitals:    11/23/23 1852   BP: 169/74   Pulse: 95         Visual Acuity      ED Medications  Medications   lidocaine (PF) (XYLOCAINE-MPF) 1 % injection 10 mL (10 mL Infiltration Given by Other 11/23/23 1944)       Diagnostic Studies  Results Reviewed       None                   No orders to display              Procedures  Universal Protocol:  Consent: Verbal consent obtained. Risks and benefits: risks, benefits and alternatives were discussed  Consent given by: patient  Patient understanding: patient states understanding of the procedure being performed  Laceration repair    Date/Time: 11/23/2023 8:33 PM    Performed by: Venkatesh Buenrostro PA-C  Authorized by: Venkatesh Buenrostro PA-C  Body area: upper extremity  Location details: left thumb  Laceration length: 1 cm  Foreign bodies: no foreign bodies  Tendon involvement: none  Nerve involvement: none  Vascular damage: no  Anesthesia: digital block    Anesthesia:  Local Anesthetic: lidocaine 1% without epinephrine  Anesthetic total: 3 mL    Sedation:  Patient sedated: no        Procedure Details:  Preparation: Patient was prepped and draped in the usual sterile fashion. Irrigation solution: saline  Irrigation method: syringe  Amount of cleaning: standard  Debridement: none  Degree of undermining: none  Wound skin closure material used: None. Approximation difficulty: simple  Dressing: Surgifoam, nonstick gauze and gauze wrap.   Patient tolerance: patient tolerated the procedure well with no immediate complications  Comments: Bandage patient arrived to the ED with was taken off. There was a blood clot which was overlying the wound. This was thoroughly irrigated off and the wound was also thoroughly irrigated. There is no active bleeding, there was hemostasis. Continue to thoroughly irrigate. Offered to apply Surgifoam or bacitracin however patient preferred surgifoam although no active bleeding at this time. Covered with nonstick gauze and gauze wrap. NVI post surgifoam/gauze. Hemostasis continues. ED Course  ED Course as of 11/23/23 2039   Thu Nov 23, 2023 2035 Avulsion injury irrigated and dressed without issue. Supportive care and follow-up as outlined in the AVS and discussed with patient prior to discharge. Strict return precautions verbally communicated to the patient as outlined in the AVS.  All patient questions and concerns were answered. Patient verbally communicated their understanding and agreement to the above plan. Patient stable at discharge. Portions of the record may have been created with voice recognition software. Occasional wrong word or "sound a like" substitutions may have occurred due to the inherent limitations of voice recognition software. Read the chart carefully and recognize, using context, where substitutions have occurred. Medical Decision Making  Left thumb avulsion laceration. Left thumb anesthetized, thoroughly irrigated and Surgifoam placed on top, wrapped in nonstick gauze and gauze wrap. Very low concern for osseous abnormality or retained foreign body, will defer imaging. Patient declined any pain medications. Supportive care and follow-up as outlined in the AVS.    Risk  Prescription drug management.              Disposition  Final diagnoses:   Laceration of thumb, left, initial encounter     Time reflects when diagnosis was documented in both MDM as applicable and the Disposition within this note       Time User Action Codes Description Comment    11/23/2023  8:31 PM Samy Gonzalez Add [S61.012A] Laceration of thumb, left, initial encounter           ED Disposition       ED Disposition   Discharge    Condition   Stable    Date/Time   Thu Nov 23, 2023  8:30 PM    Comment   Juliette LUONG [facility] discharge to home/self care. Follow-up Information       Follow up With Specialties Details Why Contact Info Additional [facility] Emergency Department Emergency Medicine Go to  As needed, If symptoms worsen [address] [phone]  [address] [place] [address] Emergency Department, [address] [place] [address], [facility]            Patient's Medications   Discharge Prescriptions    No medications on file       No discharge procedures on file.     PDMP Review         Value Time User    PDMP Reviewed  Yes 4/27/2021  7:56 AM Melissa Leal MD            ED Provider  Electronically Signed by             Venkatesh Buenrostro PA-C  11/23/23 2038       Venkatesh Buenrostro PA-C  11/23/23 2039

## 2023-12-05 ENCOUNTER — TELEMEDICINE (OUTPATIENT)
Dept: INTERNAL MEDICINE CLINIC | Facility: CLINIC | Age: 68
End: 2023-12-05
Payer: MEDICARE

## 2023-12-05 VITALS — BODY MASS INDEX: 28.72 KG/M2 | HEIGHT: 61 IN

## 2023-12-05 DIAGNOSIS — U07.1 COVID-19: Primary | ICD-10-CM

## 2023-12-05 PROCEDURE — 99213 OFFICE O/P EST LOW 20 MIN: CPT | Performed by: INTERNAL MEDICINE

## 2023-12-05 RX ORDER — NIRMATRELVIR AND RITONAVIR 300-100 MG
3 KIT ORAL 2 TIMES DAILY
Qty: 30 TABLET | Refills: 0 | Status: SHIPPED | OUTPATIENT
Start: 2023-12-05 | End: 2023-12-10

## 2023-12-05 NOTE — PROGRESS NOTES
Virtual Regular Visit    Verification of patient location:    Patient is located at Home in the following state in which I hold an active license [place]      Assessment/Plan:    Problem List Items Addressed This Visit    None           Reason for visit is   Chief Complaint   Patient presents with    COVID-19     COVID Positive 23  Cough, stuffy nose, headache, chills     Virtual Regular Visit          Encounter provider Monster Atkins MD    Provider located at [address] [place] [phone]      Recent Visits  No visits were found meeting these conditions. Showing recent visits within past 7 days and meeting all other requirements  Today's Visits  Date Type Provider Dept   23 Telemedicine Monster Atkins MD  Internal Med [facility]   Showing today's visits and meeting all other requirements  Future Appointments  No visits were found meeting these conditions. Showing future appointments within next 150 days and meeting all other requirements       The patient was identified by name and date of birth. Tha Capps was informed that this is a telemedicine visit and that the visit is being conducted through the [address] [place] [address] [employer] platform. She agrees to proceed. .  My office door was closed. No one else was in the room. She acknowledged consent and understanding of privacy and security of the video platform. The patient has agreed to participate and understands they can discontinue the visit at any time. Patient is aware this is a billable service. Subjective  Tha Capps is a 76 y.o. female .       HPI     Past Medical History:   Diagnosis Date    BPPV (benign paroxysmal positional vertigo)     Bronchitis     Disease of thyroid gland     HYPO    Diverticulitis     GERD (gastroesophageal reflux disease)     Glaucoma (increased eye pressure)      2 para 2     IBS (irritable bowel syndrome)     Insomnia     PONV (postoperative nausea and vomiting)        Past Surgical History:   Procedure Laterality Date    CHOLECYSTECTOMY      COLON SURGERY  2019    resection    COLONOSCOPY  07/2019    COLONOSCOPY      DILATION AND CURETTAGE OF UTERUS      FL GUIDED NEEDLE PLAC BX/ASP/INJ  12/13/2018    FL GUIDED NEEDLE PLAC BX/ASP/INJ  8/5/2019    FL GUIDED NEEDLE PLAC BX/ASP/INJ  2/24/2020    FL GUIDED NEEDLE PLAC BX/ASP/INJ  10/8/2020    FL GUIDED NEEDLE PLAC BX/ASP/INJ  6/22/2021    FL GUIDED NEEDLE PLAC BX/ASP/INJ  1/14/2022    FL GUIDED NEEDLE PLAC BX/ASP/INJ  5/23/2022    HYSTERECTOMY  2006    JOINT REPLACEMENT      LAPAROSCOPIC COLON RESECTION  11/12/2019    MAMMO (HISTORICAL) Bilateral 12/2018    PARTIAL HYSTERECTOMY      MS ARTHRP KNE CONDYLE&PLATU MEDIAL&LAT COMPARTMENTS Left 4/26/2021    Procedure: ARTHROPLASTY KNEE TOTAL;  Surgeon: Efrem Wilson MD;  [facility];  Service: Orthopedics    MS [address] [place] Right 1/22/2018    Procedure: ARTHROPLASTY KNEE TOTAL REVISION;  Surgeon: Efrem Wilson MD;  [facility];  Service: Orthopedics    TONSILLECTOMY      TRABECULOPLASTY, LASER SELECTIVE Left 05/14/2019    VARICOSE VEIN SURGERY         Current Outpatient Medications   Medication Sig Dispense Refill    BLACK ELDERBERRY PO Take by mouth daily      cycloSPORINE (RESTASIS) 0.05 % ophthalmic emulsion       DiphenhydrAMINE HCl (BENADRYL PO) Take by mouth daily at bedtime as needed       gabapentin (NEURONTIN) 100 mg capsule Take 100 mg by mouth 3 (three) times a day      ibuprofen (MOTRIN) 800 mg tablet TAKE 1 TABLET BY MOUTH EVERY 8  HOURS AS NEEDED FOR MODERATE  PAIN 90 tablet 0    levothyroxine 112 mcg tablet TAKE 1 TABLET BY MOUTH DAILY 90 tablet 2    LUMIGAN 0.01 % ophthalmic drops INSTILL 1 DROP IN EACH EYE EVERY DAY AT BEDTIME  1    meclizine (ANTIVERT) 12.5 MG tablet Take 1 tablet (12.5 mg total) by mouth as needed for dizziness 30 tablet 0    Probiotic Product (PROBIOTIC DAILY PO) Take by mouth      Acetaminophen (TYLENOL 8 HOUR PO) Take 200 mg by mouth as needed (Patient not taking: Reported on 11/6/2023)      scopolamine (TRANSDERM-SCOP) 1 mg/3 days TD 72 hr patch Place 1 patch on the skin over 72 hours every third day 10 patch 0     No current facility-administered medications for this visit.         Allergies   Allergen Reactions    Codeine GI Intolerance    Morphine Itching       Review of Systems    Video Exam    Vitals:    12/05/23 1246   Height: 5' 1" (1.549 m)       Physical Exam     Visit Time  Total Visit Duration: 15

## 2023-12-05 NOTE — PROGRESS NOTES
Virtual Regular Visit    Verification of patient location:    Patient is located at Home in the following state in which I hold an active license [place]      Assessment/Plan:    Problem List Items Addressed This Visit    None           Reason for visit is   Chief Complaint   Patient presents with    COVID-19     COVID Positive 23  Cough, stuffy nose, headache, chills     Virtual Regular Visit          Encounter provider Ashley Kay MD    Provider located at [address] [place] [phone]      Recent Visits  No visits were found meeting these conditions. Showing recent visits within past 7 days and meeting all other requirements  Today's Visits  Date Type Provider Dept   23 Telemedicine Ashley Kay MD [facility] Internal Med [facility]   Showing today's visits and meeting all other requirements  Future Appointments  No visits were found meeting these conditions. Showing future appointments within next 150 days and meeting all other requirements       The patient was identified by name and date of birth. Amanda Flor was informed that this is a telemedicine visit and that the visit is being conducted through the [address] [place] [address] [employer] platform. She agrees to proceed. .  My office door was closed. No one else was in the room. She acknowledged consent and understanding of privacy and security of the video platform. The patient has agreed to participate and understands they can discontinue the visit at any time. Patient is aware this is a billable service.      Subjective  Amanda Flor is a 76 y.o. female      HPI     Past Medical History:   Diagnosis Date    BPPV (benign paroxysmal positional vertigo)     Bronchitis     Disease of thyroid gland     HYPO    Diverticulitis     GERD (gastroesophageal reflux disease)     Glaucoma (increased eye pressure)      2 para 2     IBS (irritable bowel syndrome)     Insomnia     PONV (postoperative nausea and vomiting)        Past Surgical History:   Procedure Laterality Date    CHOLECYSTECTOMY      COLON SURGERY  2019    resection    COLONOSCOPY  07/2019    COLONOSCOPY      DILATION AND CURETTAGE OF UTERUS      FL GUIDED NEEDLE PLAC BX/ASP/INJ  12/13/2018    FL GUIDED NEEDLE PLAC BX/ASP/INJ  8/5/2019    FL GUIDED NEEDLE PLAC BX/ASP/INJ  2/24/2020    FL GUIDED NEEDLE PLAC BX/ASP/INJ  10/8/2020    FL GUIDED NEEDLE PLAC BX/ASP/INJ  6/22/2021    FL GUIDED NEEDLE PLAC BX/ASP/INJ  1/14/2022    FL GUIDED NEEDLE PLAC BX/ASP/INJ  5/23/2022    HYSTERECTOMY  2006    JOINT REPLACEMENT      LAPAROSCOPIC COLON RESECTION  11/12/2019    MAMMO (HISTORICAL) Bilateral 12/2018    PARTIAL HYSTERECTOMY      [place] ARTHRP KNE CONDYLE&PLATU MEDIAL&LAT COMPARTMENTS Left 4/26/2021    Procedure: ARTHROPLASTY KNEE TOTAL;  Surgeon: Kenneth Barbosa MD;  [facility];  Service: Orthopedics    CO [address] [place] Right 1/22/2018    Procedure: ARTHROPLASTY KNEE TOTAL REVISION;  Surgeon: Kenneth Barbosa MD;  [facility];  Service: Orthopedics    TONSILLECTOMY      TRABECULOPLASTY, LASER SELECTIVE Left 05/14/2019    VARICOSE VEIN SURGERY         Current Outpatient Medications   Medication Sig Dispense Refill    BLACK ELDERBERRY PO Take by mouth daily      cycloSPORINE (RESTASIS) 0.05 % ophthalmic emulsion       DiphenhydrAMINE HCl (BENADRYL PO) Take by mouth daily at bedtime as needed       gabapentin (NEURONTIN) 100 mg capsule Take 100 mg by mouth 3 (three) times a day      ibuprofen (MOTRIN) 800 mg tablet TAKE 1 TABLET BY MOUTH EVERY 8  HOURS AS NEEDED FOR MODERATE  PAIN 90 tablet 0    levothyroxine 112 mcg tablet TAKE 1 TABLET BY MOUTH DAILY 90 tablet 2    LUMIGAN 0.01 % ophthalmic drops INSTILL 1 DROP IN EACH EYE EVERY DAY AT BEDTIME  1    meclizine (ANTIVERT) 12.5 MG tablet Take 1 tablet (12.5 mg total) by mouth as needed for dizziness 30 tablet 0    Probiotic Product (PROBIOTIC DAILY PO) Take by mouth      Acetaminophen (TYLENOL 8 HOUR PO) Take 200 mg by mouth as needed (Patient not taking: Reported on 11/6/2023)      scopolamine (TRANSDERM-SCOP) 1 mg/3 days TD 72 hr patch Place 1 patch on the skin over 72 hours every third day 10 patch 0     No current facility-administered medications for this visit.         Allergies   Allergen Reactions    Codeine GI Intolerance    Morphine Itching       Review of Systems    Video Exam    Vitals:    12/05/23 1246   Height: 5' 1" (1.549 m)       Physical Exam     Visit Time  Total Visit Duration: 15

## 2023-12-05 NOTE — PROGRESS NOTES
COVID-19 Outpatient Progress Note    Assessment/Plan:        Developed symptoms yesterday around 12 4 COVID-19 positive test on 12 5 has some chills yesterday nasal congestion loose BM once no fever today no changes in blood pressure no medication change she looks good on video. Because of her age we will going to start her on Paxlovid full dose she has normal renal function        Problem List Items Addressed This Visit    None  Visit Diagnoses       COVID-19    -  Primary    Relevant Medications    nirmatrelvir & ritonavir (Paxlovid, 300/100,) tablet therapy pack           Disposition:     Referred patient to centralized site to test for COVID-19. I recommended COVID-19 PCR test 5 days after close contact exposure. Patient does not need to quarantine and should wear a high quality mask for 10 days. If testing on day 5 reveals you are positive, you should immediately isolate. Discussed COVID-19 antibody testing with the patient. If you choose to have a COVID-19 antibody test, you should understand some important limitations of this test.  Antibody tests detect the body's immune response to infection rather than detecting the virus itself. It can take weeks for antibodies to show up in the blood. For this reason, antibody tests are not used to detect or manage infection. They may be better for tracking infections in the community. Current antibody tests have not undergone the usual strict FDA approval process. The FDA decided to make it easier to have more tests available. The result is that these new tests may not be reliable. Since COVID-19 antibody testing is so new, we do not know how accurate it is. You can have a positive test and have not actually been infected. You also can have a negative test even though you might have been infected. There are other coronaviruses that are known to cause the common cold.  Many people may have antibodies to these other viruses that could make the COVID-19 antibody test positive. More importantly, even if you test positive, this does not necessarily mean you are immune to the virus. Even so, your [facility] provider understands that you may still want a COVID-19 antibody test and can order this test for you. It is important that you review the results with your provider and decide together how to use them. Discussed risks, benefits, and side effects of Remdesivir with patient and they agree to treatment. Clinical trials have shown a significant reduction in hospitalization when Remdesivir was given for 3 days in mild to moderate COVID-19 patients within seven days of symptom onset. Recommended dosing is 200 mg IV once on day 1, followed by 100mg IV on days 2-3. Most common adverse reactions can include: nausea, vomiting, diarrhea, headaches, rash, infusion reactions. Other less common reactions can include: bradycardia, seizure, hypersensitivity reactions, anaphylaxis/angioedema, elevated LFTs. I have spent a total time of 15 minutes on the day of the encounter for this patient including discussing prognosis, risks and benefits of treatment options, instructions for management, importance of treatment compliance, impressions, counseling/coordination of care, documenting in the medical record and communicating with other healthcare professionals. Encounter provider: Vidya Obrien MD     Provider located at: [address] [place] [phone]     Recent Visits  No visits were found meeting these conditions. Showing recent visits within past 7 days and meeting all other requirements  Today's Visits  Date Type Provider Dept   12/05/23 Telemedicine Vidya Obrien MD  Internal [facility]   Showing today's visits and meeting all other requirements  Future Appointments  No visits were found meeting these conditions.   Showing future appointments within next 150 days and meeting all other requirements       Patient agrees to participate in a virtual check in via telephone or video visit instead of presenting to the office to address urgent/immediate medical needs. Patient is aware this is a billable service. She acknowledged consent and understanding of privacy and security of the video platform. The patient has agreed to participate and understands they can discontinue the visit at any time. After connecting through [facility], the patient was identified by name and date of birth. Miguel A Arias was informed that this was a telemedicine visit and that the exam was being conducted confidentially over secure lines. My office door was closed. No one else was in the room. Miguel A Arias acknowledged consent and understanding of privacy and security of the telemedicine visit. I informed the patient that I have reviewed her record in Epic and presented the opportunity for her to ask any questions regarding the visit today. The patient agreed to participate. Verification of patient location:  Patient is located in the following state in which I hold an active license: [place]    Subjective:   Miguel A Arias is a 76 y.o. female who is concerned about COVID-19. Patient's symptoms include chills and fatigue. COVID-19 vaccination status: Partially vaccinated    No results found for: "Thedore Sames", "[address] [facility]", "Ree Abraham", "CORONAVIRUSR", "[address]", "[address] [facility]"    Review of Systems   Constitutional:  Positive for chills and fatigue. HENT:  Positive for postnasal drip.     Gastrointestinal:         Loose stools 1 episode     Current Outpatient Medications on File Prior to Visit   Medication Sig    BLACK ELDERBERRY PO Take by mouth daily    cycloSPORINE (RESTASIS) 0.05 % ophthalmic emulsion     DiphenhydrAMINE HCl (BENADRYL PO) Take by mouth daily at bedtime as needed     gabapentin (NEURONTIN) 100 mg capsule Take 100 mg by mouth 3 (three) times a day    ibuprofen (MOTRIN) 800 mg tablet TAKE 1 TABLET BY MOUTH EVERY 8  HOURS AS NEEDED FOR MODERATE  PAIN    levothyroxine 112 mcg tablet TAKE 1 TABLET BY MOUTH DAILY    LUMIGAN 0.01 % ophthalmic drops INSTILL 1 DROP IN EACH EYE EVERY DAY AT BEDTIME    meclizine (ANTIVERT) 12.5 MG tablet Take 1 tablet (12.5 mg total) by mouth as needed for dizziness    Probiotic Product (PROBIOTIC DAILY PO) Take by mouth    Acetaminophen (TYLENOL 8 HOUR PO) Take 200 mg by mouth as needed (Patient not taking: Reported on 11/6/2023)    [DISCONTINUED] scopolamine (TRANSDERM-SCOP) 1 mg/3 days TD 72 hr patch Place 1 patch on the skin over 72 hours every third day       Objective:    Ht 5' 1" (1.549 m)   LMP  (LMP Unknown)   BMI 28.72 kg/m²        Physical Exam  Constitutional:       Appearance: She is not ill-appearing. Pulmonary:      Effort: Pulmonary effort is normal. No respiratory distress. Skin:     Findings: No rash. Neurological:      Mental Status: She is alert.    Psychiatric:         Behavior: Behavior normal.       Clementine Perdue MD

## 2023-12-28 NOTE — PROGRESS NOTES
Daily Note     Today's date: 5/3/2021  Patient name: Cameron Gates  : 1955  MRN: [id number]  Referring provider: Marco Beach MD  Dx:   Encounter Diagnosis     ICD-10-CM    1  Primary osteoarthritis of left knee  M17 12                   Subjective: Catina Castano reports no new issues  Objective: See treatment diary below      Assessment: Tolerated treatment well  Patient exhibited good technique with therapeutic exercises and would benefit from continued PT      Plan: Continue per plan of care        Precautions: None      Manuals 4/6 5/3           PROM                                                    Neuro Re-Ed                                                                                                        Ther Ex             HEP issued             Heel slides  30 sec  X 5           Quad set  30           Hamstring stretch  30 sec   3            Bike  5 min            Gastroc stretch  30 sec X 3                                      Ther Activity                                       Gait Training                                       Modalities Private Auto Walk in

## 2024-01-09 ENCOUNTER — APPOINTMENT (OUTPATIENT)
Dept: LAB | Facility: MEDICAL CENTER | Age: 69
End: 2024-01-09
Payer: MEDICARE

## 2024-01-09 DIAGNOSIS — E55.9 AVITAMINOSIS D: ICD-10-CM

## 2024-01-09 DIAGNOSIS — M81.0 SENILE OSTEOPOROSIS: ICD-10-CM

## 2024-01-09 DIAGNOSIS — E03.9 ACQUIRED HYPOTHYROIDISM: ICD-10-CM

## 2024-01-09 DIAGNOSIS — Z79.899 ENCOUNTER FOR LONG-TERM (CURRENT) USE OF OTHER MEDICATIONS: ICD-10-CM

## 2024-01-09 LAB
25(OH)D3 SERPL-MCNC: 38.5 NG/ML (ref 30–100)
ALBUMIN SERPL BCP-MCNC: 4.2 G/DL (ref 3.5–5)
ALP SERPL-CCNC: 108 U/L (ref 34–104)
ALT SERPL W P-5'-P-CCNC: 15 U/L (ref 7–52)
ANION GAP SERPL CALCULATED.3IONS-SCNC: 11 MMOL/L
AST SERPL W P-5'-P-CCNC: 20 U/L (ref 13–39)
BILIRUB SERPL-MCNC: 0.85 MG/DL (ref 0.2–1)
BUN SERPL-MCNC: 17 MG/DL (ref 5–25)
CALCIUM SERPL-MCNC: 10.4 MG/DL (ref 8.4–10.2)
CHLORIDE SERPL-SCNC: 107 MMOL/L (ref 96–108)
CO2 SERPL-SCNC: 26 MMOL/L (ref 21–32)
CREAT SERPL-MCNC: 0.68 MG/DL (ref 0.6–1.3)
GFR SERPL CREATININE-BSD FRML MDRD: 90 ML/MIN/1.73SQ M
GLUCOSE P FAST SERPL-MCNC: 97 MG/DL (ref 65–99)
POTASSIUM SERPL-SCNC: 4.1 MMOL/L (ref 3.5–5.3)
PROT SERPL-MCNC: 6.8 G/DL (ref 6.4–8.4)
PTH-INTACT SERPL-MCNC: 54.1 PG/ML (ref 12–88)
SODIUM SERPL-SCNC: 144 MMOL/L (ref 135–147)

## 2024-01-09 PROCEDURE — 36415 COLL VENOUS BLD VENIPUNCTURE: CPT

## 2024-01-09 PROCEDURE — 82306 VITAMIN D 25 HYDROXY: CPT

## 2024-01-09 PROCEDURE — 83970 ASSAY OF PARATHORMONE: CPT

## 2024-01-09 PROCEDURE — 80053 COMPREHEN METABOLIC PANEL: CPT

## 2024-01-15 ENCOUNTER — TELEPHONE (OUTPATIENT)
Age: 69
End: 2024-01-15

## 2024-01-15 DIAGNOSIS — R26.81 GAIT INSTABILITY: ICD-10-CM

## 2024-01-15 DIAGNOSIS — R42 VERTIGO: Primary | ICD-10-CM

## 2024-01-15 NOTE — TELEPHONE ENCOUNTER
Clinic Care Coordination Contact    [facility] RN unable to contact patient x 2 and will make no further patient outreaches at this time.  Per Dr. Bello, Care Coordination no longer needed at this time and refill requests will be deferred to patient's new PCP.  [facility] RN will send chart to care team and notify unable to contact patient but that Care Coordination will remain available if any additional patient needs arise.    Kedar Kirkland RN  Clinic Care Coordinator  [facility]  Ph: [phone]   Patient called requesting AMB referrals for Physical Therapy.    Asked if she could have one entered for tomorrow for her already scheduled appointment with Dr. Vasquez at [address] [place] [address] for (gait/inbalance)    Also requesting another for a future appt not yet scheduled for [address] [place] [address].  She would like to be seen/treated there for her vertigo and also the gait/inbalance moving forward.    Any questions, she can be reached at [phone].

## 2024-01-15 NOTE — TELEPHONE ENCOUNTER
Patient called and got blood work done at a [facility] lab ordered by an [facility] doctor  tu busby md.  They did not receive the results.  She will call back with there fax number so we can send to her Rheumatologist.

## 2024-01-15 NOTE — TELEPHONE ENCOUNTER
Patient called back with information about labs. Please send all lab work from 1/9/2024.  Attn: Dr.. Kent Fax [phone]  Patient thanks us for our help!

## 2024-01-16 ENCOUNTER — EVALUATION (OUTPATIENT)
Dept: PHYSICAL THERAPY | Facility: MEDICAL CENTER | Age: 69
End: 2024-01-16
Payer: MEDICARE

## 2024-01-16 DIAGNOSIS — R42 VERTIGO: ICD-10-CM

## 2024-01-16 DIAGNOSIS — R26.81 GAIT INSTABILITY: Primary | ICD-10-CM

## 2024-01-16 PROCEDURE — 97161 PT EVAL LOW COMPLEX 20 MIN: CPT | Performed by: PHYSICAL THERAPIST

## 2024-01-16 NOTE — PROGRESS NOTES
PT Evaluation     Today's date: 2024  Patient name: Ronald Elliott  : 1955  MRN: [id number]  Referring provider: Chet Morgan DO  Dx:   Encounter Diagnosis     ICD-10-CM    1. Gait instability  R26.81 Ambulatory Referral to Physical Therapy      2. Vertigo  R42 Ambulatory Referral to Physical Therapy                     Assessment  Assessment details: Ronald Elliott is a 68 y.o. female was evaluated on 2024  for Gait instability  (primary encounter diagnosis)  Vertigo. Ronald Elliott has the above listed impairments resulting in functional deficits and negative impact to quality of life.  Patient is appropriate for skilled PT intervention to promote maximal return to function and patient specific goals.      Patient agrees with outlined treatment plan and all questions were answered to their satisfaction.       Impairments: abnormal muscle firing, abnormal muscle tone, abnormal or restricted ROM, impaired physical strength, lacks appropriate home exercise program and pain with function  Understanding of Dx/Px/POC: good   Prognosis: good    Goals  Patient will successfully transition to home exercise program.  Patient will be able to manage symptoms independently.    Ronald will report improved walking and less limping   Ronald will report greater ease of participating in pickleball        Plan  Patient would benefit from: skilled PT  Referral necessary: No  Planned modality interventions: thermotherapy: hydrocollator packs  Planned therapy interventions: home exercise program, manual therapy, neuromuscular re-education, patient education, functional ROM exercises, strengthening, stretching, joint mobilization, graded activity, graded exercise, therapeutic exercise, body mechanics training, motor coordination training and activity modification  Frequency: 1x week  Duration in weeks: 12  Treatment plan discussed with: patient        Subjective Evaluation    History of Present  Illness  Mechanism of injury: Ronald Elliott is a 68 y.o. female presenting to therapy with complaints of the feeling of limping while she is walking.   She notes that she has begun playing pickleball more and feels that she is not as balanced as she would like to be.  She had history of bilateral knee replacements with R being revised once.    She has not fallen, but notes that the limping is bothering her and she would like to walk more balanced.    Patient Goals  Patient goals for therapy: decreased pain, increased motion, return to sport/leisure activities, independence with ADLs/IADLs and increased strength    Pain  Current pain ratin  At best pain ratin  At worst pain ratin          Objective  Pelvic alignment:  slightly longer on R side compared to L    Hip strength 4+/5 in all directions with the exception of R hip being 3+/5 on R and 4+ on L accounting for her lateral lean during gait along with LLD       R knee flexion 115 vs 125 on L                Precautions: None      Manuals                                                                 Neuro Re-Ed                                                                                                        Ther Ex             Supine hip bent knee fall out Black  30 on R             Standing hip abduction Yellow  30                                                                                          Ther Activity                                       Gait Training                                       Modalities

## 2024-01-19 ENCOUNTER — OFFICE VISIT (OUTPATIENT)
Dept: PHYSICAL THERAPY | Facility: MEDICAL CENTER | Age: 69
End: 2024-01-19
Payer: MEDICARE

## 2024-01-19 DIAGNOSIS — R26.81 GAIT INSTABILITY: Primary | ICD-10-CM

## 2024-01-19 PROCEDURE — 97110 THERAPEUTIC EXERCISES: CPT | Performed by: PHYSICAL THERAPIST

## 2024-01-19 NOTE — PROGRESS NOTES
Daily Note     Today's date: 2024  Patient name: Ronald Elliott  : 1955  MRN: [id number]  Referring provider: Chet Morgan DO  Dx:   Encounter Diagnosis     ICD-10-CM    1. Gait instability  R26.81                      Subjective: Ronald reports that she is doing well,  no new issues.  Stills feels a bit unbalance when playing pickleball, especially when on balls of feet       Objective: See treatment diary below      Assessment: Tolerated treatment well. Patient exhibited good technique with therapeutic exercises and would benefit from continued PT      Plan: Continue per plan of care.      Precautions: None      Manuals                                                                 Neuro Re-Ed                                                                                                        Ther Ex             Supine hip bent knee fall out Black  30 on R             Standing hip abduction Yellow  30            Hip abduction vs wall 10 sec  X 5            Cone side step overs 5 laps            Bike 10 min             Clamshell  Black  30                                      Ther Activity                                       Gait Training                                       Modalities

## 2024-01-23 ENCOUNTER — TELEPHONE (OUTPATIENT)
Dept: INFUSION CENTER | Facility: CLINIC | Age: 69
End: 2024-01-23

## 2024-01-23 ENCOUNTER — OFFICE VISIT (OUTPATIENT)
Dept: PHYSICAL THERAPY | Facility: MEDICAL CENTER | Age: 69
End: 2024-01-23
Payer: MEDICARE

## 2024-01-23 DIAGNOSIS — R26.81 GAIT INSTABILITY: Primary | ICD-10-CM

## 2024-01-23 PROCEDURE — 97110 THERAPEUTIC EXERCISES: CPT | Performed by: PHYSICAL THERAPIST

## 2024-01-23 RX ORDER — SODIUM CHLORIDE 9 MG/ML
20 INJECTION, SOLUTION INTRAVENOUS CONTINUOUS
Status: DISCONTINUED | OUTPATIENT
Start: 2024-01-24 | End: 2024-01-27 | Stop reason: HOSPADM

## 2024-01-23 RX ORDER — ZOLEDRONIC ACID 5 MG/100ML
5 INJECTION, SOLUTION INTRAVENOUS ONCE
Status: COMPLETED | OUTPATIENT
Start: 2024-01-24 | End: 2024-01-24

## 2024-01-23 NOTE — PROGRESS NOTES
Daily Note     Today's date: 2024  Patient name: Ronald Elliott  : 1955  MRN: [id number]  Referring provider: Chet Morgan DO  Dx:   Encounter Diagnosis     ICD-10-CM    1. Gait instability  R26.81                      Subjective: Ronald reports that she is doing well,  no new issues.  Stills feels a bit unbalance when playing pickleball, especially when on balls of feet       Objective: See treatment diary below      Assessment: Tolerated treatment well. Patient exhibited good technique with therapeutic exercises and would benefit from continued PT      Plan: Continue per plan of care.      Precautions: None      Manuals                                                                 Neuro Re-Ed                                                                                                        Ther Ex             Supine hip bent knee fall out Black  30 on R             Standing hip abduction Yellow  30            Hip abduction vs wall 10 sec  X 5            Cone side step overs 5 laps            Bike 10 min             Clamshell  Black  30                                      Ther Activity                                       Gait Training                                       Modalities

## 2024-01-23 NOTE — TELEPHONE ENCOUNTER
Called patient and confirmed appointment for 1/24/24 at 09:00 for Reclast. Discussed location of infusion center, projected length of appointment, visitor policy, and availability of snacks, drinks, and WiFi. All questions answered.

## 2024-01-24 ENCOUNTER — HOSPITAL ENCOUNTER (OUTPATIENT)
Dept: INFUSION CENTER | Facility: CLINIC | Age: 69
Discharge: HOME | End: 2024-01-24
Payer: MEDICARE

## 2024-01-24 VITALS
HEART RATE: 67 BPM | RESPIRATION RATE: 18 BRPM | TEMPERATURE: 97.8 F | DIASTOLIC BLOOD PRESSURE: 77 MMHG | WEIGHT: 150.79 LBS | BODY MASS INDEX: 28.47 KG/M2 | SYSTOLIC BLOOD PRESSURE: 116 MMHG | HEIGHT: 61 IN

## 2024-01-24 PROCEDURE — 96374 THER/PROPH/DIAG INJ IV PUSH: CPT

## 2024-01-24 RX ADMIN — ZOLEDRONIC ACID 5 MG: 0.05 INJECTION, SOLUTION INTRAVENOUS at 09:11

## 2024-01-24 RX ADMIN — SODIUM CHLORIDE 20 ML/HR: 0.9 INJECTION, SOLUTION INTRAVENOUS at 09:11

## 2024-01-24 NOTE — PROGRESS NOTES
Pt presents for reclast. No new meds or concerns. Pt tolerated treatment without adverse reaction. Future appointments discussed, confirmed with patient she will call to schedule for 2025. AVS declined.

## 2024-01-30 ENCOUNTER — OFFICE VISIT (OUTPATIENT)
Dept: PHYSICAL THERAPY | Facility: MEDICAL CENTER | Age: 69
End: 2024-01-30
Payer: MEDICARE

## 2024-01-30 DIAGNOSIS — R26.81 GAIT INSTABILITY: Primary | ICD-10-CM

## 2024-01-30 PROCEDURE — 97110 THERAPEUTIC EXERCISES: CPT | Performed by: PHYSICAL THERAPIST

## 2024-01-30 NOTE — PROGRESS NOTES
Daily Note     Today's date: 2024  Patient name: Ronald Elliott  : 1955  MRN: [id number]  Referring provider: Chet Morgan DO  Dx:   Encounter Diagnosis     ICD-10-CM    1. Gait instability  R26.81                      Subjective: Ronald reports that she is doing well,  no new issues.        Objective: See treatment diary below      Assessment: Tolerated treatment well. Patient exhibited good technique with therapeutic exercises and would benefit from continued PT      Plan: Continue per plan of care.      Precautions: None      Manuals                                                                 Neuro Re-Ed                                                                                                        Ther Ex             Supine hip bent knee fall out Black  30 on R             Standing hip abduction Yellow  30            Hip abduction vs wall 10 sec  X 5            Cone side step overs 5 laps            Bike 10 min             Clamshell  Black  30                                      Ther Activity                                       Gait Training                                       Modalities

## 2024-02-06 DIAGNOSIS — E03.9 HYPOTHYROIDISM, UNSPECIFIED TYPE: ICD-10-CM

## 2024-02-06 RX ORDER — LEVOTHYROXINE SODIUM 112 UG/1
TABLET ORAL
Qty: 90 TABLET | Refills: 1 | Status: SHIPPED | OUTPATIENT
Start: 2024-02-06

## 2024-02-21 DIAGNOSIS — M15.9 PRIMARY OSTEOARTHRITIS INVOLVING MULTIPLE JOINTS: ICD-10-CM

## 2024-02-21 RX ORDER — IBUPROFEN 800 MG/1
TABLET ORAL
Qty: 90 TABLET | Refills: 1 | Status: SHIPPED | OUTPATIENT
Start: 2024-02-21

## 2024-04-12 ENCOUNTER — TELEPHONE (OUTPATIENT)
Age: 69
End: 2024-04-12

## 2024-04-12 DIAGNOSIS — Z12.31 ENCOUNTER FOR SCREENING MAMMOGRAM FOR BREAST CANCER: Primary | ICD-10-CM

## 2024-04-12 NOTE — TELEPHONE ENCOUNTER
Pt called and wanted to know if Dr. Morgan put in labs before her upcoming appt. I confirmed he did. Pt also asked If Dr. Morgan can please put a mammogram order in because the appts are far out. Please advise [phone] thank you.

## 2024-04-17 ENCOUNTER — FOLLOW UP (OUTPATIENT)
Dept: URBAN - METROPOLITAN AREA CLINIC 6 | Facility: CLINIC | Age: 69
End: 2024-04-17

## 2024-04-17 DIAGNOSIS — H40.1131: ICD-10-CM

## 2024-04-17 DIAGNOSIS — H04.123: ICD-10-CM

## 2024-04-17 PROCEDURE — 92012 INTRM OPH EXAM EST PATIENT: CPT

## 2024-04-17 PROCEDURE — 92133 CPTRZD OPH DX IMG PST SGM ON: CPT

## 2024-04-17 ASSESSMENT — VISUAL ACUITY
OD_SC: 20/20-1
OS_SC: 20/20

## 2024-04-17 ASSESSMENT — TONOMETRY
OS_IOP_MMHG: 14
OD_IOP_MMHG: 14

## 2024-05-08 ENCOUNTER — APPOINTMENT (OUTPATIENT)
Dept: LAB | Facility: MEDICAL CENTER | Age: 69
End: 2024-05-08
Payer: MEDICARE

## 2024-05-08 LAB
ALBUMIN SERPL BCP-MCNC: 4.3 G/DL (ref 3.5–5)
ALP SERPL-CCNC: 70 U/L (ref 34–104)
ALT SERPL W P-5'-P-CCNC: 13 U/L (ref 7–52)
ANION GAP SERPL CALCULATED.3IONS-SCNC: 8 MMOL/L (ref 4–13)
AST SERPL W P-5'-P-CCNC: 18 U/L (ref 13–39)
BASOPHILS # BLD AUTO: 0.02 THOUSANDS/ÂΜL (ref 0–0.1)
BASOPHILS NFR BLD AUTO: 0 % (ref 0–1)
BILIRUB SERPL-MCNC: 1.15 MG/DL (ref 0.2–1)
BUN SERPL-MCNC: 22 MG/DL (ref 5–25)
CALCIUM SERPL-MCNC: 9.7 MG/DL (ref 8.4–10.2)
CHLORIDE SERPL-SCNC: 107 MMOL/L (ref 96–108)
CHOLEST SERPL-MCNC: 196 MG/DL
CO2 SERPL-SCNC: 27 MMOL/L (ref 21–32)
CREAT SERPL-MCNC: 0.69 MG/DL (ref 0.6–1.3)
EOSINOPHIL # BLD AUTO: 0.12 THOUSAND/ÂΜL (ref 0–0.61)
EOSINOPHIL NFR BLD AUTO: 2 % (ref 0–6)
ERYTHROCYTE [DISTWIDTH] IN BLOOD BY AUTOMATED COUNT: 13.6 % (ref 11.6–15.1)
GFR SERPL CREATININE-BSD FRML MDRD: 89 ML/MIN/1.73SQ M
GLUCOSE P FAST SERPL-MCNC: 96 MG/DL (ref 65–99)
HCT VFR BLD AUTO: 42.6 % (ref 34.8–46.1)
HDLC SERPL-MCNC: 60 MG/DL
HGB BLD-MCNC: 13.6 G/DL (ref 11.5–15.4)
IMM GRANULOCYTES # BLD AUTO: 0.02 THOUSAND/UL (ref 0–0.2)
IMM GRANULOCYTES NFR BLD AUTO: 0 % (ref 0–2)
LDLC SERPL CALC-MCNC: 114 MG/DL (ref 0–100)
LYMPHOCYTES # BLD AUTO: 1.41 THOUSANDS/ÂΜL (ref 0.6–4.47)
LYMPHOCYTES NFR BLD AUTO: 28 % (ref 14–44)
MCH RBC QN AUTO: 29.1 PG (ref 26.8–34.3)
MCHC RBC AUTO-ENTMCNC: 31.9 G/DL (ref 31.4–37.4)
MCV RBC AUTO: 91 FL (ref 82–98)
MONOCYTES # BLD AUTO: 0.51 THOUSAND/ÂΜL (ref 0.17–1.22)
MONOCYTES NFR BLD AUTO: 10 % (ref 4–12)
NEUTROPHILS # BLD AUTO: 2.92 THOUSANDS/ÂΜL (ref 1.85–7.62)
NEUTS SEG NFR BLD AUTO: 60 % (ref 43–75)
NONHDLC SERPL-MCNC: 136 MG/DL
NRBC BLD AUTO-RTO: 0 /100 WBCS
PLATELET # BLD AUTO: 310 THOUSANDS/UL (ref 149–390)
PMV BLD AUTO: 10.2 FL (ref 8.9–12.7)
POTASSIUM SERPL-SCNC: 4.9 MMOL/L (ref 3.5–5.3)
PROT SERPL-MCNC: 7.1 G/DL (ref 6.4–8.4)
RBC # BLD AUTO: 4.67 MILLION/UL (ref 3.81–5.12)
SODIUM SERPL-SCNC: 142 MMOL/L (ref 135–147)
T4 FREE SERPL-MCNC: 0.86 NG/DL (ref 0.61–1.12)
TRIGL SERPL-MCNC: 109 MG/DL
TSH SERPL DL<=0.05 MIU/L-ACNC: 2.27 UIU/ML (ref 0.45–4.5)
WBC # BLD AUTO: 5 THOUSAND/UL (ref 4.31–10.16)

## 2024-05-09 NOTE — CONSULTS
Consult Note- Acute Pain Service   Amalia Oakley 77 y o  female MRN: [id number]  Unit/Bed#: -01 Encounter: [id number]               Assessment/Plan     Assessment:   Patient Active Problem List   Diagnosis    History of total knee arthroplasty    Orthopedic aftercare    Aftercare following right knee joint replacement surgery    Arthritis of foot, right    Chronic pain of left knee    Pes anserine bursitis    Primary osteoarthritis of left knee    Hypothyroidism    Hyperlipidemia    Elevated hemoglobin A1c    Vitamin D deficiency    Arthritis of left knee    Prediabetes    Breast lump    PONV (postoperative nausea and vomiting)    S/P total knee arthroplasty, left      Amalia Oakley is a 77 y o  female  POD 1 s/p left TKA, with left adductor canal and ipack blocks placed for postop pain control  She is seen in her room  Her pain has ranged from 6 to 9/10 since placement on patient floor  She did notice pain steadily increasing along the posterior aspect of her knee, she is aware she received two regional nerve blocks and that the blocks will wear off in 1-2 days time completely  She has taken Vicodin 1 tablet p o  X1 dose, and Dilaudid 0 5 mg x 1 dose  She trialed oxycodone 10 mg x 2 doses, however she complained of nausea, and so the medications were adjusted accordingly  Her nausea have since been relieved with zofran  She is an opioid naive patient, she takes Voltaren gel and naproxen for pain at home  She takes benadryl to help sleep at night  This is her third knee procedure, she had a R TKA done 3 yrs ago  She has trialed flexeril in the past for muscle spasms and would be agreeable to trial it while an inpatient  She declines use of gabapentanoids because she did not admit to neuropathic pain concerns  She is willing to trial vicodin up to 2 tabs at a time, knowing she can tolerate it  She has done well with her PT sessions and is eager to go home tomorrow   She is agreeable to scripts of vicodin, flexeril, and zofran to go home with  Plan:   - left ipack block has worn off appropriately  Left adductor canal block still in effect and will wear off in less than 24 hrs    - continue Tylenol to 650 mg p o  Q 6 hours p r n   -  modify Vicodin to 1-2 tabs p o  Q 4 hours p r n  Moderate to severe pain  - continue Dilaudid 0 5 mg IV q 3 hours p r n  Breakthrough pain  - discontinue melatonin order  - order benadryl 50 mg PO QHS for sleep (she takes this at home)  - order flexeril 5 mg PO TID for muscle spasms  - continue zofran order     Recommendations communicated to ortho service ()    Bowel regimen:  Continue  - Docusate (Colace) 100 mg PO twice daily    APS will continue to see patient  Thank you for the consult  History of Present Illness    Admit Date:  4/26/2021  Hospital Day:  0 days  Primary Service:  Orthopedics  Attending Provider:  Turner Negron MD  Reason for Consult / Principal Problem:  Acute postop pain, opioid naive patient, received regional nerve blocks for her left knee for postop pain control  HPI: Eve Gordon is a 77 y o  female who presents with (see above assessment section)    Current pain location(s):  Left knee  Pain Scale:   6 to 10/10  Current Analgesic regimen:  Tylenol, Vicodin, Dilaudid IV    Pain History:  Left knee pain  Pain Management Provider:  None    I have reviewed the patient's controlled substance dispensing history in the Prescription Drug Monitoring Program in compliance with the [facility] regulations before prescribing any controlled substances  Inpatient consult to Acute Pain Service  Consult performed by: Sophie Nair MD  Consult ordered by: Lauren Ramirez MD          Review of Systems   Constitutional: Negative  HENT: Negative  Eyes: Negative  Respiratory: Negative  Cardiovascular: Negative  Gastrointestinal: Positive for nausea          Bouts of nausea earlier today, most likely related to intolerance to oxycodone   Endocrine: Negative  Genitourinary: Negative  Musculoskeletal:        Left knee pain   Skin: Negative  Neurological: Negative  Hematological: Negative  Psychiatric/Behavioral: Negative          Historical Information   Past Medical History:   Diagnosis Date    BPPV (benign paroxysmal positional vertigo)     Bronchitis     Disease of thyroid gland     HYPO    Diverticulitis     GERD (gastroesophageal reflux disease)     Glaucoma (increased eye pressure)      2 para 2     IBS (irritable bowel syndrome)     Insomnia     PONV (postoperative nausea and vomiting)      Past Surgical History:   Procedure Laterality Date    CHOLECYSTECTOMY      COLON SURGERY      COLONOSCOPY  2019    COLONOSCOPY      DILATION AND CURETTAGE OF UTERUS      FL GUIDED NEEDLE PLAC BX/ASP/INJ  2018    FL GUIDED NEEDLE PLAC BX/ASP/INJ  2019    FL GUIDED NEEDLE PLAC BX/ASP/INJ  2020    FL GUIDED NEEDLE PLAC BX/ASP/INJ  10/8/2020    HYSTERECTOMY  2006    JOINT REPLACEMENT      LAPAROSCOPIC COLON RESECTION  2019    MAMMO (HISTORICAL) Bilateral 2018    PARTIAL HYSTERECTOMY      LA REVISE KNEE JOINT REPLACE,ALL PARTS Right 2018    Procedure: ARTHROPLASTY KNEE TOTAL REVISION;  Surgeon: Jorje Short MD;  Location: [facility];  Service: Orthopedics    LA TOTAL KNEE ARTHROPLASTY Left 2021    Procedure: ARTHROPLASTY KNEE TOTAL;  Surgeon: Jorje Short MD;  Location: [facility];  Service: Orthopedics    TONSILLECTOMY      TRABECULOPLASTY, LASER SELECTIVE Left 2019    VARICOSE VEIN SURGERY       Social History   Social History     Substance and Sexual Activity   Alcohol Use Yes    Frequency: Monthly or less    Drinks per session: 1 or 2    Comment: social      Social History     Substance and Sexual Activity   Drug Use No     Social History     Tobacco Use   Smoking Status Never Smoker   Smokeless Tobacco Never Used     Family History: non-contributory    Meds/Allergies   all current active meds have been reviewed, current meds:   Current Facility-Administered Medications   Medication Dose Route Frequency    acetaminophen (TYLENOL) tablet 650 mg  650 mg Oral Q6H PRN    calcium carbonate (TUMS) chewable tablet 1,000 mg  1,000 mg Oral Daily PRN    diphenhydrAMINE (BENADRYL) injection 25 mg  25 mg Intravenous Q6H PRN    docusate sodium (COLACE) capsule 100 mg  100 mg Oral BID    enoxaparin (LOVENOX) subcutaneous injection 40 mg  40 mg Subcutaneous Daily    hydrALAZINE (APRESOLINE) tablet 25 mg  25 mg Oral Q8H PRN    HYDROcodone-acetaminophen (NORCO) 5-325 mg per tablet 1 tablet  1 tablet Oral Q4H PRN    HYDROmorphone (DILAUDID) injection 0 5 mg  0 5 mg Intravenous Q3H PRN    iron sucrose (VENOFER) 200 mg in sodium chloride 0 9 % 100 mL IVPB  200 mg Intravenous Daily    lactated ringers bolus 1,000 mL  1,000 mL Intravenous Once PRN    And    lactated ringers bolus 1,000 mL  1,000 mL Intravenous Once PRN    lactated ringers infusion  100 mL/hr Intravenous Continuous    levothyroxine tablet 112 mcg  112 mcg Oral Early Morning    melatonin tablet 6 mg  6 mg Oral HS PRN    ondansetron (ZOFRAN) injection 4 mg  4 mg Intravenous Q6H PRN    senna (SENOKOT) tablet 8 6 mg  1 tablet Oral Daily    sodium chloride 0 9 % bolus 1,000 mL  1,000 mL Intravenous Once PRN    And    sodium chloride 0 9 % bolus 1,000 mL  1,000 mL Intravenous Once PRN    and PTA meds:   Prior to Admission Medications   Prescriptions Last Dose Informant Patient Reported? Taking?    Acetaminophen (TYLENOL ARTHRITIS PAIN PO) More than a month at Unknown time Self Yes No   Sig: Take 1,200 mg by mouth as needed    BLACK ELDERBERRY PO 2021  Yes Yes   Sig: Take by mouth daily   Biotin 26580 MCG TABS 2021 Self Yes Yes   Si (two) times a day    Calcium-Vitamin D-Vitamin K (VIACTIV PO) 2021 Self Yes Yes   Sig: Take 1 tablet by mouth daily   DiphenhydrAMINE HCl (BENADRYL PO) 4/18/2021 Self Yes Yes   Sig: Take by mouth daily at bedtime as needed    LUMIGAN 0 01 % ophthalmic drops 4/24/2021 Self Yes Yes   Sig: INSTILL 1 DROP IN [facility] EVERY DAY AT BEDTIME   Probiotic Product (PROBIOTIC DAILY PO) 4/18/2021 Self Yes Yes   Sig: Take by mouth   Tetrahydrozoline HCl (EYE DROPS REGULAR OP) 4/23/2021 Self Yes Yes   Sig: Apply to eye as needed    ascorbic acid (VITAMIN C) 500 mg tablet   No Yes   Sig: Take 1 tablet (500 mg total) by mouth daily Start 30 days prior to surgery   cholecalciferol (VITAMIN D3) 1,000 units tablet 4/18/2021 Self Yes Yes   Sig: Take 4,000 Units by mouth daily   diclofenac sodium (VOLTAREN) 1 %  Self No Yes   Sig: Apply 4 g topically 4 (four) times a day   Patient taking differently: Apply 4 g topically as needed    enoxaparin (LOVENOX) 40 mg/0 4 mL   No No   Sig: Inject 0 4 mL (40 mg total) under the skin daily   ferrous sulfate 324 (65 Fe) mg   No Yes   Sig: Take 1 tablet (324 mg total) by mouth 2 (two) times a day before meals Start 30 days prior to surgery   folic acid (FOLVITE) 1 mg tablet   No Yes   Sig: Take 1 tablet (1 mg total) by mouth daily Start 30 days prior to surgery   levothyroxine 112 mcg tablet 4/26/2021 at 0630 Self Yes Yes   Sig: Take 112 mcg by mouth daily   meclizine (ANTIVERT) 12 5 MG tablet More than a month at Unknown time Self Yes No   Sig: Take by mouth as needed    naproxen (NAPROSYN) 500 mg tablet 4/18/2021  No Yes   Sig: Take 1 tablet (500 mg total) by mouth 2 (two) times a day with meals   Patient taking differently: Take 500 mg by mouth daily at bedtime       Facility-Administered Medications Last Administration Doses Remaining   cephalexin (KEFLEX) capsule 2,000 mg None recorded 1          Allergies   Allergen Reactions    Codeine GI Intolerance    Morphine Itching       Objective   Temp:  [97 3 °F (36 3 °C)-100 °F (37 8 °C)] 100 °F (37 8 °C)  HR:  [] 83  Resp:  [12-23] 20  BP: (107-169)/(57-95) 120/70    Intake/Output Summary (Last 24 hours) at 4/27/2021 1142  Last data filed at 4/27/2021 0803  Gross per 24 hour   Intake 3635 ml   Output 2560 ml   Net 1075 ml       Physical Exam  Vitals signs and nursing note reviewed  Constitutional:       Appearance: Normal appearance  HENT:      Head: Normocephalic and atraumatic  Nose: Nose normal    Eyes:      Extraocular Movements: Extraocular movements intact  Pupils: Pupils are equal, round, and reactive to light  Neck:      Musculoskeletal: Normal range of motion  Cardiovascular:      Rate and Rhythm: Normal rate  Pulses: Normal pulses  Pulmonary:      Effort: Pulmonary effort is normal    Abdominal:      General: Abdomen is flat  Musculoskeletal:      Comments: Able to move well despite left TKA    Skin:     General: Skin is warm and dry  Neurological:      General: No focal deficit present  Mental Status: She is alert and oriented to person, place, and time  Mental status is at baseline  Psychiatric:         Mood and Affect: Mood normal          Behavior: Behavior normal          Thought Content: Thought content normal          Judgment: Judgment normal          Lab Results:   I have personally reviewed pertinent labs  , CBC:   Lab Results   Component Value Date    WBC 11 14 (H) 04/27/2021    HGB 11 0 (L) 04/27/2021    HCT 33 0 (L) 04/27/2021    MCV 89 04/27/2021     04/27/2021    MCH 29 6 04/27/2021    MCHC 33 3 04/27/2021    RDW 13 1 04/27/2021    MPV 10 8 04/27/2021   , CMP:   Lab Results   Component Value Date    SODIUM 135 (L) 04/27/2021    K 3 7 04/27/2021     04/27/2021    CO2 25 04/27/2021    BUN 17 04/27/2021    CREATININE 0 71 04/27/2021    CALCIUM 9 5 04/27/2021    EGFR 89 04/27/2021   , BMP:  Lab Results   Component Value Date    SODIUM 135 (L) 04/27/2021    K 3 7 04/27/2021     04/27/2021    CO2 25 04/27/2021    BUN 17 04/27/2021    CREATININE 0 71 04/27/2021    GLUC 117 04/27/2021    GLUF 117 (H) 04/27/2021    CALCIUM 9 5 04/27/2021    AGAP 9 04/27/2021    EGFR 89 04/27/2021   , PT/PTT:No results found for: PT, PTT    Imaging Studies: I have personally reviewed pertinent reports  EKG, Pathology, and Other Studies: I have personally reviewed pertinent reports  Counseling / Coordination of Care  Total floor / unit time spent today Level 1 = 20 minutes  Greater than 50% of total time was spent with the patient and / or family counseling and / or coordination of care  A description of the counseling / coordination of care: discussed use of regional nerve blocks for pain control, and transition to solely PO/IV pain regimen once blocks wear off    Please note that the APS provides consultative services regarding pain management only  With the exception of ketamine and epidural infusions and except when indicated, final decisions regarding starting or changing doses of analgesic medications are at the discretion of the consulting service  Off hours consultation and/or medication management is generally not available      Howie Greenwood MD  Acute Pain Service Statement Selected

## 2024-05-13 ENCOUNTER — OFFICE VISIT (OUTPATIENT)
Dept: INTERNAL MEDICINE CLINIC | Facility: CLINIC | Age: 69
End: 2024-05-13
Payer: MEDICARE

## 2024-05-13 VITALS
RESPIRATION RATE: 13 BRPM | WEIGHT: 149 LBS | SYSTOLIC BLOOD PRESSURE: 124 MMHG | TEMPERATURE: 97.6 F | HEIGHT: 61 IN | HEART RATE: 80 BPM | DIASTOLIC BLOOD PRESSURE: 80 MMHG | BODY MASS INDEX: 28.13 KG/M2

## 2024-05-13 DIAGNOSIS — M81.0 AGE-RELATED OSTEOPOROSIS WITHOUT CURRENT PATHOLOGICAL FRACTURE: ICD-10-CM

## 2024-05-13 DIAGNOSIS — Z12.31 ENCOUNTER FOR SCREENING MAMMOGRAM FOR BREAST CANCER: Primary | ICD-10-CM

## 2024-05-13 DIAGNOSIS — E03.9 ACQUIRED HYPOTHYROIDISM: ICD-10-CM

## 2024-05-13 DIAGNOSIS — E55.9 VITAMIN D DEFICIENCY: ICD-10-CM

## 2024-05-13 DIAGNOSIS — M05.742 RHEUMATOID ARTHRITIS WITH RHEUMATOID FACTOR OF LEFT HAND WITHOUT ORGAN OR SYSTEMS INVOLVEMENT (HCC): ICD-10-CM

## 2024-05-13 DIAGNOSIS — Z00.00 MEDICARE ANNUAL WELLNESS VISIT, SUBSEQUENT: ICD-10-CM

## 2024-05-13 DIAGNOSIS — E78.5 HYPERLIPIDEMIA, UNSPECIFIED HYPERLIPIDEMIA TYPE: ICD-10-CM

## 2024-05-13 PROCEDURE — 99214 OFFICE O/P EST MOD 30 MIN: CPT | Performed by: INTERNAL MEDICINE

## 2024-05-13 PROCEDURE — G0439 PPPS, SUBSEQ VISIT: HCPCS | Performed by: INTERNAL MEDICINE

## 2024-05-13 RX ORDER — MULTIVIT-MIN/IRON/FOLIC ACID/K 18-600-40
CAPSULE ORAL
COMMUNITY

## 2024-05-13 NOTE — PROGRESS NOTES
Assessment and Plan:     Past medical history of hypothyroidism, bilateral total knee replacement, osteoarthritis/inflammatory arthritis, colovaginal fistula status post colon resection in 2019, vertigo, bilateral cataracts, osteoporosis     She has had some difficulty with hair loss over the last month.  Hair loss primarily occurring on the crown of the head.  No large patches noted on exam.  We discussed potentially rechecking vitamin D and iron levels, discussed over-the-counter options like topical minoxidil, also supplements such as Nutrafol.  Patient to discuss with her dermatologist    History of colovaginal fistula status post colon resection in 2019.  Recall colonoscopy was suggested in 5 to 10 years.  After discussion today, we think it would be reasonable to have 7-year recall colonoscopy        Problem List Items Addressed This Visit     Acquired hypothyroidism     Euthyroid.  Continue levothyroxine at current dose         Relevant Orders    CBC and differential    Comprehensive metabolic panel    TSH, 3rd generation with Free T4 reflex    Lipid Panel with Direct LDL reflex    Hyperlipidemia     Mild increase in LDL from prior.  10-year ASCVD risk score is borderline.  Would defer any statin therapy at this time.  Continue to monitor         Vitamin D deficiency    Relevant Orders    Vitamin D 25 hydroxy    Rheumatoid arthritis with rheumatoid factor of left hand without organ or systems involvement (HCC)     Previously had positive rheumatoid factor, had ultrasound of the hands which showed primarily osteoarthritis with some superimposed inflammatory arthritis.  Following with rheumatology.  She is not currently on any DMARDs         Age-related osteoporosis without current pathological fracture     Maintained on Reclast, felt very sick after her first infusion appears she was not well-hydrated.  Possible infusion reaction which we discussed can be common after Reclast and may improve with subsequent  infusions.  Continue to follow with rheumatology  -Due for repeat DEXA scan July 2025        Other Visit Diagnoses     Encounter for screening mammogram for breast cancer    -  Primary    Relevant Orders    Mammo screening bilateral w 3d & cad    Medicare annual wellness visit, subsequent               Preventive health issues were discussed with patient, and age appropriate screening tests were ordered as noted in patient's After Visit Summary.  Personalized health advice and appropriate referrals for health education or preventive services given if needed, as noted in patient's After Visit Summary.     History of Present Illness:     Patient presents for a Medicare Wellness Visit    HPI   Patient Care Team:  Chet Morgan DO as PCP - General (Internal Medicine)  Regine Pepe MD as PCP - Endocrinology (Endocrinology)  Aarno Parry MD (Inactive)  Filiberto Zheng MD (Obstetrics and Gynecology)     Review of Systems:     Review of Systems     Problem List:     Patient Active Problem List   Diagnosis   • History of total knee arthroplasty   • Orthopedic aftercare   • Aftercare following left knee joint replacement surgery   • Arthritis of foot, right   • Chronic pain of left knee   • Pes anserine bursitis   • Primary osteoarthritis of left knee   • Acquired hypothyroidism   • Hyperlipidemia   • Elevated hemoglobin A1c   • Vitamin D deficiency   • Arthritis of left knee   • Prediabetes   • Breast lump   • PONV (postoperative nausea and vomiting)   • S/P total knee arthroplasty, left   • Rheumatoid arthritis with rheumatoid factor of left hand without organ or systems involvement (HCC)   • Arthritis of carpometacarpal (CMC) joints of both thumbs   • Primary osteoarthritis of both hands   • Vertigo   • Arthritis of left foot   • Primary osteoarthritis involving multiple joints   • Urgency incontinence   • Age-related osteoporosis without current pathological fracture      Past Medical and Surgical History:     Past  Medical History:   Diagnosis Date   • BPPV (benign paroxysmal positional vertigo)    • Bronchitis    • Disease of thyroid gland     HYPO   • Diverticulitis    • GERD (gastroesophageal reflux disease)    • Glaucoma (increased eye pressure)    •  2 para 2    • IBS (irritable bowel syndrome)    • Insomnia    • PONV (postoperative nausea and vomiting)      Past Surgical History:   Procedure Laterality Date   • CHOLECYSTECTOMY     • COLON SURGERY  2019    resection   • COLONOSCOPY  2019   • COLONOSCOPY     • DILATION AND CURETTAGE OF UTERUS     • FL GUIDED NEEDLE PLAC BX/ASP/INJ  2018   • FL GUIDED NEEDLE PLAC BX/ASP/INJ  2019   • FL GUIDED NEEDLE PLAC BX/ASP/INJ  2020   • FL GUIDED NEEDLE PLAC BX/ASP/INJ  10/8/2020   • FL GUIDED NEEDLE PLAC BX/ASP/INJ  2021   • FL GUIDED NEEDLE PLAC BX/ASP/INJ  2022   • FL GUIDED NEEDLE PLAC BX/ASP/INJ  2022   • HYSTERECTOMY  2006   • JOINT REPLACEMENT     • LAPAROSCOPIC COLON RESECTION  2019   • MAMMO (HISTORICAL) Bilateral 2018   • PARTIAL HYSTERECTOMY     • PA ARTHRP KNE CONDYLE&PLATU MEDIAL&LAT COMPARTMENTS Left 2021    Procedure: ARTHROPLASTY KNEE TOTAL;  Surgeon: Mauricio Mosquera MD;  [facility];  Service: Orthopedics   • PA REVJ TOT KNEE ARTHRP FEM&ENTIRE TIBIAL COMPONE Right 2018    Procedure: ARTHROPLASTY KNEE TOTAL REVISION;  Surgeon: Mauricio Mosquera MD;  Location: [facility];  Service: Orthopedics   • TONSILLECTOMY     • TRABECULOPLASTY, LASER SELECTIVE Left 2019   • VARICOSE VEIN SURGERY        Family History:     Family History   Problem Relation Age of Onset   • Pancreatic cancer Mother 65   • Diabetes type II Mother    • Heart disease Father    • No Known Problems Sister    • No Known Problems Daughter    • No Known Problems Daughter    • No Known Problems Maternal Grandmother    • No Known Problems Maternal Grandfather    • Breast cancer Paternal Grandmother 94   • Lung disease Paternal Grandfather     • No Known Problems Maternal Aunt    • No Known Problems Maternal Aunt    • No Known Problems Maternal Aunt    • No Known Problems Maternal Aunt       Social History:     Social History     Socioeconomic History   • Marital status:      Spouse name: None   • Number of children: None   • Years of education: None   • Highest education level: None   Occupational History   • None   Tobacco Use   • Smoking status: Never   • Smokeless tobacco: Never   Vaping Use   • Vaping status: Never Used   Substance and Sexual Activity   • Alcohol use: Yes     Comment: social    • Drug use: No   • Sexual activity: Yes     Partners: Male     Birth control/protection: Surgical   Other Topics Concern   • None   Social History Narrative    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     Social Determinants of Health     Financial Resource Strain: Low Risk  (5/5/2023)    Overall Financial Resource Strain (CARDIA)    • Difficulty of Paying Living Expenses: Not hard at all   Food Insecurity: No Food Insecurity (5/6/2024)    Hunger Vital Sign    • Worried About Running Out of Food in the Last Year: Never true    • Ran Out of Food in the Last Year: Never true   Transportation Needs: No Transportation Needs (5/6/2024)    PRAPARE - Transportation    • Lack of Transportation (Medical): No    • Lack of Transportation (Non-Medical): No   Physical Activity: Not on file   Stress: Not on file   Social Connections: Not on file   Intimate Partner Violence: Not on file   Housing Stability: Low Risk  (5/6/2024)    Housing Stability Vital Sign    • Unable to Pay for Housing in the Last Year: No    • Number of Places Lived in the Last Year: 1    • Unstable Housing in the Last Year: No      Medications and Allergies:     Current Outpatient Medications   Medication Sig Dispense Refill   • Acetaminophen (TYLENOL 8 HOUR PO) Take 200 mg by mouth as needed     • BLACK ELDERBERRY PO Take by mouth daily     • Cholecalciferol (Vitamin D) 50 MCG  (2000 UT) CAPS Take by mouth     • cycloSPORINE (RESTASIS) 0.05 % ophthalmic emulsion      • DiphenhydrAMINE HCl (BENADRYL PO) Take by mouth daily at bedtime as needed      • gabapentin (NEURONTIN) 100 mg capsule Take 200 mg by mouth daily at bedtime     • ibuprofen (MOTRIN) 800 mg tablet TAKE 1 TABLET BY MOUTH EVERY 8  HOURS AS NEEDED FOR MODERATE  PAIN 90 tablet 1   • levothyroxine 112 mcg tablet TAKE 1 TABLET BY MOUTH DAILY 90 tablet 1   • LUMIGAN 0.01 % ophthalmic drops INSTILL 1 DROP IN EACH EYE EVERY DAY AT BEDTIME  1   • meclizine (ANTIVERT) 12.5 MG tablet Take 1 tablet (12.5 mg total) by mouth as needed for dizziness 30 tablet 0   • Probiotic Product (PROBIOTIC DAILY PO) Take by mouth       No current facility-administered medications for this visit.     Allergies   Allergen Reactions   • Codeine GI Intolerance   • Morphine Itching      Immunizations:     Immunization History   Administered Date(s) Administered   • COVID-19 MODERNA VACC 0.5 ML IM 04/27/2022   • COVID-19 Moderna mRNA Vaccine 12 Yr+ 50 mcg/0.5 mL (Spikevax) 11/26/2023   • COVID-19 PFIZER VACCINE 0.3 ML IM 10/12/2021   • COVID-19 Pfizer Vac BIVALENT Ramírez-sucrose 12 Yr+ IM 09/07/2023   • INFLUENZA 10/02/2013, 10/16/2014, 10/19/2015, 10/25/2016, 11/14/2017, 10/19/2018, 10/08/2019, 10/08/2019, 10/01/2020, 10/12/2021   • Influenza Split 10/11/2010, 09/28/2011, 10/08/2012   • Influenza, Seasonal Vaccine, Quadrivalent, Adjuvanted, .5e 09/07/2023   • Influenza, seasonal, injectable 12/10/2008, 11/17/2009   • Pneumococcal 09/24/2021   • Pneumococcal Conjugate 13-Valent 03/13/2020   • Pneumococcal Polysaccharide PPV23 09/24/2021   • Respiratory Syncytial Virus Vaccine (Recombinant, Adjuvanted) 10/04/2023   • TD (adult) Preservative Free 12/19/2017   • Tdap 10/12/2007   • Tuberculin Skin Test-PPD Intradermal 07/19/2005   • Zoster Vaccine Recombinant 02/17/2020, 06/08/2020   • influenza, injectable, quadrivalent 10/01/2020      Health Maintenance:          Topic Date Due   • Breast Cancer Screening: Mammogram  06/26/2024   • Colorectal Cancer Screening  07/31/2024   • Hepatitis C Screening  Completed         Topic Date Due   • COVID-19 Vaccine (5 - 2023-24 season) 01/21/2024      Medicare Screening Tests and Risk Assessments:     Ronald is here for her Subsequent Wellness visit.     Health Risk Assessment:   Patient rates overall health as very good. Patient feels that their physical health rating is same. Patient is very satisfied with their life. Eyesight was rated as same. Hearing was rated as same. Patient feels that their emotional and mental health rating is same. Patients states they are never, rarely angry. Patient states they are never, rarely unusually tired/fatigued. Pain experienced in the last 7 days has been some. Patient's pain rating has been 3/10. Patient states that she has experienced no weight loss or gain in last 6 months.     Depression Screening:   PHQ-2 Score: 0      Fall Risk Screening:   In the past year, patient has experienced: history of falling in past year      Urinary Incontinence Screening:   Patient has leaked urine accidently in the last six months.     Home Safety:  Patient does not have trouble with stairs inside or outside of their home. Patient has working smoke alarms and has working carbon monoxide detector. Home safety hazards include: none.     Nutrition:   Current diet is Regular.     Medications:   Patient is currently taking over-the-counter supplements. OTC medications include: see medication list. Patient is able to manage medications.     Activities of Daily Living (ADLs)/Instrumental Activities of Daily Living (IADLs):   Walk and transfer into and out of bed and chair?: Yes  Dress and groom yourself?: Yes    Bathe or shower yourself?: Yes    Feed yourself? Yes  Do your laundry/housekeeping?: Yes  Manage your money, pay your bills and track your expenses?: Yes  Make your own meals?: Yes    Do your own shopping?:  Yes    Previous Hospitalizations:   Any hospitalizations or ED visits within the last 12 months?: No      Hospitalization Comments: I sliced my thumb with a mandolin and needed ER treatment to stop the bleeding.    Advance Care Planning:   Living will: Yes    Durable POA for healthcare: Yes    Advanced directive: Yes      Cognitive Screening:   Provider or family/friend/caregiver concerned regarding cognition?: No    PREVENTIVE SCREENINGS      Cardiovascular Screening:    General: Screening Not Indicated and History Lipid Disorder      Diabetes Screening:     General: Screening Current      Colorectal Cancer Screening:     General: Screening Current      Breast Cancer Screening:     General: Screening Current      Cervical Cancer Screening:    General: Screening Not Indicated      Osteoporosis Screening:    General: Screening Not Indicated and History Osteoporosis      Abdominal Aortic Aneurysm (AAA) Screening:        General: Screening Not Indicated      Lung Cancer Screening:     General: Screening Not Indicated      Hepatitis C Screening:    General: Screening Current    Screening, Brief Intervention, and Referral to Treatment (SBIRT)    Screening  Typical number of drinks in a day: 0  Typical number of drinks in a week: 1  Interpretation: Low risk drinking behavior.    AUDIT-C Screenin) How often did you have a drink containing alcohol in the past year? 2 to 4 times a month  2) How many drinks did you have on a typical day when you were drinking in the past year? 1 to 2  3) How often did you have 6 or more drinks on one occasion in the past year? never    AUDIT-C Score: 2  Interpretation: Score 0-2 (female): Negative screen for alcohol misuse    Single Item Drug Screening:  How often have you used an illegal drug (including marijuana) or a prescription medication for non-medical reasons in the past year? never    Single Item Drug Screen Score: 0  Interpretation: Negative screen for possible drug use  "disorder    Brief Intervention  Alcohol & drug use screenings were reviewed. No concerns regarding substance use disorder identified.     No results found.     Physical Exam:     /80 (BP Location: Left arm, Patient Position: Sitting, Cuff Size: Standard)   Pulse 80   Temp 97.6 °F (36.4 °C)   Resp 13   Ht 5' 1\" (1.549 m)   Wt 67.6 kg (149 lb)   LMP  (LMP Unknown)   BMI 28.15 kg/m²     Physical Exam  Cardiovascular:      Rate and Rhythm: Normal rate and regular rhythm.      Heart sounds: Normal heart sounds. No murmur heard.  Pulmonary:      Effort: Pulmonary effort is normal.      Breath sounds: Normal breath sounds. No wheezing, rhonchi or rales.          Chet Morgan, DO  "

## 2024-05-13 NOTE — ASSESSMENT & PLAN NOTE
Mild increase in LDL from prior.  10-year ASCVD risk score is borderline.  Would defer any statin therapy at this time.  Continue to monitor

## 2024-05-13 NOTE — ASSESSMENT & PLAN NOTE
Previously had positive rheumatoid factor, had ultrasound of the hands which showed primarily osteoarthritis with some superimposed inflammatory arthritis.  Following with rheumatology.  She is not currently on any DMARDs

## 2024-05-13 NOTE — ASSESSMENT & PLAN NOTE
Maintained on Reclast, felt very sick after her first infusion appears she was not well-hydrated.  Possible infusion reaction which we discussed can be common after Reclast and may improve with subsequent infusions.  Continue to follow with rheumatology  -Due for repeat DEXA scan July 2025

## 2024-07-03 ENCOUNTER — HOSPITAL ENCOUNTER (OUTPATIENT)
Dept: MAMMOGRAPHY | Facility: MEDICAL CENTER | Age: 69
Discharge: HOME | End: 2024-07-03
Payer: MEDICARE

## 2024-07-03 VITALS — HEIGHT: 61 IN | WEIGHT: 149 LBS | BODY MASS INDEX: 28.13 KG/M2

## 2024-07-03 DIAGNOSIS — Z12.31 ENCOUNTER FOR SCREENING MAMMOGRAM FOR BREAST CANCER: ICD-10-CM

## 2024-07-03 PROCEDURE — 77067 SCR MAMMO BI INCL CAD: CPT

## 2024-07-03 PROCEDURE — 77063 BREAST TOMOSYNTHESIS BI: CPT

## 2024-10-11 ENCOUNTER — APPOINTMENT (OUTPATIENT)
Dept: LAB | Facility: MEDICAL CENTER | Age: 69
End: 2024-10-11
Payer: MEDICARE

## 2024-10-11 DIAGNOSIS — Z79.899 NEED FOR PROPHYLACTIC CHEMOTHERAPY: ICD-10-CM

## 2024-10-11 LAB
CRP SERPL QL: <1 MG/L
ERYTHROCYTE [SEDIMENTATION RATE] IN BLOOD: 10 MM/HOUR (ref 0–29)

## 2024-10-11 PROCEDURE — 36415 COLL VENOUS BLD VENIPUNCTURE: CPT

## 2024-10-11 PROCEDURE — 85652 RBC SED RATE AUTOMATED: CPT

## 2024-10-11 PROCEDURE — 86431 RHEUMATOID FACTOR QUANT: CPT

## 2024-10-11 PROCEDURE — 86140 C-REACTIVE PROTEIN: CPT

## 2024-10-11 PROCEDURE — 86430 RHEUMATOID FACTOR TEST QUAL: CPT

## 2024-10-11 PROCEDURE — 86200 CCP ANTIBODY: CPT

## 2024-10-13 LAB
CRYOGLOB RF SER-ACNC: ABNORMAL [IU]/ML
RHEUMATOID FACT SER QL LA: POSITIVE

## 2024-10-15 LAB — CCP AB SER IA-ACNC: 1.7

## 2024-11-11 DIAGNOSIS — M15.0 PRIMARY OSTEOARTHRITIS INVOLVING MULTIPLE JOINTS: ICD-10-CM

## 2024-11-12 RX ORDER — IBUPROFEN 800 MG/1
TABLET, FILM COATED ORAL
Qty: 180 TABLET | Refills: 1 | Status: SHIPPED | OUTPATIENT
Start: 2024-11-12

## 2024-11-20 ENCOUNTER — ESTABLISHED COMPREHENSIVE EXAM (OUTPATIENT)
Dept: URBAN - METROPOLITAN AREA CLINIC 6 | Facility: CLINIC | Age: 69
End: 2024-11-20

## 2024-11-20 DIAGNOSIS — H40.1131: ICD-10-CM

## 2024-11-20 DIAGNOSIS — Z96.1: ICD-10-CM

## 2024-11-20 DIAGNOSIS — H04.123: ICD-10-CM

## 2024-11-20 PROCEDURE — 92083 EXTENDED VISUAL FIELD XM: CPT

## 2024-11-20 PROCEDURE — 92014 COMPRE OPH EXAM EST PT 1/>: CPT

## 2024-11-20 PROCEDURE — 92250 FUNDUS PHOTOGRAPHY W/I&R: CPT

## 2024-11-20 ASSESSMENT — VISUAL ACUITY
OD_SC: 20/20-1
OS_SC: 20/20
OU_SC: J1+

## 2024-11-20 ASSESSMENT — TONOMETRY
OD_IOP_MMHG: 15
OS_IOP_MMHG: 15

## 2024-12-02 DIAGNOSIS — E03.9 HYPOTHYROIDISM, UNSPECIFIED TYPE: ICD-10-CM

## 2024-12-03 RX ORDER — LEVOTHYROXINE SODIUM 112 UG/1
112 TABLET ORAL DAILY
Qty: 90 TABLET | Refills: 1 | Status: SHIPPED | OUTPATIENT
Start: 2024-12-03

## 2025-01-06 ENCOUNTER — APPOINTMENT (OUTPATIENT)
Dept: LAB | Facility: MEDICAL CENTER | Age: 70
End: 2025-01-06
Payer: MEDICARE

## 2025-01-06 DIAGNOSIS — M81.0 AGE-RELATED OSTEOPOROSIS WITHOUT CURRENT PATHOLOGICAL FRACTURE: ICD-10-CM

## 2025-01-06 LAB
ANION GAP SERPL CALCULATED.3IONS-SCNC: 11 MMOL/L (ref 4–13)
BUN SERPL-MCNC: 19 MG/DL (ref 5–25)
CALCIUM SERPL-MCNC: 9.7 MG/DL (ref 8.4–10.2)
CHLORIDE SERPL-SCNC: 103 MMOL/L (ref 96–108)
CO2 SERPL-SCNC: 27 MMOL/L (ref 21–32)
CREAT SERPL-MCNC: 0.69 MG/DL (ref 0.6–1.3)
GFR SERPL CREATININE-BSD FRML MDRD: 89 ML/MIN/1.73SQ M
GLUCOSE P FAST SERPL-MCNC: 90 MG/DL (ref 65–99)
POTASSIUM SERPL-SCNC: 4.3 MMOL/L (ref 3.5–5.3)
SODIUM SERPL-SCNC: 141 MMOL/L (ref 135–147)

## 2025-01-06 PROCEDURE — 36415 COLL VENOUS BLD VENIPUNCTURE: CPT

## 2025-01-06 PROCEDURE — 80048 BASIC METABOLIC PNL TOTAL CA: CPT

## 2025-01-16 ENCOUNTER — OFFICE VISIT (OUTPATIENT)
Dept: OBGYN CLINIC | Facility: CLINIC | Age: 70
End: 2025-01-16
Payer: MEDICARE

## 2025-01-16 VITALS — WEIGHT: 149 LBS | BODY MASS INDEX: 29.25 KG/M2 | HEIGHT: 60 IN

## 2025-01-16 DIAGNOSIS — M70.61 GREATER TROCHANTERIC BURSITIS OF RIGHT HIP: Primary | ICD-10-CM

## 2025-01-16 PROCEDURE — 99214 OFFICE O/P EST MOD 30 MIN: CPT | Performed by: PHYSICIAN ASSISTANT

## 2025-01-16 PROCEDURE — 20610 DRAIN/INJ JOINT/BURSA W/O US: CPT | Performed by: PHYSICIAN ASSISTANT

## 2025-01-16 RX ORDER — LIDOCAINE HYDROCHLORIDE 10 MG/ML
4 INJECTION, SOLUTION INFILTRATION; PERINEURAL
Status: COMPLETED | OUTPATIENT
Start: 2025-01-16 | End: 2025-01-16

## 2025-01-16 RX ORDER — BETAMETHASONE SODIUM PHOSPHATE AND BETAMETHASONE ACETATE 3; 3 MG/ML; MG/ML
6 INJECTION, SUSPENSION INTRA-ARTICULAR; INTRALESIONAL; INTRAMUSCULAR; SOFT TISSUE
Status: COMPLETED | OUTPATIENT
Start: 2025-01-16 | End: 2025-01-16

## 2025-01-16 RX ADMIN — LIDOCAINE HYDROCHLORIDE 4 ML: 10 INJECTION, SOLUTION INFILTRATION; PERINEURAL at 13:00

## 2025-01-16 RX ADMIN — BETAMETHASONE SODIUM PHOSPHATE AND BETAMETHASONE ACETATE 6 MG: 3; 3 INJECTION, SUSPENSION INTRA-ARTICULAR; INTRALESIONAL; INTRAMUSCULAR; SOFT TISSUE at 13:00

## 2025-01-16 NOTE — PROGRESS NOTES
Patient Name:  Ronald Elliott  MRN:  [id number]    Assessment & Plan     Right hip greater trochanteric bursitis  Patient was offered and accepted a right hip greater trochanteric bursa corticosteroid injection today.  Activities as tolerated modification avoid pain.  Advil as needed.  Patient wishes to follow-up as needed.  Informed patient injections may be repeated every 3 months as indicated for pain.    Chief Complaint     Right hip pain    History of the Present Illness     Ronald Elliott is a 69 y.o. female who reports to the office today for evaluation of her right hip.  She notes an onset of pain a few weeks ago.  She denies any injury or trauma.  She notes lateral based right hip pain with intermittent radiation along the lateral thigh to the knee.  She denies any radiation to the groin.  Pain is worse with direct pressure and increased activity such as walking up and down steps.  She notes increased pain with lying on her right side at night.  She does take ibuprofen intermittently with improvement.  Pain can reach 8 out of 10 in intensity.  No weakness or instability.  No numbness or tingling.  No fevers or chills.  She does have a history of left greater trochanteric bursitis treated with a corticosteroid injection 2 years ago which resolved her pain.    Physical Exam     Ht 5' (1.524 m)   Wt 67.6 kg (149 lb)   LMP  (LMP Unknown)   BMI 29.10 kg/m²     Right wrist deformity.  Skin intact.  No erythema ecchymosis or swelling.  Significant tenderness over the greater trochanter.  No tenderness anterior hip.  Hip range of motion intact and full without significant pain.  Negative logroll test.  Pain noted in the lateral hip with JENI test.  Negative FADIR test.  Positive Sanjeev test.  Sensation intact distally.    Eyes: Anicteric sclerae.  ENT: Trachea midline.  Lungs: Normal respiratory effort.  CV: Capillary refill is less than 2 seconds.  Skin: Intact without erythema.  Lymph: No palpable  lymphadenopathy.  Neuro: Sensation is grossly intact to light touch.  Psych: Mood and affect are appropriate.      Past Medical History:   Diagnosis Date    BPPV (benign paroxysmal positional vertigo)     Bronchitis     Disease of thyroid gland     HYPO    Diverticulitis     Dizziness     From time to time    GERD (gastroesophageal reflux disease)     Glaucoma (increased eye pressure)      2 para 2     IBS (irritable bowel syndrome)     Insomnia     Osteoarthritis     PONV (postoperative nausea and vomiting)     Tinnitus 2013    Worse at night       Past Surgical History:   Procedure Laterality Date    CHOLECYSTECTOMY      COLON SURGERY  2019    resection    COLONOSCOPY  2019    COLONOSCOPY      DILATION AND CURETTAGE OF UTERUS      FL GUIDED NEEDLE PLAC BX/ASP/INJ  2018    FL GUIDED NEEDLE PLAC BX/ASP/INJ  2019    FL GUIDED NEEDLE PLAC BX/ASP/INJ  2020    FL GUIDED NEEDLE PLAC BX/ASP/INJ  10/8/2020    FL GUIDED NEEDLE PLAC BX/ASP/INJ  2021    FL GUIDED NEEDLE PLAC BX/ASP/INJ  2022    FL GUIDED NEEDLE PLAC BX/ASP/INJ  2022    HYSTERECTOMY  2006    JOINT REPLACEMENT      LAPAROSCOPIC COLON RESECTION  2019    MAMMO (HISTORICAL) Bilateral 2018    PARTIAL HYSTERECTOMY      SD ARTHRP KNE CONDYLE&PLATU MEDIAL&LAT COMPARTMENTS Left 2021    Procedure: ARTHROPLASTY KNEE TOTAL;  Surgeon: Mauricio Mosquera MD;  [facility];  Service: Orthopedics    SD REVJ TOT KNEE ARTHRP FEM&ENTIRE TIBIAL COMPONE Right 2018    Procedure: ARTHROPLASTY KNEE TOTAL REVISION;  Surgeon: Mauricio Mosquera MD;  Location[facility];  Service: Orthopedics    TONSILLECTOMY      TRABECULOPLASTY, LASER SELECTIVE Left 2019    VARICOSE VEIN SURGERY         Allergies   Allergen Reactions    Codeine GI Intolerance    Morphine Itching       Current Outpatient Medications on File Prior to Visit   Medication Sig Dispense Refill    Acetaminophen (TYLENOL 8 HOUR PO) Take 200 mg by mouth as  needed      BLACK ELDERBERRY PO Take by mouth daily      Cholecalciferol (Vitamin D) 50 MCG (2000 UT) CAPS Take by mouth      cycloSPORINE (RESTASIS) 0.05 % ophthalmic emulsion       DiphenhydrAMINE HCl (BENADRYL PO) Take by mouth daily at bedtime as needed       fluocinolone acetonide (DERMOTIC) 0.01 % otic oil Administer 3 drops into both ears 2 (two) times a day Once or twice weekly. 20 mL 0    ibuprofen (MOTRIN) 800 mg tablet TAKE 1 TABLET BY MOUTH EVERY 8  HOURS AS NEEDED FOR MODERATE  PAIN 180 tablet 1    levothyroxine 112 mcg tablet TAKE 1 TABLET BY MOUTH DAILY 90 tablet 1    LUMIGAN 0.01 % ophthalmic drops INSTILL 1 DROP IN EACH EYE EVERY DAY AT BEDTIME  1    meclizine (ANTIVERT) 12.5 MG tablet Take 1 tablet (12.5 mg total) by mouth as needed for dizziness 30 tablet 0    Probiotic Product (PROBIOTIC DAILY PO) Take by mouth      gabapentin (NEURONTIN) 100 mg capsule Take 200 mg by mouth daily at bedtime       No current facility-administered medications on file prior to visit.       Social History     Tobacco Use    Smoking status: Never    Smokeless tobacco: Never    Tobacco comments:     No history   Vaping Use    Vaping status: Never Used   Substance Use Topics    Alcohol use: Yes     Alcohol/week: 1.0 - 2.0 standard drink of alcohol     Types: 1 - 2 Standard drinks or equivalent per week     Comment: strictly a social drinker    Drug use: No       Family History   Problem Relation Age of Onset    Pancreatic cancer Mother 65    Diabetes type II Mother     Cancer Mother         Pancreatic    Diabetes Mother     Osteoporosis Mother     Heart disease Father     Heart failure Father         Mitral cakve repair age 87    No Known Problems Sister     No Known Problems Daughter     No Known Problems Daughter     No Known Problems Maternal Grandmother     No Known Problems Maternal Grandfather     Breast cancer Paternal Grandmother 94    Cancer Paternal Grandmother         Breat cancer - breast remivedxat age 93     Lung disease Paternal Grandfather     No Known Problems Maternal Aunt     No Known Problems Maternal Aunt     No Known Problems Maternal Aunt     No Known Problems Maternal Aunt        Review of Systems     As stated in the HPI. All other systems reviewed and are negative.      Large joint arthrocentesis: R greater trochanteric bursa  Procedure Details  Location: hip - R greater trochanteric bursa  Needle size: 22 G  Ultrasound guidance: no  Approach: lateral  Medications administered: 4 mL lidocaine 1 %; 6 mg betamethasone acetate-betamethasone sodium phosphate 6 (3-3) mg/mL    Patient tolerance: patient tolerated the procedure well with no immediate complications  Dressing:  Sterile dressing applied

## 2025-05-02 DIAGNOSIS — M15.0 PRIMARY OSTEOARTHRITIS INVOLVING MULTIPLE JOINTS: ICD-10-CM

## 2025-05-02 DIAGNOSIS — E03.9 HYPOTHYROIDISM, UNSPECIFIED TYPE: ICD-10-CM

## 2025-05-02 RX ORDER — LEVOTHYROXINE SODIUM 112 UG/1
112 TABLET ORAL DAILY
Qty: 90 TABLET | Refills: 1 | Status: SHIPPED | OUTPATIENT
Start: 2025-05-02

## 2025-05-02 RX ORDER — IBUPROFEN 800 MG/1
TABLET, FILM COATED ORAL
Qty: 270 TABLET | Refills: 0 | Status: SHIPPED | OUTPATIENT
Start: 2025-05-02

## 2025-05-12 ENCOUNTER — APPOINTMENT (OUTPATIENT)
Dept: LAB | Facility: MEDICAL CENTER | Age: 70
End: 2025-05-12
Payer: MEDICARE

## 2025-05-12 DIAGNOSIS — E55.9 VITAMIN D DEFICIENCY: ICD-10-CM

## 2025-05-12 DIAGNOSIS — E03.9 ACQUIRED HYPOTHYROIDISM: ICD-10-CM

## 2025-05-12 LAB
25(OH)D3 SERPL-MCNC: 55 NG/ML (ref 30–100)
ALBUMIN SERPL BCG-MCNC: 4.2 G/DL (ref 3.5–5)
ALP SERPL-CCNC: 54 U/L (ref 34–104)
ALT SERPL W P-5'-P-CCNC: 17 U/L (ref 7–52)
ANION GAP SERPL CALCULATED.3IONS-SCNC: 11 MMOL/L (ref 4–13)
AST SERPL W P-5'-P-CCNC: 18 U/L (ref 13–39)
BASOPHILS # BLD AUTO: 0.04 THOUSANDS/ÂΜL (ref 0–0.1)
BASOPHILS NFR BLD AUTO: 1 % (ref 0–1)
BILIRUB SERPL-MCNC: 0.99 MG/DL (ref 0.2–1)
BUN SERPL-MCNC: 24 MG/DL (ref 5–25)
CALCIUM SERPL-MCNC: 9.7 MG/DL (ref 8.4–10.2)
CHLORIDE SERPL-SCNC: 104 MMOL/L (ref 96–108)
CHOLEST SERPL-MCNC: 213 MG/DL (ref ?–200)
CO2 SERPL-SCNC: 26 MMOL/L (ref 21–32)
CREAT SERPL-MCNC: 0.73 MG/DL (ref 0.6–1.3)
EOSINOPHIL # BLD AUTO: 0.12 THOUSAND/ÂΜL (ref 0–0.61)
EOSINOPHIL NFR BLD AUTO: 2 % (ref 0–6)
ERYTHROCYTE [DISTWIDTH] IN BLOOD BY AUTOMATED COUNT: 13.2 % (ref 11.6–15.1)
GFR SERPL CREATININE-BSD FRML MDRD: 83 ML/MIN/1.73SQ M
GLUCOSE P FAST SERPL-MCNC: 97 MG/DL (ref 65–99)
HCT VFR BLD AUTO: 43 % (ref 34.8–46.1)
HDLC SERPL-MCNC: 68 MG/DL
HGB BLD-MCNC: 13.6 G/DL (ref 11.5–15.4)
IMM GRANULOCYTES # BLD AUTO: 0.05 THOUSAND/UL (ref 0–0.2)
IMM GRANULOCYTES NFR BLD AUTO: 1 % (ref 0–2)
LDLC SERPL CALC-MCNC: 117 MG/DL (ref 0–100)
LYMPHOCYTES # BLD AUTO: 1.93 THOUSANDS/ÂΜL (ref 0.6–4.47)
LYMPHOCYTES NFR BLD AUTO: 30 % (ref 14–44)
MCH RBC QN AUTO: 29.6 PG (ref 26.8–34.3)
MCHC RBC AUTO-ENTMCNC: 31.6 G/DL (ref 31.4–37.4)
MCV RBC AUTO: 94 FL (ref 82–98)
MONOCYTES # BLD AUTO: 0.78 THOUSAND/ÂΜL (ref 0.17–1.22)
MONOCYTES NFR BLD AUTO: 12 % (ref 4–12)
NEUTROPHILS # BLD AUTO: 3.54 THOUSANDS/ÂΜL (ref 1.85–7.62)
NEUTS SEG NFR BLD AUTO: 54 % (ref 43–75)
NRBC BLD AUTO-RTO: 0 /100 WBCS
PLATELET # BLD AUTO: 281 THOUSANDS/UL (ref 149–390)
PMV BLD AUTO: 10.3 FL (ref 8.9–12.7)
POTASSIUM SERPL-SCNC: 4.6 MMOL/L (ref 3.5–5.3)
PROT SERPL-MCNC: 6.7 G/DL (ref 6.4–8.4)
RBC # BLD AUTO: 4.59 MILLION/UL (ref 3.81–5.12)
SODIUM SERPL-SCNC: 141 MMOL/L (ref 135–147)
TRIGL SERPL-MCNC: 142 MG/DL (ref ?–150)
TSH SERPL DL<=0.05 MIU/L-ACNC: 3.57 UIU/ML (ref 0.45–4.5)
WBC # BLD AUTO: 6.46 THOUSAND/UL (ref 4.31–10.16)

## 2025-05-12 PROCEDURE — 36415 COLL VENOUS BLD VENIPUNCTURE: CPT

## 2025-05-12 PROCEDURE — 80053 COMPREHEN METABOLIC PANEL: CPT

## 2025-05-12 PROCEDURE — 84443 ASSAY THYROID STIM HORMONE: CPT

## 2025-05-12 PROCEDURE — 80061 LIPID PANEL: CPT

## 2025-05-12 PROCEDURE — 85025 COMPLETE CBC W/AUTO DIFF WBC: CPT

## 2025-05-12 PROCEDURE — 82306 VITAMIN D 25 HYDROXY: CPT

## 2025-05-13 ENCOUNTER — RESULTS FOLLOW-UP (OUTPATIENT)
Dept: INTERNAL MEDICINE CLINIC | Facility: CLINIC | Age: 70
End: 2025-05-13

## 2025-05-15 ENCOUNTER — OFFICE VISIT (OUTPATIENT)
Dept: INTERNAL MEDICINE CLINIC | Facility: CLINIC | Age: 70
End: 2025-05-15
Payer: MEDICARE

## 2025-05-15 VITALS
WEIGHT: 147 LBS | SYSTOLIC BLOOD PRESSURE: 133 MMHG | HEIGHT: 60 IN | TEMPERATURE: 96.5 F | BODY MASS INDEX: 28.86 KG/M2 | HEART RATE: 69 BPM | DIASTOLIC BLOOD PRESSURE: 77 MMHG

## 2025-05-15 DIAGNOSIS — Z00.00 MEDICARE ANNUAL WELLNESS VISIT, SUBSEQUENT: ICD-10-CM

## 2025-05-15 DIAGNOSIS — T75.3XXA MOTION SICKNESS, INITIAL ENCOUNTER: Primary | ICD-10-CM

## 2025-05-15 DIAGNOSIS — K63.2 COLONIC FISTULA: ICD-10-CM

## 2025-05-15 DIAGNOSIS — M05.742 RHEUMATOID ARTHRITIS WITH RHEUMATOID FACTOR OF LEFT HAND WITHOUT ORGAN OR SYSTEMS INVOLVEMENT (HCC): ICD-10-CM

## 2025-05-15 DIAGNOSIS — E03.9 ACQUIRED HYPOTHYROIDISM: ICD-10-CM

## 2025-05-15 DIAGNOSIS — L29.9 EAR ITCH: ICD-10-CM

## 2025-05-15 DIAGNOSIS — E78.5 HYPERLIPIDEMIA, UNSPECIFIED HYPERLIPIDEMIA TYPE: ICD-10-CM

## 2025-05-15 DIAGNOSIS — E55.9 VITAMIN D DEFICIENCY: ICD-10-CM

## 2025-05-15 PROBLEM — R73.09 ELEVATED HEMOGLOBIN A1C: Status: RESOLVED | Noted: 2019-02-13 | Resolved: 2025-05-15

## 2025-05-15 PROCEDURE — 99214 OFFICE O/P EST MOD 30 MIN: CPT | Performed by: INTERNAL MEDICINE

## 2025-05-15 PROCEDURE — G0439 PPPS, SUBSEQ VISIT: HCPCS | Performed by: INTERNAL MEDICINE

## 2025-05-15 PROCEDURE — G2211 COMPLEX E/M VISIT ADD ON: HCPCS | Performed by: INTERNAL MEDICINE

## 2025-05-15 RX ORDER — SCOPOLAMINE 1 MG/3D
1 PATCH, EXTENDED RELEASE TRANSDERMAL
Qty: 20 PATCH | Refills: 0 | Status: SHIPPED | OUTPATIENT
Start: 2025-05-15

## 2025-05-15 RX ORDER — FLUOCINOLONE ACETONIDE 0.11 MG/ML
3 OIL AURICULAR (OTIC) AS NEEDED
Status: SHIPPED
Start: 2025-05-15

## 2025-05-15 NOTE — PROGRESS NOTES
Name: Ronald Elliott      : 1955      MRN: [id number]  Encounter Provider: Chet Morgan DO  Encounter Date: 5/15/2025   Encounter department: [facility] INTERNAL MEDICINE [place]  :  Past medical history of hypothyroidism, bilateral total knee replacement, osteoarthritis/inflammatory arthritis, colovaginal fistula status post colon resection in 2019, vertigo, bilateral cataracts, osteoporosis   Assessment & Plan  Motion sickness, initial encounter  She will be taking upcoming cruise trip to [place] in the fall.  Was prescribed scopolamine patches for a prior trip which adequately treated her motion sickness symptoms.  Scopolamine patches sent to pharmacy per patient request  Orders:  •  scopolamine (TRANSDERM-SCOP) 1 mg/3 days TD 72 hr patch; Place 1 patch on the skin every third day over 72 hours    Rheumatoid arthritis with rheumatoid factor of left hand without organ or systems involvement (HCC)  Previously had positive rheumatoid factor, had ultrasound of the hands which showed primarily osteoarthritis with some superimposed inflammatory arthritis.  Following with rheumatology.  She is not currently on any DMARDs       Vitamin D deficiency    Orders:  •  Vitamin D 25 hydroxy; Future    Acquired hypothyroidism  Euthyroid.  Continue levothyroxine at current dose  Orders:  •  TSH, 3rd generation with Free T4 reflex; Future    Hyperlipidemia, unspecified hyperlipidemia type  LDL remains mildly elevated.  10-year ASCVD risk or is intermediate.  Will continue to hold off on statin treatment.  Continue with healthy dietary and lifestyle choices  Orders:  •  CBC and differential; Future  •  Comprehensive metabolic panel; Future  •  Lipid Panel with Direct LDL reflex; Future    Medicare annual wellness visit, subsequent         Ear itch    Orders:  •  fluocinolone acetonide (DERMOTIC) 0.01 % otic oil; Administer 3 drops into both ears if needed (Ear itching) Once or twice weekly.    Colonic  fistula  Reported history of colovaginal fistula status post resection in 2019.  Recall colonoscopy was suggested in 5 to 10 years per the patient.  Colonoscopy was completed by Dr. Nancy Purcell at [facility].  Will try to obtain records          Preventive health issues were discussed with patient, and age appropriate screening tests were ordered as noted in patient's After Visit Summary. Personalized health advice and appropriate referrals for health education or preventive services given if needed, as noted in patient's After Visit Summary.    History of Present Illness     HPI   Patient Care Team:  Chet Morgan DO as PCP - General (Internal Medicine)  Regine Pepe MD as PCP - Endocrinology (Endocrinology)  MD Filiberto Corbett MD (Obstetrics and Gynecology)    Review of Systems  Medical History Reviewed by provider this encounter:  Meds       Annual Wellness Visit Questionnaire   Ronald is here for her Subsequent Wellness visit.     Health Risk Assessment:   Patient rates overall health as very good. Patient feels that their physical health rating is same. Patient is very satisfied with their life. Eyesight was rated as same. Hearing was rated as same. Patient feels that their emotional and mental health rating is same. Patients states they are never, rarely angry. Patient states they are sometimes unusually tired/fatigued. Pain experienced in the last 7 days has been some. Patient's pain rating has been 4/10. Patient states that she has experienced no weight loss or gain in last 6 months.     Depression Screening:   PHQ-2 Score: 0      Fall Risk Screening:   In the past year, patient has experienced: no history of falling in past year      Urinary Incontinence Screening:   Patient has leaked urine accidently in the last six months.     Home Safety:  Patient does not have trouble with stairs inside or outside of their home. Patient has working smoke alarms and has working carbon monoxide detector.  Home safety hazards include: none.     Nutrition:   Current diet is Regular.     Medications:   Patient is currently taking over-the-counter supplements. OTC medications include: see medication list. Patient is able to manage medications.     Activities of Daily Living (ADLs)/Instrumental Activities of Daily Living (IADLs):   Walk and transfer into and out of bed and chair?: Yes  Dress and groom yourself?: Yes    Bathe or shower yourself?: Yes    Feed yourself? Yes  Do your laundry/housekeeping?: Yes  Manage your money, pay your bills and track your expenses?: Yes  Make your own meals?: Yes    Do your own shopping?: Yes    Durable Medical Equipment Suppliers  N/A    Previous Hospitalizations:   Any hospitalizations or ED visits within the last 12 months?: No      Advance Care Planning:   Living will: Yes    Durable POA for healthcare: Yes    Advanced directive: Yes      Preventive Screenings      Cardiovascular Screening:    General: Screening Not Indicated and History Lipid Disorder      Diabetes Screening:     General: Screening Current      Colorectal Cancer Screening:     General: Screening Current      Breast Cancer Screening:     General: Screening Current      Cervical Cancer Screening:    General: Screening Not Indicated      Osteoporosis Screening:    General: Screening Not Indicated and History Osteoporosis      Abdominal Aortic Aneurysm (AAA) Screening:        General: Screening Not Indicated      Lung Cancer Screening:     General: Screening Not Indicated      Hepatitis C Screening:    General: Screening Current    Screening, Brief Intervention, and Referral to Treatment (SBIRT)     Screening  Typical number of drinks in a day: 0  Typical number of drinks in a week: 1  Interpretation: Low risk drinking behavior.    AUDIT-C Screenin) How often did you have a drink containing alcohol in the past year? 2 to 4 times a month  2) How many drinks did you have on a typical day when you were drinking in the  past year? 1 to 2  3) How often did you have 6 or more drinks on one occasion in the past year? never    AUDIT-C Score: 2  Interpretation: Score 0-2 (female): Negative screen for alcohol misuse    Single Item Drug Screening:  How often have you used an illegal drug (including marijuana) or a prescription medication for non-medical reasons in the past year? never    Single Item Drug Screen Score: 0  Interpretation: Negative screen for possible drug use disorder    Brief Intervention  Alcohol & drug use screenings were reviewed. No concerns regarding substance use disorder identified.     Social Drivers of Health     Financial Resource Strain: Low Risk  (5/5/2023)    Overall Financial Resource Strain (CARDIA)    • Difficulty of Paying Living Expenses: Not hard at all   Food Insecurity: No Food Insecurity (5/10/2025)    Nursing - Inadequate Food Risk Classification    • Worried About Running Out of Food in the Last Year: Never true    • Ran Out of Food in the Last Year: Never true   Transportation Needs: No Transportation Needs (5/10/2025)    PRAPARE - Transportation    • Lack of Transportation (Medical): No    • Lack of Transportation (Non-Medical): No   Housing Stability: Low Risk  (5/10/2025)    Housing Stability Vital Sign    • Unable to Pay for Housing in the Last Year: No    • Number of Times Moved in the Last Year: 0    • Homeless in the Last Year: No   Utilities: Not At Risk (5/10/2025)    [facility] Utilities    • Threatened with loss of utilities: No     No results found.    Objective   /77 (BP Location: Left arm, Patient Position: Sitting, Cuff Size: Standard)   Pulse 69   Temp (!) 96.5 °F (35.8 °C)   Ht 5' (1.524 m)   Wt 66.7 kg (147 lb)   LMP  (LMP Unknown)   BMI 28.71 kg/m²     Physical Exam    Cardiovascular:      Rate and Rhythm: Normal rate and regular rhythm.      Heart sounds: Normal heart sounds. No murmur heard.  Pulmonary:      Effort: Pulmonary effort is normal.      Breath sounds: Normal  breath sounds. No wheezing, rhonchi or rales.

## 2025-05-15 NOTE — ASSESSMENT & PLAN NOTE
Reported history of colovaginal fistula status post resection in 2019.  Recall colonoscopy was suggested in 5 to 10 years per the patient.  Colonoscopy was completed by Dr. Nancy Purcell at [facility].  Will try to obtain records

## 2025-05-15 NOTE — ASSESSMENT & PLAN NOTE
LDL remains mildly elevated.  10-year ASCVD risk or is intermediate.  Will continue to hold off on statin treatment.  Continue with healthy dietary and lifestyle choices  Orders:  •  CBC and differential; Future  •  Comprehensive metabolic panel; Future  •  Lipid Panel with Direct LDL reflex; Future

## 2025-05-16 ENCOUNTER — TELEPHONE (OUTPATIENT)
Dept: ADMINISTRATIVE | Facility: OTHER | Age: 70
End: 2025-05-16

## 2025-05-16 NOTE — TELEPHONE ENCOUNTER
----- Message from Martha MONGE sent at 5/15/2025  2:36 PM EDT -----  Regarding: Colonoscopy  05/15/25 2:36 PMHello, our patient Ronald Elliott has had CRC: Colonoscopy completed/performed. Please assist in updating the patient chart by pulling the Care Everywhere (CE) document under Procedure Tab. The date of service is 7/31/2019. Thank you,MARTHA ALLEN, [facility] INTERNAL [facility]

## 2025-05-16 NOTE — TELEPHONE ENCOUNTER
Upon review of the In Basket request we have found this is a duplicate request and no further action is needed. This request was completed upon initial request, the patient chart is up to date, and this message will now be closed.    Any additional questions or concerns should be emailed to the Practice Liaisons via the appropriate education email address, please do not reply via In Basket.    Thank you  Noy Tapia   [facility]

## 2025-06-02 ENCOUNTER — TELEPHONE (OUTPATIENT)
Age: 70
End: 2025-06-02

## 2025-06-02 DIAGNOSIS — R42 VERTIGO: Primary | ICD-10-CM

## 2025-06-19 ENCOUNTER — EVALUATION (OUTPATIENT)
Dept: PHYSICAL THERAPY | Facility: CLINIC | Age: 70
End: 2025-06-19
Attending: INTERNAL MEDICINE
Payer: MEDICARE

## 2025-06-19 DIAGNOSIS — R42 VERTIGO: ICD-10-CM

## 2025-06-19 PROCEDURE — 97161 PT EVAL LOW COMPLEX 20 MIN: CPT | Performed by: PHYSICAL THERAPIST

## 2025-06-19 PROCEDURE — 97140 MANUAL THERAPY 1/> REGIONS: CPT | Performed by: PHYSICAL THERAPIST

## 2025-06-19 NOTE — PROGRESS NOTES
PT Evaluation     Today's date: 2025  Patient name: Ronald Elliott  : 1955  MRN: [id number]  Referring provider: Chet Morgan DO  Dx:   Encounter Diagnosis     ICD-10-CM    1. Vertigo  R42 Ambulatory Referral to Physical Therapy                     Assessment  Impairments: abnormal or restricted ROM, abnormal movement, activity intolerance, lacks appropriate home exercise program, pain with function, poor posture , participation limitations, activity limitations and endurance  Symptom irritability: low    Assessment details: Pt is a 70 y.o. year old female presenting to physical therapy for Vertigo.  Pt presents with limited cervical ROM, hypomobility, and UT/LS stiffness and hypertonicity limiting her ability to ranjit her neck, look up/down, lifting, and carrying.  She has (-) osvaldo hallpike today, but reports room spinning dizziness with most recent symptoms rolling in bed one week ago.  Pt educated on BPPV and self-Epleys.  She is not functionally limited with the vertigo today, but was encouraged to call for michael if symptoms  Pt will benefit from skilled physical therapy to address functional limitations with cervical spine noted in evaluation and meet patient goals.    Goals  ST. Pt will have min deficits or better with cervical rotation.  2. Pt will meet projected FOTO score.  3. Pt will have (-) osvaldo hallpike with no vertigo symptoms for 2 weeks straight.    Plan  Patient would benefit from: skilled physical therapy and PT eval  Planned modality interventions: biofeedback, cryotherapy, electrical stimulation/Russian stimulation, TENS and thermotherapy: hydrocollator packs    Planned therapy interventions: joint mobilization, abdominal trunk stabilization, canalith repositioning, manual therapy, balance, nerve gliding, neuromuscular re-education, patient/caregiver education, postural training, strengthening, stretching, therapeutic activities, therapeutic exercise, functional ROM  exercises, flexibility and home exercise program    Frequency: 1-2x week  Duration in weeks: 6        Subjective Evaluation    History of Present Illness  Mechanism of injury: Pt reports vertigo starting 16 years ago with intermittent episodes turning in bed.  She usually gets room spinning sensations and takes meclizine when needed.  She was in [place] for 7 weeks in February and flew to [place] and had issues flying with pressure in her ears.  She has been seeing a chiropractor which has helped some with her neck pain and vertigo.  She was kneeling in her garden recently about 3-4 weeks ago and fell forward hitting her head and triggered her vertigo symptoms.  She reports room spinning with rolling in bed, lying back, and turning her head quickly.  She reports some neck pain and has a hx of C6 herniation.          Recurrent probem    Pain  Current pain ratin          Objective     Palpation   Left   Hypertonic in the lower trapezius and upper trapezius.   Tenderness of the lower trapezius and upper trapezius.   Trigger point to lower trapezius and upper trapezius.     Right   Hypertonic in the lower trapezius and upper trapezius.   Tenderness of the lower trapezius and upper trapezius.   Trigger point to lower trapezius.     Active Range of Motion   Cervical/Thoracic Spine       Cervical    Flexion:  Restriction level: moderate  Extension:  Restriction level: moderate  Left lateral flexion:  with pain Restriction level: moderate  Right lateral flexion:  Restriction level moderate  Left rotation:  with pain Restriction level: moderate  Right rotation:  Restriction level: moderate    Joint Play     Hypomobile: C1, C2, C3, C4, C5, C6, C7, T1, T2, T3, T4 and T5     Pain: C2 and C3     Tests   Cervical   Negative cervical distraction.     Left   Positive cervical flexion-rotation test .   Negative Spurling's Test A.     Right   Positive cervical flexion-rotation test.   Negative Spurling's Test A.     Left Shoulder    Negative ULTT1.     Right Shoulder   Negative ULTT1.     Ambulation     Observational Gait   Gait: asymmetric and circumduction   Walking speed and stride length within functional limits.   Neuro Exam:     Oculomotor exam   Oculomotor ROM: WNL  Resting nystagmus: not present   Gaze holding nystagmus: not present left  and not present right  Smooth pursuits: saccadic smooth pursuit  Vertical saccades: hypometric  Horizontal saccades: hypometric   Convergence: 9  Convergence: abnormal  Head thrust: left normal and right normal    Positional testing   Adarsh-Hallpike   Left posterior canal: WNL  Right posterior canal: symptomatic  Roll test   Left horizontal canal: WNL  Right horizontal canal: WNL             Precautions: (-) adarsh hallpike b/l, last symptoms for vertigo was one week ago.    Date 6/19            Visit # IE            FOTO IE             Re-eval IE              Manuals             C/s PROM SF            UT/LS str w STM SF            CTJ mob Gr V                         Neuro Re-Ed             Scap retractions             No money             Prone T's             DNF endurance             Self-elpeys HEP                                      Ther Ex             Rows             Pec str             UT/LS str                                                                              Ther Activity                                       Gait Training                                       Modalities

## 2025-06-26 ENCOUNTER — TELEPHONE (OUTPATIENT)
Age: 70
End: 2025-06-26

## 2025-06-26 NOTE — TELEPHONE ENCOUNTER
Patient called and stated she received a jury duty summons. Patient stated due to her arthritis and her knee replacements she will be unable to complete jury duty. Patient is asking for a letter to be written by her pcp to excuse her from Jury Duty. Please advise and patient provided the following information:    Juror # [id number]  Sequence # [id number]  Date to report: 08/12/2025  [facility]

## 2025-06-27 NOTE — TELEPHONE ENCOUNTER
Called and spoke with patient regarding the jury duty letter. Letting her know that the letter was completed and it's in her MyChart. Also, if she would like a print copy of the jury duty letter she can come to the office to pick it up or we can mail it to her.   Patient understood the message and is grateful. Patient will also be giving the office a call back with the courthouse's fax number so we can fax it over.

## 2025-07-07 ENCOUNTER — HOSPITAL ENCOUNTER (OUTPATIENT)
Dept: MAMMOGRAPHY | Facility: MEDICAL CENTER | Age: 70
Discharge: HOME | End: 2025-07-07
Payer: MEDICARE

## 2025-07-07 VITALS — HEIGHT: 60 IN | WEIGHT: 147 LBS | BODY MASS INDEX: 28.86 KG/M2

## 2025-07-07 DIAGNOSIS — Z12.31 ENCOUNTER FOR SCREENING MAMMOGRAM FOR BREAST CANCER: ICD-10-CM

## 2025-07-07 PROCEDURE — 77067 SCR MAMMO BI INCL CAD: CPT

## 2025-07-07 PROCEDURE — 77063 BREAST TOMOSYNTHESIS BI: CPT

## (undated) DEVICE — DRAPE SHEET X-LG

## (undated) DEVICE — GAUZE SPONGES,16 PLY: Brand: CURITY

## (undated) DEVICE — BETHLEHEM UNIV TOTAL KNEE, KIT: Brand: CARDINAL HEALTH

## (undated) DEVICE — CHLORAPREP HI-LITE 26ML ORANGE

## (undated) DEVICE — DUAL CUT SAGITTAL BLADE

## (undated) DEVICE — RECIPROCATING BLADE, DOUBLE SIDED, OFFSET  (70.0 X 0.64 X 12.6MM)

## (undated) DEVICE — ACE WRAP 6 IN STERILE

## (undated) DEVICE — DRAPE EQUIPMENT RF WAND

## (undated) DEVICE — COOL TEMP PAD

## (undated) DEVICE — SPONGE PVP SCRUB WING STERILE

## (undated) DEVICE — JP 3-SPRING RES W/10FR PVC DRAIN/TR: Brand: CARDINAL HEALTH

## (undated) DEVICE — CUFF TOURNIQUET 30 X 4 IN QUICK CONNECT DISP 1BLA

## (undated) DEVICE — SUT VICRYL PLUS 1 CTB-1 36 IN VCPB947H

## (undated) DEVICE — CAPIT KNEE ATTUNE FB CEMENT - DEPUY

## (undated) DEVICE — 3M™ IOBAN™ 2 ANTIMICROBIAL INCISE DRAPE 6650EZ: Brand: IOBAN™ 2

## (undated) DEVICE — GLOVE INDICATOR PI UNDERGLOVE SZ 8.5 BLUE

## (undated) DEVICE — SPINNING CEMENT MIXING BOWL

## (undated) DEVICE — STOCKINETTE REGULAR

## (undated) DEVICE — PADDING CAST 4 IN  COTTON STRL

## (undated) DEVICE — TRAY FOLEY 16FR URIMETER SURESTEP

## (undated) DEVICE — THE SIMPULSE SOLO SYSTEM WITH ULTREX RETRACTABLE SPLASH SHIELD TIP: Brand: SIMPULSE SOLO

## (undated) DEVICE — PACK TOTAL KNEE PBDS

## (undated) DEVICE — SUT VICRYL PLUS 2-0 CTB-1 27 IN VCPB259H

## (undated) DEVICE — SCD SEQUENTIAL COMPRESSION COMFORT SLEEVE MEDIUM KNEE LENGTH: Brand: KENDALL SCD

## (undated) DEVICE — ABDOMINAL PAD: Brand: DERMACEA

## (undated) DEVICE — SAW GIGLI 12 IN

## (undated) DEVICE — SILVER-COATED ANTIMICROBIAL BARRIER DRESSING: Brand: ACTICOAT   4" X 8"

## (undated) DEVICE — INTENDED FOR TISSUE SEPARATION, AND OTHER PROCEDURES THAT REQUIRE A SHARP SURGICAL BLADE TO PUNCTURE OR CUT.: Brand: BARD-PARKER SAFETY BLADES SIZE 10, STERILE

## (undated) DEVICE — PLUMEPEN PRO 10FT

## (undated) DEVICE — PAD GROUNDING ADULT

## (undated) DEVICE — GLOVE SRG BIOGEL 8

## (undated) DEVICE — ACE WRAP 6 IN UNSTERILE

## (undated) DEVICE — HOOD: Brand: FLYTE, SURGICOOL

## (undated) DEVICE — SPONGE GAUZE 4 X 9

## (undated) DEVICE — RECIPROCATING BLADE, DOUBLE SIDED (70.0 X 0.96 X 12.5MM)

## (undated) DEVICE — NO-SCRATCH ™ SMALL WHITNEY CURETTE ™ IS A SINGLE-USE, PLASTIC CURETTE FOR QUICKLY APPLYING, MANIPULATING AND REMOVING BONE CEMENT DURING HIP AND KNEE REPLACEMENT SURGERY. THE PLASTIC IS SOFTER THAN STEEL INSTRUMENTS, REDUCING THE RISK OF DAMAGING THE PROSTHESIS WITH METAL INSTRUMENTS.  THE CURETTE’S 6MM TIP REMOVES EXCESS CEMENT FROM REPLACEMENT HIPS AND KNEES. EASY-TO-MANEUVER, THE SMALL BLUE CURETTE LETS YOU REMOVE CEMENT FROM ALL EDGES OF THE PROSTHESIS.NO-SCRATCH WHITNEY SMALL CURETTE FEATURES:SAFER THAN STEEL- MADE OF PLASTIC - STURDY YET SOFTER THAN SURGICAL STEEL.HANDIER- EACH TOOL HAS A MOLDED-IN THUMB INDENTATION INSTANTLY ORIENTING THE TOOL.- EASIER TO MANEUVER IN HARD TO SEE PLACES.- COLOR-CODED FOR EASY IDENTIFICATION.FASTER- COMES INDIVIDUALLY PACKAGED IN STERILE, PEEL OPEN POUCH, READY TO GO.- APPLIES, MANIPULATES, OR REMOVES CEMENT WITH FINGERTIP PRECISION.ECONOMICAL- THE COST OF A SINGLE REVISION DWARFS THE COST OF A SINGLE-USE CURETTE. - DISPOSABLE – THERE’S NO NEED TO WASTE TIME REMOVING HARDENED CEMENT OR RE-STERILIZING TOOLS.- LESS EXPENSIVE TO BUY AND INVENTORY - ORDER ONLY THE TOOL YOU USE.- PACKAGED 25 INDIVIDUALLY WRAPPED TOOLS TO A CARTON FOR CONVENIENT SHELF STORAGE.: Brand: WHITNEY NO-SCRATCH CURETTE (SMALL)

## (undated) DEVICE — CAPIT KNEE REVISION FULL W/SLEEVE